# Patient Record
Sex: MALE | Race: WHITE | Employment: OTHER
[De-identification: names, ages, dates, MRNs, and addresses within clinical notes are randomized per-mention and may not be internally consistent; named-entity substitution may affect disease eponyms.]

---

## 2017-01-06 DIAGNOSIS — M54.50 LOW BACK PAIN WITHOUT SCIATICA, UNSPECIFIED BACK PAIN LATERALITY, UNSPECIFIED CHRONICITY: ICD-10-CM

## 2017-01-06 DIAGNOSIS — F41.9 ANXIETY: ICD-10-CM

## 2017-01-10 RX ORDER — CLONAZEPAM 0.5 MG/1
0.5 TABLET ORAL 2 TIMES DAILY PRN
Qty: 60 TABLET | Refills: 0 | Status: SHIPPED | OUTPATIENT
Start: 2017-01-10 | End: 2017-02-10 | Stop reason: SDUPTHER

## 2017-01-10 RX ORDER — HYDROCODONE BITARTRATE AND ACETAMINOPHEN 5; 325 MG/1; MG/1
1 TABLET ORAL DAILY PRN
Qty: 30 TABLET | Refills: 0 | Status: SHIPPED | OUTPATIENT
Start: 2017-01-10 | End: 2017-02-07 | Stop reason: SDUPTHER

## 2017-01-12 ENCOUNTER — OFFICE VISIT (OUTPATIENT)
Dept: INTERNAL MEDICINE | Facility: CLINIC | Age: 42
End: 2017-01-12

## 2017-01-12 VITALS
HEART RATE: 88 BPM | DIASTOLIC BLOOD PRESSURE: 80 MMHG | OXYGEN SATURATION: 96 % | SYSTOLIC BLOOD PRESSURE: 118 MMHG | BODY MASS INDEX: 43.12 KG/M2 | WEIGHT: 301.2 LBS | HEIGHT: 70 IN

## 2017-01-12 DIAGNOSIS — F39 MOOD DISORDER (HCC): ICD-10-CM

## 2017-01-12 DIAGNOSIS — E66.01 MORBID OBESITY DUE TO EXCESS CALORIES (HCC): ICD-10-CM

## 2017-01-12 DIAGNOSIS — I10 BENIGN ESSENTIAL HTN: Primary | ICD-10-CM

## 2017-01-12 PROCEDURE — 99214 OFFICE O/P EST MOD 30 MIN: CPT | Performed by: INTERNAL MEDICINE

## 2017-01-12 ASSESSMENT — PATIENT HEALTH QUESTIONNAIRE - PHQ9
SUM OF ALL RESPONSES TO PHQ9 QUESTIONS 1 & 2: 0
2. FEELING DOWN, DEPRESSED OR HOPELESS: 0
SUM OF ALL RESPONSES TO PHQ QUESTIONS 1-9: 0
1. LITTLE INTEREST OR PLEASURE IN DOING THINGS: 0

## 2017-02-01 RX ORDER — GUAIFENESIN 600 MG/1
600 TABLET, EXTENDED RELEASE ORAL 2 TIMES DAILY
Qty: 20 TABLET | Refills: 0 | Status: SHIPPED | OUTPATIENT
Start: 2017-02-01 | End: 2017-10-31 | Stop reason: ALTCHOICE

## 2017-02-07 DIAGNOSIS — F41.9 ANXIETY: ICD-10-CM

## 2017-02-07 DIAGNOSIS — M54.50 LOW BACK PAIN WITHOUT SCIATICA, UNSPECIFIED BACK PAIN LATERALITY, UNSPECIFIED CHRONICITY: ICD-10-CM

## 2017-02-07 RX ORDER — CLONAZEPAM 0.5 MG/1
0.5 TABLET ORAL 2 TIMES DAILY PRN
Qty: 60 TABLET | Refills: 0 | Status: CANCELLED | OUTPATIENT
Start: 2017-02-07

## 2017-02-10 DIAGNOSIS — F41.9 ANXIETY: ICD-10-CM

## 2017-02-10 DIAGNOSIS — L30.9 DERMATITIS: ICD-10-CM

## 2017-02-13 RX ORDER — CLONAZEPAM 0.5 MG/1
0.5 TABLET ORAL 2 TIMES DAILY PRN
Qty: 60 TABLET | Refills: 0 | Status: SHIPPED | OUTPATIENT
Start: 2017-02-13 | End: 2020-08-07 | Stop reason: SDUPTHER

## 2017-02-13 RX ORDER — HYDROCODONE BITARTRATE AND ACETAMINOPHEN 5; 325 MG/1; MG/1
1 TABLET ORAL DAILY PRN
Qty: 30 TABLET | Refills: 0 | Status: SHIPPED | OUTPATIENT
Start: 2017-02-13 | End: 2017-03-13 | Stop reason: SDUPTHER

## 2017-02-13 RX ORDER — KETOCONAZOLE 20 MG/G
CREAM TOPICAL
Qty: 30 G | Refills: 1 | Status: SHIPPED | OUTPATIENT
Start: 2017-02-13 | End: 2017-07-06 | Stop reason: SDUPTHER

## 2017-02-20 ENCOUNTER — OFFICE VISIT (OUTPATIENT)
Dept: INTERNAL MEDICINE | Facility: CLINIC | Age: 42
End: 2017-02-20

## 2017-02-20 VITALS
WEIGHT: 313 LBS | OXYGEN SATURATION: 96 % | DIASTOLIC BLOOD PRESSURE: 92 MMHG | SYSTOLIC BLOOD PRESSURE: 137 MMHG | HEART RATE: 87 BPM | BODY MASS INDEX: 44.91 KG/M2

## 2017-02-20 DIAGNOSIS — I10 BENIGN ESSENTIAL HTN: Primary | ICD-10-CM

## 2017-02-20 DIAGNOSIS — M54.50 LOW BACK PAIN WITHOUT SCIATICA, UNSPECIFIED BACK PAIN LATERALITY, UNSPECIFIED CHRONICITY: ICD-10-CM

## 2017-02-20 DIAGNOSIS — T14.8XXA SKIN EXCORIATION: ICD-10-CM

## 2017-02-20 DIAGNOSIS — Z20.2 STD EXPOSURE: ICD-10-CM

## 2017-02-20 PROCEDURE — 99214 OFFICE O/P EST MOD 30 MIN: CPT | Performed by: INTERNAL MEDICINE

## 2017-02-20 RX ORDER — ZOLPIDEM TARTRATE 10 MG/1
10 TABLET ORAL NIGHTLY PRN
Qty: 30 TABLET | Refills: 3 | Status: SHIPPED | OUTPATIENT
Start: 2017-02-20 | End: 2017-10-31 | Stop reason: ALTCHOICE

## 2017-02-20 RX ORDER — BUPROPION HYDROCHLORIDE 150 MG/1
TABLET ORAL
Refills: 0 | COMMUNITY
Start: 2017-02-01 | End: 2017-05-08 | Stop reason: ALTCHOICE

## 2017-02-20 RX ORDER — GABAPENTIN 600 MG/1
600 TABLET ORAL 3 TIMES DAILY
Qty: 90 TABLET | Refills: 3 | Status: SHIPPED | OUTPATIENT
Start: 2017-02-20 | End: 2017-06-19 | Stop reason: SDUPTHER

## 2017-02-20 RX ORDER — CLOBETASOL PROPIONATE 0.5 MG/G
CREAM TOPICAL
Qty: 15 G | Refills: 0 | Status: SHIPPED | OUTPATIENT
Start: 2017-02-20 | End: 2017-04-13

## 2017-02-20 RX ORDER — LITHIUM CARBONATE 300 MG/1
CAPSULE ORAL
Refills: 0 | COMMUNITY
Start: 2017-02-01 | End: 2017-05-08 | Stop reason: ALTCHOICE

## 2017-02-20 RX ORDER — GABAPENTIN 600 MG/1
600 TABLET ORAL 3 TIMES DAILY
Qty: 90 TABLET | Refills: 3 | Status: CANCELLED | OUTPATIENT
Start: 2017-02-20

## 2017-03-13 DIAGNOSIS — M54.50 LOW BACK PAIN WITHOUT SCIATICA, UNSPECIFIED BACK PAIN LATERALITY, UNSPECIFIED CHRONICITY: ICD-10-CM

## 2017-03-14 RX ORDER — HYDROCODONE BITARTRATE AND ACETAMINOPHEN 5; 325 MG/1; MG/1
1 TABLET ORAL DAILY PRN
Qty: 30 TABLET | Refills: 0 | Status: SHIPPED | OUTPATIENT
Start: 2017-03-14 | End: 2017-04-13 | Stop reason: SDUPTHER

## 2017-03-14 RX ORDER — MELOXICAM 15 MG/1
15 TABLET ORAL DAILY
Qty: 30 TABLET | Refills: 3 | Status: SHIPPED | OUTPATIENT
Start: 2017-03-14 | End: 2017-08-21 | Stop reason: SDUPTHER

## 2017-04-13 DIAGNOSIS — M54.50 LOW BACK PAIN WITHOUT SCIATICA, UNSPECIFIED BACK PAIN LATERALITY, UNSPECIFIED CHRONICITY: ICD-10-CM

## 2017-04-13 RX ORDER — CLOBETASOL PROPIONATE 0.5 MG/G
OINTMENT TOPICAL
Qty: 1 TUBE | Refills: 5 | Status: SHIPPED | OUTPATIENT
Start: 2017-04-13 | End: 2017-06-29 | Stop reason: SDUPTHER

## 2017-04-13 RX ORDER — HYDROCODONE BITARTRATE AND ACETAMINOPHEN 5; 325 MG/1; MG/1
1 TABLET ORAL DAILY PRN
Qty: 30 TABLET | Refills: 0 | Status: SHIPPED | OUTPATIENT
Start: 2017-04-13 | End: 2017-05-08 | Stop reason: SDUPTHER

## 2017-05-08 ENCOUNTER — HOSPITAL ENCOUNTER (OUTPATIENT)
Age: 42
Setting detail: SPECIMEN
Discharge: HOME | End: 2017-05-08
Payer: COMMERCIAL

## 2017-05-08 ENCOUNTER — OFFICE VISIT (OUTPATIENT)
Dept: INTERNAL MEDICINE | Age: 42
End: 2017-05-08
Payer: COMMERCIAL

## 2017-05-08 VITALS
BODY MASS INDEX: 45.2 KG/M2 | SYSTOLIC BLOOD PRESSURE: 133 MMHG | HEART RATE: 85 BPM | OXYGEN SATURATION: 94 % | DIASTOLIC BLOOD PRESSURE: 90 MMHG | WEIGHT: 315 LBS

## 2017-05-08 DIAGNOSIS — I10 BENIGN ESSENTIAL HTN: Primary | ICD-10-CM

## 2017-05-08 DIAGNOSIS — Z72.0 TOBACCO ABUSE: ICD-10-CM

## 2017-05-08 DIAGNOSIS — E66.01 MORBID OBESITY DUE TO EXCESS CALORIES (HCC): ICD-10-CM

## 2017-05-08 DIAGNOSIS — Z13.1 ENCOUNTER FOR SCREENING FOR DIABETES MELLITUS: ICD-10-CM

## 2017-05-08 DIAGNOSIS — Z23 NEED FOR PROPHYLACTIC VACCINATION AGAINST STREPTOCOCCUS PNEUMONIAE (PNEUMOCOCCUS): ICD-10-CM

## 2017-05-08 DIAGNOSIS — M54.50 LOW BACK PAIN WITHOUT SCIATICA, UNSPECIFIED BACK PAIN LATERALITY, UNSPECIFIED CHRONICITY: ICD-10-CM

## 2017-05-08 LAB — HBA1C MFR BLD: 5 %

## 2017-05-08 PROCEDURE — 90732 PPSV23 VACC 2 YRS+ SUBQ/IM: CPT | Performed by: INTERNAL MEDICINE

## 2017-05-08 PROCEDURE — 83036 HEMOGLOBIN GLYCOSYLATED A1C: CPT | Performed by: INTERNAL MEDICINE

## 2017-05-08 PROCEDURE — 90471 IMMUNIZATION ADMIN: CPT | Performed by: INTERNAL MEDICINE

## 2017-05-08 PROCEDURE — 99214 OFFICE O/P EST MOD 30 MIN: CPT | Performed by: INTERNAL MEDICINE

## 2017-05-08 RX ORDER — RISPERIDONE 1 MG/1
2 TABLET, FILM COATED ORAL 2 TIMES DAILY
Refills: 0 | COMMUNITY
Start: 2017-02-22 | End: 2017-06-19 | Stop reason: ALTCHOICE

## 2017-05-08 RX ORDER — HYDROCODONE BITARTRATE AND ACETAMINOPHEN 5; 325 MG/1; MG/1
1 TABLET ORAL DAILY PRN
Qty: 30 TABLET | Refills: 0 | Status: SHIPPED | OUTPATIENT
Start: 2017-05-08 | End: 2017-06-19 | Stop reason: SDUPTHER

## 2017-05-08 RX ORDER — HYDROCHLOROTHIAZIDE 25 MG/1
25 TABLET ORAL DAILY
Qty: 30 TABLET | Refills: 3 | Status: SHIPPED | OUTPATIENT
Start: 2017-05-08 | End: 2017-09-04 | Stop reason: SDUPTHER

## 2017-05-08 RX ORDER — VALSARTAN 160 MG/1
160 TABLET ORAL DAILY
Qty: 30 TABLET | Refills: 5 | Status: SHIPPED | OUTPATIENT
Start: 2017-05-08 | End: 2017-06-19 | Stop reason: SDUPTHER

## 2017-05-11 LAB
6-ACETYLMORPHINE, UR: NOT DETECTED
7-AMINOCLONAZEPAM, URINE: NOT DETECTED
ALPHA-OH-ALPRAZ, URINE: NOT DETECTED
ALPRAZOLAM, URINE: NOT DETECTED
AMPHETAMINES, URINE: NOT DETECTED
BARBITURATES, URINE: NOT DETECTED
BENZOYLECGONINE, UR: PRESENT
BUPRENORPHINE URINE: NOT DETECTED
CARISOPRODOL, UR: NOT DETECTED
CLONAZEPAM, URINE: NOT DETECTED
CODEINE, URINE: NOT DETECTED
CREATININE URINE: 169.8 MG/DL (ref 20–400)
DIAZEPAM, URINE: NOT DETECTED
DRUGS EXPECTED, UR: NORMAL
EER HI RES INTERP UR: NORMAL
ETHYL GLUCURONIDE UR: PRESENT
FENTANYL URINE: NOT DETECTED
HYDROCODONE, URINE: PRESENT
HYDROMORPHONE, URINE: NOT DETECTED
LORAZEPAM, URINE: NOT DETECTED
MARIJUANA METAB, UR: NOT DETECTED
MDA, UR: NOT DETECTED
MDEA, EVE, UR: NOT DETECTED
MDMA URINE: NOT DETECTED
MEPERIDINE METAB, UR: NOT DETECTED
METHADONE, URINE: NOT DETECTED
METHAMPHETAMINE, URINE: NOT DETECTED
METHYLPHENIDATE: NOT DETECTED
MIDAZOLAM, URINE: NOT DETECTED
MORPHINE URINE: NOT DETECTED
NORBUPRENORPHINE, URINE: NOT DETECTED
NORDIAZEPAM, URINE: NOT DETECTED
NORFENTANYL, URINE: NOT DETECTED
NORHYDROCODONE, URINE: PRESENT
NOROXYCODONE, URINE: NOT DETECTED
NOROXYMORPHONE, URINE: NOT DETECTED
OXAZEPAM, URINE: NOT DETECTED
OXYCODONE URINE: NOT DETECTED
OXYMORPHONE, URINE: NOT DETECTED
PAIN MANAGEMENT DRUG PANEL INTERP, URINE: NORMAL
PAIN MGT DRUG PANEL, HI RES, UR: NORMAL
PCP,URINE: NOT DETECTED
PHENTERMINE, UR: NOT DETECTED
PROPOXYPHENE, URINE: NOT DETECTED
TAPENTADOL, URINE: NOT DETECTED
TAPENTADOL-O-SULFATE, URINE: NOT DETECTED
TEMAZEPAM, URINE: NOT DETECTED
TRAMADOL, URINE: NOT DETECTED
ZOLPIDEM, URINE: NOT DETECTED

## 2017-06-19 ENCOUNTER — OFFICE VISIT (OUTPATIENT)
Dept: INTERNAL MEDICINE | Age: 42
End: 2017-06-19
Payer: COMMERCIAL

## 2017-06-19 VITALS
WEIGHT: 315 LBS | DIASTOLIC BLOOD PRESSURE: 94 MMHG | BODY MASS INDEX: 44.1 KG/M2 | OXYGEN SATURATION: 96 % | SYSTOLIC BLOOD PRESSURE: 136 MMHG | HEART RATE: 87 BPM | HEIGHT: 71 IN

## 2017-06-19 DIAGNOSIS — Z72.0 TOBACCO ABUSE: Primary | ICD-10-CM

## 2017-06-19 DIAGNOSIS — I10 BENIGN ESSENTIAL HTN: ICD-10-CM

## 2017-06-19 DIAGNOSIS — M54.50 LOW BACK PAIN WITHOUT SCIATICA, UNSPECIFIED BACK PAIN LATERALITY, UNSPECIFIED CHRONICITY: ICD-10-CM

## 2017-06-19 PROCEDURE — 99214 OFFICE O/P EST MOD 30 MIN: CPT | Performed by: INTERNAL MEDICINE

## 2017-06-19 RX ORDER — GABAPENTIN 600 MG/1
600 TABLET ORAL 3 TIMES DAILY
Qty: 90 TABLET | Refills: 3 | Status: SHIPPED | OUTPATIENT
Start: 2017-06-19 | End: 2017-10-31 | Stop reason: SDUPTHER

## 2017-06-19 RX ORDER — HYDROCODONE BITARTRATE AND ACETAMINOPHEN 5; 325 MG/1; MG/1
1 TABLET ORAL DAILY PRN
Qty: 30 TABLET | Refills: 0 | Status: CANCELLED | OUTPATIENT
Start: 2017-06-19

## 2017-06-19 RX ORDER — ARIPIPRAZOLE 10 MG/1
20 TABLET ORAL DAILY
COMMUNITY
End: 2018-06-07 | Stop reason: ALTCHOICE

## 2017-06-19 RX ORDER — VALSARTAN 320 MG/1
320 TABLET ORAL DAILY
Qty: 30 TABLET | Refills: 5 | Status: SHIPPED | OUTPATIENT
Start: 2017-06-19 | End: 2017-10-31 | Stop reason: ALTCHOICE

## 2017-06-19 RX ORDER — HYDROCODONE BITARTRATE AND ACETAMINOPHEN 5; 325 MG/1; MG/1
1 TABLET ORAL DAILY PRN
Qty: 30 TABLET | Refills: 0 | Status: SHIPPED | OUTPATIENT
Start: 2017-06-19 | End: 2017-07-19 | Stop reason: SDUPTHER

## 2017-06-19 ASSESSMENT — ENCOUNTER SYMPTOMS
PHOTOPHOBIA: 0
ABDOMINAL DISTENTION: 0
CHEST TIGHTNESS: 0
ABDOMINAL PAIN: 0
APNEA: 0
BACK PAIN: 0

## 2017-06-29 RX ORDER — CLOBETASOL PROPIONATE 0.5 MG/G
OINTMENT TOPICAL
Qty: 1 TUBE | Refills: 5 | Status: SHIPPED | OUTPATIENT
Start: 2017-06-29 | End: 2018-03-22 | Stop reason: ALTCHOICE

## 2017-07-06 DIAGNOSIS — L30.9 DERMATITIS: ICD-10-CM

## 2017-07-10 RX ORDER — KETOCONAZOLE 20 MG/G
CREAM TOPICAL
Qty: 30 G | Refills: 2 | Status: SHIPPED | OUTPATIENT
Start: 2017-07-10 | End: 2017-11-17 | Stop reason: SDUPTHER

## 2017-07-19 DIAGNOSIS — M54.50 LOW BACK PAIN WITHOUT SCIATICA, UNSPECIFIED BACK PAIN LATERALITY, UNSPECIFIED CHRONICITY: ICD-10-CM

## 2017-07-20 RX ORDER — HYDROCODONE BITARTRATE AND ACETAMINOPHEN 5; 325 MG/1; MG/1
1 TABLET ORAL DAILY PRN
Qty: 30 TABLET | Refills: 0 | Status: SHIPPED | OUTPATIENT
Start: 2017-07-20 | End: 2017-08-21 | Stop reason: SDUPTHER

## 2017-08-21 DIAGNOSIS — M54.50 LOW BACK PAIN WITHOUT SCIATICA, UNSPECIFIED BACK PAIN LATERALITY, UNSPECIFIED CHRONICITY: ICD-10-CM

## 2017-08-21 RX ORDER — MELOXICAM 15 MG/1
TABLET ORAL
Qty: 30 TABLET | Refills: 3 | Status: SHIPPED | OUTPATIENT
Start: 2017-08-21 | End: 2017-11-15 | Stop reason: ALTCHOICE

## 2017-08-21 RX ORDER — HYDROCODONE BITARTRATE AND ACETAMINOPHEN 5; 325 MG/1; MG/1
TABLET ORAL
Qty: 30 TABLET | Refills: 0 | Status: SHIPPED | OUTPATIENT
Start: 2017-08-21 | End: 2017-10-31 | Stop reason: SDUPTHER

## 2017-09-04 DIAGNOSIS — I10 BENIGN ESSENTIAL HTN: ICD-10-CM

## 2017-09-05 RX ORDER — HYDROCHLOROTHIAZIDE 25 MG/1
TABLET ORAL
Qty: 30 TABLET | Refills: 3 | Status: SHIPPED | OUTPATIENT
Start: 2017-09-05 | End: 2017-10-31 | Stop reason: ALTCHOICE

## 2017-09-22 ENCOUNTER — HOSPITAL ENCOUNTER (OUTPATIENT)
Age: 42
Setting detail: SPECIMEN
Discharge: HOME | End: 2017-09-22
Payer: COMMERCIAL

## 2017-09-22 LAB
-: NORMAL
ABSOLUTE EOS #: 0.1 K/UL (ref 0–0.4)
ABSOLUTE LYMPH #: 2.5 K/UL (ref 1–4.8)
ABSOLUTE MONO #: 1.1 K/UL (ref 0.2–0.8)
AMORPHOUS: NORMAL
BACTERIA: NORMAL
BASOPHILS # BLD: 1 %
BASOPHILS ABSOLUTE: 0.1 K/UL (ref 0–0.2)
BILIRUBIN URINE: NEGATIVE
C DIFFICILE TOXINS, PCR: NORMAL
CASTS UA: NORMAL /LPF (ref 0–8)
COLOR: YELLOW
COMMENT UA: ABNORMAL
CRYSTALS, UA: NORMAL /HPF
DIFFERENTIAL TYPE: ABNORMAL
EOSINOPHILS RELATIVE PERCENT: 1 %
EPITHELIAL CELLS UA: NORMAL /HPF (ref 0–5)
GLUCOSE URINE: NEGATIVE
HCT VFR BLD CALC: 42.4 % (ref 41–53)
HEMOGLOBIN: 13.8 G/DL (ref 13.5–17.5)
INR BLD: 1
KETONES, URINE: NEGATIVE
LEUKOCYTE ESTERASE, URINE: ABNORMAL
LYMPHOCYTES # BLD: 16 %
MAGNESIUM: 2.3 MG/DL (ref 1.6–2.6)
MCH RBC QN AUTO: 29.8 PG (ref 26–34)
MCHC RBC AUTO-ENTMCNC: 32.5 G/DL (ref 31–37)
MCV RBC AUTO: 91.7 FL (ref 80–100)
MONOCYTES # BLD: 7 %
MRSA, DNA, NASAL: NORMAL
MUCUS: NORMAL
NITRITE, URINE: NEGATIVE
OTHER OBSERVATIONS UA: NORMAL
PARTIAL THROMBOPLASTIN TIME: 27.3 SEC (ref 23–31)
PDW BLD-RTO: 14.6 % (ref 11.5–14.5)
PH UA: 5.5 (ref 5–8)
PLATELET # BLD: 203 K/UL (ref 130–400)
PLATELET ESTIMATE: ABNORMAL
PMV BLD AUTO: 9.8 FL (ref 6–12)
PROTEIN UA: NEGATIVE
PROTHROMBIN TIME: 10 SEC (ref 9.7–11.6)
RBC # BLD: 4.62 M/UL (ref 4.5–5.9)
RBC # BLD: ABNORMAL 10*6/UL
RBC UA: NORMAL /HPF (ref 0–4)
RENAL EPITHELIAL, UA: NORMAL /HPF
SEG NEUTROPHILS: 75 %
SEGMENTED NEUTROPHILS ABSOLUTE COUNT: 11.8 K/UL (ref 1.8–7.7)
SPECIFIC GRAVITY UA: 1.03 (ref 1–1.03)
SPECIMEN DESCRIPTION: NORMAL
SPECIMEN DESCRIPTION: NORMAL
TRICHOMONAS: NORMAL
TURBIDITY: CLEAR
URINE HGB: NEGATIVE
UROBILINOGEN, URINE: NORMAL
WBC # BLD: 15.6 K/UL (ref 3.5–11)
WBC # BLD: ABNORMAL 10*3/UL
WBC UA: NORMAL /HPF (ref 0–5)
YEAST: NORMAL

## 2017-09-22 PROCEDURE — 86403 PARTICLE AGGLUT ANTBDY SCRN: CPT

## 2017-09-22 PROCEDURE — 87641 MR-STAPH DNA AMP PROBE: CPT

## 2017-09-22 PROCEDURE — 81001 URINALYSIS AUTO W/SCOPE: CPT

## 2017-09-22 PROCEDURE — 83735 ASSAY OF MAGNESIUM: CPT

## 2017-09-22 PROCEDURE — 87186 SC STD MICRODIL/AGAR DIL: CPT

## 2017-09-22 PROCEDURE — 85025 COMPLETE CBC W/AUTO DIFF WBC: CPT

## 2017-09-22 PROCEDURE — 85610 PROTHROMBIN TIME: CPT

## 2017-09-22 PROCEDURE — 87081 CULTURE SCREEN ONLY: CPT

## 2017-09-22 PROCEDURE — 87086 URINE CULTURE/COLONY COUNT: CPT

## 2017-09-22 PROCEDURE — 87070 CULTURE OTHR SPECIMN AEROBIC: CPT

## 2017-09-22 PROCEDURE — 85730 THROMBOPLASTIN TIME PARTIAL: CPT

## 2017-09-22 PROCEDURE — 87493 C DIFF AMPLIFIED PROBE: CPT

## 2017-09-22 PROCEDURE — 87205 SMEAR GRAM STAIN: CPT

## 2017-09-22 PROCEDURE — 36415 COLL VENOUS BLD VENIPUNCTURE: CPT

## 2017-09-23 LAB
CULTURE: NO GROWTH
CULTURE: NORMAL
Lab: NORMAL
SPECIMEN DESCRIPTION: NORMAL
STATUS: NORMAL

## 2017-09-24 ENCOUNTER — HOSPITAL ENCOUNTER (OUTPATIENT)
Age: 42
Setting detail: SPECIMEN
Discharge: HOME | End: 2017-09-24
Payer: COMMERCIAL

## 2017-09-24 LAB
CULTURE: ABNORMAL
CULTURE: NORMAL
DIRECT EXAM: ABNORMAL
HCT VFR BLD CALC: 44.8 % (ref 41–53)
HEMOGLOBIN: 14.5 G/DL (ref 13.5–17.5)
Lab: ABNORMAL
Lab: ABNORMAL
Lab: NORMAL
MCH RBC QN AUTO: 29.9 PG (ref 26–34)
MCHC RBC AUTO-ENTMCNC: 32.3 G/DL (ref 31–37)
MCV RBC AUTO: 92.5 FL (ref 80–100)
ORGANISM: ABNORMAL
PDW BLD-RTO: 13.9 % (ref 11.5–14.5)
PLATELET # BLD: 208 K/UL (ref 130–400)
PMV BLD AUTO: 9.6 FL (ref 6–12)
RBC # BLD: 4.84 M/UL (ref 4.5–5.9)
SPECIMEN DESCRIPTION: ABNORMAL
SPECIMEN DESCRIPTION: ABNORMAL
SPECIMEN DESCRIPTION: NORMAL
STATUS: ABNORMAL
STATUS: NORMAL
STATUS: NORMAL
WBC # BLD: 12.1 K/UL (ref 3.5–11)

## 2017-09-24 PROCEDURE — 85027 COMPLETE CBC AUTOMATED: CPT

## 2017-09-24 PROCEDURE — 36415 COLL VENOUS BLD VENIPUNCTURE: CPT

## 2017-09-28 ENCOUNTER — HOSPITAL ENCOUNTER (OUTPATIENT)
Age: 42
Setting detail: SPECIMEN
Discharge: HOME | End: 2017-09-28
Payer: COMMERCIAL

## 2017-09-29 ENCOUNTER — HOSPITAL ENCOUNTER (OUTPATIENT)
Age: 42
Setting detail: SPECIMEN
Discharge: HOME | End: 2017-09-29
Payer: COMMERCIAL

## 2017-09-29 LAB
ABSOLUTE EOS #: 0.3 K/UL (ref 0–0.4)
ABSOLUTE LYMPH #: 1.3 K/UL (ref 1–4.8)
ABSOLUTE MONO #: 0.6 K/UL (ref 0.2–0.8)
ANION GAP SERPL CALCULATED.3IONS-SCNC: 15 MMOL/L (ref 9–17)
BASOPHILS # BLD: 0 %
BASOPHILS ABSOLUTE: 0 K/UL (ref 0–0.2)
BUN BLDV-MCNC: 31 MG/DL (ref 6–20)
BUN/CREAT BLD: 39 (ref 9–20)
CALCIUM SERPL-MCNC: 9.7 MG/DL (ref 8.6–10.4)
CHLORIDE BLD-SCNC: 108 MMOL/L (ref 98–107)
CO2: 24 MMOL/L (ref 20–31)
CREAT SERPL-MCNC: 0.79 MG/DL (ref 0.7–1.2)
DIFFERENTIAL TYPE: ABNORMAL
EOSINOPHILS RELATIVE PERCENT: 2 %
GFR AFRICAN AMERICAN: >60 ML/MIN
GFR NON-AFRICAN AMERICAN: >60 ML/MIN
GFR SERPL CREATININE-BSD FRML MDRD: ABNORMAL ML/MIN/{1.73_M2}
GFR SERPL CREATININE-BSD FRML MDRD: ABNORMAL ML/MIN/{1.73_M2}
GLUCOSE BLD-MCNC: 126 MG/DL (ref 70–99)
HCT VFR BLD CALC: 43.7 % (ref 41–53)
HEMOGLOBIN: 14.6 G/DL (ref 13.5–17.5)
INR BLD: 1
LYMPHOCYTES # BLD: 12 %
MCH RBC QN AUTO: 30.2 PG (ref 26–34)
MCHC RBC AUTO-ENTMCNC: 33.3 G/DL (ref 31–37)
MCV RBC AUTO: 90.8 FL (ref 80–100)
MONOCYTES # BLD: 6 %
PARTIAL THROMBOPLASTIN TIME: 28.2 SEC (ref 23–31)
PDW BLD-RTO: 13.4 % (ref 11.5–14.5)
PLATELET # BLD: 180 K/UL (ref 130–400)
PLATELET ESTIMATE: ABNORMAL
PMV BLD AUTO: ABNORMAL FL (ref 6–12)
POTASSIUM SERPL-SCNC: 3.7 MMOL/L (ref 3.7–5.3)
PROTHROMBIN TIME: 10.6 SEC (ref 9.7–11.6)
RBC # BLD: 4.82 M/UL (ref 4.5–5.9)
RBC # BLD: ABNORMAL 10*6/UL
SEG NEUTROPHILS: 80 %
SEGMENTED NEUTROPHILS ABSOLUTE COUNT: 8.5 K/UL (ref 1.8–7.7)
SODIUM BLD-SCNC: 147 MMOL/L (ref 135–144)
WBC # BLD: 10.7 K/UL (ref 3.5–11)
WBC # BLD: ABNORMAL 10*3/UL

## 2017-09-29 PROCEDURE — 85730 THROMBOPLASTIN TIME PARTIAL: CPT

## 2017-09-29 PROCEDURE — 87081 CULTURE SCREEN ONLY: CPT

## 2017-09-29 PROCEDURE — 36415 COLL VENOUS BLD VENIPUNCTURE: CPT

## 2017-09-29 PROCEDURE — 87077 CULTURE AEROBIC IDENTIFY: CPT

## 2017-09-29 PROCEDURE — 85610 PROTHROMBIN TIME: CPT

## 2017-09-29 PROCEDURE — 85025 COMPLETE CBC W/AUTO DIFF WBC: CPT

## 2017-09-29 PROCEDURE — 87641 MR-STAPH DNA AMP PROBE: CPT

## 2017-09-29 PROCEDURE — 80048 BASIC METABOLIC PNL TOTAL CA: CPT

## 2017-09-30 ENCOUNTER — HOSPITAL ENCOUNTER (OUTPATIENT)
Age: 42
Setting detail: SPECIMEN
Discharge: HOME | End: 2017-09-30
Payer: COMMERCIAL

## 2017-09-30 LAB
CULTURE: ABNORMAL
CULTURE: NORMAL
Lab: ABNORMAL
Lab: ABNORMAL
Lab: NORMAL
Lab: NORMAL
MRSA, DNA, NASAL: ABNORMAL
SPECIMEN DESCRIPTION: ABNORMAL
SPECIMEN DESCRIPTION: NORMAL
SPECIMEN DESCRIPTION: NORMAL
STATUS: ABNORMAL
STATUS: NORMAL

## 2017-09-30 PROCEDURE — 80053 COMPREHEN METABOLIC PANEL: CPT

## 2017-09-30 PROCEDURE — 85027 COMPLETE CBC AUTOMATED: CPT

## 2017-09-30 PROCEDURE — 36415 COLL VENOUS BLD VENIPUNCTURE: CPT

## 2017-10-01 ENCOUNTER — HOSPITAL ENCOUNTER (OUTPATIENT)
Age: 42
Setting detail: SPECIMEN
Discharge: HOME | End: 2017-10-01
Payer: COMMERCIAL

## 2017-10-01 LAB
ALBUMIN SERPL-MCNC: 3.3 G/DL (ref 3.5–5.2)
ALBUMIN/GLOBULIN RATIO: ABNORMAL (ref 1–2.5)
ALP BLD-CCNC: 63 U/L (ref 40–129)
ALT SERPL-CCNC: 39 U/L (ref 5–41)
ANION GAP SERPL CALCULATED.3IONS-SCNC: 15 MMOL/L (ref 9–17)
AST SERPL-CCNC: 18 U/L
BILIRUB SERPL-MCNC: 0.5 MG/DL (ref 0.3–1.2)
BUN BLDV-MCNC: 32 MG/DL (ref 6–20)
BUN/CREAT BLD: 40 (ref 9–20)
CALCIUM SERPL-MCNC: 10.1 MG/DL (ref 8.6–10.4)
CHLORIDE BLD-SCNC: 104 MMOL/L (ref 98–107)
CO2: 29 MMOL/L (ref 20–31)
CREAT SERPL-MCNC: 0.8 MG/DL (ref 0.7–1.2)
CULTURE: NORMAL
GFR AFRICAN AMERICAN: >60 ML/MIN
GFR NON-AFRICAN AMERICAN: >60 ML/MIN
GFR SERPL CREATININE-BSD FRML MDRD: ABNORMAL ML/MIN/{1.73_M2}
GFR SERPL CREATININE-BSD FRML MDRD: ABNORMAL ML/MIN/{1.73_M2}
GLUCOSE BLD-MCNC: 121 MG/DL (ref 70–99)
HCT VFR BLD CALC: 40.5 % (ref 41–53)
HCT VFR BLD CALC: 43.6 % (ref 41–53)
HEMOGLOBIN: 13.4 G/DL (ref 13.5–17.5)
HEMOGLOBIN: 14.3 G/DL (ref 13.5–17.5)
Lab: NORMAL
MCH RBC QN AUTO: 30 PG (ref 26–34)
MCH RBC QN AUTO: 30.5 PG (ref 26–34)
MCHC RBC AUTO-ENTMCNC: 32.8 G/DL (ref 31–37)
MCHC RBC AUTO-ENTMCNC: 33.1 G/DL (ref 31–37)
MCV RBC AUTO: 91.6 FL (ref 80–100)
MCV RBC AUTO: 91.9 FL (ref 80–100)
PDW BLD-RTO: 13.6 % (ref 11.5–14.5)
PDW BLD-RTO: 13.9 % (ref 11.5–14.5)
PLATELET # BLD: 141 K/UL (ref 130–400)
PLATELET # BLD: 89 K/UL (ref 130–400)
PMV BLD AUTO: ABNORMAL FL (ref 6–12)
PMV BLD AUTO: NORMAL FL (ref 6–12)
POTASSIUM SERPL-SCNC: 4.1 MMOL/L (ref 3.7–5.3)
RBC # BLD: 4.41 M/UL (ref 4.5–5.9)
RBC # BLD: 4.77 M/UL (ref 4.5–5.9)
SODIUM BLD-SCNC: 148 MMOL/L (ref 135–144)
SPECIMEN DESCRIPTION: NORMAL
STATUS: NORMAL
STATUS: NORMAL
TOTAL PROTEIN: 7.3 G/DL (ref 6.4–8.3)
WBC # BLD: 7.9 K/UL (ref 3.5–11)
WBC # BLD: 8.2 K/UL (ref 3.5–11)

## 2017-10-01 PROCEDURE — 85027 COMPLETE CBC AUTOMATED: CPT

## 2017-10-01 PROCEDURE — 36415 COLL VENOUS BLD VENIPUNCTURE: CPT

## 2017-10-02 ENCOUNTER — HOSPITAL ENCOUNTER (OUTPATIENT)
Age: 42
Setting detail: SPECIMEN
Discharge: HOME | End: 2017-10-02
Payer: COMMERCIAL

## 2017-10-02 LAB
ANION GAP SERPL CALCULATED.3IONS-SCNC: 9 MMOL/L (ref 9–17)
BUN BLDV-MCNC: 22 MG/DL (ref 6–20)
BUN/CREAT BLD: 32 (ref 9–20)
CALCIUM SERPL-MCNC: 9.3 MG/DL (ref 8.6–10.4)
CHLORIDE BLD-SCNC: 105 MMOL/L (ref 98–107)
CO2: 31 MMOL/L (ref 20–31)
CREAT SERPL-MCNC: 0.68 MG/DL (ref 0.7–1.2)
GFR AFRICAN AMERICAN: >60 ML/MIN
GFR NON-AFRICAN AMERICAN: >60 ML/MIN
GFR SERPL CREATININE-BSD FRML MDRD: ABNORMAL ML/MIN/{1.73_M2}
GFR SERPL CREATININE-BSD FRML MDRD: ABNORMAL ML/MIN/{1.73_M2}
GLUCOSE BLD-MCNC: 108 MG/DL (ref 70–99)
POTASSIUM SERPL-SCNC: 3.6 MMOL/L (ref 3.7–5.3)
SODIUM BLD-SCNC: 145 MMOL/L (ref 135–144)

## 2017-10-02 PROCEDURE — 80048 BASIC METABOLIC PNL TOTAL CA: CPT

## 2017-10-02 PROCEDURE — 36415 COLL VENOUS BLD VENIPUNCTURE: CPT

## 2017-10-03 ENCOUNTER — HOSPITAL ENCOUNTER (OUTPATIENT)
Age: 42
Setting detail: SPECIMEN
Discharge: HOME | End: 2017-10-03
Payer: COMMERCIAL

## 2017-10-03 LAB
ANION GAP SERPL CALCULATED.3IONS-SCNC: 14 MMOL/L (ref 9–17)
BUN BLDV-MCNC: 18 MG/DL (ref 6–20)
BUN/CREAT BLD: 29 (ref 9–20)
CALCIUM SERPL-MCNC: 9.2 MG/DL (ref 8.6–10.4)
CHLORIDE BLD-SCNC: 97 MMOL/L (ref 98–107)
CO2: 28 MMOL/L (ref 20–31)
CREAT SERPL-MCNC: 0.62 MG/DL (ref 0.7–1.2)
GFR AFRICAN AMERICAN: >60 ML/MIN
GFR NON-AFRICAN AMERICAN: >60 ML/MIN
GFR SERPL CREATININE-BSD FRML MDRD: ABNORMAL ML/MIN/{1.73_M2}
GFR SERPL CREATININE-BSD FRML MDRD: ABNORMAL ML/MIN/{1.73_M2}
GLUCOSE BLD-MCNC: 91 MG/DL (ref 70–99)
HCT VFR BLD CALC: 36.2 % (ref 41–53)
HEMOGLOBIN: 12.2 G/DL (ref 13.5–17.5)
MCH RBC QN AUTO: 30.1 PG (ref 26–34)
MCHC RBC AUTO-ENTMCNC: 33.6 G/DL (ref 31–37)
MCV RBC AUTO: 89.6 FL (ref 80–100)
PDW BLD-RTO: 13.6 % (ref 11.5–14.5)
PLATELET # BLD: 161 K/UL (ref 130–400)
PMV BLD AUTO: 10 FL (ref 6–12)
POTASSIUM SERPL-SCNC: 3.7 MMOL/L (ref 3.7–5.3)
RBC # BLD: 4.04 M/UL (ref 4.5–5.9)
SODIUM BLD-SCNC: 139 MMOL/L (ref 135–144)
WBC # BLD: 6.2 K/UL (ref 3.5–11)

## 2017-10-03 PROCEDURE — 36415 COLL VENOUS BLD VENIPUNCTURE: CPT

## 2017-10-03 PROCEDURE — 85027 COMPLETE CBC AUTOMATED: CPT

## 2017-10-03 PROCEDURE — 80048 BASIC METABOLIC PNL TOTAL CA: CPT

## 2017-10-04 ENCOUNTER — HOSPITAL ENCOUNTER (OUTPATIENT)
Age: 42
Setting detail: SPECIMEN
Discharge: HOME | End: 2017-10-04
Payer: COMMERCIAL

## 2017-10-04 LAB
AMYLASE: 136 U/L (ref 28–100)
ANION GAP SERPL CALCULATED.3IONS-SCNC: 10 MMOL/L (ref 9–17)
BUN BLDV-MCNC: 11 MG/DL (ref 6–20)
BUN/CREAT BLD: 18 (ref 9–20)
CALCIUM SERPL-MCNC: 8.8 MG/DL (ref 8.6–10.4)
CHLORIDE BLD-SCNC: 102 MMOL/L (ref 98–107)
CO2: 29 MMOL/L (ref 20–31)
CREAT SERPL-MCNC: 0.6 MG/DL (ref 0.7–1.2)
GFR AFRICAN AMERICAN: >60 ML/MIN
GFR NON-AFRICAN AMERICAN: >60 ML/MIN
GFR SERPL CREATININE-BSD FRML MDRD: ABNORMAL ML/MIN/{1.73_M2}
GFR SERPL CREATININE-BSD FRML MDRD: ABNORMAL ML/MIN/{1.73_M2}
GLUCOSE BLD-MCNC: 116 MG/DL (ref 70–99)
LIPASE: 243 U/L (ref 13–60)
MAGNESIUM: 1.7 MG/DL (ref 1.6–2.6)
POTASSIUM SERPL-SCNC: 3.2 MMOL/L (ref 3.7–5.3)
SODIUM BLD-SCNC: 141 MMOL/L (ref 135–144)

## 2017-10-04 PROCEDURE — 80048 BASIC METABOLIC PNL TOTAL CA: CPT

## 2017-10-04 PROCEDURE — 83690 ASSAY OF LIPASE: CPT

## 2017-10-04 PROCEDURE — 83735 ASSAY OF MAGNESIUM: CPT

## 2017-10-04 PROCEDURE — 82150 ASSAY OF AMYLASE: CPT

## 2017-10-05 ENCOUNTER — HOSPITAL ENCOUNTER (OUTPATIENT)
Age: 42
Setting detail: SPECIMEN
Discharge: HOME | End: 2017-10-05
Payer: COMMERCIAL

## 2017-10-05 LAB
AMYLASE: 130 U/L (ref 28–100)
ANION GAP SERPL CALCULATED.3IONS-SCNC: 11 MMOL/L (ref 9–17)
BUN BLDV-MCNC: 8 MG/DL (ref 6–20)
BUN/CREAT BLD: 12 (ref 9–20)
CALCIUM SERPL-MCNC: 8.8 MG/DL (ref 8.6–10.4)
CHLORIDE BLD-SCNC: 104 MMOL/L (ref 98–107)
CO2: 27 MMOL/L (ref 20–31)
CREAT SERPL-MCNC: 0.67 MG/DL (ref 0.7–1.2)
GFR AFRICAN AMERICAN: >60 ML/MIN
GFR NON-AFRICAN AMERICAN: >60 ML/MIN
GFR SERPL CREATININE-BSD FRML MDRD: ABNORMAL ML/MIN/{1.73_M2}
GFR SERPL CREATININE-BSD FRML MDRD: ABNORMAL ML/MIN/{1.73_M2}
GLUCOSE BLD-MCNC: 109 MG/DL (ref 70–99)
HCT VFR BLD CALC: 36 % (ref 41–53)
HEMOGLOBIN: 12.2 G/DL (ref 13.5–17.5)
LIPASE: 242 U/L (ref 13–60)
MAGNESIUM: 1.6 MG/DL (ref 1.6–2.6)
MCH RBC QN AUTO: 29.9 PG (ref 26–34)
MCHC RBC AUTO-ENTMCNC: 33.9 G/DL (ref 31–37)
MCV RBC AUTO: 88.1 FL (ref 80–100)
PDW BLD-RTO: 13.1 % (ref 11.5–14.5)
PLATELET # BLD: 192 K/UL (ref 130–400)
PMV BLD AUTO: ABNORMAL FL (ref 6–12)
POTASSIUM SERPL-SCNC: 3.6 MMOL/L (ref 3.7–5.3)
RBC # BLD: 4.08 M/UL (ref 4.5–5.9)
SODIUM BLD-SCNC: 142 MMOL/L (ref 135–144)
WBC # BLD: 6.4 K/UL (ref 3.5–11)

## 2017-10-05 PROCEDURE — 80048 BASIC METABOLIC PNL TOTAL CA: CPT

## 2017-10-05 PROCEDURE — 82150 ASSAY OF AMYLASE: CPT

## 2017-10-05 PROCEDURE — 83735 ASSAY OF MAGNESIUM: CPT

## 2017-10-05 PROCEDURE — 83690 ASSAY OF LIPASE: CPT

## 2017-10-05 PROCEDURE — 85027 COMPLETE CBC AUTOMATED: CPT

## 2017-10-06 ENCOUNTER — HOSPITAL ENCOUNTER (OUTPATIENT)
Age: 42
Setting detail: SPECIMEN
Discharge: HOME | End: 2017-10-06
Payer: COMMERCIAL

## 2017-10-06 LAB
AMYLASE: 102 U/L (ref 28–100)
ANION GAP SERPL CALCULATED.3IONS-SCNC: 10 MMOL/L (ref 9–17)
BUN BLDV-MCNC: 6 MG/DL (ref 6–20)
BUN/CREAT BLD: 10 (ref 9–20)
CALCIUM SERPL-MCNC: 9 MG/DL (ref 8.6–10.4)
CHLORIDE BLD-SCNC: 102 MMOL/L (ref 98–107)
CO2: 26 MMOL/L (ref 20–31)
CREAT SERPL-MCNC: 0.61 MG/DL (ref 0.7–1.2)
GFR AFRICAN AMERICAN: >60 ML/MIN
GFR NON-AFRICAN AMERICAN: >60 ML/MIN
GFR SERPL CREATININE-BSD FRML MDRD: ABNORMAL ML/MIN/{1.73_M2}
GFR SERPL CREATININE-BSD FRML MDRD: ABNORMAL ML/MIN/{1.73_M2}
GLUCOSE BLD-MCNC: 98 MG/DL (ref 70–99)
LIPASE: 171 U/L (ref 13–60)
MAGNESIUM: 1.5 MG/DL (ref 1.6–2.6)
POTASSIUM SERPL-SCNC: 3.5 MMOL/L (ref 3.7–5.3)
SODIUM BLD-SCNC: 138 MMOL/L (ref 135–144)

## 2017-10-06 PROCEDURE — 83735 ASSAY OF MAGNESIUM: CPT

## 2017-10-06 PROCEDURE — 82150 ASSAY OF AMYLASE: CPT

## 2017-10-06 PROCEDURE — 83690 ASSAY OF LIPASE: CPT

## 2017-10-06 PROCEDURE — 80048 BASIC METABOLIC PNL TOTAL CA: CPT

## 2017-10-07 ENCOUNTER — HOSPITAL ENCOUNTER (OUTPATIENT)
Age: 42
Setting detail: SPECIMEN
Discharge: HOME | End: 2017-10-07
Payer: COMMERCIAL

## 2017-10-07 LAB
ANION GAP SERPL CALCULATED.3IONS-SCNC: 11 MMOL/L (ref 9–17)
BUN BLDV-MCNC: 4 MG/DL (ref 6–20)
BUN/CREAT BLD: 6 (ref 9–20)
CALCIUM SERPL-MCNC: 8.6 MG/DL (ref 8.6–10.4)
CHLORIDE BLD-SCNC: 101 MMOL/L (ref 98–107)
CO2: 28 MMOL/L (ref 20–31)
CREAT SERPL-MCNC: 0.65 MG/DL (ref 0.7–1.2)
GFR AFRICAN AMERICAN: >60 ML/MIN
GFR NON-AFRICAN AMERICAN: >60 ML/MIN
GFR SERPL CREATININE-BSD FRML MDRD: ABNORMAL ML/MIN/{1.73_M2}
GFR SERPL CREATININE-BSD FRML MDRD: ABNORMAL ML/MIN/{1.73_M2}
GLUCOSE BLD-MCNC: 90 MG/DL (ref 70–99)
HCT VFR BLD CALC: 35.5 % (ref 41–53)
HEMOGLOBIN: 11.6 G/DL (ref 13.5–17.5)
MAGNESIUM: 1.6 MG/DL (ref 1.6–2.6)
MCH RBC QN AUTO: 29 PG (ref 26–34)
MCHC RBC AUTO-ENTMCNC: 32.8 G/DL (ref 31–37)
MCV RBC AUTO: 88.4 FL (ref 80–100)
PDW BLD-RTO: 13.2 % (ref 11.5–14.5)
PLATELET # BLD: 223 K/UL (ref 130–400)
PMV BLD AUTO: ABNORMAL FL (ref 6–12)
POTASSIUM SERPL-SCNC: 3.6 MMOL/L (ref 3.7–5.3)
RBC # BLD: 4.01 M/UL (ref 4.5–5.9)
SODIUM BLD-SCNC: 140 MMOL/L (ref 135–144)
WBC # BLD: 7 K/UL (ref 3.5–11)

## 2017-10-07 PROCEDURE — 85027 COMPLETE CBC AUTOMATED: CPT

## 2017-10-07 PROCEDURE — 36415 COLL VENOUS BLD VENIPUNCTURE: CPT

## 2017-10-07 PROCEDURE — 80048 BASIC METABOLIC PNL TOTAL CA: CPT

## 2017-10-07 PROCEDURE — 83735 ASSAY OF MAGNESIUM: CPT

## 2017-10-09 ENCOUNTER — HOSPITAL ENCOUNTER (OUTPATIENT)
Age: 42
Setting detail: SPECIMEN
Discharge: HOME | End: 2017-10-09
Payer: COMMERCIAL

## 2017-10-09 LAB
HCT VFR BLD CALC: 34.2 % (ref 41–53)
HEMOGLOBIN: 11.4 G/DL (ref 13.5–17.5)
MCH RBC QN AUTO: 29.8 PG (ref 26–34)
MCHC RBC AUTO-ENTMCNC: 33.4 G/DL (ref 31–37)
MCV RBC AUTO: 89.2 FL (ref 80–100)
PDW BLD-RTO: 14.1 % (ref 11.5–14.5)
PLATELET # BLD: 212 K/UL (ref 130–400)
PMV BLD AUTO: 9.7 FL (ref 6–12)
RBC # BLD: 3.84 M/UL (ref 4.5–5.9)
WBC # BLD: 9.8 K/UL (ref 3.5–11)

## 2017-10-09 PROCEDURE — 85027 COMPLETE CBC AUTOMATED: CPT

## 2017-10-09 PROCEDURE — 36415 COLL VENOUS BLD VENIPUNCTURE: CPT

## 2017-10-10 ENCOUNTER — HOSPITAL ENCOUNTER (OUTPATIENT)
Age: 42
Setting detail: SPECIMEN
Discharge: HOME | End: 2017-10-10
Payer: COMMERCIAL

## 2017-10-10 LAB
ANION GAP SERPL CALCULATED.3IONS-SCNC: 12 MMOL/L (ref 9–17)
BUN BLDV-MCNC: 11 MG/DL (ref 6–20)
BUN/CREAT BLD: 19 (ref 9–20)
CALCIUM SERPL-MCNC: 9.2 MG/DL (ref 8.6–10.4)
CHLORIDE BLD-SCNC: 97 MMOL/L (ref 98–107)
CO2: 28 MMOL/L (ref 20–31)
CREAT SERPL-MCNC: 0.58 MG/DL (ref 0.7–1.2)
GFR AFRICAN AMERICAN: >60 ML/MIN
GFR NON-AFRICAN AMERICAN: >60 ML/MIN
GFR SERPL CREATININE-BSD FRML MDRD: ABNORMAL ML/MIN/{1.73_M2}
GFR SERPL CREATININE-BSD FRML MDRD: ABNORMAL ML/MIN/{1.73_M2}
GLUCOSE BLD-MCNC: 99 MG/DL (ref 70–99)
POTASSIUM SERPL-SCNC: 3.9 MMOL/L (ref 3.7–5.3)
SODIUM BLD-SCNC: 137 MMOL/L (ref 135–144)

## 2017-10-10 PROCEDURE — 80048 BASIC METABOLIC PNL TOTAL CA: CPT

## 2017-10-11 ENCOUNTER — HOSPITAL ENCOUNTER (OUTPATIENT)
Age: 42
Setting detail: SPECIMEN
Discharge: HOME | End: 2017-10-11
Payer: COMMERCIAL

## 2017-10-11 LAB
ANION GAP SERPL CALCULATED.3IONS-SCNC: 13 MMOL/L (ref 9–17)
BUN BLDV-MCNC: 12 MG/DL (ref 6–20)
BUN/CREAT BLD: 24 (ref 9–20)
CALCIUM SERPL-MCNC: 9.1 MG/DL (ref 8.6–10.4)
CHLORIDE BLD-SCNC: 97 MMOL/L (ref 98–107)
CO2: 28 MMOL/L (ref 20–31)
CREAT SERPL-MCNC: 0.51 MG/DL (ref 0.7–1.2)
GFR AFRICAN AMERICAN: >60 ML/MIN
GFR NON-AFRICAN AMERICAN: >60 ML/MIN
GFR SERPL CREATININE-BSD FRML MDRD: ABNORMAL ML/MIN/{1.73_M2}
GFR SERPL CREATININE-BSD FRML MDRD: ABNORMAL ML/MIN/{1.73_M2}
GLUCOSE BLD-MCNC: 114 MG/DL (ref 70–99)
HCT VFR BLD CALC: 34.1 % (ref 41–53)
HEMOGLOBIN: 11.4 G/DL (ref 13.5–17.5)
MAGNESIUM: 1.7 MG/DL (ref 1.6–2.6)
MCH RBC QN AUTO: 29.8 PG (ref 26–34)
MCHC RBC AUTO-ENTMCNC: 33.5 G/DL (ref 31–37)
MCV RBC AUTO: 89.1 FL (ref 80–100)
PDW BLD-RTO: 14 % (ref 11.5–14.5)
PLATELET # BLD: 212 K/UL (ref 130–400)
PMV BLD AUTO: 9.7 FL (ref 6–12)
POTASSIUM SERPL-SCNC: 3.9 MMOL/L (ref 3.7–5.3)
RBC # BLD: 3.82 M/UL (ref 4.5–5.9)
SODIUM BLD-SCNC: 138 MMOL/L (ref 135–144)
WBC # BLD: 7.8 K/UL (ref 3.5–11)

## 2017-10-11 PROCEDURE — 83735 ASSAY OF MAGNESIUM: CPT

## 2017-10-11 PROCEDURE — 85027 COMPLETE CBC AUTOMATED: CPT

## 2017-10-11 PROCEDURE — 80048 BASIC METABOLIC PNL TOTAL CA: CPT

## 2017-10-13 ENCOUNTER — HOSPITAL ENCOUNTER (OUTPATIENT)
Age: 42
Setting detail: SPECIMEN
Discharge: HOME | End: 2017-10-13
Payer: COMMERCIAL

## 2017-10-13 LAB
HCT VFR BLD CALC: 40.2 % (ref 41–53)
HEMOGLOBIN: 13.3 G/DL (ref 13.5–17.5)
MCH RBC QN AUTO: 30 PG (ref 26–34)
MCHC RBC AUTO-ENTMCNC: 33.1 G/DL (ref 31–37)
MCV RBC AUTO: 90.7 FL (ref 80–100)
PDW BLD-RTO: 13.8 % (ref 11.5–14.5)
PLATELET # BLD: 194 K/UL (ref 130–400)
PMV BLD AUTO: 9.2 FL (ref 6–12)
RBC # BLD: 4.43 M/UL (ref 4.5–5.9)
WBC # BLD: 9.5 K/UL (ref 3.5–11)

## 2017-10-13 PROCEDURE — 85027 COMPLETE CBC AUTOMATED: CPT

## 2017-10-15 ENCOUNTER — HOSPITAL ENCOUNTER (OUTPATIENT)
Age: 42
Setting detail: SPECIMEN
Discharge: HOME | End: 2017-10-15
Payer: COMMERCIAL

## 2017-10-15 LAB
HCT VFR BLD CALC: 34.9 % (ref 41–53)
HEMOGLOBIN: 11.7 G/DL (ref 13.5–17.5)
MCH RBC QN AUTO: 30 PG (ref 26–34)
MCHC RBC AUTO-ENTMCNC: 33.5 G/DL (ref 31–37)
MCV RBC AUTO: 89.5 FL (ref 80–100)
PDW BLD-RTO: 14.1 % (ref 11.5–14.5)
PLATELET # BLD: 218 K/UL (ref 130–400)
PMV BLD AUTO: 9 FL (ref 6–12)
RBC # BLD: 3.9 M/UL (ref 4.5–5.9)
WBC # BLD: 9.4 K/UL (ref 3.5–11)

## 2017-10-15 PROCEDURE — 85027 COMPLETE CBC AUTOMATED: CPT

## 2017-10-16 ENCOUNTER — HOSPITAL ENCOUNTER (OUTPATIENT)
Age: 42
Setting detail: SPECIMEN
Discharge: HOME | End: 2017-10-16
Payer: COMMERCIAL

## 2017-10-16 LAB
ABSOLUTE EOS #: 0.1 K/UL (ref 0–0.4)
ABSOLUTE LYMPH #: 2.3 K/UL (ref 1–4.8)
ABSOLUTE MONO #: 0.7 K/UL (ref 0.2–0.8)
ALBUMIN SERPL-MCNC: 3.2 G/DL (ref 3.5–5.2)
ALBUMIN/GLOBULIN RATIO: ABNORMAL (ref 1–2.5)
ALP BLD-CCNC: 67 U/L (ref 40–129)
ALT SERPL-CCNC: 47 U/L (ref 5–41)
ANION GAP SERPL CALCULATED.3IONS-SCNC: 12 MMOL/L (ref 9–17)
AST SERPL-CCNC: 23 U/L
BASOPHILS # BLD: 1 %
BASOPHILS ABSOLUTE: 0.1 K/UL (ref 0–0.2)
BILIRUB SERPL-MCNC: 0.25 MG/DL (ref 0.3–1.2)
BUN BLDV-MCNC: 10 MG/DL (ref 6–20)
BUN/CREAT BLD: 15 (ref 9–20)
CALCIUM SERPL-MCNC: 9 MG/DL (ref 8.6–10.4)
CHLORIDE BLD-SCNC: 102 MMOL/L (ref 98–107)
CO2: 27 MMOL/L (ref 20–31)
CREAT SERPL-MCNC: 0.66 MG/DL (ref 0.7–1.2)
DIFFERENTIAL TYPE: ABNORMAL
EOSINOPHILS RELATIVE PERCENT: 1 %
GFR AFRICAN AMERICAN: >60 ML/MIN
GFR NON-AFRICAN AMERICAN: >60 ML/MIN
GFR SERPL CREATININE-BSD FRML MDRD: ABNORMAL ML/MIN/{1.73_M2}
GFR SERPL CREATININE-BSD FRML MDRD: ABNORMAL ML/MIN/{1.73_M2}
GLUCOSE BLD-MCNC: 89 MG/DL (ref 70–99)
HCT VFR BLD CALC: 35.6 % (ref 41–53)
HEMOGLOBIN: 11.8 G/DL (ref 13.5–17.5)
INR BLD: 1
LYMPHOCYTES # BLD: 22 %
MCH RBC QN AUTO: 29.7 PG (ref 26–34)
MCHC RBC AUTO-ENTMCNC: 33 G/DL (ref 31–37)
MCV RBC AUTO: 89.7 FL (ref 80–100)
MONOCYTES # BLD: 6 %
PDW BLD-RTO: 13.9 % (ref 11.5–14.5)
PLATELET # BLD: 225 K/UL (ref 130–400)
PLATELET ESTIMATE: ABNORMAL
PMV BLD AUTO: 9.6 FL (ref 6–12)
POTASSIUM SERPL-SCNC: 4.1 MMOL/L (ref 3.7–5.3)
PROTHROMBIN TIME: 10.4 SEC (ref 9.7–11.6)
RBC # BLD: 3.96 M/UL (ref 4.5–5.9)
RBC # BLD: ABNORMAL 10*6/UL
SEG NEUTROPHILS: 70 %
SEGMENTED NEUTROPHILS ABSOLUTE COUNT: 7.4 K/UL (ref 1.8–7.7)
SODIUM BLD-SCNC: 141 MMOL/L (ref 135–144)
TOTAL PROTEIN: 5.9 G/DL (ref 6.4–8.3)
WBC # BLD: 10.6 K/UL (ref 3.5–11)
WBC # BLD: ABNORMAL 10*3/UL

## 2017-10-16 PROCEDURE — 85610 PROTHROMBIN TIME: CPT

## 2017-10-16 PROCEDURE — 80053 COMPREHEN METABOLIC PANEL: CPT

## 2017-10-16 PROCEDURE — 85025 COMPLETE CBC W/AUTO DIFF WBC: CPT

## 2017-10-17 ENCOUNTER — HOSPITAL ENCOUNTER (OUTPATIENT)
Age: 42
Setting detail: SPECIMEN
Discharge: HOME | End: 2017-10-17
Payer: COMMERCIAL

## 2017-10-17 LAB
HCT VFR BLD CALC: 35.1 % (ref 41–53)
HEMOGLOBIN: 11.8 G/DL (ref 13.5–17.5)
MCH RBC QN AUTO: 30.2 PG (ref 26–34)
MCHC RBC AUTO-ENTMCNC: 33.7 G/DL (ref 31–37)
MCV RBC AUTO: 89.6 FL (ref 80–100)
PDW BLD-RTO: 14.1 % (ref 11.5–14.5)
PLATELET # BLD: 227 K/UL (ref 130–400)
PMV BLD AUTO: 8.9 FL (ref 6–12)
RBC # BLD: 3.92 M/UL (ref 4.5–5.9)
WBC # BLD: 9.9 K/UL (ref 3.5–11)

## 2017-10-17 PROCEDURE — 85027 COMPLETE CBC AUTOMATED: CPT

## 2017-10-31 ENCOUNTER — OFFICE VISIT (OUTPATIENT)
Dept: INTERNAL MEDICINE | Age: 42
End: 2017-10-31
Payer: COMMERCIAL

## 2017-10-31 VITALS
HEART RATE: 117 BPM | WEIGHT: 303 LBS | HEIGHT: 71 IN | SYSTOLIC BLOOD PRESSURE: 130 MMHG | OXYGEN SATURATION: 95 % | TEMPERATURE: 99 F | DIASTOLIC BLOOD PRESSURE: 96 MMHG | BODY MASS INDEX: 42.42 KG/M2

## 2017-10-31 DIAGNOSIS — J18.9 SEVERE PNEUMONIA: ICD-10-CM

## 2017-10-31 DIAGNOSIS — S99.912D INJURY OF LEFT ANKLE, SUBSEQUENT ENCOUNTER: ICD-10-CM

## 2017-10-31 DIAGNOSIS — M79.89 LEG SWELLING: ICD-10-CM

## 2017-10-31 DIAGNOSIS — Z09 HOSPITAL DISCHARGE FOLLOW-UP: Primary | ICD-10-CM

## 2017-10-31 DIAGNOSIS — I10 BENIGN ESSENTIAL HTN: ICD-10-CM

## 2017-10-31 DIAGNOSIS — Z87.09 H/O ACUTE RESPIRATORY FAILURE: ICD-10-CM

## 2017-10-31 DIAGNOSIS — M54.50 LOW BACK PAIN WITHOUT SCIATICA, UNSPECIFIED BACK PAIN LATERALITY, UNSPECIFIED CHRONICITY: ICD-10-CM

## 2017-10-31 PROCEDURE — G8482 FLU IMMUNIZE ORDER/ADMIN: HCPCS | Performed by: FAMILY MEDICINE

## 2017-10-31 PROCEDURE — 99214 OFFICE O/P EST MOD 30 MIN: CPT | Performed by: FAMILY MEDICINE

## 2017-10-31 PROCEDURE — 4004F PT TOBACCO SCREEN RCVD TLK: CPT | Performed by: FAMILY MEDICINE

## 2017-10-31 PROCEDURE — G8417 CALC BMI ABV UP PARAM F/U: HCPCS | Performed by: FAMILY MEDICINE

## 2017-10-31 PROCEDURE — G8427 DOCREV CUR MEDS BY ELIG CLIN: HCPCS | Performed by: FAMILY MEDICINE

## 2017-10-31 RX ORDER — HYDROCODONE BITARTRATE AND ACETAMINOPHEN 5; 325 MG/1; MG/1
1 TABLET ORAL DAILY PRN
Qty: 30 TABLET | Refills: 0 | Status: SHIPPED | OUTPATIENT
Start: 2017-10-31 | End: 2017-11-30 | Stop reason: SDUPTHER

## 2017-10-31 RX ORDER — LISINOPRIL 2.5 MG/1
2.5 TABLET ORAL DAILY
Qty: 30 TABLET | Refills: 3 | Status: SHIPPED | OUTPATIENT
Start: 2017-10-31 | End: 2018-02-28 | Stop reason: SDUPTHER

## 2017-10-31 RX ORDER — BUMETANIDE 1 MG/1
1 TABLET ORAL DAILY
COMMUNITY
End: 2017-10-31 | Stop reason: SDUPTHER

## 2017-10-31 RX ORDER — BUMETANIDE 1 MG/1
1 TABLET ORAL 2 TIMES DAILY
Qty: 60 TABLET | Refills: 2 | Status: SHIPPED | OUTPATIENT
Start: 2017-10-31 | End: 2018-01-29 | Stop reason: SDUPTHER

## 2017-10-31 RX ORDER — GABAPENTIN 600 MG/1
600 TABLET ORAL 3 TIMES DAILY
Qty: 90 TABLET | Refills: 3 | Status: SHIPPED | OUTPATIENT
Start: 2017-10-31 | End: 2018-03-05 | Stop reason: SDUPTHER

## 2017-10-31 RX ORDER — CARVEDILOL 12.5 MG/1
12.5 TABLET ORAL 2 TIMES DAILY WITH MEALS
COMMUNITY
End: 2017-11-17

## 2017-10-31 ASSESSMENT — ENCOUNTER SYMPTOMS
NAUSEA: 0
COUGH: 0
VOMITING: 0
CONSTIPATION: 0
WHEEZING: 0
DIARRHEA: 0
SHORTNESS OF BREATH: 0

## 2017-10-31 NOTE — PROGRESS NOTES
Subjective:      Patient ID: Rama Gonzalez is a 35 y.o. male. HPI patient was hospitalized in September. He was admitted for severe pna, and spesis at [facility]. Feels better except feels his lower legs feel swollen. Was on ventilator and then trach was placed. Xiong Finder was removed a week before discharge. He was discharged on oct 17. Has home health care and PTOT currently. Has a peg tube, which will be removed in 3 days by Dr. Orin Freedman at [facility]. He state on the day of his admission, he was disoriented and fallen down hurting his left ankle. His mom called EMS. His ankle was swollen and bruised when he went to hospital. xrays were negative. He state that it is hard to bear weight on left ankle and he has been limping. B/L leg swelling: Hes on bumex 1mg daily for lower leg swelling, started on 1/13 while he was hospitalized. Recent Echo with EF of 45% and mild global hypokinesia. denies any chest pain, shortness of breath, pnd, orthopnea, leg swelling, palpitations etc    Denies any sob and cough after discharge from hospital.     Review of Systems   Constitutional: Negative for chills and fever. HENT: Negative for congestion. Respiratory: Negative for cough, shortness of breath and wheezing. Cardiovascular: Positive for leg swelling. Negative for chest pain and palpitations. Gastrointestinal: Negative for constipation, diarrhea, nausea and vomiting. Skin: Negative for rash. Neurological: Negative for dizziness and headaches. BP (!) 130/96   Pulse 117   Temp 99 °F (37.2 °C)   Ht 5' 11\" (1.803 m)   Wt (!) 303 lb (137.4 kg)   SpO2 95%   BMI 42.26 kg/m²       Objective:   Physical Exam   Constitutional: No distress. obese   Neck: Neck supple. No thyromegaly present. Cardiovascular: Normal rate, regular rhythm, normal heart sounds and intact distal pulses. No murmur heard. Pulmonary/Chest: Effort normal and breath sounds normal. No respiratory distress.  He has no

## 2017-11-01 ENCOUNTER — TELEPHONE (OUTPATIENT)
Dept: INTERNAL MEDICINE | Age: 42
End: 2017-11-01

## 2017-11-13 ENCOUNTER — HOSPITAL ENCOUNTER (OUTPATIENT)
Dept: MRI IMAGING | Facility: CLINIC | Age: 42
Discharge: HOME | End: 2017-11-13
Payer: COMMERCIAL

## 2017-11-13 DIAGNOSIS — S82.892D CLOSED FRACTURE OF LEFT ANKLE WITH ROUTINE HEALING, SUBSEQUENT ENCOUNTER: Primary | ICD-10-CM

## 2017-11-13 DIAGNOSIS — S99.912D INJURY OF LEFT ANKLE, SUBSEQUENT ENCOUNTER: ICD-10-CM

## 2017-11-13 PROCEDURE — 73721 MRI JNT OF LWR EXTRE W/O DYE: CPT

## 2017-11-15 ENCOUNTER — OFFICE VISIT (OUTPATIENT)
Dept: PODIATRY | Age: 42
End: 2017-11-15
Payer: COMMERCIAL

## 2017-11-15 VITALS
HEIGHT: 70 IN | BODY MASS INDEX: 42.23 KG/M2 | TEMPERATURE: 98.1 F | WEIGHT: 295 LBS | HEART RATE: 95 BPM | SYSTOLIC BLOOD PRESSURE: 120 MMHG | DIASTOLIC BLOOD PRESSURE: 90 MMHG

## 2017-11-15 DIAGNOSIS — M25.572 CHRONIC PAIN OF LEFT ANKLE: ICD-10-CM

## 2017-11-15 DIAGNOSIS — B35.3 TINEA PEDIS OF BOTH FEET: ICD-10-CM

## 2017-11-15 DIAGNOSIS — S82.892A ANKLE FRACTURE, LEFT, CLOSED, INITIAL ENCOUNTER: Primary | ICD-10-CM

## 2017-11-15 DIAGNOSIS — G89.29 CHRONIC PAIN OF LEFT ANKLE: ICD-10-CM

## 2017-11-15 PROCEDURE — 4004F PT TOBACCO SCREEN RCVD TLK: CPT | Performed by: PODIATRIST

## 2017-11-15 PROCEDURE — G8427 DOCREV CUR MEDS BY ELIG CLIN: HCPCS | Performed by: PODIATRIST

## 2017-11-15 PROCEDURE — 99203 OFFICE O/P NEW LOW 30 MIN: CPT | Performed by: PODIATRIST

## 2017-11-15 PROCEDURE — G8417 CALC BMI ABV UP PARAM F/U: HCPCS | Performed by: PODIATRIST

## 2017-11-15 PROCEDURE — G8482 FLU IMMUNIZE ORDER/ADMIN: HCPCS | Performed by: PODIATRIST

## 2017-11-15 NOTE — PROGRESS NOTES
Patient instructed to remove shoes and socks, instructed to sit in exam chair. Current PCP name is Dr Carlos Reyes and date of last visit 10/31/2017. Do you have a follow up visit scheduled?   no

## 2017-11-15 NOTE — PROGRESS NOTES
Podiatry Clinic Note    Subjective:   Dante Richards is a 38 y.o. male who presents to the office today complaining of pain in his left ankle. Patient states that he does not remember a specific injury. Patient states that he remembers walking on the side of his ankle but it was at the time when he had pneumonia. Patient states an ambulance took him to the hospital and he was septic and they did a tracheotomy and he was ventilated. Patient states that he was there for almost two months. Patient states that he was discharged on 10/17/17. Patient states that since then he is still having pain in his ankle. Patient states that it has been really swollen. Patient states that he has been walking in a tennis shoe. Patient states that they got an xray and it was negative. Patient states that they did get an MRI and he doesn't know the results. Currently denies F/C/N/V. Past Medical History:   Diagnosis Date    Anxiety     Back pain     Hypertension        Prior to Admission medications    Medication Sig Start Date End Date Taking? Authorizing Provider   Ketoconazole 2 % GEL Apply to affected areas daily. 11/15/17  Yes Shweta Campos DPM   carvedilol (COREG) 12.5 MG tablet Take 12.5 mg by mouth 2 times daily (with meals)   Yes Historical Provider, MD   bumetanide (BUMEX) 1 MG tablet Take 1 tablet by mouth 2 times daily 10/31/17  Yes Franklin Cunningham MD   lisinopril (ZESTRIL) 2.5 MG tablet Take 1 tablet by mouth daily 10/31/17  Yes Franklin Cunningham MD   gabapentin (NEURONTIN) 600 MG tablet Take 1 tablet by mouth 3 times daily 10/31/17  Yes Franklin Cunningham MD   HYDROcodone-acetaminophen (NORCO) 5-325 MG per tablet Take 1 tablet by mouth daily as needed for Pain . 10/31/17  Yes Franklin Cunningham MD   ketoconazole (NIZORAL) 2 % cream Apply topically daily. 7/10/17  Yes Suni Ellis MD   clobetasol (TEMOVATE) 0.05 % ointment Apply topically 2 times daily.  6/29/17  Yes Suni Ellis MD   ARIPiprazole (ABILIFY) 10 MG tablet Take 10 mg by mouth daily   Yes Historical Provider, MD   Potassium (POTASSIMIN PO) Take 350 tablets by mouth daily    Historical Provider, MD   clonazePAM (KLONOPIN) 0.5 MG tablet Take 1 tablet by mouth 2 times daily as needed for Anxiety 2/13/17   Patricia Berumen MD       No past surgical history on file. No family history on file. Social History   Substance Use Topics    Smoking status: Current Every Day Smoker     Packs/day: 0.50     Types: Cigarettes    Smokeless tobacco: Never Used    Alcohol use Yes      Comment: social       No Known Allergies      Review of Systems: All 12 systems reviewed and pertinent positives noted above. Lower Extremity Physical Examination:   Vitals:   Vitals:    11/15/17 1325   BP: (!) 120/90   Pulse:    Temp:      General: AAO x 3 in NAD. Integument: Warm, dry, supple bilaterally. No HPKs, interdigital macerations, or ulcerations present, bilaterally. Nails are thickened, elongated, with subungual debris to digits 1-5, bilaterally. Erythema and xerotic scaling plaque in a moccasin like distribution, bilateral feet. Vascular: DP / PT pulses palpable 2/4 bilaterally. CFT < 3 seconds to digits 1-5 bilaterally. Hair growth present to the level of the digits bilaterally. Mil non-pitting edema noted to lower extremity, left. No varicosities noted to the medial ankle, bilaterally. Neurological:  Sensation intact to 10/10 sites via 5.07 [id number]. Vibratory and proprioceptive sensation intact to the 1st MPJ, bilaterally. Musculoskeletal: Muscle strength 5/5 in all lower extremity muscle groups, right. Muscle strength 4/5 in all lower extremity muscle groups, left. Pain with ROM of the left ankle. POP of the medial and lateral ankle ligaments, right. POP of the medial malleolus, right.        Labs:   Lab Results   Component Value Date    LABA1C 5.0 05/08/2017     No results found for: EAG    Radiographs:   MRI LEFT ANKLE 11/13/17  Impression   Ligamentous injury of the ankle with tears of the anterior tibiofibular   ligament, anterior talofibular ligament, and deltoid ligament.       Suspected small avulsion fracture of the medial malleolus.       Mild tendinosis of the peroneal tendons.       Reactive edema versus high-grade bone marrow contusion within the talus.  No   discrete fracture line is seen. Asessment:   Steffanie Canela is a 38 y.o. yo male with   1. Ankle fracture, left, closed, initial encounter  XR ANKLE LEFT (MIN 3 VIEWS)   2. Grade 3 ankle sprain, left, initial encounter  XR ANKLE LEFT (MIN 3 VIEWS)   3. Chronic pain of left ankle     4. Tinea pedis of both feet       Plan:   · Patient examined and evaluated. · Current condition and treatment options discussed in detail. · RX for tinea pedis. · Reviewed MRI of left ankle. · Dispensed CAM boot. · Discussed with patient that we cannot prescribe him pain meds for a chronic injury. Patient needs to see his PCP. Cannot do NSAIDs since he is on blood thinners. · Standing XR of left ankle ordered. · Will attempt immobilization. If no improvement may need an ankle stabilization. · Return to clinic in 3 weeks.     Discussed with Dr. Johanny Flores    Electronically signed by Zeb Rasmussen DPM on 11/15/2017 at 3:10 PM

## 2017-11-16 ENCOUNTER — OFFICE VISIT (OUTPATIENT)
Dept: PULMONOLOGY | Age: 42
End: 2017-11-16
Payer: COMMERCIAL

## 2017-11-16 VITALS — BODY MASS INDEX: 42.37 KG/M2 | HEIGHT: 70 IN | OXYGEN SATURATION: 99 % | WEIGHT: 296 LBS | HEART RATE: 81 BPM

## 2017-11-16 VITALS
OXYGEN SATURATION: 99 % | RESPIRATION RATE: 18 BRPM | HEIGHT: 72 IN | SYSTOLIC BLOOD PRESSURE: 124 MMHG | DIASTOLIC BLOOD PRESSURE: 88 MMHG | BODY MASS INDEX: 40.09 KG/M2 | WEIGHT: 296 LBS | HEART RATE: 82 BPM

## 2017-11-16 DIAGNOSIS — G47.33 OSA (OBSTRUCTIVE SLEEP APNEA): ICD-10-CM

## 2017-11-16 DIAGNOSIS — J15.6 PNEUMONIA DUE TO OTHER AEROBIC GRAM-NEGATIVE BACTERIA, UNSPECIFIED LATERALITY, UNSPECIFIED PART OF LUNG (HCC): Primary | ICD-10-CM

## 2017-11-16 DIAGNOSIS — J96.00 ACUTE RESPIRATORY FAILURE, UNSPECIFIED WHETHER WITH HYPOXIA OR HYPERCAPNIA (HCC): ICD-10-CM

## 2017-11-16 DIAGNOSIS — E66.01 MORBID OBESITY DUE TO EXCESS CALORIES (HCC): ICD-10-CM

## 2017-11-16 DIAGNOSIS — R06.00 DYSPNEA, UNSPECIFIED TYPE: Primary | ICD-10-CM

## 2017-11-16 PROCEDURE — 94729 DIFFUSING CAPACITY: CPT | Performed by: INTERNAL MEDICINE

## 2017-11-16 PROCEDURE — 94726 PLETHYSMOGRAPHY LUNG VOLUMES: CPT | Performed by: INTERNAL MEDICINE

## 2017-11-16 PROCEDURE — G8427 DOCREV CUR MEDS BY ELIG CLIN: HCPCS | Performed by: INTERNAL MEDICINE

## 2017-11-16 PROCEDURE — G8482 FLU IMMUNIZE ORDER/ADMIN: HCPCS | Performed by: INTERNAL MEDICINE

## 2017-11-16 PROCEDURE — 99215 OFFICE O/P EST HI 40 MIN: CPT | Performed by: INTERNAL MEDICINE

## 2017-11-16 PROCEDURE — 4004F PT TOBACCO SCREEN RCVD TLK: CPT | Performed by: INTERNAL MEDICINE

## 2017-11-16 PROCEDURE — G8417 CALC BMI ABV UP PARAM F/U: HCPCS | Performed by: INTERNAL MEDICINE

## 2017-11-16 PROCEDURE — 94375 RESPIRATORY FLOW VOLUME LOOP: CPT | Performed by: INTERNAL MEDICINE

## 2017-11-16 RX ORDER — CALCIUM CARBONATE/VITAMIN D3 500-10/5ML
400 LIQUID (ML) ORAL
COMMUNITY
Start: 2017-11-15 | End: 2018-07-16 | Stop reason: ALTCHOICE

## 2017-11-16 NOTE — TELEPHONE ENCOUNTER
Next Visit Date:  Future Appointments  Date Time Provider Asmita Erwin   11/16/2017 2:30 PM SCHEDULE, [facility] SPEC Resp Spec [id number]   11/16/2017 3:00 PM Edith Null [facility]   12/6/2017 2:15 PM Nandini Prasad,  [address] Maintenance   Topic Date Due    Lipid screen  06/20/2021    DTaP/Tdap/Td vaccine (2 - Td) 06/06/2026    Flu vaccine  Completed    Pneumococcal med risk  Completed    HIV screen  Completed       Hemoglobin A1C (%)   Date Value   05/08/2017 5.0             ( goal A1C is < 7)   No results found for: LABMICR  LDL Cholesterol (mg/dL)   Date Value   06/20/2016 111       (goal LDL is <100)   AST (U/L)   Date Value   10/16/2017 23     ALT (U/L)   Date Value   10/16/2017 47 (H)     BUN (mg/dL)   Date Value   10/16/2017 10     BP Readings from Last 3 Encounters:   11/15/17 (!) 120/90   10/31/17 (!) 130/96   06/19/17 (!) 136/94          (goal 120/80)    All Future Testing planned in [facility]  Lab Frequency Next Occurrence   XR ANKLE LEFT (MIN 3 VIEWS) Once 11/15/2017               Patient Active Problem List:     Low back pain without sciatica     Morbid obesity due to excess calories (HCC)     Benign essential HTN     Tobacco abuse

## 2017-11-17 DIAGNOSIS — L30.9 DERMATITIS: ICD-10-CM

## 2017-11-17 RX ORDER — CARVEDILOL 3.12 MG/1
TABLET ORAL
Qty: 60 TABLET | Refills: 5 | Status: SHIPPED | OUTPATIENT
Start: 2017-11-17 | End: 2018-05-21 | Stop reason: SDUPTHER

## 2017-11-18 NOTE — PROGRESS NOTES
PULMONARY OP  PROGRESS NOTE      Patient:  Leatha Meyer  YOB: 1975    MRN: [id number]     Acct:        Pt seen and Chart reviewed. Mrs. Leatha Meyer is here in followup for   1. Pneumonia due to other aerobic gram-negative bacteria, unspecified laterality, unspecified part of lung ([facility] [address]    2. CHRIS (obstructive sleep apnea)    3. Morbid obesity due to excess calories ([facility] [address]    4. Acute respiratory failure, unspecified whether with hypoxia or hypercapnia [facility])        Patient has been doing well since last visit. Pt has not been hospitalized or been to er since last visit. Has been using meds as recommended. Denied any fevers or chills. No orthopnea or PND. No cough or sputum production. No hemoptysis. No loss of appetite or weight. Has snoring with apnea with fatigue and tiredness in the daytime. Patient likely has sleep apnea. Patient does not want sleep study as he does not want any treatment for sleep apnea.   He understands the consequences of not treating sleep apnea       Subjective:     Review of Systems -   General ROS: Completed and except as mentioned above were negative   Psychological ROS:  Completed and except as mentioned above were negative  Ophthalmic ROS:  Completed and except as mentioned above were negative  ENT ROS:  Completed and except as mentioned above were negative  Allergy and Immunology ROS:  Completed and except as mentioned above were negative  Hematological and Lymphatic ROS:  Completed and except as mentioned above were negative  Endocrine ROS: Completed and except as mentioned above were negative  Respiratory ROS:  Completed and except as mentioned above were negative  Cardiovascular ROS:  Completed and except as mentioned above were negative  Gastrointestinal ROS: Completed and except as mentioned above were negative  Genito-Urinary ROS:  Completed and except as mentioned above were is 40.14 kg/m². Physical Examination:   PHYSICAL EXAMINATION:  Vitals:    11/16/17 1452   BP: 124/88   Site: Right Arm   Position: Sitting   Cuff Size: Large Adult   Pulse: 82   Resp: 18   SpO2: 99%   Weight: 296 lb (134.3 kg)   Height: 6' (1.829 m)     Constitutional: This is a well developed, well nourished, Greater than 40 - Morbid Obesity / Extreme Obesity / Grade III 38y.o. year old male who is alert, oriented, cooperative and in no apparent distress. Head:normocephalic and atraumatic. EENT:   ER. No conjunctival injections. Septum was midline, mucosa was without erythema, exudates or cobblestoning. No thrush was noted. Mallampati III (soft palate, base of uvula visible)  Neck: Supple without thyromegaly. No elevated JVP. Trachea was midline. Respiratory: Chest was symmetrical without dullness to percussion. Breath sounds bilaterally were clear to auscultation. There were no wheezes, rhonchi or rales. There is no intercostal retraction or use of accessory muscles. No egophony noted. Cardiovascular: Regular without murmur, clicks, gallops or rubs. Abdomen: Slightly rounded and soft without organomegaly. No rebound tenderness, rigidity or guarding was appreciated. Lymphatic: No lymphadenopathy. Musculoskeletal: Normal curvature of the spine. No gross muscle weakness. Extremities:  No lower extremity edema, ulcerations, tenderness, varicosities or erythema. Muscle size, tone and strength are normal.  No involuntary movements are noted. Skin:  Warm and dry. Good color, turgor and pigmentation. No lesions or scars. Neurological/Psychiatric: The patient's general behavior, level of consciousness, thought content and emotional status is normal.       Labs:  Pulmonary function tests were normal        Assessment:    1. Pneumonia due to other aerobic gram-negative bacteria, unspecified laterality, unspecified part of lung ([address]    2. CHRIS (obstructive sleep apnea)    3.  Morbid obesity due to excess calories ([facility] [address]    4. Acute respiratory failure, unspecified whether with hypoxia or hypercapnia (HCC)          PLAN:    Refills were provided -none  Educated and clarified the medication use. Recommend flu vaccination in the fall annually. Patient had flu vaccination for the season  Recommendations given regarding pneumococcal vaccinations. Patient is up-to-date with vaccinations from pulmonary perspective. Maintain an active lifestyle. Questions  Patient had were answered to his satisfaction. Home O2 evaluation to be done. Supplemental oxygen was not needed. Smoking cessation was recommended  Pulmonary function tests were reviewed. Chest x-ray was reviewed/ordered a follow-up chest x-ray to ensure resolution of the pneumonia. Polysomnogram with CPAP/BiPAP titration if needed. Was offered but the patient refused. Weight loss was recommended and discussed. Recommended following good sleep hygiene instructions. Pt is not to drive if sleepy  We'll see the patient back as needed. If the chest x-ray is abnormal or if the patient would want workup for sleep apnea. Thank you for having us involved in the care of your patient. Please call us if you have any questions or concerns.       Juliet Hart MD             11/18/2017, 8:36 AM

## 2017-11-20 ENCOUNTER — HOSPITAL ENCOUNTER (OUTPATIENT)
Dept: GENERAL RADIOLOGY | Facility: CLINIC | Age: 42
Discharge: HOME | End: 2017-11-20
Payer: COMMERCIAL

## 2017-11-20 DIAGNOSIS — J15.6 PNEUMONIA DUE TO OTHER AEROBIC GRAM-NEGATIVE BACTERIA, UNSPECIFIED LATERALITY, UNSPECIFIED PART OF LUNG (HCC): ICD-10-CM

## 2017-11-20 DIAGNOSIS — S82.892A ANKLE FRACTURE, LEFT, CLOSED, INITIAL ENCOUNTER: ICD-10-CM

## 2017-11-20 PROCEDURE — 73610 X-RAY EXAM OF ANKLE: CPT

## 2017-11-20 PROCEDURE — 71020 XR CHEST STANDARD TWO VW: CPT

## 2017-11-20 RX ORDER — KETOCONAZOLE 20 MG/G
CREAM TOPICAL
Qty: 15 G | Refills: 2 | Status: SHIPPED | OUTPATIENT
Start: 2017-11-20 | End: 2018-04-05 | Stop reason: SDUPTHER

## 2017-11-30 DIAGNOSIS — M54.50 LOW BACK PAIN WITHOUT SCIATICA, UNSPECIFIED BACK PAIN LATERALITY, UNSPECIFIED CHRONICITY: ICD-10-CM

## 2017-11-30 NOTE — TELEPHONE ENCOUNTER
Next Visit Date:  Future Appointments  Date Time Provider Asmita Ordazi   12/5/2017 2:20 PM Kenny Rutherford MD Chris IM [facility]   12/6/2017 2:15 PM Geannie Osgood,  [address] Maintenance   Topic Date Due    Lipid screen  06/20/2021    DTaP/Tdap/Td vaccine (2 - Td) 06/06/2026    Flu vaccine  Completed    Pneumococcal med risk  Completed    HIV screen  Completed       Hemoglobin A1C (%)   Date Value   05/08/2017 5.0             ( goal A1C is < 7)   No results found for: LABMICR  LDL Cholesterol (mg/dL)   Date Value   06/20/2016 111       (goal LDL is <100)   AST (U/L)   Date Value   10/16/2017 23     ALT (U/L)   Date Value   10/16/2017 47 (H)     BUN (mg/dL)   Date Value   10/16/2017 10     BP Readings from Last 3 Encounters:   11/16/17 124/88   11/15/17 (!) 120/90   10/31/17 (!) 130/96          (goal 120/80)    All Future Testing planned in [facility]  Lab Frequency Next Occurrence               Patient Active Problem List:     Low back pain without sciatica     Morbid obesity due to excess calories (HCC)     Benign essential HTN     Tobacco abuse

## 2017-12-01 DIAGNOSIS — M54.50 LOW BACK PAIN WITHOUT SCIATICA, UNSPECIFIED BACK PAIN LATERALITY, UNSPECIFIED CHRONICITY: ICD-10-CM

## 2017-12-04 RX ORDER — HYDROCODONE BITARTRATE AND ACETAMINOPHEN 5; 325 MG/1; MG/1
TABLET ORAL
Qty: 30 TABLET | Refills: 0 | Status: SHIPPED | OUTPATIENT
Start: 2017-12-04 | End: 2018-01-04 | Stop reason: SDUPTHER

## 2017-12-05 ENCOUNTER — OFFICE VISIT (OUTPATIENT)
Dept: INTERNAL MEDICINE | Age: 42
End: 2017-12-05
Payer: COMMERCIAL

## 2017-12-05 VITALS
OXYGEN SATURATION: 97 % | WEIGHT: 297 LBS | DIASTOLIC BLOOD PRESSURE: 88 MMHG | BODY MASS INDEX: 42.52 KG/M2 | HEIGHT: 70 IN | SYSTOLIC BLOOD PRESSURE: 137 MMHG | HEART RATE: 80 BPM

## 2017-12-05 DIAGNOSIS — M79.89 LEG SWELLING: Primary | ICD-10-CM

## 2017-12-05 DIAGNOSIS — M54.50 LOW BACK PAIN WITHOUT SCIATICA, UNSPECIFIED BACK PAIN LATERALITY, UNSPECIFIED CHRONICITY: ICD-10-CM

## 2017-12-05 PROCEDURE — 99214 OFFICE O/P EST MOD 30 MIN: CPT | Performed by: FAMILY MEDICINE

## 2017-12-05 PROCEDURE — G8417 CALC BMI ABV UP PARAM F/U: HCPCS | Performed by: FAMILY MEDICINE

## 2017-12-05 PROCEDURE — G8427 DOCREV CUR MEDS BY ELIG CLIN: HCPCS | Performed by: FAMILY MEDICINE

## 2017-12-05 PROCEDURE — G8482 FLU IMMUNIZE ORDER/ADMIN: HCPCS | Performed by: FAMILY MEDICINE

## 2017-12-05 PROCEDURE — 4004F PT TOBACCO SCREEN RCVD TLK: CPT | Performed by: FAMILY MEDICINE

## 2017-12-05 RX ORDER — HYDROCODONE BITARTRATE AND ACETAMINOPHEN 5; 325 MG/1; MG/1
1 TABLET ORAL DAILY PRN
Qty: 30 TABLET | Refills: 0 | OUTPATIENT
Start: 2017-12-05

## 2017-12-05 RX ORDER — HYDROCODONE BITARTRATE AND ACETAMINOPHEN 5; 325 MG/1; MG/1
TABLET ORAL
Qty: 30 TABLET | Refills: 0 | Status: CANCELLED | OUTPATIENT
Start: 2017-12-05

## 2017-12-05 ASSESSMENT — ENCOUNTER SYMPTOMS
BACK PAIN: 1
COUGH: 0
NAUSEA: 0
VOMITING: 0
SHORTNESS OF BREATH: 0
WHEEZING: 0

## 2017-12-05 NOTE — PROGRESS NOTES
Subjective:      Patient ID: Laurel Agosto is a 38 y.o. male. HPIpatient is here for follow up. Recent severe pna/CHRIS: s/p trach removal. seen pulm recently. Refuses cpap    UE left dvt: restarted on xaralto 20mg daily by cardiology    Chronic back pain: associated with radicular sx including numness andf tingling in right leg. On gabapentin. Lower leg swelling; has been taking bumex 2 mg daily. Review of Systems   Constitutional: Negative for chills and fever. HENT: Negative for congestion. Respiratory: Negative for cough, shortness of breath and wheezing. Cardiovascular: Negative for chest pain, palpitations and leg swelling. Gastrointestinal: Negative for nausea and vomiting. Musculoskeletal: Positive for back pain. Neurological: Negative for dizziness and headaches. /88   Pulse 80   Ht 5' 10\" (1.778 m)   Wt 297 lb (134.7 kg)   SpO2 97%   BMI 42.62 kg/m²       Objective:   Physical Exam   Constitutional:   obese   Neck: Neck supple. Cardiovascular: Normal rate, regular rhythm, normal heart sounds and intact distal pulses. No murmur heard. Pulmonary/Chest: Effort normal and breath sounds normal. No respiratory distress. He has no wheezes. Musculoskeletal: He exhibits no edema. Lymphadenopathy:     He has no cervical adenopathy. Neurological: He has normal reflexes. Skin: No rash noted. Assessment:      1. Leg swelling    2. Low back pain without sciatica, unspecified back pain laterality, unspecified chronicity            Plan:       Check BMP  refrral placed for PT  norco refilled      Nirmal received counseling on the following healthy behaviors: nutrition, exercise and medication adherence  Reviewed prior labs and health maintenance  Continue current medications, diet and exercise. Discussed use, benefit, and side effects of prescribed medications. Barriers to medication compliance addressed.    Patient given educational materials - see patient instructions  Was a self-tracking handout given in paper form or via [employer]? No:     Requested Prescriptions      No prescriptions requested or ordered in this encounter       All patient questions answered. Patient voiced understanding. Quality Measures    Body mass index is 42.62 kg/m². Elevated. Weight control planned discussed Healthy diet and regular exercise. BP: 137/88 Blood pressure is normal. Treatment plan consists of No treatment change needed.     Lab Results   Component Value Date    LDLCHOLESTEROL 111 06/20/2016    (goal LDL reduction with dx if diabetes is 50% LDL reduction)      PHQ Scores 1/12/2017 12/12/2016 11/10/2016   PHQ2 Score 0 6 0   PHQ9 Score 0 15 0     Interpretation of Total Score Depression Severity: 1-4 = Minimal depression, 5-9 = Mild depression, 10-14 = Moderate depression, 15-19 = Moderately severe depression, 20-27 = Severe depression        Orders Placed This Encounter   Procedures    Basic Metabolic Panel     Standing Status:   Future     Standing Expiration Date:   12/5/2018    External Referral To Physical Therapy     Referral Priority:   Routine     Referral Type:   Eval and Treat     Referral Reason:   Specialty Services Required     Requested Specialty:   Physical Therapy     Number of Visits Requested:   1

## 2017-12-06 ENCOUNTER — OFFICE VISIT (OUTPATIENT)
Dept: PODIATRY | Age: 42
End: 2017-12-06
Payer: COMMERCIAL

## 2017-12-06 VITALS
DIASTOLIC BLOOD PRESSURE: 89 MMHG | WEIGHT: 305 LBS | BODY MASS INDEX: 42.7 KG/M2 | HEART RATE: 90 BPM | HEIGHT: 71 IN | SYSTOLIC BLOOD PRESSURE: 124 MMHG

## 2017-12-06 PROCEDURE — 99213 OFFICE O/P EST LOW 20 MIN: CPT | Performed by: PODIATRIST

## 2017-12-11 ENCOUNTER — HOSPITAL ENCOUNTER (OUTPATIENT)
Age: 42
Discharge: HOME | End: 2017-12-11
Payer: COMMERCIAL

## 2017-12-11 DIAGNOSIS — M79.89 LEG SWELLING: ICD-10-CM

## 2017-12-11 LAB
ANION GAP SERPL CALCULATED.3IONS-SCNC: 11 MMOL/L (ref 9–17)
BUN BLDV-MCNC: 18 MG/DL (ref 6–20)
BUN/CREAT BLD: 19 (ref 9–20)
CALCIUM SERPL-MCNC: 10 MG/DL (ref 8.6–10.4)
CHLORIDE BLD-SCNC: 98 MMOL/L (ref 98–107)
CO2: 30 MMOL/L (ref 20–31)
CREAT SERPL-MCNC: 0.94 MG/DL (ref 0.7–1.2)
GFR AFRICAN AMERICAN: >60 ML/MIN
GFR NON-AFRICAN AMERICAN: >60 ML/MIN
GFR SERPL CREATININE-BSD FRML MDRD: NORMAL ML/MIN/{1.73_M2}
GFR SERPL CREATININE-BSD FRML MDRD: NORMAL ML/MIN/{1.73_M2}
GLUCOSE BLD-MCNC: 92 MG/DL (ref 70–99)
POTASSIUM SERPL-SCNC: 4.3 MMOL/L (ref 3.7–5.3)
SODIUM BLD-SCNC: 139 MMOL/L (ref 135–144)

## 2017-12-11 PROCEDURE — 36415 COLL VENOUS BLD VENIPUNCTURE: CPT

## 2017-12-11 PROCEDURE — 80048 BASIC METABOLIC PNL TOTAL CA: CPT

## 2017-12-20 ENCOUNTER — OFFICE VISIT (OUTPATIENT)
Dept: PODIATRY | Age: 42
End: 2017-12-20
Payer: COMMERCIAL

## 2017-12-20 VITALS
WEIGHT: 304.9 LBS | HEART RATE: 106 BPM | SYSTOLIC BLOOD PRESSURE: 134 MMHG | DIASTOLIC BLOOD PRESSURE: 86 MMHG | HEIGHT: 71 IN | BODY MASS INDEX: 42.69 KG/M2

## 2017-12-20 DIAGNOSIS — M21.869 ACQUIRED POSTERIOR EQUINUS, UNSPECIFIED LATERALITY: ICD-10-CM

## 2017-12-20 DIAGNOSIS — M54.16 LUMBAR RADICULOPATHY: ICD-10-CM

## 2017-12-20 DIAGNOSIS — M25.572 CHRONIC PAIN OF LEFT ANKLE: ICD-10-CM

## 2017-12-20 DIAGNOSIS — G89.29 CHRONIC PAIN OF LEFT ANKLE: ICD-10-CM

## 2017-12-20 PROBLEM — I42.9 SECONDARY CARDIOMYOPATHY (HCC): Status: ACTIVE | Noted: 2017-11-03

## 2017-12-20 PROBLEM — N17.9 ACUTE KIDNEY INJURY (HCC): Status: ACTIVE | Noted: 2017-09-11

## 2017-12-20 PROBLEM — R00.1 BRADYCARDIA: Status: ACTIVE | Noted: 2017-09-13

## 2017-12-20 PROBLEM — R65.21 SEPTIC SHOCK (HCC): Status: ACTIVE | Noted: 2017-09-11

## 2017-12-20 PROBLEM — I82.409 DVT (DEEP VENOUS THROMBOSIS) (HCC): Status: ACTIVE | Noted: 2017-12-01

## 2017-12-20 PROBLEM — R73.9 HYPERGLYCEMIA: Status: ACTIVE | Noted: 2017-09-11

## 2017-12-20 PROBLEM — J96.90 RESPIRATORY FAILURE (HCC): Status: ACTIVE | Noted: 2017-09-09

## 2017-12-20 PROBLEM — R06.02 SHORTNESS OF BREATH: Status: ACTIVE | Noted: 2017-11-03

## 2017-12-20 PROBLEM — J96.01 ACUTE HYPOXEMIC RESPIRATORY FAILURE (HCC): Status: ACTIVE | Noted: 2017-11-02

## 2017-12-20 PROBLEM — M62.82 RHABDOMYOLYSIS: Status: ACTIVE | Noted: 2017-09-11

## 2017-12-20 PROBLEM — J18.9 BILATERAL PNEUMONIA: Status: ACTIVE | Noted: 2017-11-02

## 2017-12-20 PROBLEM — A41.9 SEPTIC SHOCK (HCC): Status: ACTIVE | Noted: 2017-09-11

## 2017-12-20 PROBLEM — I50.21 ACUTE SYSTOLIC CONGESTIVE HEART FAILURE (HCC): Status: ACTIVE | Noted: 2017-09-13

## 2017-12-20 PROCEDURE — 99213 OFFICE O/P EST LOW 20 MIN: CPT | Performed by: PODIATRIST

## 2017-12-20 NOTE — PROGRESS NOTES
[facility]    External Referral To Physical Therapy   3. Lumbar radiculopathy  [facility]    External Referral To Physical Therapy   4. Acquired posterior equinus, unspecified laterality       Plan:   · Patient examined and evaluated. · Current condition and treatment options discussed in detail. · Reviewed imaging. · Continue ambulating in regular shoe gear. · External referral given for PT.  · RTC  2 months if not improving. Electronically signed by Pawan Werner DPM on 12/20/2017 at 3:02 PM    I performed a history and physical examination of the patient and discussed management with the resident. I reviewed the residents note and agree with the documented findings and plan of care. Any areas of disagreement are noted on the chart. I was personally present for the key portions of any procedures. I have documented in the chart those procedures where I was not present during the key portions. I have reviewed the Podiatry Resident progress note. I agree with the chief complaint, past medical history, past surgical history, allergies, medications, social and family history as documented unless otherwise noted below. Documentation of the HPI, Physical Exam and Medical Decision Making performed by medical students or scribes is based on my personal performance of the HPI, PE and MDM. I have personally evaluated this patient and have completed at least one if not all key elements of the E/M (history, physical exam, and MDM). Additional findings are as noted. Eliezer Fox D.P.M.

## 2018-01-04 DIAGNOSIS — M54.50 LOW BACK PAIN WITHOUT SCIATICA, UNSPECIFIED BACK PAIN LATERALITY, UNSPECIFIED CHRONICITY: ICD-10-CM

## 2018-01-04 RX ORDER — HYDROCODONE BITARTRATE AND ACETAMINOPHEN 5; 325 MG/1; MG/1
TABLET ORAL
Qty: 30 TABLET | Refills: 0 | Status: SHIPPED | OUTPATIENT
Start: 2018-01-04 | End: 2018-02-02 | Stop reason: SDUPTHER

## 2018-01-04 NOTE — TELEPHONE ENCOUNTER
Health Maintenance   Topic Date Due    Potassium monitoring  1975    Creatinine monitoring  1975    Lipid screen  06/20/2021    DTaP/Tdap/Td vaccine (2 - Td) 06/06/2026    Flu vaccine  Completed    Pneumococcal med risk  Completed    HIV screen  Completed             (applicable per patient's age: Cancer Screenings, Depression Screening, Fall Risk Screening, Immunizations)    Hemoglobin A1C (%)   Date Value   05/08/2017 5.0     LDL Cholesterol (mg/dL)   Date Value   06/20/2016 111     AST (U/L)   Date Value   10/16/2017 23     ALT (U/L)   Date Value   10/16/2017 47 (H)     BUN (mg/dL)   Date Value   12/11/2017 18      (goal A1C is < 7)   (goal LDL is <100) need 30-50% reduction from baseline     BP Readings from Last 3 Encounters:   12/20/17 134/86   12/06/17 124/89   12/05/17 137/88    (goal /80)      All Future Testing planned in [facility]:  Lab Frequency Next Occurrence       Next Visit Date:  Future Appointments  Date Time Provider Asmita Erwin   2/21/2018 1:15 PM Joaquin Lazar DPM [address] [place] [address] Podiatry [facility]            Patient Active Problem List:     Low back pain without sciatica     Morbid obesity due to excess calories (HCC)     Benign essential HTN     Tobacco abuse     Acute hypoxemic respiratory failure (HCC)     Acute kidney injury ([address]     Acute systolic congestive heart failure (HCC)     Bilateral pneumonia     Bradycardia     Secondary cardiomyopathy ([address]     DVT (deep venous thrombosis) (HCC)     Hyperglycemia     Respiratory failure (HCC)     Rhabdomyolysis     Septic shock (HCC)     Shortness of breath

## 2018-01-29 RX ORDER — BUMETANIDE 1 MG/1
1 TABLET ORAL 2 TIMES DAILY
Qty: 60 TABLET | Refills: 3 | Status: SHIPPED | OUTPATIENT
Start: 2018-01-29 | End: 2019-01-10 | Stop reason: ALTCHOICE

## 2018-01-29 NOTE — TELEPHONE ENCOUNTER
Next Visit Date:  Future Appointments  Date Time Provider Asmita Ordazi   2/15/2018 2:20 PM Sourav Pittman MD Chris IM [facility]   2/21/2018 1:15 PM Art Ridley DPOCTAVIANO [address] Maintenance   Topic Date Due    Potassium monitoring  12/11/2018    Creatinine monitoring  12/11/2018    Lipid screen  06/20/2021    DTaP/Tdap/Td vaccine (2 - Td) 06/06/2026    Flu vaccine  Completed    Pneumococcal med risk  Completed    HIV screen  Completed       Hemoglobin A1C (%)   Date Value   05/08/2017 5.0             ( goal A1C is < 7)   No results found for: LABMICR  LDL Cholesterol (mg/dL)   Date Value   06/20/2016 111       (goal LDL is <100)   AST (U/L)   Date Value   10/16/2017 23     ALT (U/L)   Date Value   10/16/2017 47 (H)     BUN (mg/dL)   Date Value   12/11/2017 18     BP Readings from Last 3 Encounters:   12/20/17 134/86   12/06/17 124/89   12/05/17 137/88          (goal 120/80)    All Future Testing planned in [facility]  Lab Frequency Next Occurrence               Patient Active Problem List:     Low back pain without sciatica     Morbid obesity due to excess calories (HCC)     Benign essential HTN     Tobacco abuse     Acute hypoxemic respiratory failure (HCC)     Acute kidney injury ([address]     Acute systolic congestive heart failure (HCC)     Bilateral pneumonia     Bradycardia     Secondary cardiomyopathy (HCC)     DVT (deep venous thrombosis) (HCC)     Hyperglycemia     Respiratory failure (HCC)     Rhabdomyolysis     Septic shock (HCC)     Shortness of breath

## 2018-02-05 ENCOUNTER — TELEPHONE (OUTPATIENT)
Dept: INTERNAL MEDICINE | Age: 43
End: 2018-02-05

## 2018-03-01 RX ORDER — LISINOPRIL 2.5 MG/1
TABLET ORAL
Qty: 30 TABLET | Refills: 3 | Status: SHIPPED | OUTPATIENT
Start: 2018-03-01 | End: 2018-06-07 | Stop reason: ALTCHOICE

## 2018-03-02 ENCOUNTER — TELEPHONE (OUTPATIENT)
Dept: PULMONOLOGY | Age: 43
End: 2018-03-02

## 2018-03-02 NOTE — TELEPHONE ENCOUNTER
Patient called and stated that Dr. Nidia Conti gave him an order for a CXR/  DIAG:  Tracheal mass and to have done at the [facility]. He does not want to go there. Shirley Meeks He is wondering if Dr. Stephy Horne can put an order in the [address] [place] [address] [employer] system so he can have done here. I advised that he call Dr. Oj Adams and see if they may help since he is ordering physician. He said he wants Dr. Stephy Horne to?? Please Advise.

## 2018-03-02 NOTE — TELEPHONE ENCOUNTER
Seems to me that Dr. Harsh Saavedra had done a bronchoscopy on him on January 24 no results or report available for that and he had a CT chest done on January 24, I don't think that he would send him to [facility] clinic just for a chest x-ray, I don't have any problem ordering a chest x-ray we need to have the bronchoscopy results obtained from whichever hospital bronchoscopy was done and is scheduling an appointment with Dr. Tamera Webb, getting the chest x-ray is not a problem but we need to know the bronchoscopy report and also follow-up in the office with Dr. Tamera Webb in few weeks. ,

## 2018-03-05 DIAGNOSIS — M54.50 LOW BACK PAIN WITHOUT SCIATICA, UNSPECIFIED BACK PAIN LATERALITY, UNSPECIFIED CHRONICITY: ICD-10-CM

## 2018-03-06 RX ORDER — HYDROCODONE BITARTRATE AND ACETAMINOPHEN 5; 325 MG/1; MG/1
1 TABLET ORAL EVERY 8 HOURS PRN
Qty: 30 TABLET | Refills: 0 | Status: SHIPPED | OUTPATIENT
Start: 2018-03-06 | End: 2018-04-05 | Stop reason: SDUPTHER

## 2018-03-06 RX ORDER — GABAPENTIN 600 MG/1
600 TABLET ORAL 3 TIMES DAILY
Qty: 90 TABLET | Refills: 3 | Status: SHIPPED | OUTPATIENT
Start: 2018-03-06 | End: 2018-06-25 | Stop reason: SDUPTHER

## 2018-03-08 ENCOUNTER — OFFICE VISIT (OUTPATIENT)
Dept: PULMONOLOGY | Age: 43
End: 2018-03-08
Payer: COMMERCIAL

## 2018-03-08 VITALS
RESPIRATION RATE: 16 BRPM | BODY MASS INDEX: 44.1 KG/M2 | SYSTOLIC BLOOD PRESSURE: 119 MMHG | OXYGEN SATURATION: 95 % | HEART RATE: 86 BPM | DIASTOLIC BLOOD PRESSURE: 68 MMHG | HEIGHT: 71 IN | WEIGHT: 315 LBS | TEMPERATURE: 97.6 F

## 2018-03-08 DIAGNOSIS — E66.01 MORBID OBESITY DUE TO EXCESS CALORIES (HCC): ICD-10-CM

## 2018-03-08 DIAGNOSIS — F17.200 SMOKING: ICD-10-CM

## 2018-03-08 DIAGNOSIS — G47.33 OSA (OBSTRUCTIVE SLEEP APNEA): ICD-10-CM

## 2018-03-08 DIAGNOSIS — J15.6 PNEUMONIA DUE TO OTHER AEROBIC GRAM-NEGATIVE BACTERIA, UNSPECIFIED LATERALITY, UNSPECIFIED PART OF LUNG (HCC): Primary | ICD-10-CM

## 2018-03-08 PROCEDURE — 99215 OFFICE O/P EST HI 40 MIN: CPT | Performed by: INTERNAL MEDICINE

## 2018-03-08 PROCEDURE — G8482 FLU IMMUNIZE ORDER/ADMIN: HCPCS | Performed by: INTERNAL MEDICINE

## 2018-03-08 PROCEDURE — G8427 DOCREV CUR MEDS BY ELIG CLIN: HCPCS | Performed by: INTERNAL MEDICINE

## 2018-03-08 PROCEDURE — 4004F PT TOBACCO SCREEN RCVD TLK: CPT | Performed by: INTERNAL MEDICINE

## 2018-03-08 PROCEDURE — G8417 CALC BMI ABV UP PARAM F/U: HCPCS | Performed by: INTERNAL MEDICINE

## 2018-03-11 NOTE — PROGRESS NOTES
PULMONARY OP  PROGRESS NOTE      Patient:  Karli Ashton  YOB: 1975    MRN: [id number]     Acct:        Pt seen and Chart reviewed. Mr. Karli Ashton is here in followup for   1. Pneumonia due to other aerobic gram-negative bacteria, unspecified laterality, unspecified part of lung ([facility]    2. CHRIS (obstructive sleep apnea)    3. Morbid obesity due to excess calories ([facility]    4. Smoking        Patient has been doing well since last visit. Pt has not been hospitalized or been to er since last visit. Has been using meds as recommended. Patient had a bronchoscopy done and was informed that he has a polyp in the trachea. It is possible this could be granulation tissue from his tracheostomy. He was told it was a benign lesion  Denied any fevers or chills. No orthopnea or PND. No cough or sputum production. No hemoptysis. No loss of appetite or weight. Has snoring with apnea with fatigue and tiredness in the daytime. Takes naps in the daytime  Patient likely has sleep apnea.   Patient now  Willing to have sleep study  He understands the consequences of not treating sleep apnea       Subjective:     Review of Systems -   General ROS: Completed and except as mentioned above were negative   Psychological ROS:  Completed and except as mentioned above were negative  Ophthalmic ROS:  Completed and except as mentioned above were negative  ENT ROS:  Completed and except as mentioned above were negative  Allergy and Immunology ROS:  Completed and except as mentioned above were negative  Hematological and Lymphatic ROS:  Completed and except as mentioned above were negative  Endocrine ROS: Completed and except as mentioned above were negative  Respiratory ROS:  Completed and except as mentioned above were negative  Cardiovascular ROS:  Completed and except as mentioned above were negative  Gastrointestinal ROS: Completed and except as emotional status is normal.       Labs:  Pulmonary function tests were normal  Chest x-ray in November 2017 was normal      Assessment:    1. Pneumonia due to other aerobic gram-negative bacteria, unspecified laterality, unspecified part of lung ([facility]    2. CHRIS (obstructive sleep apnea)    3. Morbid obesity due to excess calories ([facility] [facility]    4. Smoking          PLAN:    Refills were provided -none  Educated and clarified the medication use. Recommend flu vaccination in the fall annually. Patient had flu vaccination for the season  Recommendations given regarding pneumococcal vaccinations. Patient is up-to-date with vaccinations from pulmonary perspective. Maintain an active lifestyle. Questions  Patient had were answered to his satisfaction. Home O2 evaluation to be done. Supplemental oxygen was not needed. Smoking cessation was recommended  Pulmonary function tests were reviewed. Chest x-ray was reviewed  Polysomnogram with CPAP/BiPAP titration if needed  Weight loss was recommended and discussed. Recommended following good sleep hygiene instructions. Pt is not to drive if sleepy. We'll see the patient back in 3 months or earlier based on the sleep study could be done  Thank you for having us involved in the care of your patient. Please call us if you have any questions or concerns.       Leona Cruz MD             3/11/2018, 3:30 PM

## 2018-03-22 ENCOUNTER — OFFICE VISIT (OUTPATIENT)
Dept: INTERNAL MEDICINE | Age: 43
End: 2018-03-22
Payer: COMMERCIAL

## 2018-03-22 VITALS
BODY MASS INDEX: 43.65 KG/M2 | HEART RATE: 79 BPM | DIASTOLIC BLOOD PRESSURE: 87 MMHG | OXYGEN SATURATION: 96 % | WEIGHT: 313 LBS | SYSTOLIC BLOOD PRESSURE: 121 MMHG

## 2018-03-22 DIAGNOSIS — E66.01 MORBID OBESITY DUE TO EXCESS CALORIES (HCC): Primary | ICD-10-CM

## 2018-03-22 DIAGNOSIS — I10 BENIGN ESSENTIAL HTN: ICD-10-CM

## 2018-03-22 DIAGNOSIS — I82.629 DEEP VEIN THROMBOSIS (DVT) OF UPPER EXTREMITY, UNSPECIFIED CHRONICITY, UNSPECIFIED LATERALITY, UNSPECIFIED VEIN (HCC): ICD-10-CM

## 2018-03-22 PROBLEM — R00.1 BRADYCARDIA: Status: RESOLVED | Noted: 2017-09-13 | Resolved: 2018-03-22

## 2018-03-22 PROBLEM — J96.01 ACUTE HYPOXEMIC RESPIRATORY FAILURE (HCC): Status: RESOLVED | Noted: 2017-11-02 | Resolved: 2018-03-22

## 2018-03-22 PROBLEM — R65.21 SEPTIC SHOCK (HCC): Status: RESOLVED | Noted: 2017-09-11 | Resolved: 2018-03-22

## 2018-03-22 PROBLEM — J96.90 RESPIRATORY FAILURE (HCC): Status: RESOLVED | Noted: 2017-09-09 | Resolved: 2018-03-22

## 2018-03-22 PROBLEM — A41.9 SEPTIC SHOCK (HCC): Status: RESOLVED | Noted: 2017-09-11 | Resolved: 2018-03-22

## 2018-03-22 PROBLEM — R06.02 SHORTNESS OF BREATH: Status: RESOLVED | Noted: 2017-11-03 | Resolved: 2018-03-22

## 2018-03-22 PROBLEM — J18.9 BILATERAL PNEUMONIA: Status: RESOLVED | Noted: 2017-11-02 | Resolved: 2018-03-22

## 2018-03-22 PROBLEM — N17.9 ACUTE KIDNEY INJURY (HCC): Status: RESOLVED | Noted: 2017-09-11 | Resolved: 2018-03-22

## 2018-03-22 PROCEDURE — 1036F TOBACCO NON-USER: CPT | Performed by: INTERNAL MEDICINE

## 2018-03-22 PROCEDURE — 99214 OFFICE O/P EST MOD 30 MIN: CPT | Performed by: INTERNAL MEDICINE

## 2018-03-22 PROCEDURE — G8427 DOCREV CUR MEDS BY ELIG CLIN: HCPCS | Performed by: INTERNAL MEDICINE

## 2018-03-22 PROCEDURE — G8482 FLU IMMUNIZE ORDER/ADMIN: HCPCS | Performed by: INTERNAL MEDICINE

## 2018-03-22 PROCEDURE — G8417 CALC BMI ABV UP PARAM F/U: HCPCS | Performed by: INTERNAL MEDICINE

## 2018-03-22 ASSESSMENT — ENCOUNTER SYMPTOMS
RESPIRATORY NEGATIVE: 1
ALLERGIC/IMMUNOLOGIC NEGATIVE: 1
EYES NEGATIVE: 1
GASTROINTESTINAL NEGATIVE: 1

## 2018-03-22 ASSESSMENT — PATIENT HEALTH QUESTIONNAIRE - PHQ9
2. FEELING DOWN, DEPRESSED OR HOPELESS: 0
SUM OF ALL RESPONSES TO PHQ QUESTIONS 1-9: 0
SUM OF ALL RESPONSES TO PHQ9 QUESTIONS 1 & 2: 0
1. LITTLE INTEREST OR PLEASURE IN DOING THINGS: 0

## 2018-03-22 NOTE — PROGRESS NOTES
[facility]   Progress Note      Visit Information    Have you changed or started any medications since your last visit including any over-the-counter medicines, vitamins, or herbal medicines? no   Are you having any side effects from any of your medications? -  no  Have you stopped taking any of your medications? Is so, why? -  no    Have you seen any other physician or provider since your last visit? Yes - Records Obtained  Have you had any other diagnostic tests since your last visit? No  Have you been seen in the emergency room and/or had an admission to a hospital since we last saw you? Yes - Records Obtained  Have you had your routine dental cleaning in the past 6 months? no    Have you activated your [employer] account? If not, what are your barriers? Yes     Patient Care Team:  Robert Rico MD as PCP - General (Internal Medicine)  Robert Rico MD as Referring Physician (Internal Medicine)  Tomeka Hoang MD as Consulting Physician (Pulmonology)  Johnathon Cuba MD as Consulting Physician (Internal Medicine)    Medical History Review  Past Medical, Family, and Social History reviewed and does contribute to the patient presenting condition    Health Maintenance   Topic Date Due    Potassium monitoring  12/11/2018    Creatinine monitoring  12/11/2018    Lipid screen  06/20/2021    DTaP/Tdap/Td vaccine (2 - Td) 06/06/2026    Flu vaccine  Completed    Pneumococcal med risk  Completed    HIV screen  Completed       Date of patient's visit: 3/22/2018  Patient's Name:  Alexandra Garcia                   YOB: 1975        PCP:  Robert Rico MD    Alexandra Garcia is a 38 y.o. male who presents for   Chief Complaint   Patient presents with    Numbness     to both legs    Medication Check    Other     patient would like some lab work done    and follow up of chronic medical problems.   Patient Active Problem List   Diagnosis    Low back intending to generate a document that actually reflects the content of the visit, the document can still have some mistakes which may not have been identified and corrected by editing.     Dr Ross Vargas MD, [address]  Associate , Department of Internal Medicine  [address] [place] [address]  Attending [address] [facility], [facility]                   3/22/2018, 9:36 AM

## 2018-04-04 ENCOUNTER — HOSPITAL ENCOUNTER (OUTPATIENT)
Age: 43
Discharge: HOME | End: 2018-04-04
Payer: COMMERCIAL

## 2018-04-04 DIAGNOSIS — E66.01 MORBID OBESITY DUE TO EXCESS CALORIES (HCC): ICD-10-CM

## 2018-04-04 LAB
CHOLESTEROL/HDL RATIO: 4.6
CHOLESTEROL: 237 MG/DL
HDLC SERPL-MCNC: 52 MG/DL
LDL CHOLESTEROL: 130 MG/DL (ref 0–130)
TRIGL SERPL-MCNC: 274 MG/DL
VLDLC SERPL CALC-MCNC: ABNORMAL MG/DL (ref 1–30)

## 2018-04-04 PROCEDURE — 80061 LIPID PANEL: CPT

## 2018-04-04 PROCEDURE — 36415 COLL VENOUS BLD VENIPUNCTURE: CPT

## 2018-04-05 DIAGNOSIS — M54.50 LOW BACK PAIN WITHOUT SCIATICA, UNSPECIFIED BACK PAIN LATERALITY, UNSPECIFIED CHRONICITY: ICD-10-CM

## 2018-04-05 DIAGNOSIS — L30.9 DERMATITIS: ICD-10-CM

## 2018-04-05 DIAGNOSIS — E78.1 HYPERTRIGLYCERIDEMIA: Primary | ICD-10-CM

## 2018-04-05 RX ORDER — KETOCONAZOLE 20 MG/G
CREAM TOPICAL
Qty: 15 G | Refills: 3 | Status: SHIPPED | OUTPATIENT
Start: 2018-04-05 | End: 2019-01-10 | Stop reason: ALTCHOICE

## 2018-04-05 RX ORDER — FENOFIBRATE 48 MG/1
48 TABLET, COATED ORAL DAILY
Qty: 30 TABLET | Refills: 3 | Status: SHIPPED | OUTPATIENT
Start: 2018-04-05 | End: 2018-04-25

## 2018-04-05 RX ORDER — HYDROCODONE BITARTRATE AND ACETAMINOPHEN 5; 325 MG/1; MG/1
1 TABLET ORAL EVERY 8 HOURS PRN
Qty: 30 TABLET | Refills: 0 | Status: SHIPPED | OUTPATIENT
Start: 2018-04-05 | End: 2018-05-04 | Stop reason: SDUPTHER

## 2018-04-25 ENCOUNTER — TELEPHONE (OUTPATIENT)
Dept: INTERNAL MEDICINE | Age: 43
End: 2018-04-25

## 2018-04-25 RX ORDER — GEMFIBROZIL 600 MG/1
600 TABLET, FILM COATED ORAL 2 TIMES DAILY
Qty: 60 TABLET | Refills: 3 | Status: SHIPPED | OUTPATIENT
Start: 2018-04-25 | End: 2018-12-03 | Stop reason: SDUPTHER

## 2018-05-21 RX ORDER — CARVEDILOL 3.12 MG/1
3.12 TABLET ORAL 2 TIMES DAILY
Qty: 60 TABLET | Refills: 5 | Status: SHIPPED | OUTPATIENT
Start: 2018-05-21 | End: 2018-07-16 | Stop reason: SDUPTHER

## 2018-06-07 ENCOUNTER — OFFICE VISIT (OUTPATIENT)
Dept: INTERNAL MEDICINE | Age: 43
End: 2018-06-07
Payer: MEDICARE

## 2018-06-07 VITALS
DIASTOLIC BLOOD PRESSURE: 76 MMHG | HEART RATE: 70 BPM | HEIGHT: 70 IN | OXYGEN SATURATION: 95 % | RESPIRATION RATE: 12 BRPM | BODY MASS INDEX: 45.1 KG/M2 | WEIGHT: 315 LBS | SYSTOLIC BLOOD PRESSURE: 134 MMHG

## 2018-06-07 DIAGNOSIS — I42.9 SECONDARY CARDIOMYOPATHY (HCC): ICD-10-CM

## 2018-06-07 DIAGNOSIS — M54.50 LOW BACK PAIN WITHOUT SCIATICA, UNSPECIFIED BACK PAIN LATERALITY, UNSPECIFIED CHRONICITY: ICD-10-CM

## 2018-06-07 DIAGNOSIS — Z23 NEED FOR PROPHYLACTIC VACCINATION AGAINST STREPTOCOCCUS PNEUMONIAE (PNEUMOCOCCUS): Primary | ICD-10-CM

## 2018-06-07 PROCEDURE — 99212 OFFICE O/P EST SF 10 MIN: CPT | Performed by: INTERNAL MEDICINE

## 2018-06-07 PROCEDURE — G0009 ADMIN PNEUMOCOCCAL VACCINE: HCPCS | Performed by: INTERNAL MEDICINE

## 2018-06-07 PROCEDURE — 1036F TOBACCO NON-USER: CPT | Performed by: INTERNAL MEDICINE

## 2018-06-07 PROCEDURE — G8427 DOCREV CUR MEDS BY ELIG CLIN: HCPCS | Performed by: INTERNAL MEDICINE

## 2018-06-07 PROCEDURE — G8417 CALC BMI ABV UP PARAM F/U: HCPCS | Performed by: INTERNAL MEDICINE

## 2018-06-07 PROCEDURE — 90670 PCV13 VACCINE IM: CPT | Performed by: INTERNAL MEDICINE

## 2018-06-07 RX ORDER — HYDROCODONE BITARTRATE AND ACETAMINOPHEN 5; 325 MG/1; MG/1
TABLET ORAL
Qty: 30 TABLET | Refills: 0 | Status: SHIPPED | OUTPATIENT
Start: 2018-06-07 | End: 2018-07-07

## 2018-06-25 DIAGNOSIS — M54.50 LOW BACK PAIN WITHOUT SCIATICA, UNSPECIFIED BACK PAIN LATERALITY, UNSPECIFIED CHRONICITY: ICD-10-CM

## 2018-06-25 RX ORDER — GABAPENTIN 600 MG/1
600 TABLET ORAL 3 TIMES DAILY
Qty: 90 TABLET | Refills: 3 | Status: SHIPPED | OUTPATIENT
Start: 2018-06-25 | End: 2018-10-17 | Stop reason: SDUPTHER

## 2018-07-13 DIAGNOSIS — M54.50 LOW BACK PAIN WITHOUT SCIATICA, UNSPECIFIED BACK PAIN LATERALITY, UNSPECIFIED CHRONICITY: ICD-10-CM

## 2018-07-16 ENCOUNTER — OFFICE VISIT (OUTPATIENT)
Dept: INTERNAL MEDICINE | Age: 43
End: 2018-07-16
Payer: MEDICARE

## 2018-07-16 VITALS
DIASTOLIC BLOOD PRESSURE: 101 MMHG | WEIGHT: 315 LBS | HEIGHT: 71 IN | HEART RATE: 88 BPM | SYSTOLIC BLOOD PRESSURE: 147 MMHG | BODY MASS INDEX: 44.1 KG/M2 | OXYGEN SATURATION: 95 %

## 2018-07-16 DIAGNOSIS — R21 SKIN RASH: ICD-10-CM

## 2018-07-16 DIAGNOSIS — Z93.0 TRACHEOSTOMY STATUS (HCC): ICD-10-CM

## 2018-07-16 DIAGNOSIS — M54.50 LOW BACK PAIN WITHOUT SCIATICA, UNSPECIFIED BACK PAIN LATERALITY, UNSPECIFIED CHRONICITY: ICD-10-CM

## 2018-07-16 DIAGNOSIS — E66.01 MORBID OBESITY DUE TO EXCESS CALORIES (HCC): ICD-10-CM

## 2018-07-16 DIAGNOSIS — I10 BENIGN ESSENTIAL HTN: Primary | ICD-10-CM

## 2018-07-16 PROCEDURE — 1036F TOBACCO NON-USER: CPT | Performed by: INTERNAL MEDICINE

## 2018-07-16 PROCEDURE — G8427 DOCREV CUR MEDS BY ELIG CLIN: HCPCS | Performed by: INTERNAL MEDICINE

## 2018-07-16 PROCEDURE — G8417 CALC BMI ABV UP PARAM F/U: HCPCS | Performed by: INTERNAL MEDICINE

## 2018-07-16 PROCEDURE — 99214 OFFICE O/P EST MOD 30 MIN: CPT | Performed by: INTERNAL MEDICINE

## 2018-07-16 RX ORDER — HYDROCODONE BITARTRATE AND ACETAMINOPHEN 5; 325 MG/1; MG/1
1 TABLET ORAL EVERY 8 HOURS PRN
COMMUNITY
End: 2018-08-27 | Stop reason: ALTCHOICE

## 2018-07-16 RX ORDER — HYDROCODONE BITARTRATE AND ACETAMINOPHEN 5; 325 MG/1; MG/1
1 TABLET ORAL EVERY 8 HOURS PRN
Status: CANCELLED | OUTPATIENT
Start: 2018-07-16

## 2018-07-16 RX ORDER — OXYCODONE AND ACETAMINOPHEN 7.5; 325 MG/1; MG/1
1 TABLET ORAL DAILY PRN
Qty: 30 TABLET | Refills: 0 | Status: SHIPPED | OUTPATIENT
Start: 2018-07-16 | End: 2018-08-27 | Stop reason: SDUPTHER

## 2018-07-16 RX ORDER — CARVEDILOL 6.25 MG/1
6.25 TABLET ORAL 2 TIMES DAILY
Qty: 60 TABLET | Refills: 3 | Status: SHIPPED | OUTPATIENT
Start: 2018-07-16 | End: 2019-04-01 | Stop reason: SDUPTHER

## 2018-07-16 RX ORDER — HYDROCODONE BITARTRATE AND ACETAMINOPHEN 5; 325 MG/1; MG/1
TABLET ORAL
Qty: 30 TABLET | Refills: 0 | OUTPATIENT
Start: 2018-07-16 | End: 2018-08-12

## 2018-07-16 ASSESSMENT — ENCOUNTER SYMPTOMS
GASTROINTESTINAL NEGATIVE: 1
BACK PAIN: 1
RESPIRATORY NEGATIVE: 1
EYES NEGATIVE: 1
ALLERGIC/IMMUNOLOGIC NEGATIVE: 1

## 2018-07-16 NOTE — PROGRESS NOTES
[facility]   Progress Note      Visit Information    Have you changed or started any medications since your last visit including any over-the-counter medicines, vitamins, or herbal medicines? no   Are you having any side effects from any of your medications? -  no  Have you stopped taking any of your medications? Is so, why? -  no    Have you seen any other physician or provider since your last visit? No  Have you had any other diagnostic tests since your last visit? No  Have you been seen in the emergency room and/or had an admission to a hospital since we last saw you? No  Have you had your routine dental cleaning in the past 6 months? no    Have you activated your [employer] account? If not, what are your barriers? Yes     Patient Care Team:  Jim Steel MD as PCP - General (Internal Medicine)  Jim Steel MD as PCP - S Attributed Provider  Jim Steel MD as Referring Physician (Internal Medicine)  Gretta Zazueta MD as Consulting Physician (Pulmonology)  Bill Feliz MD as Consulting Physician (Internal Medicine)    Medical History Review  Past Medical, Family, and Social History reviewed and does contribute to the patient presenting condition    Health Maintenance   Topic Date Due    Flu vaccine (1) 09/01/2018    Potassium monitoring  12/11/2018    Creatinine monitoring  12/11/2018    Lipid screen  04/04/2023    DTaP/Tdap/Td vaccine (2 - Td) 06/06/2026    Pneumococcal med risk  Completed    HIV screen  Completed       Date of patient's visit: 7/16/2018  Patient's Name:  Any Willingham                   YOB: 1975        PCP:  Jim Steel MD    Any Willingham is a 38 y.o. male who presents for   Chief Complaint   Patient presents with    Follow-up     4 month    Hypertension    and follow up of chronic medical problems.   Patient Active Problem List   Diagnosis    Low back pain without sciatica    Morbid obesity due to Allergic/Immunologic: Negative. Neurological: Negative. Hematological: Negative. Psychiatric/Behavioral: Negative.         PHYSICAL EXAM:      Vitals:    07/16/18 1355   BP: (!) 147/101   Pulse: 88   SpO2:      BP Readings from Last 3 Encounters:   07/16/18 (!) 147/101   06/07/18 134/76   03/22/18 121/87       Physical Examination: General appearance - alert, well appearing, and in no distress  Mental status - normal mood, behavior, speech, dress, motor activity, and thought processes  Eyes - pupils equal and reactive, extraocular eye movements intact  Chest - clear to auscultation, no wheezes, rales or rhonchi, symmetric air entry  Heart - normal rate and regular rhythm  Back exam - full range of motion, no tenderness, palpable spasm or pain on motion  Neurological - normal muscle tone, no tremors, strength 5/5  Musculoskeletal - no joint tenderness, deformity or swelling  Extremities - no pedal edema noted  Skin - LESIONS NOTED: small red bloisters on palms    LABORATORY FINDINGS:    CBC:   Lab Results   Component Value Date    WBC 9.9 10/17/2017    HGB 11.8 10/17/2017     10/17/2017     BMP:    Lab Results   Component Value Date     12/11/2017    K 4.3 12/11/2017    CL 98 12/11/2017    CO2 30 12/11/2017    BUN 18 12/11/2017    CREATININE 0.94 12/11/2017    GLUCOSE 92 12/11/2017     Hemoglobin A1C:   Lab Results   Component Value Date    LABA1C 5.0 05/08/2017     Microalbumin Urine: No results found for: MICROALBUR  Lipid profile:   Lab Results   Component Value Date    CHOL 237 04/04/2018    TRIG 274 04/04/2018    HDL 52 04/04/2018     Thyroid functions: No results found for: TSH   Hepatic functions:   Lab Results   Component Value Date    ALT 47 10/16/2017    AST 23 10/16/2017    PROT 5.9 10/16/2017    BILITOT 0.25 10/16/2017    LABALBU 3.2 10/16/2017     Urine Analysis: No results found for: [address] [address]her:    There are no preventive care reminders to display for this patient. ASSESSMENT AND PLAN:       Diagnosis Orders   1. Benign essential HTN  carvedilol (COREG) 6.25 MG tablet BID increased from 3.125mg BID   2. Morbid obesity due to excess calories [facility])  The patient is asked to make an attempt to improve diet and exercise patterns to aid in medical management of this problem. 3. Low back pain without sciatica, unspecified back pain laterality, unspecified chronicity  oxyCODONE-acetaminophen (PERCOCET) 7.5-325 MG per tablet  Increased from 5-325mg dose  Controlled substances monitoring: OARRS report reviewed today- activity consistent with treatment plan. 4. Skin rash -contact dermatitis gentle cleanser (CETAPHIL) lotion    hydrocortisone (SCALACORT) 2 % LOTN lotion   5. Trache[facility] status ([facility]  resolved     rtc in 4 weeks    FOLLOW UP:   1. Lydia Blank received counseling on the following healthy behaviors: nutrition and exercise    2. Reviewed prior labs and health maintenance. 3.  Discussed use, benefit, and side effects of prescribed medications. Barriers to medication compliance addressed. All patient questions answered. Pt voiced understanding. 4.  Continue current medications, diet and exercise. Orders Placed This Encounter   Medications    carvedilol (COREG) 6.25 MG tablet     Sig: Take 1 tablet by mouth 2 times daily     Dispense:  60 tablet     Refill:  3    oxyCODONE-acetaminophen (PERCOCET) 7.5-325 MG per tablet     Sig: Take 1 tablet by mouth daily as needed for Pain for up to 30 days. Intended supply: 28 days. Dispense:  30 tablet     Refill:  0    gentle cleanser (CETAPHIL) lotion     Sig: Apply topically as needed.      Dispense:  1 Bottle     Refill:  5    hydrocortisone (SCALACORT) 2 % LOTN lotion     Sig: Apply on affected area     Dispense:  1 Bottle     Refill:  5          Completed Refills               Requested Prescriptions     Signed Prescriptions Disp Refills    carvedilol (COREG) 6.25 MG tablet 60 tablet 3

## 2018-07-16 NOTE — PATIENT INSTRUCTIONS
lemon juice, onion, vinegar, herbs, and spices instead of salt. Do not use soy sauce, steak sauce, onion salt, garlic salt, mustard, or ketchup on your food. ¨ Use less salt (or none) when recipes call for it. You can often use half the salt a recipe calls for without losing flavor. · Be physically active. Get at least 30 minutes of exercise on most days of the week. Walking is a good choice. You also may want to do other activities, such as running, swimming, cycling, or playing tennis or team sports. · Limit alcohol to 2 drinks a day for men and 1 drink a day for women. · Eat plenty of fruits, vegetables, and low-fat dairy products. Eat less saturated and total fats. How is high blood pressure treated? · Your doctor will suggest making lifestyle changes. For example, your doctor may ask you to eat healthy foods, quit smoking, lose extra weight, and be more active. · If lifestyle changes don't help enough or your blood pressure is very high, you will have to take medicine every day. Follow-up care is a key part of your treatment and safety. Be sure to make and go to all appointments, and call your doctor if you are having problems. It's also a good idea to know your test results and keep a list of the medicines you take. Where can you learn more? Go to [email or web address] org and sign in to your [employer] account. Enter P501 in the [facility] box to learn more about \"Learning About High Blood Pressure. \"     If you do not have an account, please click on the \"Sign Up Now\" link. Current as of: May 10, 2017  Content Version: 11.6  © 1608-2667 [employer], [employer]. Care instructions adapted under license by [facility] If you have questions about a medical condition or this instruction, always ask your healthcare professional. [address] any warranty or liability for your use of this information.

## 2018-08-27 ENCOUNTER — OFFICE VISIT (OUTPATIENT)
Dept: INTERNAL MEDICINE | Age: 43
End: 2018-08-27
Payer: MEDICARE

## 2018-08-27 VITALS
HEART RATE: 81 BPM | OXYGEN SATURATION: 100 % | SYSTOLIC BLOOD PRESSURE: 123 MMHG | RESPIRATION RATE: 12 BRPM | BODY MASS INDEX: 44.1 KG/M2 | HEIGHT: 71 IN | WEIGHT: 315 LBS | DIASTOLIC BLOOD PRESSURE: 86 MMHG

## 2018-08-27 DIAGNOSIS — I10 BENIGN ESSENTIAL HTN: ICD-10-CM

## 2018-08-27 DIAGNOSIS — M54.50 LOW BACK PAIN WITHOUT SCIATICA, UNSPECIFIED BACK PAIN LATERALITY, UNSPECIFIED CHRONICITY: ICD-10-CM

## 2018-08-27 DIAGNOSIS — L30.9 DERMATITIS: ICD-10-CM

## 2018-08-27 DIAGNOSIS — E66.01 MORBID OBESITY DUE TO EXCESS CALORIES (HCC): Primary | ICD-10-CM

## 2018-08-27 PROCEDURE — G8417 CALC BMI ABV UP PARAM F/U: HCPCS | Performed by: INTERNAL MEDICINE

## 2018-08-27 PROCEDURE — G8427 DOCREV CUR MEDS BY ELIG CLIN: HCPCS | Performed by: INTERNAL MEDICINE

## 2018-08-27 PROCEDURE — 99213 OFFICE O/P EST LOW 20 MIN: CPT | Performed by: INTERNAL MEDICINE

## 2018-08-27 PROCEDURE — 1036F TOBACCO NON-USER: CPT | Performed by: INTERNAL MEDICINE

## 2018-08-27 RX ORDER — OXYCODONE AND ACETAMINOPHEN 7.5; 325 MG/1; MG/1
1 TABLET ORAL 2 TIMES DAILY PRN
Qty: 60 TABLET | Refills: 0 | Status: SHIPPED | OUTPATIENT
Start: 2018-08-27 | End: 2018-09-25 | Stop reason: SDUPTHER

## 2018-08-27 ASSESSMENT — ENCOUNTER SYMPTOMS
EYES NEGATIVE: 1
GASTROINTESTINAL NEGATIVE: 1
RESPIRATORY NEGATIVE: 1
BACK PAIN: 1
ALLERGIC/IMMUNOLOGIC NEGATIVE: 1

## 2018-08-27 ASSESSMENT — PATIENT HEALTH QUESTIONNAIRE - PHQ9
SUM OF ALL RESPONSES TO PHQ QUESTIONS 1-9: 0
2. FEELING DOWN, DEPRESSED OR HOPELESS: 0
1. LITTLE INTEREST OR PLEASURE IN DOING THINGS: 0
SUM OF ALL RESPONSES TO PHQ9 QUESTIONS 1 & 2: 0
SUM OF ALL RESPONSES TO PHQ QUESTIONS 1-9: 0

## 2018-08-27 NOTE — PROGRESS NOTES
[facility]   Progress Note      Visit Information    Have you changed or started any medications since your last visit including any over-the-counter medicines, vitamins, or herbal medicines? no   Are you having any side effects from any of your medications? -  no  Have you stopped taking any of your medications? Is so, why? -  no    Have you seen any other physician or provider since your last visit? No  Have you had any other diagnostic tests since your last visit? No  Have you been seen in the emergency room and/or had an admission to a hospital since we last saw you? No  Have you had your routine dental cleaning in the past 6 months? yes -     Have you activated your [employer] account? If not, what are your barriers? Yes     Patient Care Team:  Madan Jaimes MD as PCP - General (Internal Medicine)  Madan Jaimes MD as PCP - S Attributed Provider  Madan Jaimes MD as Referring Physician (Internal Medicine)  Zamzam Zamora MD as Consulting Physician (Pulmonology)  Aime Espinal MD as Consulting Physician (Internal Medicine)    Medical History Review  Past Medical, Family, and Social History reviewed and does contribute to the patient presenting condition    Health Maintenance   Topic Date Due    Flu vaccine (1) 09/01/2018    Potassium monitoring  12/11/2018    Creatinine monitoring  12/11/2018    Lipid screen  04/04/2023    DTaP/Tdap/Td vaccine (2 - Td) 06/06/2026    Pneumococcal med risk  Completed    HIV screen  Completed       Date of patient's visit: 8/27/2018  Patient's Name:  Swapnil Evangelista                   YOB: 1975        PCP:  Madan Jaimes MD    Swapnil Evangelista is a 38 y.o. male who presents for No chief complaint on file. and follow up of chronic medical problems.   Patient Active Problem List   Diagnosis    Low back pain without sciatica    Morbid obesity due to excess calories (HCC)    Benign essential HTN    Acute systolic congestive heart failure (HCC)    Secondary cardiomyopathy ([facility]    DVT (deep venous thrombosis) (HCC)    Hyperglycemia    Rhabdomyolysis       HISTORY OF PRESENT ILLNESS:    History was obtained from the patient. Hypertension  Patient is here for follow-up of elevated blood pressure. He is not exercising and is adherent to a low-salt diet. Blood pressure is well controlled at home. Cardiac symptoms: none. Patient denies dyspnea and exertional chest pressure/discomfort. Cardiovascular risk factors: none. Use of agents associated with hypertension: none. History of target organ damage: none. Needs refill on percocet. Patient's allergies, medications, past medical, surgical, social and family histories were reviewed and updated as appropriate. ALLERGIES    No Known Allergies      MEDICATIONS:      Current Outpatient Prescriptions   Medication Sig Dispense Refill    HYDROcodone-acetaminophen (NORCO) 5-325 MG per tablet Take 1 tablet by mouth every 8 hours as needed for Pain. Nikki Pascual carvedilol (COREG) 6.25 MG tablet Take 1 tablet by mouth 2 times daily 60 tablet 3    gentle cleanser (CETAPHIL) lotion Apply topically as needed. 1 Bottle 5    hydrocortisone (SCALACORT) 2 % LOTN lotion Apply on affected area 1 Bottle 5    gabapentin (NEURONTIN) 600 MG tablet Take 1 tablet by mouth 3 times daily for 30 days. . 90 tablet 3    brexpiprazole (REXULTI) 3 MG TABS tablet Take 3 mg by mouth daily      gemfibrozil (LOPID) 600 MG tablet Take 1 tablet by mouth 2 times daily 60 tablet 3    ketoconazole (NIZORAL) 2 % cream Apply topically daily.  15 g 3    VENTOLIN  (90 Base) MCG/ACT inhaler Inhale 2 puffs into the lungs every 4-6 hours as needed  0    bumetanide (BUMEX) 1 MG tablet Take 1 tablet by mouth 2 times daily 60 tablet 3    clonazePAM (KLONOPIN) 0.5 MG tablet Take 1 tablet by mouth 2 times daily as needed for Anxiety (Patient taking differently: Take 0.5 mg by mouth once .) 60 SpO2: 100%     BP Readings from Last 3 Encounters:   08/27/18 123/86   07/16/18 (!) 147/101   06/07/18 134/76       Physical Examination: General appearance - alert, well appearing, and in no distress  Mental status - alert, oriented to person, place, and time  Eyes - pupils equal and reactive, extraocular eye movements intact  Chest - clear to auscultation, no wheezes, rales or rhonchi, symmetric air entry  Heart - normal rate and regular rhythm  Abdomen - soft, nontender, nondistended, no masses or organomegaly  bowel sounds normal  Back exam - full range of motion, no tenderness, palpable spasm or pain on motion  Neurological - alert, oriented, normal speech, no focal findings or movement disorder noted  Musculoskeletal - no joint tenderness, deformity or swelling  Extremities - no pedal edema noted    LABORATORY FINDINGS:    CBC:   Lab Results   Component Value Date    WBC 9.9 10/17/2017    HGB 11.8 10/17/2017     10/17/2017     BMP:    Lab Results   Component Value Date     12/11/2017    K 4.3 12/11/2017    CL 98 12/11/2017    CO2 30 12/11/2017    BUN 18 12/11/2017    CREATININE 0.94 12/11/2017    GLUCOSE 92 12/11/2017     Hemoglobin A1C:   Lab Results   Component Value Date    LABA1C 5.0 05/08/2017     Microalbumin Urine: No results found for: Jose Lucas  Lipid profile:   Lab Results   Component Value Date    CHOL 237 04/04/2018    TRIG 274 04/04/2018    HDL 52 04/04/2018     Thyroid functions: No results found for: TSH   Hepatic functions:   Lab Results   Component Value Date    ALT 47 10/16/2017    AST 23 10/16/2017    PROT 5.9 10/16/2017    BILITOT 0.25 10/16/2017    LABALBU 3.2 10/16/2017     Urine Analysis: No results found for: [id number] [address]:    There are no preventive care reminders to display for this patient. ASSESSMENT AND PLAN:       Diagnosis Orders   1.  Morbid obesity due to excess calories [facility])  The patient is asked to make an attempt to improve diet and Hannah Erwin MD, [address]  Associate , Department of Internal Medicine  [facility]  Attending [facility], [facility]  [facility]                   8/27/2018, 12:07 PM

## 2018-09-25 DIAGNOSIS — M54.50 LOW BACK PAIN WITHOUT SCIATICA, UNSPECIFIED BACK PAIN LATERALITY, UNSPECIFIED CHRONICITY: ICD-10-CM

## 2018-09-25 RX ORDER — OXYCODONE AND ACETAMINOPHEN 7.5; 325 MG/1; MG/1
TABLET ORAL
Qty: 60 TABLET | Refills: 0 | Status: SHIPPED | OUTPATIENT
Start: 2018-09-25 | End: 2018-11-01 | Stop reason: SDUPTHER

## 2018-09-25 NOTE — TELEPHONE ENCOUNTER
Last visit:8/27/18  Last Med refill:8/27/18    Next Visit Date:  No future appointments.     Health Maintenance   Topic Date Due    Flu vaccine (1) 09/01/2018    Potassium monitoring  12/11/2018    Creatinine monitoring  12/11/2018    Lipid screen  04/04/2023    DTaP/Tdap/Td vaccine (2 - Td) 06/06/2026    Pneumococcal med risk  Completed    HIV screen  Completed       Hemoglobin A1C (%)   Date Value   05/08/2017 5.0             ( goal A1C is < 7)   No results found for: LABMICR  LDL Cholesterol (mg/dL)   Date Value   04/04/2018 130   06/20/2016 111       (goal LDL is <100)   AST (U/L)   Date Value   10/16/2017 23     ALT (U/L)   Date Value   10/16/2017 47 (H)     BUN (mg/dL)   Date Value   12/11/2017 18     BP Readings from Last 3 Encounters:   08/27/18 123/86   07/16/18 (!) 147/101   06/07/18 134/76          (goal 120/80)    All Future Testing planned in [facility]  Lab Frequency Next Occurrence               Patient Active Problem List:     Low back pain without sciatica     Morbid obesity due to excess calories (HCC)     Benign essential HTN     Acute systolic congestive heart failure (HCC)     Secondary cardiomyopathy ([address]     DVT (deep venous thrombosis) (HCC)     Hyperglycemia     Rhabdomyolysis

## 2018-10-17 DIAGNOSIS — M54.50 LOW BACK PAIN WITHOUT SCIATICA, UNSPECIFIED BACK PAIN LATERALITY, UNSPECIFIED CHRONICITY: ICD-10-CM

## 2018-10-17 RX ORDER — GABAPENTIN 600 MG/1
TABLET ORAL
Qty: 90 TABLET | Refills: 3 | Status: SHIPPED | OUTPATIENT
Start: 2018-10-17 | End: 2019-01-10 | Stop reason: SDUPTHER

## 2018-10-23 ENCOUNTER — TELEPHONE (OUTPATIENT)
Dept: INTERNAL MEDICINE | Age: 43
End: 2018-10-23

## 2018-11-01 ENCOUNTER — NURSE ONLY (OUTPATIENT)
Dept: INTERNAL MEDICINE | Age: 43
End: 2018-11-01
Payer: MEDICARE

## 2018-11-01 VITALS
RESPIRATION RATE: 16 BRPM | SYSTOLIC BLOOD PRESSURE: 128 MMHG | HEART RATE: 84 BPM | OXYGEN SATURATION: 96 % | DIASTOLIC BLOOD PRESSURE: 86 MMHG

## 2018-11-01 DIAGNOSIS — M54.50 LOW BACK PAIN WITHOUT SCIATICA, UNSPECIFIED BACK PAIN LATERALITY, UNSPECIFIED CHRONICITY: ICD-10-CM

## 2018-11-01 DIAGNOSIS — Z23 NEEDS FLU SHOT: Primary | ICD-10-CM

## 2018-11-01 PROCEDURE — 99212 OFFICE O/P EST SF 10 MIN: CPT | Performed by: INTERNAL MEDICINE

## 2018-11-01 PROCEDURE — 90686 IIV4 VACC NO PRSV 0.5 ML IM: CPT | Performed by: INTERNAL MEDICINE

## 2018-11-01 PROCEDURE — 99211 OFF/OP EST MAY X REQ PHY/QHP: CPT | Performed by: INTERNAL MEDICINE

## 2018-11-01 RX ORDER — OXYCODONE AND ACETAMINOPHEN 7.5; 325 MG/1; MG/1
1 TABLET ORAL 2 TIMES DAILY
Qty: 60 TABLET | Refills: 0 | Status: SHIPPED | OUTPATIENT
Start: 2018-11-01 | End: 2018-12-03 | Stop reason: SDUPTHER

## 2018-12-03 ENCOUNTER — OFFICE VISIT (OUTPATIENT)
Dept: INTERNAL MEDICINE | Age: 43
End: 2018-12-03
Payer: MEDICARE

## 2018-12-03 VITALS
DIASTOLIC BLOOD PRESSURE: 84 MMHG | HEART RATE: 85 BPM | SYSTOLIC BLOOD PRESSURE: 130 MMHG | HEIGHT: 70 IN | BODY MASS INDEX: 45.1 KG/M2 | WEIGHT: 315 LBS

## 2018-12-03 DIAGNOSIS — M54.50 LOW BACK PAIN WITHOUT SCIATICA, UNSPECIFIED BACK PAIN LATERALITY, UNSPECIFIED CHRONICITY: ICD-10-CM

## 2018-12-03 DIAGNOSIS — I10 BENIGN ESSENTIAL HTN: ICD-10-CM

## 2018-12-03 DIAGNOSIS — E66.01 MORBID OBESITY DUE TO EXCESS CALORIES (HCC): Primary | ICD-10-CM

## 2018-12-03 DIAGNOSIS — E78.1 HYPERTRIGLYCERIDEMIA: ICD-10-CM

## 2018-12-03 PROCEDURE — 1036F TOBACCO NON-USER: CPT | Performed by: INTERNAL MEDICINE

## 2018-12-03 PROCEDURE — G8417 CALC BMI ABV UP PARAM F/U: HCPCS | Performed by: INTERNAL MEDICINE

## 2018-12-03 PROCEDURE — 99211 OFF/OP EST MAY X REQ PHY/QHP: CPT | Performed by: INTERNAL MEDICINE

## 2018-12-03 PROCEDURE — G8482 FLU IMMUNIZE ORDER/ADMIN: HCPCS | Performed by: INTERNAL MEDICINE

## 2018-12-03 PROCEDURE — 99214 OFFICE O/P EST MOD 30 MIN: CPT | Performed by: INTERNAL MEDICINE

## 2018-12-03 PROCEDURE — G8427 DOCREV CUR MEDS BY ELIG CLIN: HCPCS | Performed by: INTERNAL MEDICINE

## 2018-12-03 RX ORDER — OXYCODONE AND ACETAMINOPHEN 7.5; 325 MG/1; MG/1
1 TABLET ORAL 2 TIMES DAILY
Qty: 60 TABLET | Refills: 0 | Status: SHIPPED | OUTPATIENT
Start: 2018-12-03 | End: 2019-01-10 | Stop reason: SDUPTHER

## 2018-12-03 RX ORDER — GEMFIBROZIL 600 MG/1
600 TABLET, FILM COATED ORAL 2 TIMES DAILY
Qty: 60 TABLET | Refills: 3 | Status: SHIPPED | OUTPATIENT
Start: 2018-12-03 | End: 2019-06-12 | Stop reason: SDUPTHER

## 2018-12-03 ASSESSMENT — ENCOUNTER SYMPTOMS
RESPIRATORY NEGATIVE: 1
EYES NEGATIVE: 1
GASTROINTESTINAL NEGATIVE: 1
ALLERGIC/IMMUNOLOGIC NEGATIVE: 1

## 2018-12-03 NOTE — PATIENT INSTRUCTIONS
Return To Clinic 1/10/19. After Visit Summary given and reviewed. bb    It is very important for your care that you keep your appointment. If for some reason you are unable to keep your appointment it is equally important that you call our office at [phone] to cancel your appointment and reschedule. Failure to do so may result in your termination from our practice. LABORATORY INSTRUCTIONS    Your doctor has ordered blood or urine testing. You can get this testing done at the Lab located on the first floor of the [facility], or at any other [facility]. Please stop at Main Registration, before going to the lab, as you must be registered first.     Please get this lab done before your next visit. You may NOT eat, drink, smoke, or chew anything before this test for 8 hours. You may still have water.     Script for percocet given to pt

## 2018-12-03 NOTE — PROGRESS NOTES
obesity due to excess calories ([facility] [address]     2. Low back pain without sciatica, unspecified back pain laterality, unspecified chronicity  oxyCODONE-acetaminophen (PERCOCET) 7.5-325 MG per tablet  Controlled substances monitoring: OARRS report reviewed today- activity consistent with treatment plan. 3. Benign essential HTN  Basic Metabolic Panel   4. Hypertriglyceridemia  Lipid Panel         FOLLOW UP:   1. Luz Elena Cardoso received counseling on the following healthy behaviors: nutrition and exercise    2. Reviewed prior labs and health maintenance. 3.  Discussed use, benefit, and side effects of prescribed medications. Barriers to medication compliance addressed. All patient questions answered. Pt voiced understanding. 4.  Continue current medications, diet and exercise. Orders Placed This Encounter   Medications    oxyCODONE-acetaminophen (PERCOCET) 7.5-325 MG per tablet     Sig: Take 1 tablet by mouth 2 times daily for 30 days. Francesca Malone Date: 12/3/18     Dispense:  60 tablet     Refill:  0    gemfibrozil (LOPID) 600 MG tablet     Sig: Take 1 tablet by mouth 2 times daily     Dispense:  60 tablet     Refill:  3          Completed Refills               Requested Prescriptions     Signed Prescriptions Disp Refills    oxyCODONE-acetaminophen (PERCOCET) 7.5-325 MG per tablet 60 tablet 0     Sig: Take 1 tablet by mouth 2 times daily for 30 days. Francesca Malone Date: 12/3/18    gemfibrozil (LOPID) 600 MG tablet 60 tablet 3     Sig: Take 1 tablet by mouth 2 times daily       5. Patient given educational materials - see patient instructions    6. Was a self-tracking handout given in paper form or via zlient? Yes  If yes, see orders or list here. Orders Placed This Encounter   Procedures    Basic Metabolic Panel     Please get this labwork done before your next visit.      Standing Status:   Future     Standing Expiration Date:   12/2/2019    Lipid Panel     Standing Status:   Future

## 2018-12-31 ENCOUNTER — HOSPITAL ENCOUNTER (OUTPATIENT)
Age: 43
Discharge: HOME | End: 2018-12-31
Payer: MEDICARE

## 2018-12-31 DIAGNOSIS — I10 BENIGN ESSENTIAL HTN: ICD-10-CM

## 2018-12-31 DIAGNOSIS — E78.1 HYPERTRIGLYCERIDEMIA: ICD-10-CM

## 2018-12-31 LAB
ANION GAP SERPL CALCULATED.3IONS-SCNC: 13 MMOL/L (ref 9–17)
BUN BLDV-MCNC: 12 MG/DL (ref 6–20)
BUN/CREAT BLD: ABNORMAL (ref 9–20)
CALCIUM SERPL-MCNC: 9.7 MG/DL (ref 8.6–10.4)
CHLORIDE BLD-SCNC: 104 MMOL/L (ref 98–107)
CHOLESTEROL/HDL RATIO: 3.6
CHOLESTEROL: 207 MG/DL
CO2: 26 MMOL/L (ref 20–31)
CREAT SERPL-MCNC: 1.14 MG/DL (ref 0.7–1.2)
GFR AFRICAN AMERICAN: >60 ML/MIN
GFR NON-AFRICAN AMERICAN: >60 ML/MIN
GFR SERPL CREATININE-BSD FRML MDRD: ABNORMAL ML/MIN/{1.73_M2}
GFR SERPL CREATININE-BSD FRML MDRD: ABNORMAL ML/MIN/{1.73_M2}
GLUCOSE BLD-MCNC: 102 MG/DL (ref 70–99)
HDLC SERPL-MCNC: 57 MG/DL
LDL CHOLESTEROL: 126 MG/DL (ref 0–130)
POTASSIUM SERPL-SCNC: 4.4 MMOL/L (ref 3.7–5.3)
SODIUM BLD-SCNC: 143 MMOL/L (ref 135–144)
TRIGL SERPL-MCNC: 120 MG/DL
VLDLC SERPL CALC-MCNC: ABNORMAL MG/DL (ref 1–30)

## 2018-12-31 PROCEDURE — 80061 LIPID PANEL: CPT

## 2018-12-31 PROCEDURE — 36415 COLL VENOUS BLD VENIPUNCTURE: CPT

## 2018-12-31 PROCEDURE — 80048 BASIC METABOLIC PNL TOTAL CA: CPT

## 2019-01-10 ENCOUNTER — TELEPHONE (OUTPATIENT)
Dept: INTERNAL MEDICINE | Age: 44
End: 2019-01-10

## 2019-01-10 ENCOUNTER — OFFICE VISIT (OUTPATIENT)
Dept: INTERNAL MEDICINE | Age: 44
End: 2019-01-10
Payer: MEDICARE

## 2019-01-10 VITALS
DIASTOLIC BLOOD PRESSURE: 91 MMHG | SYSTOLIC BLOOD PRESSURE: 127 MMHG | BODY MASS INDEX: 45.1 KG/M2 | WEIGHT: 315 LBS | HEART RATE: 74 BPM | HEIGHT: 70 IN

## 2019-01-10 DIAGNOSIS — E66.01 MORBID OBESITY WITH BMI OF 45.0-49.9, ADULT (HCC): ICD-10-CM

## 2019-01-10 DIAGNOSIS — I42.9 SECONDARY CARDIOMYOPATHY (HCC): ICD-10-CM

## 2019-01-10 DIAGNOSIS — R21 RASH: ICD-10-CM

## 2019-01-10 DIAGNOSIS — M54.50 LOW BACK PAIN WITHOUT SCIATICA, UNSPECIFIED BACK PAIN LATERALITY, UNSPECIFIED CHRONICITY: ICD-10-CM

## 2019-01-10 DIAGNOSIS — Z93.0 TRACHEOSTOMY STATUS (HCC): ICD-10-CM

## 2019-01-10 DIAGNOSIS — E66.01 MORBID OBESITY DUE TO EXCESS CALORIES (HCC): Primary | ICD-10-CM

## 2019-01-10 PROCEDURE — 99211 OFF/OP EST MAY X REQ PHY/QHP: CPT | Performed by: INTERNAL MEDICINE

## 2019-01-10 PROCEDURE — 99214 OFFICE O/P EST MOD 30 MIN: CPT | Performed by: INTERNAL MEDICINE

## 2019-01-10 RX ORDER — OXYCODONE AND ACETAMINOPHEN 7.5; 325 MG/1; MG/1
1 TABLET ORAL 2 TIMES DAILY
Qty: 60 TABLET | Refills: 0 | Status: SHIPPED | OUTPATIENT
Start: 2019-01-10 | End: 2019-02-07 | Stop reason: SDUPTHER

## 2019-01-10 RX ORDER — PHENTERMINE HYDROCHLORIDE 37.5 MG/1
37.5 CAPSULE ORAL EVERY MORNING
Qty: 30 CAPSULE | Refills: 0 | Status: SHIPPED | OUTPATIENT
Start: 2019-01-10 | End: 2019-02-07 | Stop reason: SDUPTHER

## 2019-01-10 RX ORDER — GABAPENTIN 600 MG/1
TABLET ORAL
Qty: 90 TABLET | Refills: 3 | Status: SHIPPED | OUTPATIENT
Start: 2019-01-10 | End: 2019-05-23 | Stop reason: SDUPTHER

## 2019-01-10 ASSESSMENT — ENCOUNTER SYMPTOMS
BACK PAIN: 1
EYES NEGATIVE: 1
ALLERGIC/IMMUNOLOGIC NEGATIVE: 1
GASTROINTESTINAL NEGATIVE: 1
STRIDOR: 1

## 2019-02-07 ENCOUNTER — OFFICE VISIT (OUTPATIENT)
Dept: INTERNAL MEDICINE | Age: 44
End: 2019-02-07
Payer: MEDICARE

## 2019-02-07 VITALS
HEART RATE: 78 BPM | DIASTOLIC BLOOD PRESSURE: 89 MMHG | BODY MASS INDEX: 44.61 KG/M2 | SYSTOLIC BLOOD PRESSURE: 131 MMHG | HEIGHT: 70 IN | WEIGHT: 311.6 LBS

## 2019-02-07 DIAGNOSIS — M54.50 LOW BACK PAIN WITHOUT SCIATICA, UNSPECIFIED BACK PAIN LATERALITY, UNSPECIFIED CHRONICITY: ICD-10-CM

## 2019-02-07 DIAGNOSIS — E66.01 MORBID OBESITY DUE TO EXCESS CALORIES (HCC): ICD-10-CM

## 2019-02-07 PROCEDURE — 99211 OFF/OP EST MAY X REQ PHY/QHP: CPT | Performed by: INTERNAL MEDICINE

## 2019-02-07 PROCEDURE — 99213 OFFICE O/P EST LOW 20 MIN: CPT | Performed by: INTERNAL MEDICINE

## 2019-02-07 RX ORDER — PHENTERMINE HYDROCHLORIDE 37.5 MG/1
37.5 CAPSULE ORAL EVERY MORNING
Qty: 30 CAPSULE | Refills: 0 | Status: SHIPPED | OUTPATIENT
Start: 2019-02-07 | End: 2019-03-07 | Stop reason: SDUPTHER

## 2019-02-07 RX ORDER — OXYCODONE AND ACETAMINOPHEN 7.5; 325 MG/1; MG/1
1 TABLET ORAL 2 TIMES DAILY
Qty: 60 TABLET | Refills: 0 | Status: SHIPPED | OUTPATIENT
Start: 2019-02-07 | End: 2019-03-07 | Stop reason: SDUPTHER

## 2019-02-07 ASSESSMENT — PATIENT HEALTH QUESTIONNAIRE - PHQ9
SUM OF ALL RESPONSES TO PHQ QUESTIONS 1-9: 1
2. FEELING DOWN, DEPRESSED OR HOPELESS: 0
1. LITTLE INTEREST OR PLEASURE IN DOING THINGS: 1
SUM OF ALL RESPONSES TO PHQ QUESTIONS 1-9: 1
SUM OF ALL RESPONSES TO PHQ9 QUESTIONS 1 & 2: 1

## 2019-02-07 ASSESSMENT — ENCOUNTER SYMPTOMS
RESPIRATORY NEGATIVE: 1
BACK PAIN: 1
GASTROINTESTINAL NEGATIVE: 1
EYES NEGATIVE: 1

## 2019-03-07 ENCOUNTER — OFFICE VISIT (OUTPATIENT)
Dept: INTERNAL MEDICINE | Age: 44
End: 2019-03-07
Payer: MEDICARE

## 2019-03-07 VITALS
HEIGHT: 70 IN | WEIGHT: 302 LBS | SYSTOLIC BLOOD PRESSURE: 119 MMHG | DIASTOLIC BLOOD PRESSURE: 73 MMHG | BODY MASS INDEX: 43.23 KG/M2 | HEART RATE: 90 BPM

## 2019-03-07 DIAGNOSIS — E66.01 MORBID OBESITY DUE TO EXCESS CALORIES (HCC): ICD-10-CM

## 2019-03-07 DIAGNOSIS — M54.50 LOW BACK PAIN WITHOUT SCIATICA, UNSPECIFIED BACK PAIN LATERALITY, UNSPECIFIED CHRONICITY: ICD-10-CM

## 2019-03-07 PROCEDURE — 99213 OFFICE O/P EST LOW 20 MIN: CPT | Performed by: INTERNAL MEDICINE

## 2019-03-07 PROCEDURE — 99211 OFF/OP EST MAY X REQ PHY/QHP: CPT | Performed by: INTERNAL MEDICINE

## 2019-03-07 RX ORDER — OXYCODONE AND ACETAMINOPHEN 7.5; 325 MG/1; MG/1
1 TABLET ORAL 2 TIMES DAILY
Qty: 60 TABLET | Refills: 0 | Status: SHIPPED | OUTPATIENT
Start: 2019-03-07 | End: 2019-04-11 | Stop reason: SDUPTHER

## 2019-03-07 RX ORDER — PHENTERMINE HYDROCHLORIDE 37.5 MG/1
37.5 CAPSULE ORAL EVERY MORNING
Qty: 30 CAPSULE | Refills: 0 | Status: SHIPPED | OUTPATIENT
Start: 2019-03-07 | End: 2019-04-06

## 2019-03-07 ASSESSMENT — ENCOUNTER SYMPTOMS
BACK PAIN: 1
ALLERGIC/IMMUNOLOGIC NEGATIVE: 1
EYES NEGATIVE: 1
RESPIRATORY NEGATIVE: 1
GASTROINTESTINAL NEGATIVE: 1

## 2019-04-01 DIAGNOSIS — I10 BENIGN ESSENTIAL HTN: ICD-10-CM

## 2019-04-01 RX ORDER — CARVEDILOL 6.25 MG/1
6.25 TABLET ORAL 2 TIMES DAILY
Qty: 60 TABLET | Refills: 3 | Status: SHIPPED | OUTPATIENT
Start: 2019-04-01 | End: 2019-08-18 | Stop reason: SDUPTHER

## 2019-04-11 ENCOUNTER — OFFICE VISIT (OUTPATIENT)
Dept: INTERNAL MEDICINE | Age: 44
End: 2019-04-11
Payer: MEDICARE

## 2019-04-11 VITALS
SYSTOLIC BLOOD PRESSURE: 142 MMHG | HEIGHT: 70 IN | DIASTOLIC BLOOD PRESSURE: 96 MMHG | WEIGHT: 292 LBS | HEART RATE: 84 BPM | BODY MASS INDEX: 41.8 KG/M2

## 2019-04-11 DIAGNOSIS — E66.01 MORBID OBESITY DUE TO EXCESS CALORIES (HCC): Primary | ICD-10-CM

## 2019-04-11 DIAGNOSIS — M54.50 LOW BACK PAIN WITHOUT SCIATICA, UNSPECIFIED BACK PAIN LATERALITY, UNSPECIFIED CHRONICITY: ICD-10-CM

## 2019-04-11 DIAGNOSIS — I10 BENIGN ESSENTIAL HTN: ICD-10-CM

## 2019-04-11 PROCEDURE — 99211 OFF/OP EST MAY X REQ PHY/QHP: CPT | Performed by: INTERNAL MEDICINE

## 2019-04-11 PROCEDURE — 99213 OFFICE O/P EST LOW 20 MIN: CPT | Performed by: INTERNAL MEDICINE

## 2019-04-11 RX ORDER — OXYCODONE AND ACETAMINOPHEN 7.5; 325 MG/1; MG/1
1 TABLET ORAL 2 TIMES DAILY
Qty: 60 TABLET | Refills: 0 | Status: SHIPPED | OUTPATIENT
Start: 2019-04-11 | End: 2019-05-11

## 2019-04-11 ASSESSMENT — ENCOUNTER SYMPTOMS
RESPIRATORY NEGATIVE: 1
GASTROINTESTINAL NEGATIVE: 1
ALLERGIC/IMMUNOLOGIC NEGATIVE: 1
EYE REDNESS: 1

## 2019-04-11 NOTE — PATIENT INSTRUCTIONS
Follow-up appointment scheduled for 7/25/19, AVS given to patient.     Printed script for [employer] signed and given to pt    LJ

## 2019-04-11 NOTE — PROGRESS NOTES
[facility]     Progress Note      Visit Information    Have you changed or started any medications since your last visit including any over-the-counter medicines, vitamins, or herbal medicines? no   Are you having any side effects from any of your medications? -  no  Have you stopped taking any of your medications? Is so, why? -  no    Have you seen any other physician or provider since your last visit? No  Have you had any other diagnostic tests since your last visit? No  Have you been seen in the emergency room and/or had an admission to a hospital since we last saw you? No  Have you had your routine dental cleaning in the past 6 months? no    Have you activated your [employer] account? If not, what are your barriers? Yes     Patient Care Team:  Trev Willams MD as PCP - General (Internal Medicine)  Trev Willams MD as PCP - [facility] Attributed Provider  Trev Willams MD as Referring Physician (Internal Medicine)  Miracle Mcfadden MD as Consulting Physician (Pulmonology)  Gadiel Lerner MD as Consulting Physician (Internal Medicine)    Medical History Review  Past Medical, Family, and Social History reviewed and does not contribute to the patient presenting condition    Health Maintenance   Topic Date Due    Potassium monitoring  12/31/2019    Creatinine monitoring  12/31/2019    Diabetes screen  05/08/2020    Lipid screen  12/31/2023    DTaP/Tdap/Td vaccine (2 - Td) 06/06/2026    Flu vaccine  Completed    Pneumococcal 0-64 years Vaccine  Completed    HIV screen  Completed       Date of patient's visit: 4/11/2019  Patient's Name:  Haylee Nagel                   YOB: 1975        PCP:  Trev Willams MD    Haylee Nagel is a 37 y.o. male who presents for   Chief Complaint   Patient presents with    Medication Refill     Adipex refill, 1 month follow up    and follow up of chronic medical problems.   Patient Active Problem List   Diagnosis    Low back pain without sciatica    Morbid obesity due to excess calories (HCC)    Benign essential HTN    Acute systolic congestive heart failure (HCC)    Secondary cardiomyopathy ([address]    DVT (deep venous thrombosis) (HCC)    Hyperglycemia    Rhabdomyolysis       HISTORY OF PRESENT ILLNESS:    History was obtained from the patient. Patient Active Problem List   Diagnosis    Low back pain without sciatica- unchanged. Although reports he feels more ability to exercise since losing more than 30 lbs    Morbid obesity due to excess calories ([address]- completed 3 months of adipex and lost 38 lbs    Benign essential HTN- controlled. Patient's allergies, medications, past medical, surgical, social and family histories were reviewed and updated as appropriate. ALLERGIES    No Known Allergies      MEDICATIONS:      Current Outpatient Medications   Medication Sig Dispense Refill    carvedilol (COREG) 6.25 MG tablet Take 1 tablet by mouth 2 times daily 60 tablet 3    gabapentin (NEURONTIN) 600 MG tablet take 1 tablet by mouth three times a day. 90 tablet 3    gemfibrozil (LOPID) 600 MG tablet Take 1 tablet by mouth 2 times daily 60 tablet 3    TraZODone HCl  MG TB24 Take 300 mg by mouth nightly      brexpiprazole (REXULTI) 3 MG TABS tablet Take 3 mg by mouth daily      VENTOLIN  (90 Base) MCG/ACT inhaler Inhale 2 puffs into the lungs every 4-6 hours as needed  0    clonazePAM (KLONOPIN) 0.5 MG tablet Take 1 tablet by mouth 2 times daily as needed for Anxiety (Patient taking differently: Take 0.5 mg by mouth once .) 60 tablet 0     No current facility-administered medications for this visit.         Patient Care Team:  Edith Hernandez MD as PCP - General (Internal Medicine)  Edith Hernandez MD as PCP - [facility] Attributed Provider  Edith Hernandez MD as Referring Physician (Internal Medicine)  Nabor Peterson MD as Consulting Physician (Pulmonology)  Anjelica Holden MD as Consulting Physician (Internal Medicine)    [facility] AND SURGICAL HISTORY:      Past Medical History:   Diagnosis Date    Anxiety     Back pain     Dvt femoral (deep venous thrombosis) (HCC)     Hypertension     Morbid obesity due to excess calories (HCC)     CHRIS (obstructive sleep apnea)     Pneumonia     DUE TO OTHER AEROBIC GRAM-NEGATIVE BACTERIA, UNSPECIFIED LATERALITY, UNSPECIFIED PART OF LUNG     Respiratory failure (HCC)     Smoking      Past Surgical History:   Procedure Laterality Date    TRACHEOSTOMY         SOCIAL HISTORY      Social History     Tobacco Use    Smoking status: Former Smoker     Packs/day: 0.50     Types: Cigarettes     Last attempt to quit: 9/3/2017     Years since quittin.6    Smokeless tobacco: Never Used   Substance Use Topics    Alcohol use: Yes     Comment: social Francy Weiss  reports that he quit smoking about 19 months ago. His smoking use included cigarettes. He smoked 0.50 packs per day. He has never used smokeless tobacco.    FAMILY HISTORY:    History reviewed. No pertinent family history. REVIEW OF SYSTEMS:    Review of Systems   Constitutional: Positive for appetite change. HENT: Negative. Eyes: Positive for redness. Respiratory: Negative. Cardiovascular: Negative. Gastrointestinal: Negative. Endocrine: Negative. Genitourinary: Negative. Musculoskeletal: Negative. Skin: Negative. Allergic/Immunologic: Negative. Neurological: Negative. Hematological: Negative. Psychiatric/Behavioral: Negative.         PHYSICAL EXAM:      Vitals:    19 1412   BP: (!) 142/96   Pulse:      BP Readings from Last 3 Encounters:   19 (!) 142/96   19 119/73   19 131/89       Physical Examination: General appearance - alert, well appearing, and in no distress  Mental status - alert, oriented to person, place, and time  Chest - clear to auscultation, no wheezes, rales or rhonchi, symmetric air entry  Heart - normal rate, regular rhythm, normal S1, S2, no murmurs, rubs, clicks or

## 2019-05-23 DIAGNOSIS — M54.50 LOW BACK PAIN WITHOUT SCIATICA, UNSPECIFIED BACK PAIN LATERALITY, UNSPECIFIED CHRONICITY: ICD-10-CM

## 2019-05-24 RX ORDER — GABAPENTIN 600 MG/1
TABLET ORAL
Qty: 90 TABLET | Refills: 3 | Status: SHIPPED | OUTPATIENT
Start: 2019-05-24 | End: 2019-10-09 | Stop reason: SDUPTHER

## 2019-07-15 DIAGNOSIS — M54.50 LOW BACK PAIN WITHOUT SCIATICA, UNSPECIFIED BACK PAIN LATERALITY, UNSPECIFIED CHRONICITY: ICD-10-CM

## 2019-07-15 RX ORDER — OXYCODONE AND ACETAMINOPHEN 7.5; 325 MG/1; MG/1
TABLET ORAL
Qty: 60 TABLET | Refills: 0 | Status: SHIPPED | OUTPATIENT
Start: 2019-07-15 | End: 2019-08-16 | Stop reason: SDUPTHER

## 2019-07-15 NOTE — TELEPHONE ENCOUNTER
E-scribe request from [employer] for Percocet 7.5-325 mg. Pt has a follow up appt on 9/19/19.       Health Maintenance   Topic Date Due    Flu vaccine (1) 09/01/2019    Potassium monitoring  12/31/2019    Creatinine monitoring  12/31/2019    Diabetes screen  05/08/2020    Lipid screen  12/31/2023    DTaP/Tdap/Td vaccine (2 - Td) 06/06/2026    Pneumococcal 0-64 years Vaccine  Completed    HIV screen  Completed             (applicable per patient's age: Cancer Screenings, Depression Screening, Fall Risk Screening, Immunizations)    Hemoglobin A1C (%)   Date Value   05/08/2017 5.0     LDL Cholesterol (mg/dL)   Date Value   12/31/2018 126     AST (U/L)   Date Value   10/16/2017 23     ALT (U/L)   Date Value   10/16/2017 47 (H)     BUN (mg/dL)   Date Value   12/31/2018 12      (goal A1C is < 7)   (goal LDL is <100) need 30-50% reduction from baseline     BP Readings from Last 3 Encounters:   04/11/19 (!) 142/96   03/07/19 119/73   02/07/19 131/89    (goal /80)      All Future Testing planned in [facility]:  Lab Frequency Next Occurrence       Next Visit Date:  Future Appointments   Date Time Provider Asmita Erwin   9/19/2019  9:30 AM Lorin Kam MD [facility]            Patient Active Problem List:     Low back pain without sciatica     Morbid obesity due to excess calories ([address]     Benign essential HTN     Acute systolic congestive heart failure (HCC)     Secondary cardiomyopathy ([address]     DVT (deep venous thrombosis) (HCC)     Hyperglycemia     Rhabdomyolysis

## 2019-08-18 DIAGNOSIS — I10 BENIGN ESSENTIAL HTN: ICD-10-CM

## 2019-08-19 ENCOUNTER — NURSE ONLY (OUTPATIENT)
Dept: INTERNAL MEDICINE | Age: 44
End: 2019-08-19
Payer: MEDICARE

## 2019-08-19 VITALS — DIASTOLIC BLOOD PRESSURE: 88 MMHG | SYSTOLIC BLOOD PRESSURE: 121 MMHG | HEART RATE: 95 BPM

## 2019-08-19 DIAGNOSIS — I10 BENIGN ESSENTIAL HTN: Primary | ICD-10-CM

## 2019-08-19 PROCEDURE — 99211 OFF/OP EST MAY X REQ PHY/QHP: CPT | Performed by: INTERNAL MEDICINE

## 2019-08-19 RX ORDER — CARVEDILOL 6.25 MG/1
TABLET ORAL
Qty: 60 TABLET | Refills: 5 | Status: SHIPPED | OUTPATIENT
Start: 2019-08-19 | End: 2020-07-06 | Stop reason: SDUPTHER

## 2019-09-19 DIAGNOSIS — M54.50 LOW BACK PAIN WITHOUT SCIATICA, UNSPECIFIED BACK PAIN LATERALITY, UNSPECIFIED CHRONICITY: ICD-10-CM

## 2019-09-23 RX ORDER — OXYCODONE AND ACETAMINOPHEN 7.5; 325 MG/1; MG/1
TABLET ORAL
Qty: 60 TABLET | Refills: 0 | Status: SHIPPED | OUTPATIENT
Start: 2019-09-23 | End: 2019-10-23

## 2019-09-23 RX ORDER — GABAPENTIN 600 MG/1
TABLET ORAL
Qty: 90 TABLET | Refills: 3 | OUTPATIENT
Start: 2019-09-23

## 2019-10-09 ENCOUNTER — OFFICE VISIT (OUTPATIENT)
Dept: INTERNAL MEDICINE | Age: 44
End: 2019-10-09
Payer: MEDICARE

## 2019-10-09 VITALS
DIASTOLIC BLOOD PRESSURE: 87 MMHG | WEIGHT: 303.6 LBS | BODY MASS INDEX: 42.5 KG/M2 | HEART RATE: 80 BPM | SYSTOLIC BLOOD PRESSURE: 127 MMHG | HEIGHT: 71 IN

## 2019-10-09 DIAGNOSIS — M54.50 LOW BACK PAIN WITHOUT SCIATICA, UNSPECIFIED BACK PAIN LATERALITY, UNSPECIFIED CHRONICITY: ICD-10-CM

## 2019-10-09 DIAGNOSIS — F19.20: ICD-10-CM

## 2019-10-09 DIAGNOSIS — M54.16 LUMBAR RADICULOPATHY: ICD-10-CM

## 2019-10-09 DIAGNOSIS — G47.33 OSA (OBSTRUCTIVE SLEEP APNEA): ICD-10-CM

## 2019-10-09 DIAGNOSIS — E88.81 METABOLIC SYNDROME: Primary | ICD-10-CM

## 2019-10-09 DIAGNOSIS — I10 BENIGN ESSENTIAL HTN: ICD-10-CM

## 2019-10-09 DIAGNOSIS — Z79.899 POLYPHARMACY: ICD-10-CM

## 2019-10-09 PROCEDURE — 99214 OFFICE O/P EST MOD 30 MIN: CPT | Performed by: INTERNAL MEDICINE

## 2019-10-09 PROCEDURE — 99211 OFF/OP EST MAY X REQ PHY/QHP: CPT | Performed by: INTERNAL MEDICINE

## 2019-10-09 RX ORDER — OXYCODONE AND ACETAMINOPHEN 7.5; 325 MG/1; MG/1
TABLET ORAL
Qty: 60 TABLET | Refills: 0 | Status: CANCELLED | OUTPATIENT
Start: 2019-10-09 | End: 2019-11-08

## 2019-10-09 RX ORDER — ARIPIPRAZOLE 30 MG/1
30 TABLET ORAL
COMMUNITY
Start: 2019-10-08 | End: 2020-08-07 | Stop reason: SDUPTHER

## 2019-10-09 RX ORDER — BUPROPION HYDROCHLORIDE 300 MG/1
TABLET ORAL
COMMUNITY
Start: 2019-10-08 | End: 2020-08-07

## 2019-10-09 RX ORDER — GABAPENTIN 600 MG/1
600 TABLET ORAL 3 TIMES DAILY
Qty: 90 TABLET | Refills: 11 | Status: SHIPPED | OUTPATIENT
Start: 2019-10-09 | End: 2020-08-07 | Stop reason: SDUPTHER

## 2019-10-21 ENCOUNTER — HOSPITAL ENCOUNTER (OUTPATIENT)
Age: 44
Discharge: HOME | End: 2019-10-21
Payer: MEDICARE

## 2019-10-21 DIAGNOSIS — E88.81 METABOLIC SYNDROME: ICD-10-CM

## 2019-10-21 LAB
ALBUMIN SERPL-MCNC: 4.4 G/DL (ref 3.5–5.2)
ALBUMIN/GLOBULIN RATIO: 1.5 (ref 1–2.5)
ALP BLD-CCNC: 86 U/L (ref 40–129)
ALT SERPL-CCNC: 14 U/L (ref 5–41)
ANION GAP SERPL CALCULATED.3IONS-SCNC: 12 MMOL/L (ref 9–17)
AST SERPL-CCNC: 13 U/L
BILIRUB SERPL-MCNC: 0.58 MG/DL (ref 0.3–1.2)
BUN BLDV-MCNC: 12 MG/DL (ref 6–20)
BUN/CREAT BLD: NORMAL (ref 9–20)
CALCIUM SERPL-MCNC: 9.6 MG/DL (ref 8.6–10.4)
CHLORIDE BLD-SCNC: 101 MMOL/L (ref 98–107)
CHOLESTEROL/HDL RATIO: 3.7
CHOLESTEROL: 229 MG/DL
CO2: 27 MMOL/L (ref 20–31)
CREAT SERPL-MCNC: 0.92 MG/DL (ref 0.7–1.2)
ESTIMATED AVERAGE GLUCOSE: 103 MG/DL
GFR AFRICAN AMERICAN: >60 ML/MIN
GFR NON-AFRICAN AMERICAN: >60 ML/MIN
GFR SERPL CREATININE-BSD FRML MDRD: NORMAL ML/MIN/{1.73_M2}
GFR SERPL CREATININE-BSD FRML MDRD: NORMAL ML/MIN/{1.73_M2}
GLUCOSE BLD-MCNC: 83 MG/DL (ref 70–99)
HBA1C MFR BLD: 5.2 % (ref 4–6)
HCT VFR BLD CALC: 56.2 % (ref 40.7–50.3)
HDLC SERPL-MCNC: 62 MG/DL
HEMOGLOBIN: 17.9 G/DL (ref 13–17)
LDL CHOLESTEROL: 134 MG/DL (ref 0–130)
MCH RBC QN AUTO: 30.1 PG (ref 25.2–33.5)
MCHC RBC AUTO-ENTMCNC: 31.9 G/DL (ref 28.4–34.8)
MCV RBC AUTO: 94.5 FL (ref 82.6–102.9)
NRBC AUTOMATED: 0 PER 100 WBC
PDW BLD-RTO: 13.7 % (ref 11.8–14.4)
PLATELET # BLD: 210 K/UL (ref 138–453)
PMV BLD AUTO: 11.3 FL (ref 8.1–13.5)
POTASSIUM SERPL-SCNC: 4.7 MMOL/L (ref 3.7–5.3)
RBC # BLD: 5.95 M/UL (ref 4.21–5.77)
SODIUM BLD-SCNC: 140 MMOL/L (ref 135–144)
TOTAL PROTEIN: 7.4 G/DL (ref 6.4–8.3)
TRIGL SERPL-MCNC: 167 MG/DL
TSH SERPL DL<=0.05 MIU/L-ACNC: 1.19 MIU/L (ref 0.3–5)
VLDLC SERPL CALC-MCNC: ABNORMAL MG/DL (ref 1–30)
WBC # BLD: 10.2 K/UL (ref 3.5–11.3)

## 2019-10-21 PROCEDURE — 83036 HEMOGLOBIN GLYCOSYLATED A1C: CPT

## 2019-10-21 PROCEDURE — 36415 COLL VENOUS BLD VENIPUNCTURE: CPT

## 2019-10-21 PROCEDURE — 80061 LIPID PANEL: CPT

## 2019-10-21 PROCEDURE — 80053 COMPREHEN METABOLIC PANEL: CPT

## 2019-10-21 PROCEDURE — 85027 COMPLETE CBC AUTOMATED: CPT

## 2019-10-21 PROCEDURE — 84443 ASSAY THYROID STIM HORMONE: CPT

## 2019-10-23 DIAGNOSIS — M54.50 LOW BACK PAIN WITHOUT SCIATICA, UNSPECIFIED BACK PAIN LATERALITY, UNSPECIFIED CHRONICITY: ICD-10-CM

## 2019-10-24 RX ORDER — OXYCODONE AND ACETAMINOPHEN 7.5; 325 MG/1; MG/1
TABLET ORAL
Qty: 60 TABLET | Refills: 0 | OUTPATIENT
Start: 2019-10-24 | End: 2019-11-23

## 2019-11-06 ENCOUNTER — TELEPHONE (OUTPATIENT)
Dept: INTERNAL MEDICINE | Age: 44
End: 2019-11-06

## 2019-11-06 DIAGNOSIS — K62.5 BLOOD PER RECTUM: Primary | ICD-10-CM

## 2019-11-11 ENCOUNTER — TELEPHONE (OUTPATIENT)
Dept: GASTROENTEROLOGY | Age: 44
End: 2019-11-11

## 2019-11-26 ENCOUNTER — OFFICE VISIT (OUTPATIENT)
Dept: GASTROENTEROLOGY | Age: 44
End: 2019-11-26
Payer: MEDICARE

## 2019-11-26 VITALS
HEART RATE: 93 BPM | BODY MASS INDEX: 43.46 KG/M2 | SYSTOLIC BLOOD PRESSURE: 124 MMHG | WEIGHT: 311.6 LBS | DIASTOLIC BLOOD PRESSURE: 93 MMHG

## 2019-11-26 DIAGNOSIS — R19.7 DIARRHEA, UNSPECIFIED TYPE: ICD-10-CM

## 2019-11-26 DIAGNOSIS — K62.5 RECTAL BLEEDING: Primary | ICD-10-CM

## 2019-11-26 PROCEDURE — 99204 OFFICE O/P NEW MOD 45 MIN: CPT | Performed by: INTERNAL MEDICINE

## 2019-11-26 RX ORDER — SODIUM, POTASSIUM,MAG SULFATES 17.5-3.13G
1 SOLUTION, RECONSTITUTED, ORAL ORAL ONCE
Qty: 1 BOTTLE | Refills: 0 | Status: SHIPPED | OUTPATIENT
Start: 2019-11-26 | End: 2019-11-26

## 2019-11-26 ASSESSMENT — ENCOUNTER SYMPTOMS
NAUSEA: 1
BACK PAIN: 1
DIARRHEA: 1
BLOOD IN STOOL: 1
EYES NEGATIVE: 1
RESPIRATORY NEGATIVE: 1
ALLERGIC/IMMUNOLOGIC NEGATIVE: 1

## 2019-12-06 ENCOUNTER — TELEPHONE (OUTPATIENT)
Dept: GASTROENTEROLOGY | Age: 44
End: 2019-12-06

## 2019-12-11 ENCOUNTER — ANESTHESIA EVENT (OUTPATIENT)
Dept: OPERATING ROOM | Age: 44
End: 2019-12-11
Payer: MEDICARE

## 2019-12-12 ENCOUNTER — HOSPITAL ENCOUNTER (OUTPATIENT)
Age: 44
Setting detail: OUTPATIENT SURGERY
Discharge: HOME | End: 2019-12-12
Attending: INTERNAL MEDICINE | Admitting: INTERNAL MEDICINE
Payer: MEDICARE

## 2019-12-12 ENCOUNTER — ANESTHESIA (OUTPATIENT)
Dept: OPERATING ROOM | Age: 44
End: 2019-12-12
Payer: MEDICARE

## 2019-12-12 VITALS
RESPIRATION RATE: 23 BRPM | OXYGEN SATURATION: 99 % | HEIGHT: 70 IN | WEIGHT: 315 LBS | SYSTOLIC BLOOD PRESSURE: 115 MMHG | BODY MASS INDEX: 45.1 KG/M2 | DIASTOLIC BLOOD PRESSURE: 71 MMHG | TEMPERATURE: 97.2 F | HEART RATE: 64 BPM

## 2019-12-12 VITALS — DIASTOLIC BLOOD PRESSURE: 66 MMHG | OXYGEN SATURATION: 99 % | SYSTOLIC BLOOD PRESSURE: 117 MMHG

## 2019-12-12 PROCEDURE — 3700000001 HC ADD 15 MINUTES (ANESTHESIA): Performed by: INTERNAL MEDICINE

## 2019-12-12 PROCEDURE — 6360000002 HC RX W HCPCS: Performed by: NURSE ANESTHETIST, CERTIFIED REGISTERED

## 2019-12-12 PROCEDURE — 2500000003 HC RX 250 WO HCPCS: Performed by: NURSE ANESTHETIST, CERTIFIED REGISTERED

## 2019-12-12 PROCEDURE — 88305 TISSUE EXAM BY PATHOLOGIST: CPT

## 2019-12-12 PROCEDURE — 2709999900 HC NON-CHARGEABLE SUPPLY: Performed by: INTERNAL MEDICINE

## 2019-12-12 PROCEDURE — 2580000003 HC RX 258: Performed by: NURSE ANESTHETIST, CERTIFIED REGISTERED

## 2019-12-12 PROCEDURE — 45385 COLONOSCOPY W/LESION REMOVAL: CPT | Performed by: INTERNAL MEDICINE

## 2019-12-12 PROCEDURE — 7100000011 HC PHASE II RECOVERY - ADDTL 15 MIN: Performed by: INTERNAL MEDICINE

## 2019-12-12 PROCEDURE — 3700000000 HC ANESTHESIA ATTENDED CARE: Performed by: INTERNAL MEDICINE

## 2019-12-12 PROCEDURE — 3609010400 HC COLONOSCOPY POLYPECTOMY HOT BIOPSY: Performed by: INTERNAL MEDICINE

## 2019-12-12 PROCEDURE — 2580000003 HC RX 258: Performed by: ANESTHESIOLOGY

## 2019-12-12 PROCEDURE — 7100000010 HC PHASE II RECOVERY - FIRST 15 MIN: Performed by: INTERNAL MEDICINE

## 2019-12-12 RX ORDER — LIDOCAINE HYDROCHLORIDE 10 MG/ML
1 INJECTION, SOLUTION EPIDURAL; INFILTRATION; INTRACAUDAL; PERINEURAL
Status: DISCONTINUED | OUTPATIENT
Start: 2019-12-12 | End: 2019-12-12 | Stop reason: HOSPADM

## 2019-12-12 RX ORDER — SODIUM CHLORIDE, SODIUM LACTATE, POTASSIUM CHLORIDE, CALCIUM CHLORIDE 600; 310; 30; 20 MG/100ML; MG/100ML; MG/100ML; MG/100ML
INJECTION, SOLUTION INTRAVENOUS CONTINUOUS PRN
Status: DISCONTINUED | OUTPATIENT
Start: 2019-12-12 | End: 2019-12-12 | Stop reason: SDUPTHER

## 2019-12-12 RX ORDER — SODIUM CHLORIDE 0.9 % (FLUSH) 0.9 %
10 SYRINGE (ML) INJECTION PRN
Status: DISCONTINUED | OUTPATIENT
Start: 2019-12-12 | End: 2019-12-12 | Stop reason: HOSPADM

## 2019-12-12 RX ORDER — SODIUM CHLORIDE 9 MG/ML
INJECTION, SOLUTION INTRAVENOUS CONTINUOUS
Status: DISCONTINUED | OUTPATIENT
Start: 2019-12-12 | End: 2019-12-12

## 2019-12-12 RX ORDER — SODIUM CHLORIDE 0.9 % (FLUSH) 0.9 %
10 SYRINGE (ML) INJECTION EVERY 12 HOURS SCHEDULED
Status: DISCONTINUED | OUTPATIENT
Start: 2019-12-12 | End: 2019-12-12 | Stop reason: HOSPADM

## 2019-12-12 RX ORDER — SODIUM CHLORIDE, SODIUM LACTATE, POTASSIUM CHLORIDE, CALCIUM CHLORIDE 600; 310; 30; 20 MG/100ML; MG/100ML; MG/100ML; MG/100ML
INJECTION, SOLUTION INTRAVENOUS CONTINUOUS
Status: DISCONTINUED | OUTPATIENT
Start: 2019-12-12 | End: 2019-12-12 | Stop reason: HOSPADM

## 2019-12-12 RX ORDER — LIDOCAINE HYDROCHLORIDE 20 MG/ML
INJECTION, SOLUTION EPIDURAL; INFILTRATION; INTRACAUDAL; PERINEURAL PRN
Status: DISCONTINUED | OUTPATIENT
Start: 2019-12-12 | End: 2019-12-12 | Stop reason: SDUPTHER

## 2019-12-12 RX ORDER — ONDANSETRON 2 MG/ML
4 INJECTION INTRAMUSCULAR; INTRAVENOUS
Status: DISCONTINUED | OUTPATIENT
Start: 2019-12-12 | End: 2019-12-12 | Stop reason: HOSPADM

## 2019-12-12 RX ORDER — PROPOFOL 10 MG/ML
INJECTION, EMULSION INTRAVENOUS PRN
Status: DISCONTINUED | OUTPATIENT
Start: 2019-12-12 | End: 2019-12-12 | Stop reason: SDUPTHER

## 2019-12-12 RX ADMIN — LIDOCAINE HYDROCHLORIDE 100 MG: 20 INJECTION, SOLUTION EPIDURAL; INFILTRATION; INTRACAUDAL; PERINEURAL at 11:08

## 2019-12-12 RX ADMIN — PROPOFOL 20 MG: 10 INJECTION, EMULSION INTRAVENOUS at 11:11

## 2019-12-12 RX ADMIN — PROPOFOL 20 MG: 10 INJECTION, EMULSION INTRAVENOUS at 11:25

## 2019-12-12 RX ADMIN — PROPOFOL 10 MG: 10 INJECTION, EMULSION INTRAVENOUS at 11:27

## 2019-12-12 RX ADMIN — PROPOFOL 20 MG: 10 INJECTION, EMULSION INTRAVENOUS at 11:23

## 2019-12-12 RX ADMIN — SODIUM CHLORIDE, POTASSIUM CHLORIDE, SODIUM LACTATE AND CALCIUM CHLORIDE: 600; 310; 30; 20 INJECTION, SOLUTION INTRAVENOUS at 10:59

## 2019-12-12 RX ADMIN — PROPOFOL 20 MG: 10 INJECTION, EMULSION INTRAVENOUS at 11:31

## 2019-12-12 RX ADMIN — PROPOFOL 30 MG: 10 INJECTION, EMULSION INTRAVENOUS at 11:19

## 2019-12-12 RX ADMIN — PROPOFOL 20 MG: 10 INJECTION, EMULSION INTRAVENOUS at 11:29

## 2019-12-12 RX ADMIN — PROPOFOL 20 MG: 10 INJECTION, EMULSION INTRAVENOUS at 11:21

## 2019-12-12 RX ADMIN — PROPOFOL 20 MG: 10 INJECTION, EMULSION INTRAVENOUS at 11:09

## 2019-12-12 RX ADMIN — SODIUM CHLORIDE, POTASSIUM CHLORIDE, SODIUM LACTATE AND CALCIUM CHLORIDE: 600; 310; 30; 20 INJECTION, SOLUTION INTRAVENOUS at 10:23

## 2019-12-12 RX ADMIN — PROPOFOL 20 MG: 10 INJECTION, EMULSION INTRAVENOUS at 11:17

## 2019-12-12 RX ADMIN — PROPOFOL 20 MG: 10 INJECTION, EMULSION INTRAVENOUS at 11:13

## 2019-12-12 RX ADMIN — PROPOFOL 100 MG: 10 INJECTION, EMULSION INTRAVENOUS at 11:08

## 2019-12-12 RX ADMIN — PROPOFOL 20 MG: 10 INJECTION, EMULSION INTRAVENOUS at 11:15

## 2019-12-12 ASSESSMENT — PULMONARY FUNCTION TESTS
PIF_VALUE: 1

## 2019-12-12 ASSESSMENT — PAIN DESCRIPTION - DESCRIPTORS: DESCRIPTORS: THROBBING;SHOOTING

## 2019-12-12 ASSESSMENT — PAIN - FUNCTIONAL ASSESSMENT: PAIN_FUNCTIONAL_ASSESSMENT: 0-10

## 2019-12-12 ASSESSMENT — PAIN SCALES - GENERAL
PAINLEVEL_OUTOF10: 0

## 2019-12-13 LAB — SURGICAL PATHOLOGY REPORT: NORMAL

## 2019-12-19 DIAGNOSIS — K62.5 RECTAL BLEEDING: ICD-10-CM

## 2019-12-19 PROBLEM — K63.5 HYPERPLASTIC COLON POLYP: Status: ACTIVE | Noted: 2019-12-19

## 2019-12-19 PROBLEM — D12.8 TUBULAR ADENOMA OF RECTUM: Status: ACTIVE | Noted: 2019-12-19

## 2019-12-19 PROBLEM — D12.6 SERRATED ADENOMA OF COLON: Status: ACTIVE | Noted: 2019-12-19

## 2019-12-19 PROBLEM — D12.6 TUBULAR ADENOMA OF COLON: Status: ACTIVE | Noted: 2019-12-19

## 2020-01-06 RX ORDER — GEMFIBROZIL 600 MG/1
600 TABLET, FILM COATED ORAL 2 TIMES DAILY
Qty: 60 TABLET | Refills: 3 | Status: SHIPPED | OUTPATIENT
Start: 2020-01-06 | End: 2020-08-07 | Stop reason: SDUPTHER

## 2020-01-06 NOTE — TELEPHONE ENCOUNTER
Request for gemfibrozil - medication pended. Please fill if appropriate.       Next Visit Date:  Future Appointments   Date Time Provider Asmita Erwin   1/29/2020  1:30 PM Meme Elmore MD [facility]   2/13/2020  1:00 PM Anneliese Kelsey MD [address] Maintenance   Topic Date Due    Annual Wellness Visit (AWV)  05/29/2019    Potassium monitoring  10/21/2020    Creatinine monitoring  10/21/2020    Colon cancer screen colonoscopy  12/12/2020    Lipid screen  10/21/2024    DTaP/Tdap/Td vaccine (2 - Td) 06/06/2026    Flu vaccine  Completed    Pneumococcal 0-64 years Vaccine  Completed    HIV screen  Completed       Hemoglobin A1C (%)   Date Value   10/21/2019 5.2   05/08/2017 5.0             ( goal A1C is < 7)   No results found for: LABMICR  LDL Cholesterol (mg/dL)   Date Value   10/21/2019 134 (H)       (goal LDL is <100)   AST (U/L)   Date Value   10/21/2019 13     ALT (U/L)   Date Value   10/21/2019 14     BUN (mg/dL)   Date Value   10/21/2019 12     BP Readings from Last 3 Encounters:   12/12/19 117/66   12/12/19 115/71   11/26/19 (!) 124/93          (goal 120/80)    All Future Testing planned in [facility]  Lab Frequency Next Occurrence   MRI Lumbar Spine WO Contrast Once 01/25/2020         Patient Active Problem List:     Low back pain without sciatica     Morbid obesity due to excess calories (HCC)     Benign essential HTN     Acute systolic congestive heart failure (HCC)     Secondary cardiomyopathy ([address]     DVT (deep venous thrombosis) ([address]     Hyperglycemia     Rhabdomyolysis     Encounter for screening colonoscopy     Tubular adenoma of colon     Tubular adenoma of rectum     Hyperplastic colon polyp     Serrated adenoma of colon

## 2020-01-29 ENCOUNTER — OFFICE VISIT (OUTPATIENT)
Dept: GASTROENTEROLOGY | Age: 45
End: 2020-01-29
Payer: MEDICARE

## 2020-01-29 VITALS
HEART RATE: 74 BPM | BODY MASS INDEX: 44.1 KG/M2 | DIASTOLIC BLOOD PRESSURE: 72 MMHG | HEIGHT: 71 IN | WEIGHT: 315 LBS | TEMPERATURE: 97.2 F | SYSTOLIC BLOOD PRESSURE: 112 MMHG

## 2020-01-29 PROCEDURE — 99214 OFFICE O/P EST MOD 30 MIN: CPT | Performed by: INTERNAL MEDICINE

## 2020-01-29 ASSESSMENT — ENCOUNTER SYMPTOMS
SHORTNESS OF BREATH: 0
DIARRHEA: 0
CONSTIPATION: 1
VOMITING: 0
NAUSEA: 0
CHEST TIGHTNESS: 0
ABDOMINAL PAIN: 0

## 2020-01-29 NOTE — PROGRESS NOTES
GI CLINIC FOLLOW UP    INTERVAL HISTORY:   No referring provider defined for this encounter. Chief Complaint   Patient presents with    Follow-up     12/12/19 Colonoscopy follow up           HISTORY OF PRESENT ILLNESS: Tia Coles is a 53 y.o. male with a pasthistory remarkable for anxiety, Bipolar, on wellbutrin, Trazadone, Klonopin, and abilify, morbid obesity, DVT, off AC, HTN, back pain, CHRIS, prior history of complicated PNA, prior trach, 2017, referred for evaluation of intermittent rectal bleeding.     No prior Colonoscopy/EGD  No weight loss, + weight gain  No smoking  Social drinking  No illicit drugs  PEG in past in 2017, removed that same yr. FH- GF had colostomy, no GI cancers in the family    Past Medical,Family, and Social History reviewed and does contribute to the patient presenting condition. Patient's PMH/PSH,SH,PSYCH Hx, MEDs, ALLERGIES, and ROS were all reviewed and updated in the appropriate sections.     PAST MEDICAL HISTORY:  Past Medical History:   Diagnosis Date    Anxiety     Back pain     B[facility] 1 disorder ([facility] [address]     Dvt femoral (deep venous thrombosis) (HCC)     Hypertension     Morbid obesity due to excess calories (HCC)     CHRIS (obstructive sleep apnea)     Pneumonia     DUE TO OTHER AEROBIC GRAM-NEGATIVE BACTERIA, UNSPECIFIED LATERALITY, UNSPECIFIED PART OF LUNG     Respiratory failure ([facility] [address]     Smoking        Past Surgical History:   Procedure Laterality Date    COLONOSCOPY N/A 12/12/2019    COLONOSCOPY POLYPECTOMY HOT BIOPSY performed by Jai Hutson MD at [address] [facility],[address]:    Current Outpatient Medications:     gemfibrozil (LOPID) 600 MG tablet, Take 1 tablet by mouth 2 times daily, Disp: 60 tablet, Rfl: 3    ARIPiprazole (ABILIFY) 30 MG tablet, 30 mg , Disp: , Rfl:     buPROPion (WELLBUTRIN XL) 300 MG extended on file    Intimate partner violence:     Fear of current or ex partner: Not on file     Emotionally abused: Not on file     Physically abused: Not on file     Forced sexual activity: Not on file   Other Topics Concern    Not on file   Social History Narrative    Not on file       REVIEW OF SYSTEMS: A 12-point review of systems was obtained and pertinent positives and negatives were listed below. REVIEW OF SYSTEMS:     Constitutional: No fever, no chills, no lethargy, no weakness. HEENT:  No headache, otalgia, itchy eyes, nasal discharge or sore throat. Cardiac:  No chest pain, dyspnea, orthopnea or PND. Chest:   No cough, phlegm or wheezing. Abdomen:      Detailed by MA   Neuro:  No focal weakness, abnormal movements or seizure like activity. Skin:   No rashes, no itching. :   No hematuria, no pyuria, no dysuria, no flank pain. Extremities:  No swelling or joint pains. ROS was otherwise negative    Review of Systems   Constitutional: Negative for chills and fever. Respiratory: Negative for chest tightness and shortness of breath. Cardiovascular: Negative for chest pain and palpitations. Gastrointestinal: Positive for constipation. Negative for abdominal pain, diarrhea, nausea and vomiting. Genitourinary: Negative for difficulty urinating. Neurological: Negative for dizziness, light-headedness and headaches. Hematological: Bruises/bleeds easily. PHYSICAL EXAMINATION: Vital signs reviewed per the nursing documentation. /72   Pulse 74   Temp 97.2 °F (36.2 °C)   Ht 5' 11\" (1.803 m)   Wt (!) 319 lb 3.2 oz (144.8 kg)   BMI 44.52 kg/m²   Body mass index is 44.52 kg/m². Physical Exam    Physical Exam   Constitutional: Patient is oriented to person, place, and time. Patient appears well-developed and well-nourished. HENT:   Head: Normocephalic and atraumatic. Eyes: Pupils are equal, round, and reactive to light. EOM are normal.   Neck: Normal range of motion.  Neck (ongoing x 6 months). S/p colonoscopy to identified TA and rectal hemorrhoids, internal and external, likely source of recurrent intermittent bleeding    Anusol for rectal topical application for hemorrhoids  Repeat Colonoscopy 5 months  Will refer to CR surgery if ongoing bleeding, none currently  Smoking cessation advised. Advised weight loss. RTC in 4 months. Thank you for allowing me to participate in the care of Mr. Ronny Story. For any further questions please do not hesitate to contact me. I have reviewed and agree with the ROS entered by the MA/LPN.          Rickey Taylor MD, MPH   [facility]  Office #: [phone]

## 2020-02-13 ENCOUNTER — OFFICE VISIT (OUTPATIENT)
Dept: INTERNAL MEDICINE | Age: 45
End: 2020-02-13
Payer: MEDICARE

## 2020-02-13 VITALS
HEART RATE: 84 BPM | HEIGHT: 71 IN | BODY MASS INDEX: 44.1 KG/M2 | DIASTOLIC BLOOD PRESSURE: 98 MMHG | WEIGHT: 315 LBS | SYSTOLIC BLOOD PRESSURE: 143 MMHG

## 2020-02-13 PROCEDURE — G0444 DEPRESSION SCREEN ANNUAL: HCPCS | Performed by: INTERNAL MEDICINE

## 2020-02-13 PROCEDURE — G8431 POS CLIN DEPRES SCRN F/U DOC: HCPCS | Performed by: INTERNAL MEDICINE

## 2020-02-13 PROCEDURE — 99211 OFF/OP EST MAY X REQ PHY/QHP: CPT | Performed by: INTERNAL MEDICINE

## 2020-02-13 PROCEDURE — 99214 OFFICE O/P EST MOD 30 MIN: CPT | Performed by: INTERNAL MEDICINE

## 2020-02-13 ASSESSMENT — PATIENT HEALTH QUESTIONNAIRE - PHQ9
5. POOR APPETITE OR OVEREATING: 3
8. MOVING OR SPEAKING SO SLOWLY THAT OTHER PEOPLE COULD HAVE NOTICED. OR THE OPPOSITE, BEING SO FIGETY OR RESTLESS THAT YOU HAVE BEEN MOVING AROUND A LOT MORE THAN USUAL: 3
4. FEELING TIRED OR HAVING LITTLE ENERGY: 3
7. TROUBLE CONCENTRATING ON THINGS, SUCH AS READING THE NEWSPAPER OR WATCHING TELEVISION: 0
10. IF YOU CHECKED OFF ANY PROBLEMS, HOW DIFFICULT HAVE THESE PROBLEMS MADE IT FOR YOU TO DO YOUR WORK, TAKE CARE OF THINGS AT HOME, OR GET ALONG WITH OTHER PEOPLE: 0
SUM OF ALL RESPONSES TO PHQ QUESTIONS 1-9: 21
9. THOUGHTS THAT YOU WOULD BE BETTER OFF DEAD, OR OF HURTING YOURSELF: 0
3. TROUBLE FALLING OR STAYING ASLEEP: 3
6. FEELING BAD ABOUT YOURSELF - OR THAT YOU ARE A FAILURE OR HAVE LET YOURSELF OR YOUR FAMILY DOWN: 3
1. LITTLE INTEREST OR PLEASURE IN DOING THINGS: 3
SUM OF ALL RESPONSES TO PHQ9 QUESTIONS 1 & 2: 6
SUM OF ALL RESPONSES TO PHQ QUESTIONS 1-9: 21
2. FEELING DOWN, DEPRESSED OR HOPELESS: 3

## 2020-02-13 ASSESSMENT — ENCOUNTER SYMPTOMS
COUGH: 1
ALLERGIC/IMMUNOLOGIC NEGATIVE: 1
BACK PAIN: 1
GASTROINTESTINAL NEGATIVE: 1
EYES NEGATIVE: 1

## 2020-02-13 NOTE — PROGRESS NOTES
On the basis of positive PHQ-9 screening (PHQ-9 Total Score: 21), the following plan was implemented: patient declines further evaluation/treatment for depression. Patient will follow-up in 6 month(s) with PCP.

## 2020-02-13 NOTE — PROGRESS NOTES
of Birth: 1975        PCP:  Bret Akins MD    Anthony Mcneil is a 53 y.o. male who presents for   Chief Complaint   Patient presents with    Hypertension    Health Maintenance     Needs AWV    and follow up of chronic medical problems. Patient Active Problem List   Diagnosis    Low back pain without sciatica    Morbid obesity due to excess calories (HCC)    Benign essential HTN    Acute systolic congestive heart failure (HCC)    Secondary cardiomyopathy ([address]    DVT (deep venous thrombosis) (HCC)    Hyperglycemia    Rhabdomyolysis    Tubular adenoma of colon    Tubular adenoma of rectum    Hyperplastic colon polyp    Serrated adenoma of colon       HISTORY OF PRESENT ILLNESS:    History was obtained from the patient. Patient here today reporting that he would like to return back to using Adipex for weight loss. Patient did do a trial of Adipex about 1 year ago at that time he did lose about 20 pounds however at this time he has gained all the weight back and reports he has not continued with diet and exercise. Explained to patient that a certain amount of weight loss has to be maintained for a repeated trial and that given some of his comorbid conditions that it is best for him to try alternative options. Patient also reports ongoing depression associated with his bipolar disorder he is following with psychiatry and sees his physician every 4 months he is not in therapy of any kind at this time and reports that there are ongoing stressors in his life  Patient was recently seen by gastroenterology for a colonoscopy which revealed he had tubular adenoma of the colon he is asked to return back to the GI physician in a few months  Patient's blood pressure is controlled on Coreg  Patient also continues to take gemfibrozil for high triglycerides  Patient's allergies, medications, past medical, surgical, social and family histories were reviewed and updated as appropriate.     ALLERGIES    No Known Allergies      MEDICATIONS:      Current Outpatient Medications   Medication Sig Dispense Refill    hydrocortisone (ANUSOL-HC) 2.5 % rectal cream Place rectally 2 times daily. 1 Tube 3    gemfibrozil (LOPID) 600 MG tablet Take 1 tablet by mouth 2 times daily 60 tablet 3    ARIPiprazole (ABILIFY) 30 MG tablet 30 mg       buPROPion (WELLBUTRIN XL) 300 MG extended release tablet       gabapentin (NEURONTIN) 600 MG tablet Take 1 tablet by mouth 3 times daily for 30 days. 90 tablet 11    carvedilol (COREG) 6.25 MG tablet take 1 tablet by mouth twice a day 60 tablet 5    naloxone 4 MG/0.1ML LIQD nasal spray 1 spray by Nasal route as needed for Opioid Reversal 1 each 5    TraZODone HCl  MG TB24 Take 300 mg by mouth nightly      clonazePAM (KLONOPIN) 0.5 MG tablet Take 1 tablet by mouth 2 times daily as needed for Anxiety (Patient taking differently: Take 0.5 mg by mouth once .) 60 tablet 0     No current facility-administered medications for this visit.         Patient Care Team:  Jose J Esparza MD as PCP - General (Internal Medicine)  Jose J Esparza MD [facility]  Jose J Esparza MD as Referring Physician (Internal Medicine)  Izaiah Hartman MD as Consulting Physician (Pulmonology)  Blayne Boggs MD as Consulting Physician (Internal Medicine)  Shanta Serna MD as Consulting Physician (Gastroenterology)    PAST MEDICAL AND SURGICAL HISTORY:      Past Medical History:   Diagnosis Date    Anxiety     Back pain     Bipolar 1 disorder ([address]     Dvt femoral (deep venous thrombosis) ([address]     Hypertension     Morbid obesity due to excess calories ([address]     CHRIS (obstructive sleep apnea)     Pneumonia     DUE TO OTHER AEROBIC GRAM-NEGATIVE BACTERIA, UNSPECIFIED LATERALITY, UNSPECIFIED PART OF LUNG     Respiratory failure ([address]     Smoking      Past Surgical History:   Procedure Laterality Date    COLONOSCOPY N/A 12/12/2019    COLONOSCOPY POLYPECTOMY HOT BIOPSY performed by Component Value Date     10/21/2019    K 4.7 10/21/2019     10/21/2019    CO2 27 10/21/2019    BUN 12 10/21/2019    CREATININE 0.92 10/21/2019    GLUCOSE 83 10/21/2019     Hemoglobin A1C:   Lab Results   Component Value Date    LABA1C 5.2 10/21/2019     Microalbumin Urine: No results found for: Rosario Ramirez  Lipid profile:   Lab Results   Component Value Date    CHOL 229 10/21/2019    TRIG 167 10/21/2019    HDL 62 10/21/2019     Thyroid functions:   Lab Results   Component Value Date    TSH 1.19 10/21/2019      Hepatic functions:   Lab Results   Component Value Date    ALT 14 10/21/2019    AST 13 10/21/2019    PROT 7.4 10/21/2019    BILITOT 0.58 10/21/2019    LABALBU 4.4 10/21/2019     Urine Analysis: No results found for: [id number] [address]:      Health Maintenance Due   Topic Date Due    Annual Wellness Visit (AWV)  05/29/2019       ASSESSMENT AND PLAN:       Diagnosis Orders   1. Positive depression screening  Positive Screen for Clinical Depression with a Documented Follow-up Plan    2. Morbid obesity due to excess calories [facility])  Fahad Vargas MD, Weight Management and [address] [place] [address]   3. Benign essential HTN     4. Polypharmacy   patient reports he will follow-up with pain clinic as well as bariatrics   5. Hyperplastic colonic polyp, unspecified part of colon   return to GI     On the basis of positive PHQ-9 screening (PHQ-9 Total Score: 21), the following plan was implemented: Continue to follow-up with psychiatry. Patient will follow-up in 6 month(s) with PCP. FOLLOW UP:   1. Colleen Simpson received counseling on the following healthy behaviors: nutrition and exercise    2. Reviewed prior labs and health maintenance. 3.  Discussed use, benefit, and side effects of prescribed medications. Barriers to medication compliance addressed. All patient questions answered. Pt voiced understanding. 4.  Continue current medications, diet and exercise.             No orders of the defined types were placed in this encounter. Completed Refills               Requested Prescriptions      No prescriptions requested or ordered in this encounter       5. Patient given educational materials - see patient instructions    6. Was a self-tracking handout given in paper form or via OrderWithMehart? Yes  If yes, see orders or list here. Orders Placed This Encounter   Procedures   Giuseppe Conde MD, Weight Management and [address] [facility]     Referral Priority:   Routine     Referral Type:   Eval and Treat     Referral Reason:   Specialty Services Required     Requested Specialty:   Bariatric Surgery     Number of Visits Requested:   1    Positive Screen for Clinical Depression with a Documented Follow-up Plan        Return in about 6 months (around 8/13/2020). Patient voiced understanding and agreed to treatment plan. This note is created with the assistance of a speech-recognition program. While intending to generate a document that actually reflects the content of the visit, the document can still have some mistakes which may not have been identified and corrected by editing.     Dr Maci Leyva MD, [address]  Associate , Department of Internal Medicine  [address] [place] [address]  Attending [address] [place] [address], [address] [place] [address]                   2/13/2020, 2:02 PM

## 2020-05-14 ENCOUNTER — TELEPHONE (OUTPATIENT)
Dept: GASTROENTEROLOGY | Age: 45
End: 2020-05-14

## 2020-05-22 ENCOUNTER — VIRTUAL VISIT (OUTPATIENT)
Dept: GASTROENTEROLOGY | Age: 45
End: 2020-05-22
Payer: MEDICARE

## 2020-05-22 PROCEDURE — 99211 OFF/OP EST MAY X REQ PHY/QHP: CPT | Performed by: INTERNAL MEDICINE

## 2020-05-22 RX ORDER — POLYETHYLENE GLYCOL 3350 17 G/17G
17 POWDER, FOR SOLUTION ORAL DAILY
Qty: 5 BOTTLE | Refills: 0 | COMMUNITY
Start: 2020-05-22 | End: 2020-05-24

## 2020-05-22 ASSESSMENT — ENCOUNTER SYMPTOMS
CONSTIPATION: 1
VOMITING: 0
ALLERGIC/IMMUNOLOGIC NEGATIVE: 1
BACK PAIN: 1
EYES NEGATIVE: 1
DIARRHEA: 0
RESPIRATORY NEGATIVE: 1
ANAL BLEEDING: 1
SHORTNESS OF BREATH: 0
ABDOMINAL PAIN: 0
CHEST TIGHTNESS: 0
NAUSEA: 0

## 2020-05-22 NOTE — PROGRESS NOTES
VIRTUAL VISIT:  Kaylynn Munroe is a 53 y.o. male evaluated via telephone on 2020. Physical ID was verified on screen, along with verbal confirmation of  and full name. Consent:  He and/or health care decision maker is aware that that he may receive a bill for this telephone service, depending on his insurance coverage, and has provided verbal consent to proceed: Yes    Agree with ROS as documented and detailed by MA/LPN in separate note from today's encounter     Documentation:  I communicated with the patient and/or health care decision maker about rectal bleeding and prior colonoscopy. Details of this discussion including any medical advice provided:     Aria Brooks a 53 y. o. male with a pasthistory remarkable for anxiety, Bipolar, on wellbutrin, Trazadone, Klonopin, and abilify, morbid obesity, DVT, off AC, HTN, back pain, CHRIS, prior history of complicated PNA, prior trach, , referred for evaluation of intermittent rectal bleeding (ongoing x 6 months). S/p colonoscopy to identified TA and rectal hemorrhoids, internal and external, likely source of recurrent intermittent bleeding. Prior colonoscopy with tubular adenomas- patient is due for colonoscopy in 1 month, had a poor bowel prep in 2020. Findings were discussed with the patient again. Rectal bleeding- frequency of rectal bleeding has reduced significantly with the use of Anusol. Patient continues to have mild rectal bleeding while wiping. Impression: 61-year-old male with a history of bipolar disorder, on psychiatric medication, morbid obesity, hypertension, CHRIS, rectal bleeding secondary to internal/external hemorrhoids, prior colonoscopy which identified tubular adenomas and sessile serrated adenomas (see operative report).     Prior history of tubular adenoma polyps- we will plan for repeat colonoscopy with extended bowel prep.  2 days of clear liquid diet and daily MiraLAX along with standard bowel prep. Tentative plan in 1 month. Rectal bleeding secondary to hemorrhoids- patient is to continue with Anusol topical treatment. Remains ongoing bleeding then patient is to be referred to colorectal surgery for evaluation of a hemorrhoidectomy. Follow-up in 3 months    The patient was counseled at length about the risks of jone Covid-19 during their perioperative period and any recovery window from their procedure. The patient was made aware that jone Covid-19  may worsen their prognosis for recovering from their procedure  and lend to a higher morbidity and/or mortality risk. All material risks, benefits, and reasonable alternatives including postponing the procedure were discussed. The patient does wish to proceed with the procedure at this time. I affirm this is a Patient Initiated Episode with an Established Patient who has not had a related appointment within my department in the past 7 days or scheduled within the next 24 hours. Total Time: minutes: 5-10 minutes    Sarika Sorenson is a 53 y.o. male being evaluated by a Virtual Visit (video visit) encounter to address concerns as mentioned above. A caregiver was present when appropriate. Due to this being a TeleHealth encounter (During JQOZR-03 public health emergency), evaluation of the following organ systems was limited: Vitals/Constitutional/EENT/Resp/CV/GI//MS/Neuro/Skin/Heme-Lymph-Imm. Pursuant to the emergency declaration under the [address] [place] [address], [address] [place] [address] authority and the [employer] and [employer] Act, this Virtual Visit was conducted with patient's (and/or legal guardian's) consent, to reduce the patient's risk of exposure to COVID-19 and provide necessary medical care.   The patient (and/or legal guardian) has also been advised to contact this office for worsening conditions or problems, and seek emergency medical treatment and/or call 911 if deemed necessary. Services were provided through a video synchronous discussion virtually to substitute for in-person clinic visit. Patient and provider were located at their individual homes. --Brenita Kayser, MD on 5/22/2020 at 10:16 AM    An electronic signature was used to authenticate this note.        [address] [facility]

## 2020-05-26 RX ORDER — POLYETHYLENE GLYCOL 3350, SODIUM SULFATE ANHYDROUS, SODIUM BICARBONATE, SODIUM CHLORIDE, POTASSIUM CHLORIDE 236; 22.74; 6.74; 5.86; 2.97 G/4L; G/4L; G/4L; G/4L; G/4L
4 POWDER, FOR SOLUTION ORAL ONCE
Qty: 4000 ML | Refills: 0 | Status: SHIPPED | OUTPATIENT
Start: 2020-05-26 | End: 2020-05-26

## 2020-05-29 ENCOUNTER — HOSPITAL ENCOUNTER (OUTPATIENT)
Dept: PREADMISSION TESTING | Age: 45
Setting detail: SPECIMEN
Discharge: HOME | End: 2020-06-02
Payer: MEDICARE

## 2020-05-29 PROCEDURE — U0004 COV-19 TEST NON-CDC HGH THRU: HCPCS

## 2020-05-30 LAB
SARS-COV-2, PCR: NOT DETECTED
SARS-COV-2, RAPID: NORMAL
SARS-COV-2: NORMAL
SOURCE: NORMAL

## 2020-05-31 ENCOUNTER — TELEPHONE (OUTPATIENT)
Dept: PRIMARY CARE CLINIC | Age: 45
End: 2020-05-31

## 2020-06-02 ENCOUNTER — HOSPITAL ENCOUNTER (OUTPATIENT)
Age: 45
Setting detail: OUTPATIENT SURGERY
Discharge: HOME | End: 2020-06-02
Attending: INTERNAL MEDICINE | Admitting: INTERNAL MEDICINE
Payer: MEDICARE

## 2020-06-02 ENCOUNTER — ANESTHESIA (OUTPATIENT)
Dept: OPERATING ROOM | Age: 45
End: 2020-06-02
Payer: MEDICARE

## 2020-06-02 ENCOUNTER — ANESTHESIA EVENT (OUTPATIENT)
Dept: OPERATING ROOM | Age: 45
End: 2020-06-02
Payer: MEDICARE

## 2020-06-02 VITALS
HEIGHT: 71 IN | SYSTOLIC BLOOD PRESSURE: 111 MMHG | TEMPERATURE: 97.2 F | HEART RATE: 65 BPM | RESPIRATION RATE: 14 BRPM | DIASTOLIC BLOOD PRESSURE: 84 MMHG | WEIGHT: 308 LBS | OXYGEN SATURATION: 96 % | BODY MASS INDEX: 43.12 KG/M2

## 2020-06-02 VITALS
SYSTOLIC BLOOD PRESSURE: 117 MMHG | DIASTOLIC BLOOD PRESSURE: 63 MMHG | OXYGEN SATURATION: 97 % | RESPIRATION RATE: 23 BRPM

## 2020-06-02 PROCEDURE — 7100000011 HC PHASE II RECOVERY - ADDTL 15 MIN: Performed by: INTERNAL MEDICINE

## 2020-06-02 PROCEDURE — 6360000002 HC RX W HCPCS: Performed by: ANESTHESIOLOGY

## 2020-06-02 PROCEDURE — 3609010600 HC COLONOSCOPY POLYPECTOMY SNARE/COLD BIOPSY: Performed by: INTERNAL MEDICINE

## 2020-06-02 PROCEDURE — 2580000003 HC RX 258: Performed by: ANESTHESIOLOGY

## 2020-06-02 PROCEDURE — 7100000010 HC PHASE II RECOVERY - FIRST 15 MIN: Performed by: INTERNAL MEDICINE

## 2020-06-02 PROCEDURE — 88305 TISSUE EXAM BY PATHOLOGIST: CPT

## 2020-06-02 PROCEDURE — 6360000002 HC RX W HCPCS: Performed by: NURSE ANESTHETIST, CERTIFIED REGISTERED

## 2020-06-02 PROCEDURE — 2709999900 HC NON-CHARGEABLE SUPPLY: Performed by: INTERNAL MEDICINE

## 2020-06-02 PROCEDURE — 3700000001 HC ADD 15 MINUTES (ANESTHESIA): Performed by: INTERNAL MEDICINE

## 2020-06-02 PROCEDURE — 45380 COLONOSCOPY AND BIOPSY: CPT | Performed by: INTERNAL MEDICINE

## 2020-06-02 PROCEDURE — 3700000000 HC ANESTHESIA ATTENDED CARE: Performed by: INTERNAL MEDICINE

## 2020-06-02 RX ORDER — SODIUM CHLORIDE 9 MG/ML
INJECTION, SOLUTION INTRAVENOUS CONTINUOUS
Status: DISCONTINUED | OUTPATIENT
Start: 2020-06-02 | End: 2020-06-02

## 2020-06-02 RX ORDER — LIDOCAINE HYDROCHLORIDE 10 MG/ML
1 INJECTION, SOLUTION EPIDURAL; INFILTRATION; INTRACAUDAL; PERINEURAL
Status: DISCONTINUED | OUTPATIENT
Start: 2020-06-02 | End: 2020-06-02 | Stop reason: HOSPADM

## 2020-06-02 RX ORDER — FENTANYL CITRATE 50 UG/ML
50 INJECTION, SOLUTION INTRAMUSCULAR; INTRAVENOUS ONCE
Status: COMPLETED | OUTPATIENT
Start: 2020-06-02 | End: 2020-06-02

## 2020-06-02 RX ORDER — SODIUM CHLORIDE 0.9 % (FLUSH) 0.9 %
10 SYRINGE (ML) INJECTION PRN
Status: DISCONTINUED | OUTPATIENT
Start: 2020-06-02 | End: 2020-06-02 | Stop reason: HOSPADM

## 2020-06-02 RX ORDER — SODIUM CHLORIDE, SODIUM LACTATE, POTASSIUM CHLORIDE, CALCIUM CHLORIDE 600; 310; 30; 20 MG/100ML; MG/100ML; MG/100ML; MG/100ML
INJECTION, SOLUTION INTRAVENOUS CONTINUOUS
Status: DISCONTINUED | OUTPATIENT
Start: 2020-06-02 | End: 2020-06-02 | Stop reason: HOSPADM

## 2020-06-02 RX ORDER — PROPOFOL 10 MG/ML
INJECTION, EMULSION INTRAVENOUS PRN
Status: DISCONTINUED | OUTPATIENT
Start: 2020-06-02 | End: 2020-06-02 | Stop reason: SDUPTHER

## 2020-06-02 RX ORDER — SODIUM CHLORIDE 0.9 % (FLUSH) 0.9 %
10 SYRINGE (ML) INJECTION EVERY 12 HOURS SCHEDULED
Status: DISCONTINUED | OUTPATIENT
Start: 2020-06-02 | End: 2020-06-02 | Stop reason: HOSPADM

## 2020-06-02 RX ADMIN — PROPOFOL 20 MG: 10 INJECTION, EMULSION INTRAVENOUS at 11:09

## 2020-06-02 RX ADMIN — PROPOFOL 20 MG: 10 INJECTION, EMULSION INTRAVENOUS at 10:51

## 2020-06-02 RX ADMIN — PROPOFOL 50 MG: 10 INJECTION, EMULSION INTRAVENOUS at 10:48

## 2020-06-02 RX ADMIN — PROPOFOL 20 MG: 10 INJECTION, EMULSION INTRAVENOUS at 11:06

## 2020-06-02 RX ADMIN — PROPOFOL 20 MG: 10 INJECTION, EMULSION INTRAVENOUS at 11:14

## 2020-06-02 RX ADMIN — SODIUM CHLORIDE, POTASSIUM CHLORIDE, SODIUM LACTATE AND CALCIUM CHLORIDE: 600; 310; 30; 20 INJECTION, SOLUTION INTRAVENOUS at 10:40

## 2020-06-02 RX ADMIN — PROPOFOL 20 MG: 10 INJECTION, EMULSION INTRAVENOUS at 10:46

## 2020-06-02 RX ADMIN — PROPOFOL 20 MG: 10 INJECTION, EMULSION INTRAVENOUS at 11:00

## 2020-06-02 RX ADMIN — PROPOFOL 20 MG: 10 INJECTION, EMULSION INTRAVENOUS at 10:54

## 2020-06-02 RX ADMIN — PROPOFOL 20 MG: 10 INJECTION, EMULSION INTRAVENOUS at 11:12

## 2020-06-02 RX ADMIN — PROPOFOL 20 MG: 10 INJECTION, EMULSION INTRAVENOUS at 11:03

## 2020-06-02 RX ADMIN — PROPOFOL 20 MG: 10 INJECTION, EMULSION INTRAVENOUS at 10:57

## 2020-06-02 RX ADMIN — PROPOFOL 20 MG: 10 INJECTION, EMULSION INTRAVENOUS at 10:56

## 2020-06-02 RX ADMIN — FENTANYL CITRATE 50 MCG: 50 INJECTION, SOLUTION INTRAMUSCULAR; INTRAVENOUS at 10:33

## 2020-06-02 RX ADMIN — SODIUM CHLORIDE, POTASSIUM CHLORIDE, SODIUM LACTATE AND CALCIUM CHLORIDE: 600; 310; 30; 20 INJECTION, SOLUTION INTRAVENOUS at 10:03

## 2020-06-02 RX ADMIN — PROPOFOL 50 MG: 10 INJECTION, EMULSION INTRAVENOUS at 10:45

## 2020-06-02 ASSESSMENT — PULMONARY FUNCTION TESTS
PIF_VALUE: 1

## 2020-06-02 ASSESSMENT — PAIN SCALES - GENERAL
PAINLEVEL_OUTOF10: 0
PAINLEVEL_OUTOF10: 8
PAINLEVEL_OUTOF10: 0
PAINLEVEL_OUTOF10: 0

## 2020-06-02 ASSESSMENT — PAIN - FUNCTIONAL ASSESSMENT
PAIN_FUNCTIONAL_ASSESSMENT: PREVENTS OR INTERFERES SOME ACTIVE ACTIVITIES AND ADLS
PAIN_FUNCTIONAL_ASSESSMENT: 0-10

## 2020-06-02 ASSESSMENT — PAIN DESCRIPTION - DESCRIPTORS: DESCRIPTORS: THROBBING;SHOOTING

## 2020-06-02 NOTE — H&P
History and Physical Update    Pt Name: Nikita Macias  MRN: [id number]  YOB: 1975  Date of evaluation: 6/2/2020      [x] I have reviewed the Gastroenterology Progress Note by Dr Genevieve Duarte dated 5/22/20 in Epic which meets the criteria for an Interval History and Physical note and is attached below. [x] I have examined  Nikita Macias, a 52 yo morbidly obese male. There are no changes to the patient who is scheduled for a diagnostic colonoscopy by Dr Genevieve Duarte for rectal bleeding   S/p colonoscopy 12/12/19 for his rectal bleeding Polyps excised but \"prep inadequate especially on right side of colon\" (refer to Epic OP Note)  The patient was now rescheduled for his procedure. He followed the bowel prep as directed until watery clear No FH  He denies bloody tarry stools, diarrhea alternating with constipation, abdominal pain, fever, chills, wheezing, cough, increased SOB, chest pain, or unexplained weight loss. Vital signs: /78   Pulse 74   Temp 97.1 °F (36.2 °C) (Temporal)   Resp 20   Ht 5' 11\" (1.803 m)   Wt (!) 308 lb (139.7 kg)   SpO2 95%   BMI 42.96 kg/m²     Allergies:  Patient has no known allergies. Medications:    Prior to Admission medications    Medication Sig Start Date End Date Taking? Authorizing Provider   bisacodyl (DULCOLAX) 5 MG EC tablet TAKE 4 TABS AS DIRECTED BY PHYSICIAN OFFICE 5/26/20  Yes Genevieve Duarte MD   gemfibrozil (LOPID) 600 MG tablet Take 1 tablet by mouth 2 times daily 1/6/20  Yes Rohit Dumont MD   ARIPiprazole (ABILIFY) 30 MG tablet 30 mg  10/8/19  Yes Historical Provider, MD   buPROPion (WELLBUTRIN XL) 300 MG extended release tablet  10/8/19  Yes Historical Provider, MD   gabapentin (NEURONTIN) 600 MG tablet Take 1 tablet by mouth 3 times daily for 30 days.  10/9/19 6/2/20 Yes Rohit Dumont MD   carvedilol (COREG) 6.25 MG tablet take 1 tablet by mouth twice a day 8/19/19  Yes Katherin Najjar, MD   TraZODone HCl  MG TB24 Take 300 mg by mouth identified tubular adenomas and sessile serrated adenomas (see operative report). Prior history of tubular adenoma polyps- we will plan for repeat colonoscopy with extended bowel prep.  2 days of clear liquid diet and daily MiraLAX along with standard bowel prep. Tentative plan in 1 month. Rectal bleeding secondary to hemorrhoids- patient is to continue with Anusol topical treatment. Remains ongoing bleeding then patient is to be referred to colorectal surgery for evaluation of a hemorrhoidectomy. Follow-up in 3 months     The patient was counseled at length about the risks of jone Covid-19 during their perioperative period and any recovery window from their procedure. The patient was made aware that jone Covid-19  may worsen their prognosis for recovering from their procedure  and lend to a higher morbidity and/or mortality risk. All material risks, benefits, and reasonable alternatives including postponing the procedure were discussed. The patient does wish to proceed with the procedure at this time. I affirm this is a Patient Initiated Episode with an Established Patient who has not had a related appointment within my department in the past 7 days or scheduled within the next 24 hours. Total Time: minutes: 5-10 minutes     Pam Martinez is a 53 y.o. male being evaluated by a Virtual Visit (video visit) encounter to address concerns as mentioned above. A caregiver was present when appropriate. Due to this being a TeleHealth encounter (During LXYIV-77 public health emergency), evaluation of the following organ systems was limited: Vitals/Constitutional/EENT/Resp/CV/GI//MS/Neuro/Skin/Heme-Lymph-Imm.   Pursuant to the emergency declaration under the [address] [place] [address], [address] [place] [address] authority and the [employer] and [employer] Act, this Virtual Visit was conducted with patient's (and/or legal guardian's) consent, to reduce the patient's risk of exposure to COVID-19 and provide necessary medical care. The patient (and/or legal guardian) has also been advised to contact this office for worsening conditions or problems, and seek emergency medical treatment and/or call 911 if deemed necessary. Services were provided through a video synchronous discussion virtually to substitute for in-person clinic visit. Patient and provider were located at their individual homes. --Nahid Newman MD on 5/22/2020 at 10:16 AM     An electronic signature was used to authenticate this note. [facility] [facility]             ·   Virtual Visit on 5/22/2020   ·   ·   Detailed Report   ·   ·   Note shared with patient   Progress Notes Info     Author Note Status Last Update User   Nahid Newman MD Signed Nahid Newman MD   Last Update Date/Time: 5/22/2020 10:42 AM   Chart Review Routing History     No routing history on file. Sahil Lazo LPN   Medical Assistant      Progress Notes      Signed   Encounter Date:  5/22/2020               Signed            Subjective:      Patient ID: Carroll Turpin is a 53 y.o. male. HPI     Review of Systems   Constitutional: Negative. Negative for chills and fever. HENT: Negative. Eyes: Negative. Respiratory: Negative. Negative for chest tightness and shortness of breath. Cardiovascular: Negative. Negative for chest pain and palpitations. Gastrointestinal: Positive for anal bleeding and constipation. Negative for abdominal pain, diarrhea, nausea and vomiting. Endocrine: Negative. Genitourinary: Negative. Negative for difficulty urinating. Musculoskeletal: Positive for back pain. Skin: Negative. Allergic/Immunologic: Negative. Neurological: Negative. Negative for dizziness, light-headedness and headaches. Hematological: Bruises/bleeds easily. Psychiatric/Behavioral: The patient is nervous/anxious.     All other systems

## 2020-06-02 NOTE — ANESTHESIA POSTPROCEDURE EVALUATION
Department of Anesthesiology  Postprocedure Note    Patient: Marlys Babb  MRN: [id number]  YOB: 1975  Date of evaluation: 6/2/2020  Time:  11:32 AM     Procedure Summary     Date:  06/02/20 Room / Location:  [place] [address] / [facility] - INPATIENT    Anesthesia Start:  4810 Anesthesia Stop:  1125    Procedure:  COLONOSCOPY POLYPECTOMY SNARE/COLD BIOPSY (N/A ) Diagnosis:  (DX RECTAL BLEEDING)    Surgeon:  Manuel Lombardo MD Responsible Provider:  Anderson Caballero MD    Anesthesia Type:  general, MAC ASA Status:  3          Anesthesia Type: general, MAC    Che Phase I: Che Score: 10    Che Phase II: Che Score: 8    Last vitals: Reviewed and per EMR flowsheets.        Anesthesia Post Evaluation    Patient location during evaluation: PACU  Patient participation: complete - patient participated  Level of consciousness: awake and alert  Pain score: 1  Airway patency: patent  Nausea & Vomiting: no nausea and no vomiting  Complications: no  Cardiovascular status: blood pressure returned to baseline  Respiratory status: acceptable  Hydration status: euvolemic

## 2020-06-02 NOTE — OP NOTE
[facility]     [facility] ENDOSCOPY     COLONOSCOPY    PROCEDURE DATE: 06/02/20    REFERRING PHYSICIAN: No ref. provider found     PRIMARY CARE PROVIDER: Emelda Paget, MD    ATTENDING PHYSICIAN: Felicity Johnson MD     HISTORY: Mr. Sarika Sorenson is a 53 y.o. male who presents to the [facility] endoscopy unit for colonoscopy. The patient's clinical history is remarkable for HTN, DVT, prior history of serrated adenoma, referred for repeat colonoscopy due to prior poor prep. He is currently medically stable and appropriate for the planned procedure. PREOPERATIVE DIAGNOSIS: previous adenomatous polyp. PROCEDURES:   Transanal Colonoscopy with polypectomy (cold biopsy). POSTPROCEDURE DIAGNOSIS:    FAIR PREP    1) Hepatic flexure polyp 6mm s/p cold biopsy and removal  2) Distal transverse colon polyp 5mm s/p cold biopsy removal  3) Descending colon polyp 5mm s/p cold biopsy removal  4) Small internal hemorrhoids    MEDICATIONS:     MAC per anesthesia     EBL <10cc     INSTRUMENT: Olympus CF-H180 AL Pediatric flexible Colonoscope. PREPARATION: The nature and character of the procedure as well as risks, benefits, and alternatives were discussed with the patient and informed consent was obtained. Complications were said to include, but were not limited to: medication allergy, medication reaction, cardiovascular and respiratory problems, bleeding, perforation, infection, and/or missed diagnosis. Following arrival in the endoscopy room, the patient was placed in the left lateral decubitus position and final time-out accomplished in the presence of the nursing staff. Baseline vital signs were obtained and reviewed, and IV sedation was subsequently initiated. FINDINGS: Rectal examination demonstrated no significant visible external abnormality and digital palpation was unremarkable.  Following adequate conscious sedation the colonoscope was introduced and advanced under direct visualization to the

## 2020-06-02 NOTE — ANESTHESIA PRE PROCEDURE
Department of Anesthesiology  Preprocedure Note       Name:  Monica Sanabria   Age:  53 y.o.  :  1975                                          MRN:  [id number]         Date:  2020      Surgeon: Anita Leon):  Thomas Gonzalez MD    Procedure: Procedure(s):  COLONOSCOPY DIAGNOSTIC    Medications prior to admission:   Prior to Admission medications    Medication Sig Start Date End Date Taking? Authorizing Provider   bisacodyl (DULCOLAX) 5 MG EC tablet TAKE 4 TABS AS DIRECTED BY PHYSICIAN OFFICE 20  Yes Thomas Gonzalez MD   gemfibrozil (LOPID) 600 MG tablet Take 1 tablet by mouth 2 times daily 20  Yes Alli Maurer MD   ARIPiprazole (ABILIFY) 30 MG tablet 30 mg  10/8/19  Yes Historical Provider, MD   buPROPion (WELLBUTRIN XL) 300 MG extended release tablet  10/8/19  Yes Historical Provider, MD   gabapentin (NEURONTIN) 600 MG tablet Take 1 tablet by mouth 3 times daily for 30 days. 10/9/19 6/2/20 Yes Alli Maurer MD   carvedilol (COREG) 6.25 MG tablet take 1 tablet by mouth twice a day 19  Yes Russell Barthel, MD   TraZODone HCl  MG TB24 Take 300 mg by mouth nightly   Yes Historical Provider, MD   hydrocortisone (ANUSOL-HC) 2.5 % rectal cream Place rectally 2 times daily. 20   Thomas Gonzalez MD   naloxone 4 MG/0.1ML LIQD nasal spray 1 spray by Nasal route as needed for Opioid Reversal 19   Russell Barthel, MD   clonazePAM (KLONOPIN) 0.5 MG tablet Take 1 tablet by mouth 2 times daily as needed for Anxiety  Patient taking differently: Take 0.5 mg by mouth once .  17   Russell Barthel, MD       Current medications:    Current Facility-Administered Medications   Medication Dose Route Frequency Provider Last Rate Last Dose    lactated ringers infusion   Intravenous Continuous Shauna Rivas  mL/hr at 20 1003      sodium chloride flush 0.9 % injection 10 mL  10 mL Intravenous 2 times per day Shauna Rivas MD        sodium chloride flush 0.9 % injection 10 mL  10 mL Intravenous PRN Last 3 Encounters:   06/02/20 131/78   02/13/20 (!) 143/98   01/29/20 112/72       NPO Status: Time of last liquid consumption: 1900                        Time of last solid consumption: 1600                        Date of last liquid consumption: 06/01/20                        Date of last solid food consumption: 05/31/20    BMI:   Wt Readings from Last 3 Encounters:   06/02/20 (!) 308 lb (139.7 kg)   02/13/20 (!) 316 lb 6.4 oz (143.5 kg)   01/29/20 (!) 319 lb 3.2 oz (144.8 kg)     Body mass index is 42.96 kg/m². CBC:   Lab Results   Component Value Date    WBC 10.2 10/21/2019    RBC 5.95 10/21/2019    HGB 17.9 10/21/2019    HCT 56.2 10/21/2019    MCV 94.5 10/21/2019    RDW 13.7 10/21/2019     10/21/2019       CMP:   Lab Results   Component Value Date     10/21/2019    K 4.7 10/21/2019     10/21/2019    CO2 27 10/21/2019    BUN 12 10/21/2019    CREATININE 0.92 10/21/2019    GFRAA >60 10/21/2019    LABGLOM >60 10/21/2019    GLUCOSE 83 10/21/2019    PROT 7.4 10/21/2019    CALCIUM 9.6 10/21/2019    BILITOT 0.58 10/21/2019    ALKPHOS 86 10/21/2019    AST 13 10/21/2019    ALT 14 10/21/2019       POC Tests: No results for input(s): POCGLU, POCNA, POCK, POCCL, POCBUN, POCHEMO, POCHCT in the last 72 hours.     Coags:   Lab Results   Component Value Date    PROTIME 10.4 10/16/2017    INR 1.0 10/16/2017    APTT 28.2 09/29/2017       HCG (If Applicable): No results found for: PREGTESTUR, PREGSERUM, HCG, HCGQUANT     ABGs: No results found for: PHART, PO2ART, UCQ1NXS, GQH6HTM, BEART, P0RGUXMW     Type & Screen (If Applicable):  No results found for: LABABO, LABRH    Drug/Infectious Status (If Applicable):  Lab Results   Component Value Date    HEPCAB NONREACTIVE 03/01/2017       COVID-19 Screening (If Applicable):   Lab Results   Component Value Date    COVID19 Not Detected 05/29/2020         Anesthesia Evaluation  Patient summary reviewed and Nursing notes reviewed  Airway: Mallampati: II  TM distance: >3 FB   Neck ROM: full  Mouth opening: > = 3 FB Dental: normal exam         Pulmonary:normal exam  breath sounds clear to auscultation                             Cardiovascular:  Exercise tolerance: good (>4 METS),           Rhythm: regular  Rate: normal                    Neuro/Psych:               GI/Hepatic/Renal:             Endo/Other:                     Abdominal:       Abdomen: soft. Vascular:                                        Anesthesia Plan      general and MAC     ASA 3       Induction: intravenous. MIPS: Postoperative opioids intended and Prophylactic antiemetics administered. Anesthetic plan and risks discussed with patient. Use of blood products discussed with patient whom consented to blood products. Plan discussed with attending and CRNA.     Attending anesthesiologist reviewed and agrees with Todd Dinero MD   6/2/2020

## 2020-06-04 LAB — SURGICAL PATHOLOGY REPORT: NORMAL

## 2020-07-06 NOTE — TELEPHONE ENCOUNTER
Request for carvedilol - med pended. Please fill if appropriate.       Next Visit Date:  Future Appointments   Date Time Provider Asmita Erwin   8/25/2020  3:45 PM MD robbie Mireles [address] Maintenance   Topic Date Due    Annual Wellness Visit (AWV)  05/29/2019    Flu vaccine (1) 09/01/2020    Potassium monitoring  10/21/2020    Creatinine monitoring  10/21/2020    Colon cancer screen colonoscopy  06/02/2022    Lipid screen  10/21/2024    DTaP/Tdap/Td vaccine (2 - Td) 06/06/2026    Pneumococcal 0-64 years Vaccine  Completed    HIV screen  Completed    Hepatitis A vaccine  Aged Out    Hepatitis B vaccine  Aged Out    Hib vaccine  Aged Out    Meningococcal (ACWY) vaccine  Aged Out       Hemoglobin A1C (%)   Date Value   10/21/2019 5.2   05/08/2017 5.0             ( goal A1C is < 7)   No results found for: LABMICR  LDL Cholesterol (mg/dL)   Date Value   10/21/2019 134 (H)       (goal LDL is <100)   AST (U/L)   Date Value   10/21/2019 13     ALT (U/L)   Date Value   10/21/2019 14     BUN (mg/dL)   Date Value   10/21/2019 12     BP Readings from Last 3 Encounters:   06/02/20 111/84   06/02/20 117/63   02/13/20 (!) 143/98          (goal 120/80)    All Future Testing planned in [facility]  Lab Frequency Next Occurrence   MRI Lumbar Spine WO Contrast Once 10/09/2020         Patient Active Problem List:     Low back pain without sciatica     Morbid obesity due to excess calories (HCC)     Benign essential HTN     Acute systolic congestive heart failure (HCC)     Secondary cardiomyopathy ([address]     DVT (deep venous thrombosis) (HCC)     Hyperglycemia     Rhabdomyolysis     Tubular adenoma of colon     Tubular adenoma of rectum     Hyperplastic colon polyp     Serrated adenoma of colon     Polyp of colon

## 2020-07-08 RX ORDER — CARVEDILOL 6.25 MG/1
6.25 TABLET ORAL 2 TIMES DAILY
Qty: 60 TABLET | Refills: 5 | Status: SHIPPED | OUTPATIENT
Start: 2020-07-08 | End: 2020-08-07 | Stop reason: SDUPTHER

## 2020-07-10 ENCOUNTER — TELEPHONE (OUTPATIENT)
Dept: INTERNAL MEDICINE | Age: 45
End: 2020-07-10

## 2020-07-17 ENCOUNTER — HOSPITAL ENCOUNTER (OUTPATIENT)
Dept: GENERAL RADIOLOGY | Facility: CLINIC | Age: 45
Discharge: HOME | End: 2020-07-19
Payer: MEDICARE

## 2020-07-17 ENCOUNTER — OFFICE VISIT (OUTPATIENT)
Dept: PAIN MANAGEMENT | Age: 45
End: 2020-07-17
Payer: MEDICARE

## 2020-07-17 VITALS
BODY MASS INDEX: 44.1 KG/M2 | HEART RATE: 86 BPM | WEIGHT: 315 LBS | SYSTOLIC BLOOD PRESSURE: 127 MMHG | DIASTOLIC BLOOD PRESSURE: 78 MMHG | HEIGHT: 71 IN | OXYGEN SATURATION: 96 % | TEMPERATURE: 97.8 F

## 2020-07-17 PROCEDURE — 72114 X-RAY EXAM L-S SPINE BENDING: CPT

## 2020-07-17 PROCEDURE — 99204 OFFICE O/P NEW MOD 45 MIN: CPT | Performed by: ANESTHESIOLOGY

## 2020-07-17 ASSESSMENT — ENCOUNTER SYMPTOMS
EYE REDNESS: 1
ANAL BLEEDING: 0
WHEEZING: 0
BACK PAIN: 1

## 2020-07-17 NOTE — PROGRESS NOTES
(Imaging/EMG)   CT, MRI, or Xray:  none  What part of the body:none  What facility did they have it at:none  What year or specific date: N/A  EMG:  no    2. Previous non interventional treatments tried:  chiropractor or physical therapy: PT  What part of the body part: legs  What facility was it done at: unsure  How long ago was it last tried: 2007  Did it work: No  Did they complete it:No    3. Previous Medications tried  NSAID's: yes  Neurontin: yes  Lyrica: no  Trycyclic antidepressant (Ellavil / Pamelor ): no  Cymbalta: no  Opioids (Ultram / Vicodin / Percocet / Morphine / Dilaudid / Oramorph/ Fentanyl etc.): Percocet, Tylenol #3 in the past  Last Pain medication taken (name of med and date): gabapentin and tylenol 7/17/2020    4. Previous Interventional pain procedures tried:  What kind of injection: none  Who did the injection: none  did the injection help: n/a  Last time injection was done:N/A    5.  Previous surgeries for pain  What part of the body did they have the surgery:none  What physician did the surgery:none  What Facility did they have the surgery done:none  Date of Surgery:N/A    Social History:  Marital status:Single  Employment History:   Working  No  Full time Or Part time:   Disability  Yes   Legal Issues related to pain complaint: No     Pain Disability Index score : 36            Informant: patient    Past Medical History:   Diagnosis Date    Anxiety     Back pain     Bipolar 1 disorder ([facility]     Dvt femoral (deep venous thrombosis) ([facility]     Hypertension     Morbid obesity due to excess calories ([facility]     CHRIS (obstructive sleep apnea)     Pneumonia 2017    DUE TO OTHER AEROBIC GRAM-NEGATIVE BACTERIA, UNSPECIFIED LATERALITY, UNSPECIFIED PART OF LUNG     Respiratory failure ([facility] 2017    Smoking       Past Surgical History:   Procedure Laterality Date    COLONOSCOPY N/A 12/12/2019    COLONOSCOPY POLYPECTOMY HOT BIOPSY performed by Niru Campos MD at [facility] 2020    COLONOSCOPY POLYPECTOMY SNARE/COLD BIOPSY performed by Nikolas Solis MD at [address] [place] [address]     Social History     Socioeconomic History    Marital status: Single     Spouse name: None    Number of children: None    Years of education: None    Highest education level: None   Occupational History    None   Social Needs    Financial resource strain: None    Food insecurity     Worry: None     Inability: None    Transportation needs     Medical: None     Non-medical: None   Tobacco Use    Smoking status: Former Smoker     Packs/day: 0.50     Types: Cigarettes     Last attempt to quit: 9/3/2017     Years since quittin.8    Smokeless tobacco: Never Used   Substance and Sexual Activity    Alcohol use: Not Currently     Comment: social    Drug use: No    Sexual activity: None   Lifestyle    Physical activity     Days per week: None     Minutes per session: None    Stress: None   Relationships    Social connections     Talks on phone: None     Gets together: None     Attends Oriental orthodox service: None     Active member of club or organization: None     Attends meetings of clubs or organizations: None     Relationship status: None    Intimate partner violence     Fear of current or ex partner: None     Emotionally abused: None     Physically abused: None     Forced sexual activity: None   Other Topics Concern    None   Social History Narrative    None     No family history on file. No Known Allergies  Patient has no known allergies.    Vitals:    20 1018   BP: 127/78   Pulse: 86   Temp: 97.8 °F (36.6 °C)   SpO2: 96%     Current Outpatient Medications   Medication Sig Dispense Refill    carvedilol (COREG) 6.25 MG tablet Take 1 tablet by mouth 2 times daily 60 tablet 5    gemfibrozil (LOPID) 600 MG tablet Take 1 tablet by mouth 2 times daily 60 tablet 3    ARIPiprazole (ABILIFY) 30 MG tablet 30 mg       buPROPion (WELLBUTRIN XL) 300 MG extended release tablet       gabapentin (NEURONTIN) 600 MG tablet Take 1 tablet by mouth 3 times daily for 30 days. 90 tablet 11    naloxone 4 MG/0.1ML LIQD nasal spray 1 spray by Nasal route as needed for Opioid Reversal 1 each 5    TraZODone HCl  MG TB24 Take 300 mg by mouth nightly      clonazePAM (KLONOPIN) 0.5 MG tablet Take 1 tablet by mouth 2 times daily as needed for Anxiety (Patient taking differently: Take 0.5 mg by mouth once .) 60 tablet 0    bisacodyl (DULCOLAX) 5 MG EC tablet TAKE 4 TABS AS DIRECTED BY PHYSICIAN OFFICE (Patient not taking: Reported on 7/17/2020) 4 tablet 0    hydrocortisone (ANUSOL-HC) 2.5 % rectal cream Place rectally 2 times daily. (Patient not taking: Reported on 7/17/2020) 1 Tube 3     No current facility-administered medications for this visit. Review of Systems   Constitutional: Negative for chills and fever. HENT: Negative for postnasal drip. Eyes: Positive for redness. Respiratory: Negative for wheezing. Cardiovascular: Negative for palpitations. Gastrointestinal: Negative for anal bleeding. Endocrine: Positive for heat intolerance. Genitourinary: Negative for difficulty urinating. Musculoskeletal: Positive for back pain. Skin: Negative for wound. Allergic/Immunologic: Negative for food allergies. Neurological: Positive for numbness. Negative for weakness. Hematological: Does not bruise/bleed easily. Psychiatric/Behavioral: Negative for agitation. All other systems reviewed and are negative. Objective:  General Appearance:  Well-appearing, in no acute distress, uncomfortable and in pain. Vital signs: (most recent): Blood pressure 127/78, pulse 86, temperature 97.8 °F (36.6 °C), temperature source Oral, height 5' 11\" (1.803 m), weight (!) 318 lb (144.2 kg), SpO2 96 %. Vital signs are normal.  No fever. Output: Producing urine and producing stool. HEENT: Normal HEENT exam.    Lungs:  Normal effort and normal respiratory rate.   Breath sounds clear previous lumbar spine surgical history  Recalls physical therapy few years ago but unable to provide further details  No previous diagnostic work-up for chronic back pain issues  Pain score today  5  1. Chronic bilateral low back pain with bilateral sciatica    2. Hx of drug abuse ([facility]    3. Morbid obesity with BMI of 40.0-44.9, adult ([facility]    4. History of psychiatric disorder    5. Chronic prescription benzodiazepine use        Chronic low back pain with bilateral sciatica  Clinical presentation suggest lumbar disc disease and lumbar spinal stenosis  We will refer patient for physical therapy  There is no focal deficit to recommend any urgent surgical intervention or evaluation    We will obtain plain x-ray imaging  We will obtain EMG nerve testing    Consider for MRI lumbar spine and appropriate intervention after therapy    Will order topical compounding cream,  including local anesthetic and NSAIDs    He is not a candidate for opioid for lack of utilization of non-opioid modalities and cocaine abuse history  Orders Placed This Encounter   Procedures    XR Lumbar Spine Ap Lateral Flexion and Extension and Oblique    Ambulatory referral to Physical Therapy    EMG      No orders of the defined types were placed in this encounter. Controlled Substance Monitoring:    Acute and Chronic Pain Monitoring:   RX Monitoring 7/17/2020   Attestation -   Periodic Controlled Substance Monitoring Potential drug abuse or diversion identified, see note documentation.      Consultation note sent to the referring physician    Electronically signed by Nusrat Barajas MD on 7/17/2020 at 11:02 AM     EMG bilateral lower extremity August 4, 2020  Chronic left L5-S1 lumbar radiculopathy

## 2020-07-24 ENCOUNTER — TELEPHONE (OUTPATIENT)
Dept: PEDIATRICS | Age: 45
End: 2020-07-24

## 2020-07-24 NOTE — TELEPHONE ENCOUNTER
Back in January 2019 a referral was made for pt to see Dr Beverly Del Rio at dermatology. That referral has been closed and the pt would like a new referral if possible.

## 2020-08-07 ENCOUNTER — VIRTUAL VISIT (OUTPATIENT)
Dept: INTERNAL MEDICINE | Age: 45
End: 2020-08-07
Payer: MEDICARE

## 2020-08-07 PROBLEM — Z93.0 TRACHEOSTOMY STATUS (HCC): Status: ACTIVE | Noted: 2020-08-07

## 2020-08-07 PROBLEM — F19.20: Status: ACTIVE | Noted: 2020-08-07

## 2020-08-07 PROCEDURE — 99441 PR PHYS/QHP TELEPHONE EVALUATION 5-10 MIN: CPT | Performed by: INTERNAL MEDICINE

## 2020-08-07 RX ORDER — BUPROPION HYDROCHLORIDE 300 MG/1
TABLET ORAL
Qty: 30 TABLET | Status: CANCELLED | OUTPATIENT
Start: 2020-08-07

## 2020-08-07 RX ORDER — TRAZODONE HYDROCHLORIDE 300 MG/1
300 TABLET ORAL NIGHTLY
Status: CANCELLED | OUTPATIENT
Start: 2020-08-07

## 2020-08-07 RX ORDER — ARIPIPRAZOLE 30 MG/1
30 TABLET ORAL DAILY
Qty: 30 TABLET | Refills: 0 | Status: SHIPPED | OUTPATIENT
Start: 2020-08-07 | End: 2021-07-06

## 2020-08-07 RX ORDER — CARVEDILOL 6.25 MG/1
6.25 TABLET ORAL 2 TIMES DAILY
Qty: 60 TABLET | Refills: 5 | Status: SHIPPED | OUTPATIENT
Start: 2020-08-07 | End: 2021-04-30 | Stop reason: SDUPTHER

## 2020-08-07 RX ORDER — GEMFIBROZIL 600 MG/1
600 TABLET, FILM COATED ORAL 2 TIMES DAILY
Qty: 60 TABLET | Refills: 3 | Status: SHIPPED | OUTPATIENT
Start: 2020-08-07 | End: 2021-02-04

## 2020-08-07 RX ORDER — GABAPENTIN 600 MG/1
600 TABLET ORAL 3 TIMES DAILY
Qty: 90 TABLET | Refills: 0 | Status: SHIPPED | OUTPATIENT
Start: 2020-08-07 | End: 2020-09-23 | Stop reason: SDUPTHER

## 2020-08-07 RX ORDER — CLONAZEPAM 0.5 MG/1
0.5 TABLET ORAL DAILY PRN
Qty: 30 TABLET | Refills: 0 | Status: SHIPPED | OUTPATIENT
Start: 2020-08-07 | End: 2021-07-06

## 2020-08-17 ENCOUNTER — TELEPHONE (OUTPATIENT)
Dept: INTERNAL MEDICINE | Age: 45
End: 2020-08-17

## 2020-08-17 NOTE — TELEPHONE ENCOUNTER
Patient calling in regards to AVS - he is wondering why it says opioid dependence & abuse? Patient states he's never had an opioid issue. About 2-3 years ago he tested dirty with cocaine in system - he states that doesn't mean it's opioid dependence or abuse. This being on his AVS is making him look bad & ruining his chances with pain management. He states he is going to get shots in his back but he knows the doctor will be hesitant after seeing that. Please advise. Was this an error? Can we get it removed?

## 2020-08-17 NOTE — TELEPHONE ENCOUNTER
Patient called to schedule an appointment with Dermatology for possible ringworm on both legs. He is also experiencing his hands turning white/purple, blistering and peeling. He is asking for the referral to state these symptoms along with the rash. Currently dermatology is scheduling out to November for new patients and he is hopeful with an updated referral he may be able to be seen at an earlier date. Please advise at earliest convenience. Thank you.

## 2020-08-20 NOTE — TELEPHONE ENCOUNTER
Referral was sent to same place. Different referral pended. Please contact patient with contact information.

## 2020-08-25 ENCOUNTER — OFFICE VISIT (OUTPATIENT)
Dept: GASTROENTEROLOGY | Age: 45
End: 2020-08-25
Payer: MEDICARE

## 2020-08-25 ENCOUNTER — OFFICE VISIT (OUTPATIENT)
Dept: PAIN MANAGEMENT | Age: 45
End: 2020-08-25
Payer: MEDICARE

## 2020-08-25 VITALS
BODY MASS INDEX: 44.1 KG/M2 | DIASTOLIC BLOOD PRESSURE: 67 MMHG | HEART RATE: 86 BPM | OXYGEN SATURATION: 96 % | HEIGHT: 71 IN | WEIGHT: 315 LBS | SYSTOLIC BLOOD PRESSURE: 147 MMHG | TEMPERATURE: 96.2 F

## 2020-08-25 VITALS — TEMPERATURE: 97.2 F | WEIGHT: 310 LBS | BODY MASS INDEX: 43.24 KG/M2 | RESPIRATION RATE: 18 BRPM

## 2020-08-25 PROBLEM — F19.11 HX OF DRUG ABUSE (HCC): Status: ACTIVE | Noted: 2020-08-25

## 2020-08-25 PROBLEM — E66.01 MORBID OBESITY WITH BMI OF 40.0-44.9, ADULT (HCC): Status: ACTIVE | Noted: 2020-08-25

## 2020-08-25 PROBLEM — Z86.59 HISTORY OF PSYCHIATRIC DISORDER: Status: ACTIVE | Noted: 2020-08-25

## 2020-08-25 PROCEDURE — 99213 OFFICE O/P EST LOW 20 MIN: CPT | Performed by: INTERNAL MEDICINE

## 2020-08-25 PROCEDURE — 99213 OFFICE O/P EST LOW 20 MIN: CPT | Performed by: ANESTHESIOLOGY

## 2020-08-25 RX ORDER — AMOXICILLIN 250 MG
1 CAPSULE ORAL DAILY PRN
Qty: 30 TABLET | Refills: 3 | Status: SHIPPED | OUTPATIENT
Start: 2020-08-25 | End: 2020-12-01 | Stop reason: ALTCHOICE

## 2020-08-25 RX ORDER — FAMOTIDINE 20 MG/1
20 TABLET, FILM COATED ORAL DAILY PRN
Qty: 30 TABLET | Refills: 3 | Status: SHIPPED | OUTPATIENT
Start: 2020-08-25 | End: 2020-12-01 | Stop reason: ALTCHOICE

## 2020-08-25 ASSESSMENT — ENCOUNTER SYMPTOMS
ABDOMINAL PAIN: 0
SHORTNESS OF BREATH: 0
EYES NEGATIVE: 1
RESPIRATORY NEGATIVE: 1
ANAL BLEEDING: 1
BACK PAIN: 1
DIARRHEA: 0
CONSTIPATION: 1
RESPIRATORY NEGATIVE: 1
CHEST TIGHTNESS: 0
ALLERGIC/IMMUNOLOGIC NEGATIVE: 1
BACK PAIN: 1
VOMITING: 0
NAUSEA: 0

## 2020-08-25 NOTE — PROGRESS NOTES
Respiratory failure ([facility] 2017    Smoking        Past Surgical History:   Procedure Laterality Date    COLONOSCOPY N/A 12/12/2019    COLONOSCOPY POLYPECTOMY HOT BIOPSY performed by Clarita Kimbrough MD at [facility] COLONOSCOPY N/A 6/2/2020    COLONOSCOPY POLYPECTOMY SNARE/COLD BIOPSY performed by Clarita Kimbrough MD at [address] [place] [address]  2017       CURRENT MEDICATIONS:    Current Outpatient Medications:     buPROPion HCl (WELLBUTRIN PO), Take by mouth, Disp: , Rfl:     Elastic Bandages & Supports (LUMBAR BACK BRACE/SUPPORT PAD) MISC, 1 Units by Does not apply route daily, Disp: 1 each, Rfl: 0    carvedilol (COREG) 6.25 MG tablet, Take 1 tablet by mouth 2 times daily, Disp: 60 tablet, Rfl: 5    clonazePAM (KLONOPIN) 0.5 MG tablet, Take 1 tablet by mouth daily as needed for Anxiety for up to 30 days. , Disp: 30 tablet, Rfl: 0    gabapentin (NEURONTIN) 600 MG tablet, Take 1 tablet by mouth 3 times daily for 30 days. , Disp: 90 tablet, Rfl: 0    gemfibrozil (LOPID) 600 MG tablet, Take 1 tablet by mouth 2 times daily, Disp: 60 tablet, Rfl: 3    ARIPiprazole (ABILIFY) 30 MG tablet, Take 1 tablet by mouth daily, Disp: 30 tablet, Rfl: 0    bisacodyl (DULCOLAX) 5 MG EC tablet, TAKE 4 TABS AS DIRECTED BY PHYSICIAN OFFICE, Disp: 4 tablet, Rfl: 0    hydrocortisone (ANUSOL-HC) 2.5 % rectal cream, Place rectally 2 times daily. , Disp: 1 Tube, Rfl: 3    naloxone 4 MG/0.1ML LIQD nasal spray, 1 spray by Nasal route as needed for Opioid Reversal, Disp: 1 each, Rfl: 5    TraZODone HCl  MG TB24, Take 300 mg by mouth nightly, Disp: , Rfl:     ALLERGIES:   No Known Allergies    FAMILY HISTORY: No family history on file.       SOCIAL HISTORY:   Social History     Socioeconomic History    Marital status: Single     Spouse name: Not on file    Number of children: Not on file    Years of education: Not on file    Highest education level: Not on file   Occupational History    Not on file   Social Needs    Financial resource strain: Not on file    Food insecurity     Worry: Not on file     Inability: Not on file    Transportation needs     Medical: Not on file     Non-medical: Not on file   Tobacco Use    Smoking status: Former Smoker     Packs/day: 0.50     Types: Cigarettes     Last attempt to quit: 9/3/2017     Years since quittin.9    Smokeless tobacco: Never Used   Substance and Sexual Activity    Alcohol use: Not Currently     Comment: social    Drug use: No    Sexual activity: Not on file   Lifestyle    Physical activity     Days per week: Not on file     Minutes per session: Not on file    Stress: Not on file   Relationships    Social connections     Talks on phone: Not on file     Gets together: Not on file     Attends Restoration service: Not on file     Active member of club or organization: Not on file     Attends meetings of clubs or organizations: Not on file     Relationship status: Not on file    Intimate partner violence     Fear of current or ex partner: Not on file     Emotionally abused: Not on file     Physically abused: Not on file     Forced sexual activity: Not on file   Other Topics Concern    Not on file   Social History Narrative    Not on file       REVIEW OF SYSTEMS: A 12-point review of systems was obtained and pertinent positives and negatives were listed below. REVIEW OF SYSTEMS:     Constitutional: No fever, no chills, no lethargy, no weakness. HEENT:  No headache, otalgia, itchy eyes, nasal discharge or sore throat. Cardiac:  No chest pain, dyspnea, orthopnea or PND. Chest:   No cough, phlegm or wheezing. Abdomen:      Detailed by MA   Neuro:  No focal weakness, abnormal movements or seizure like activity. Skin:   No rashes, no itching. :   No hematuria, no pyuria, no dysuria, no flank pain. Extremities:  No swelling or joint pains. ROS was otherwise negative    Review of Systems    PHYSICAL EXAMINATION: Vital signs reviewed per the nursing documentation.      Temp 97.2 °F (36.2 °C)   Resp 18   Wt (!) 310 lb (140.6 kg)   BMI 43.24 kg/m²   Body mass index is 43.24 kg/m². Physical Exam    Physical Exam   Constitutional: Patient is oriented to person, place, and time. Patient appears well-developed and well-nourished. HENT:   Head: Normocephalic and atraumatic. Eyes: Pupils are equal, round, and reactive to light. EOM are normal.   Neck: Normal range of motion. Neck supple. No JVD present. No tracheal deviation present. No thyromegaly present. Cardiovascular: Normal rate, regular rhythm, normal heart sounds and intact distal pulses. Pulmonary/Chest: Effort normal and breath sounds normal. No stridor. No respiratory distress. He has no wheezes. He has no rales. He exhibits no tenderness. Abdominal: Soft. Bowel sounds are normal. He exhibits no distension and no mass. There is no tenderness. There is no rebound and no guarding. No hernia. Musculoskeletal: Normal range of motion. Lymphadenopathy:    Patient has no cervical adenopathy. Neurological: Patient is alert and oriented to person, place, and time. Psychiatric: Patient has a normal mood and affect.  Patient behavior is normal.       LABORATORY DATA: Reviewed  Lab Results   Component Value Date    WBC 10.2 10/21/2019    HGB 17.9 (H) 10/21/2019    HCT 56.2 (H) 10/21/2019    MCV 94.5 10/21/2019     10/21/2019     10/21/2019    K 4.7 10/21/2019     10/21/2019    CO2 27 10/21/2019    BUN 12 10/21/2019    CREATININE 0.92 10/21/2019    LABALBU 4.4 10/21/2019    BILITOT 0.58 10/21/2019    ALKPHOS 86 10/21/2019    AST 13 10/21/2019    ALT 14 10/21/2019    INR 1.0 10/16/2017         Lab Results   Component Value Date    RBC 5.95 (H) 10/21/2019    HGB 17.9 (H) 10/21/2019    MCV 94.5 10/21/2019    MCH 30.1 10/21/2019    MCHC 31.9 10/21/2019    RDW 13.7 10/21/2019    MPV 11.3 10/21/2019    BASOPCT 1 10/16/2017    LYMPHSABS 2.30 10/16/2017    MONOSABS 0.70 10/16/2017    NEUTROABS 7.40 10/16/2017

## 2020-08-25 NOTE — PROGRESS NOTES
Subjective:      Patient ID: Luis Enrique Rodriguez is a 53 y.o. male. HPI    Review of Systems   Constitutional: Negative. Negative for chills and fever. HENT: Negative. Eyes: Negative. Respiratory: Negative. Negative for chest tightness and shortness of breath. Cardiovascular: Negative. Negative for chest pain and palpitations. Gastrointestinal: Positive for anal bleeding and constipation. Negative for abdominal pain, diarrhea, nausea and vomiting. Endocrine: Negative. Genitourinary: Negative. Negative for difficulty urinating. Musculoskeletal: Positive for back pain. Skin: Negative. Allergic/Immunologic: Negative. Neurological: Negative. Negative for dizziness, light-headedness and headaches. Hematological: Bruises/bleeds easily. Psychiatric/Behavioral: The patient is nervous/anxious. All other systems reviewed and are negative.       Objective:   Physical Exam    Assessment:            Plan:

## 2020-08-25 NOTE — PROGRESS NOTES
History    None   Social Needs    Financial resource strain: None    Food insecurity     Worry: None     Inability: None    Transportation needs     Medical: None     Non-medical: None   Tobacco Use    Smoking status: Former Smoker     Packs/day: 0.50     Types: Cigarettes     Last attempt to quit: 9/3/2017     Years since quittin.9    Smokeless tobacco: Never Used   Substance and Sexual Activity    Alcohol use: Not Currently     Comment: social    Drug use: No    Sexual activity: None   Lifestyle    Physical activity     Days per week: None     Minutes per session: None    Stress: None   Relationships    Social connections     Talks on phone: None     Gets together: None     Attends Voodoo service: None     Active member of club or organization: None     Attends meetings of clubs or organizations: None     Relationship status: None    Intimate partner violence     Fear of current or ex partner: None     Emotionally abused: None     Physically abused: None     Forced sexual activity: None   Other Topics Concern    None   Social History Narrative    None     No family history on file. No Known Allergies  Patient has no known allergies. Vitals:    20 1128   BP: (!) 147/67   Pulse: 86   Temp: 96.2 °F (35.7 °C)   SpO2: 96%     Current Outpatient Medications   Medication Sig Dispense Refill    buPROPion HCl (WELLBUTRIN PO) Take by mouth      Elastic Bandages & Supports (LUMBAR BACK BRACE/SUPPORT PAD) MISC 1 Units by Does not apply route daily 1 each 0    carvedilol (COREG) 6.25 MG tablet Take 1 tablet by mouth 2 times daily 60 tablet 5    clonazePAM (KLONOPIN) 0.5 MG tablet Take 1 tablet by mouth daily as needed for Anxiety for up to 30 days. 30 tablet 0    gabapentin (NEURONTIN) 600 MG tablet Take 1 tablet by mouth 3 times daily for 30 days.  90 tablet 0    gemfibrozil (LOPID) 600 MG tablet Take 1 tablet by mouth 2 times daily 60 tablet 3    ARIPiprazole (ABILIFY) 30 MG tablet Take 1 EMG  Here for review of the results  Did not start physical therapy  States that it is too expensive    Insurance have declined MRI because of lack of physical therapy  No progressive leg weakness    History of psychiatric disorder, on Abilify and chronic benzodiazepine therapy  Currently taking gabapentin for pain  History of cocaine drug abuse  1. Chronic midline low back pain without sciatica    2. Morbid obesity with BMI of 40.0-44.9, adult ([facility]    3. History of psychiatric disorder    4. Hx of drug abuse ([facility]        EXAMINATION: 7 XRAY VIEWS OF THE LUMBAR SPINE  2020 3:52 pm   FINDINGS: Lumbar vertebral body height and alignment is maintained. Disc spaces are preserved. Mild endplate degenerative changes at multiple levels. Small anterior osteophytes within the upper lumbar spine. EMG bilateral lower extremity 2020  Chronic left L5-S1 lumbar radiculopathy    Diagnostic work-up reviewed  Discussed with patient  Advised patient to start physical therapy  Offered TENS unit, patient declined  Offered back brace, patient agreeable to try that    No orders of the defined types were placed in this encounter.      Orders Placed This Encounter   Medications    Elastic Bandages & Supports (LUMBAR BACK BRACE/SUPPORT PAD) MISC     Si Units by Does not apply route daily     Dispense:  1 each     Refill:  0          Electronically signed by Gabe Lennox, MD on 2020 at 11:57 AM

## 2020-09-23 NOTE — TELEPHONE ENCOUNTER
Refill request for Gabapentin. If appropriate please send medication(s) to patients pharmacy. Next appt: Patient is currently on wait list for provider.     Last appt: 8/7/2020    Health Maintenance   Topic Date Due    Annual Wellness Visit (AWV)  05/29/2019    Flu vaccine (1) 09/01/2020    Potassium monitoring  10/21/2020    Creatinine monitoring  10/21/2020    Colon cancer screen colonoscopy  06/02/2022    Lipid screen  10/21/2024    DTaP/Tdap/Td vaccine (2 - Td) 06/06/2026    Pneumococcal 0-64 years Vaccine  Completed    HIV screen  Completed    Hepatitis A vaccine  Aged Out    Hepatitis B vaccine  Aged Out    Hib vaccine  Aged Out    Meningococcal (ACWY) vaccine  Aged Out       Hemoglobin A1C (%)   Date Value   10/21/2019 5.2   05/08/2017 5.0             ( goal A1C is < 7)   No results found for: LABMICR  LDL Cholesterol (mg/dL)   Date Value   10/21/2019 134 (H)       (goal LDL is <100)   AST (U/L)   Date Value   10/21/2019 13     ALT (U/L)   Date Value   10/21/2019 14     BUN (mg/dL)   Date Value   10/21/2019 12     BP Readings from Last 3 Encounters:   08/25/20 (!) 147/67   07/17/20 127/78   06/02/20 111/84          (goal 120/80)          Patient Active Problem List:     Bilateral low back pain with bilateral sciatica     Morbid obesity due to excess calories (HCC)     Benign essential HTN     Acute systolic congestive heart failure (HCC)     Secondary cardiomyopathy ([address]     DVT (deep venous thrombosis) (HCC)     Hyperglycemia     Rhabdomyolysis     Tubular adenoma of colon     Tubular adenoma of rectum     Hyperplastic colon polyp     Serrated adenoma of colon     Polyp of colon     Tracheostomy status ([address]     Combined opioid with non-opioid drug dependence, with abuse ([address]     Morbid obesity with BMI of 40.0-44.9, adult ([address]     History of psychiatric disorder     Hx of drug abuse ([address]

## 2020-09-24 RX ORDER — GABAPENTIN 600 MG/1
600 TABLET ORAL 3 TIMES DAILY
Qty: 90 TABLET | Refills: 0 | Status: SHIPPED | OUTPATIENT
Start: 2020-09-24 | End: 2020-10-01 | Stop reason: SDUPTHER

## 2020-10-01 ENCOUNTER — VIRTUAL VISIT (OUTPATIENT)
Dept: INTERNAL MEDICINE | Age: 45
End: 2020-10-01
Payer: MEDICARE

## 2020-10-01 PROCEDURE — G8431 POS CLIN DEPRES SCRN F/U DOC: HCPCS | Performed by: INTERNAL MEDICINE

## 2020-10-01 PROCEDURE — G0444 DEPRESSION SCREEN ANNUAL: HCPCS | Performed by: INTERNAL MEDICINE

## 2020-10-01 PROCEDURE — 99442 PR PHYS/QHP TELEPHONE EVALUATION 11-20 MIN: CPT | Performed by: INTERNAL MEDICINE

## 2020-10-01 RX ORDER — TRAZODONE HYDROCHLORIDE 300 MG/1
300 TABLET ORAL NIGHTLY
COMMUNITY
Start: 2020-09-25 | End: 2021-07-06 | Stop reason: SDUPTHER

## 2020-10-01 RX ORDER — BUPROPION HYDROCHLORIDE 300 MG/1
300 TABLET ORAL DAILY
COMMUNITY
Start: 2020-09-24 | End: 2021-07-06

## 2020-10-01 RX ORDER — GABAPENTIN 800 MG/1
800 TABLET ORAL 3 TIMES DAILY
Qty: 90 TABLET | Refills: 0 | Status: SHIPPED | OUTPATIENT
Start: 2020-10-01 | End: 2020-11-09 | Stop reason: SDUPTHER

## 2020-10-01 RX ORDER — TERBINAFINE HYDROCHLORIDE 250 MG/1
250 TABLET ORAL DAILY
COMMUNITY
Start: 2020-09-04 | End: 2022-04-26

## 2020-10-01 ASSESSMENT — PATIENT HEALTH QUESTIONNAIRE - PHQ9
SUM OF ALL RESPONSES TO PHQ9 QUESTIONS 1 & 2: 6
7. TROUBLE CONCENTRATING ON THINGS, SUCH AS READING THE NEWSPAPER OR WATCHING TELEVISION: 3
9. THOUGHTS THAT YOU WOULD BE BETTER OFF DEAD, OR OF HURTING YOURSELF: 0
6. FEELING BAD ABOUT YOURSELF - OR THAT YOU ARE A FAILURE OR HAVE LET YOURSELF OR YOUR FAMILY DOWN: 3
6. FEELING BAD ABOUT YOURSELF - OR THAT YOU ARE A FAILURE OR HAVE LET YOURSELF OR YOUR FAMILY DOWN: 3
3. TROUBLE FALLING OR STAYING ASLEEP: 3
SUM OF ALL RESPONSES TO PHQ QUESTIONS 1-9: 24
5. POOR APPETITE OR OVEREATING: 3
9. THOUGHTS THAT YOU WOULD BE BETTER OFF DEAD, OR OF HURTING YOURSELF: 0
2. FEELING DOWN, DEPRESSED OR HOPELESS: 3
SUM OF ALL RESPONSES TO PHQ QUESTIONS 1-9: 24
SUM OF ALL RESPONSES TO PHQ QUESTIONS 1-9: 24
2. FEELING DOWN, DEPRESSED OR HOPELESS: 3
1. LITTLE INTEREST OR PLEASURE IN DOING THINGS: 3
8. MOVING OR SPEAKING SO SLOWLY THAT OTHER PEOPLE COULD HAVE NOTICED. OR THE OPPOSITE, BEING SO FIGETY OR RESTLESS THAT YOU HAVE BEEN MOVING AROUND A LOT MORE THAN USUAL: 3
4. FEELING TIRED OR HAVING LITTLE ENERGY: 3
4. FEELING TIRED OR HAVING LITTLE ENERGY: 3
7. TROUBLE CONCENTRATING ON THINGS, SUCH AS READING THE NEWSPAPER OR WATCHING TELEVISION: 3
8. MOVING OR SPEAKING SO SLOWLY THAT OTHER PEOPLE COULD HAVE NOTICED. OR THE OPPOSITE, BEING SO FIGETY OR RESTLESS THAT YOU HAVE BEEN MOVING AROUND A LOT MORE THAN USUAL: 3
3. TROUBLE FALLING OR STAYING ASLEEP: 3
SUM OF ALL RESPONSES TO PHQ9 QUESTIONS 1 & 2: 6
SUM OF ALL RESPONSES TO PHQ QUESTIONS 1-9: 24
10. IF YOU CHECKED OFF ANY PROBLEMS, HOW DIFFICULT HAVE THESE PROBLEMS MADE IT FOR YOU TO DO YOUR WORK, TAKE CARE OF THINGS AT HOME, OR GET ALONG WITH OTHER PEOPLE: 1
5. POOR APPETITE OR OVEREATING: 3
10. IF YOU CHECKED OFF ANY PROBLEMS, HOW DIFFICULT HAVE THESE PROBLEMS MADE IT FOR YOU TO DO YOUR WORK, TAKE CARE OF THINGS AT HOME, OR GET ALONG WITH OTHER PEOPLE: 1
1. LITTLE INTEREST OR PLEASURE IN DOING THINGS: 3

## 2020-10-01 ASSESSMENT — LIFESTYLE VARIABLES: HOW OFTEN DO YOU HAVE A DRINK CONTAINING ALCOHOL: 0

## 2020-10-01 NOTE — PROGRESS NOTES
Jesenia Mitchell is a 53 y.o. male evaluated via telephone on 10/1/2020. Consent:  He and/or health care decision maker is aware that that he may receive a bill for this telephone service, depending on his insurance coverage, and has provided verbal consent to proceed: Yes      Documentation:  Depression  Patient complains of depression. He complains of depressed mood, difficulty concentrating and fatigue. Onset was approximately several years ago. Symptoms have been unchanged since that time. Current symptoms include: depressed mood, difficulty concentrating and fatigue. Patient denies recurrent thoughts of death. Family history significant for depression. Possible organic causes contributing are: none. Risk factors: none. Previous treatment includes medication. He complains of the following side effects from the treatment: none. Back Pain:  Pain is aching in nature, with radiation to thigh. Pain is intermittent, typically mild in intensity, and is exacerbated by nothing in particular. Associated symptoms: none. Patient reports the following CPR Red Flags: none. Precipitating factors: no known injury. Patient's history includes years of back pain. Has been on meds for a long time. Imaging shows mild DJD- may have some lumbar stenosis and disc disease. Needs to do PT- reports it is 80$ and can't afford. Diagnosis Orders   1. Positive depression screening     2. Low back pain without sciatica, unspecified back pain laterality, unspecified chronicity  gabapentin (NEURONTIN) 800 MG tablet     Follows with PSych- has over years been on various regiments of meds. This is the best his mood symptoms have been controlled  Inc gabapentin and offered financial assistance info       I affirm this is a Patient Initiated Episode with a Patient who has not had a related appointment within my department in the past 7 days or scheduled within the next 24 hours.     Patient identification was verified at the start of the visit: Yes    Total Time: minutes: 11-20 minutes    Note: not billable if this call serves to triage the patient into an appointment for the relevant concern      Sherry Farm

## 2020-10-01 NOTE — PATIENT INSTRUCTIONS
Medications e-scribe to pharmacy of pt's choice. Patient to return to clinic 3 months (office will call and schedule appointment). AVS reviewed and mailed to pt. It is very important for your care that you keep your appointment. If for some reason you are unable to keep your appointment it is equally important that you call our office at [phone] to cancel your appointment and reschedule. Failure to do so may result in your termination from our practice.   MB

## 2020-10-09 ENCOUNTER — TELEPHONE (OUTPATIENT)
Dept: INTERNAL MEDICINE | Age: 45
End: 2020-10-09

## 2020-10-09 NOTE — TELEPHONE ENCOUNTER
([facility]     Morbid obesity with BMI of 40.0-44.9, adult ([facility]     History of psychiatric disorder     Hx of drug abuse ([facility]

## 2020-10-12 ENCOUNTER — TELEPHONE (OUTPATIENT)
Dept: INTERNAL MEDICINE | Age: 45
End: 2020-10-12

## 2020-10-12 NOTE — TELEPHONE ENCOUNTER
Patient called the office and wanted to know why Dr. Skyla Steve put: Combined opioid with non-opioid drug dependence, with abuse [facility]) for a diagnosis on his visit on 8/7/2020. Writer can't see why this was put in so she told the patient she will send a message to Dr. Skyla Steve and then we will call the patient back once she replies.

## 2020-11-09 ENCOUNTER — OFFICE VISIT (OUTPATIENT)
Dept: FAMILY MEDICINE CLINIC | Age: 45
End: 2020-11-09
Payer: MEDICARE

## 2020-11-09 VITALS
OXYGEN SATURATION: 95 % | TEMPERATURE: 97.3 F | BODY MASS INDEX: 44.1 KG/M2 | HEIGHT: 71 IN | WEIGHT: 315 LBS | DIASTOLIC BLOOD PRESSURE: 92 MMHG | HEART RATE: 94 BPM | SYSTOLIC BLOOD PRESSURE: 153 MMHG

## 2020-11-09 PROCEDURE — 99204 OFFICE O/P NEW MOD 45 MIN: CPT | Performed by: FAMILY MEDICINE

## 2020-11-09 RX ORDER — GABAPENTIN 800 MG/1
800 TABLET ORAL 3 TIMES DAILY
Qty: 90 TABLET | Refills: 2 | Status: SHIPPED | OUTPATIENT
Start: 2020-11-09 | End: 2021-02-04

## 2020-11-09 ASSESSMENT — PATIENT HEALTH QUESTIONNAIRE - PHQ9
SUM OF ALL RESPONSES TO PHQ9 QUESTIONS 1 & 2: 0
1. LITTLE INTEREST OR PLEASURE IN DOING THINGS: 0
2. FEELING DOWN, DEPRESSED OR HOPELESS: 0
SUM OF ALL RESPONSES TO PHQ QUESTIONS 1-9: 0

## 2020-11-09 NOTE — PROGRESS NOTES
[facility] 53 FAM[address] [address]  Dept: [phone]      Melany Salas is a 50 y.o. male who presents today for follow up on his  medical conditions as noted below. Chief Complaint   Patient presents with    New Patient       Patient Active Problem List:     Bilateral low back pain with bilateral sciatica     Morbid obesity due to excess calories ([address]     Benign essential HTN     Acute systolic congestive heart failure ([address]     Secondary cardiomyopathy ([address]     DVT (deep venous thrombosis) (HCC)     Hyperglycemia     Rhabdomyolysis     Tubular adenoma of colon     Tubular adenoma of rectum     Hyperplastic colon polyp     Serrated adenoma of colon     Polyp of colon     Tracheostomy status ([address]     Combined opioid with non-opioid drug dependence, with abuse ([address]     Morbid obesity with BMI of 40.0-44.9, adult ([address]     History of psychiatric disorder     Hx of drug abuse ([address]     Past Medical History:   Diagnosis Date    Anxiety     Back pain     Bipolar 1 disorder ([address]     Dvt femoral (deep venous thrombosis) ([address]     Hypertension     Morbid obesity due to excess calories ([address]     CHRIS (obstructive sleep apnea)     Pneumonia 2017    DUE TO OTHER AEROBIC GRAM-NEGATIVE BACTERIA, UNSPECIFIED LATERALITY, UNSPECIFIED PART OF LUNG     Respiratory failure ([address] 2017    Smoking       Past Surgical History:   Procedure Laterality Date    COLONOSCOPY N/A 12/12/2019    COLONOSCOPY POLYPECTOMY HOT BIOPSY performed by Carmela House MD at [address] [place] [address] N/A 6/2/2020    COLONOSCOPY POLYPECTOMY SNARE/COLD BIOPSY performed by Carmela House MD at [address] [place] [address], S.  2017     No family history on file. Current Outpatient Medications   Medication Sig Dispense Refill    gabapentin (NEURONTIN) 800 MG tablet Take 1 tablet by mouth 3 times daily for 30 days.  90 tablet 2    buPROPion (WELLBUTRIN XL) 300 MG extended release tablet Take 300 mg by mouth daily      terbinafine (LAMISIL) 250 MG tablet Take 250 mg by mouth daily      traZODone (DESYREL) 300 MG tablet Take 300 mg by mouth nightly      senna-docusate (PERICOLACE) 8.6-50 MG per tablet Take 1 tablet by mouth daily as needed for Constipation 30 tablet 3    famotidine (PEPCID) 20 MG tablet Take 1 tablet by mouth daily as needed (GERD) 30 tablet 3    carvedilol (COREG) 6.25 MG tablet Take 1 tablet by mouth 2 times daily 60 tablet 5    clonazePAM (KLONOPIN) 0.5 MG tablet Take 1 tablet by mouth daily as needed for Anxiety for up to 30 days. 30 tablet 0    gemfibrozil (LOPID) 600 MG tablet Take 1 tablet by mouth 2 times daily 60 tablet 3    ARIPiprazole (ABILIFY) 30 MG tablet Take 1 tablet by mouth daily 30 tablet 0    ciclopirox (LOPROX) 0.77 % cream apply to affected area twice a day      buPROPion HCl (WELLBUTRIN PO) Take by mouth       No current facility-administered medications for this visit.       ALLERGIES:  No Known Allergies    Social History     Tobacco Use    Smoking status: Former Smoker     Packs/day: 0.50     Types: Cigarettes     Last attempt to quit: 9/3/2017     Years since quitting: 3.1    Smokeless tobacco: Never Used   Substance Use Topics    Alcohol use: Not Currently     Comment: social        LDL Cholesterol (mg/dL)   Date Value   10/21/2019 134 (H)     HDL (mg/dL)   Date Value   10/21/2019 62     BUN (mg/dL)   Date Value   10/21/2019 12     CREATININE (mg/dL)   Date Value   10/21/2019 0.92     Glucose (mg/dL)   Date Value   10/21/2019 83     Hemoglobin A1C (%)   Date Value   10/21/2019 5.2              Subjective:      HPI  He is here today to establish care as a new patient he has a history of several medical disorders documented in chart and I reviewed with patient  He has ongoing issues with his back is taking gabapentin and needs a refill for that he has a history of opioid abuse  I did question about use of alcohol and he states he only had 1 beer today    Review of Systems:     Constitutional: Negative for fever, appetite change and fatigue. Family social and medical history reviewed and unchanged     HENT: Negative. Negative for nosebleeds, trouble swallowing and neck pain. Eyes: Negative for photophobia and visual disturbance. Respiratory: Negative. Negative for chest tightness and shortness of breath. Cardiovascular: Negative. Negative for chest pain and leg swelling. Gastrointestinal: Negative. Negative for abdominal pain and blood in stool. Endocrine: Negative for cold intolerance and polyuria. Genitourinary: Negative for dysuria and hematuria. Musculoskeletal: Negative. Skin: Negative for rash. Allergic/Immunologic: Negative. Neurological: Negative. Negative for dizziness, weakness and numbness. Hematological: Negative. Negative for adenopathy. Does not bruise/bleed easily. Psychiatric/Behavioral: Negative for sleep disturbance, dysphoric mood and  decreased concentration. The patient is not nervous/anxious. Objective:     Physical Exam:     Nursing note and vitals reviewed. BP (!) 153/92 Comment: No BP medication taken  Pulse 94   Temp 97.3 °F (36.3 °C)   Ht 5' 11\" (1.803 m)   Wt (!) 323 lb 12.8 oz (146.9 kg)   SpO2 95%   BMI 45.16 kg/m²   Constitutional: He is oriented to person, place, and time. He   appears well-developed and well-nourished. His speech is a bit slurred I can smell alcohol  HENT:   Head: Normocephalic and atraumatic. Right Ear: External ear normal. Tympanic membrane is not erythematous. No middle ear effusion. Left Ear: External ear normal. Tympanic membrane is not erythematous. No middle ear effusion. Nose: No mucosal edema. Mouth/Throat: Oropharynx is clear and moist. No posterior oropharyngeal erythema. Eyes: Conjunctivae extremely red bilaterally and EOM are normal. Pupils are equal, round, and reactive to light.     Neck: Normal range of motion. Neck supple. No thyromegaly present. Cardiovascular: Normal rate, regular rhythm and normal heart sounds. No murmur heard. Pulmonary/Chest: Effort normal and breath sounds normal. He has no wheezes. Hehas no rales. Abdominal: Soft. Bowel sounds are normal. He exhibits no distension and no mass. There is no tenderness. There is no rebound and no guarding. Genitourinary/Anorectal:deferred  Musculoskeletal: Normal range of motion. He exhibits no edema or tenderness. Lymphadenopathy: He has no cervical adenopathy. Neurological: He is alert and oriented to person, place, and time. He has normal reflexes. Skin: Skin is warm and dry. No rash noted. Psychiatric: He has a normal mood and affect. His   behavior is normal.       Assessment:      1. Establishing care with new doctor, encounter for    2. Low back pain without sciatica, unspecified back pain laterality, unspecified chronicity    3. Benign essential HTN    4. Secondary cardiomyopathy ([facility]    5. Hyperglycemia    6. Morbid obesity due to excess calories [facility])          Plan:      Call or return to clinic prn if these symptoms worsen or fail to improve as anticipated. I have reviewed the instructions with the patient, answering all questions to his satisfaction. No follow-ups on file.   Orders Placed This Encounter   Procedures    CBC Auto Differential     Standing Status:   Future     Standing Expiration Date:   5/9/2021    Comprehensive Metabolic Panel     Standing Status:   Future     Standing Expiration Date:   5/9/2021    Lipid Panel     Standing Status:   Future     Standing Expiration Date:   5/9/2021     Order Specific Question:   Is Patient Fasting?/# of Hours     Answer:   yes    T4, Free     Standing Status:   Future     Standing Expiration Date:   5/9/2021    TSH without Reflex     Standing Status:   Future     Standing Expiration Date:   5/9/2021    Gamma GT     Standing Status:   Future     Standing Expiration Date:   5/9/2021    Vitamin B12 & Folate     Standing Status:   Future     Standing Expiration Date:   12/9/2020     Orders Placed This Encounter   Medications    gabapentin (NEURONTIN) 800 MG tablet     Sig: Take 1 tablet by mouth 3 times daily for 30 days.      Dispense:  90 tablet     Refill:  2     Is to get the above testing done  And follow-up in the office in 6 months or as needed I also did question him on alcohol use and he states he only drinks 1 he only had 1 drink today  Electronically signed by Eloise Goodman DO on 11/9/2020 at 3:21 PM

## 2020-11-13 ENCOUNTER — HOSPITAL ENCOUNTER (OUTPATIENT)
Age: 45
Discharge: HOME | End: 2020-11-13
Payer: MEDICARE

## 2020-11-13 LAB
ABSOLUTE EOS #: 0.4 K/UL (ref 0–0.44)
ABSOLUTE IMMATURE GRANULOCYTE: 0.05 K/UL (ref 0–0.3)
ABSOLUTE LYMPH #: 1.69 K/UL (ref 1.1–3.7)
ABSOLUTE MONO #: 0.54 K/UL (ref 0.1–1.2)
ALBUMIN SERPL-MCNC: 4.1 G/DL (ref 3.5–5.2)
ALBUMIN/GLOBULIN RATIO: 1.5 (ref 1–2.5)
ALP BLD-CCNC: 86 U/L (ref 40–129)
ALT SERPL-CCNC: 12 U/L (ref 5–41)
ANION GAP SERPL CALCULATED.3IONS-SCNC: 8 MMOL/L (ref 9–17)
AST SERPL-CCNC: 14 U/L
BASOPHILS # BLD: 1 % (ref 0–2)
BASOPHILS ABSOLUTE: 0.12 K/UL (ref 0–0.2)
BILIRUB SERPL-MCNC: 0.37 MG/DL (ref 0.3–1.2)
BUN BLDV-MCNC: 12 MG/DL (ref 6–20)
BUN/CREAT BLD: ABNORMAL (ref 9–20)
CALCIUM SERPL-MCNC: 9.1 MG/DL (ref 8.6–10.4)
CHLORIDE BLD-SCNC: 102 MMOL/L (ref 98–107)
CHOLESTEROL/HDL RATIO: 2.9
CHOLESTEROL: 191 MG/DL
CO2: 28 MMOL/L (ref 20–31)
CREAT SERPL-MCNC: 0.89 MG/DL (ref 0.7–1.2)
DIFFERENTIAL TYPE: ABNORMAL
EOSINOPHILS RELATIVE PERCENT: 4 % (ref 1–4)
FOLATE: 8.9 NG/ML
GFR AFRICAN AMERICAN: >60 ML/MIN
GFR NON-AFRICAN AMERICAN: >60 ML/MIN
GFR SERPL CREATININE-BSD FRML MDRD: ABNORMAL ML/MIN/{1.73_M2}
GFR SERPL CREATININE-BSD FRML MDRD: ABNORMAL ML/MIN/{1.73_M2}
GGT: 17 U/L (ref 8–61)
GLUCOSE BLD-MCNC: 97 MG/DL (ref 70–99)
HCT VFR BLD CALC: 57.2 % (ref 40.7–50.3)
HDLC SERPL-MCNC: 65 MG/DL
HEMOGLOBIN: 17.8 G/DL (ref 13–17)
IMMATURE GRANULOCYTES: 1 %
LDL CHOLESTEROL: 100 MG/DL (ref 0–130)
LYMPHOCYTES # BLD: 19 % (ref 24–43)
MCH RBC QN AUTO: 29.9 PG (ref 25.2–33.5)
MCHC RBC AUTO-ENTMCNC: 31.1 G/DL (ref 28.4–34.8)
MCV RBC AUTO: 96 FL (ref 82.6–102.9)
MONOCYTES # BLD: 6 % (ref 3–12)
NRBC AUTOMATED: 0 PER 100 WBC
PDW BLD-RTO: 12.6 % (ref 11.8–14.4)
PLATELET # BLD: 174 K/UL (ref 138–453)
PLATELET ESTIMATE: ABNORMAL
PMV BLD AUTO: 11.9 FL (ref 8.1–13.5)
POTASSIUM SERPL-SCNC: 4.4 MMOL/L (ref 3.7–5.3)
RBC # BLD: 5.96 M/UL (ref 4.21–5.77)
RBC # BLD: ABNORMAL 10*6/UL
SEG NEUTROPHILS: 69 % (ref 36–65)
SEGMENTED NEUTROPHILS ABSOLUTE COUNT: 6.27 K/UL (ref 1.5–8.1)
SEX HORMONE BINDING GLOBULIN: 36 NMOL/L (ref 11–80)
SODIUM BLD-SCNC: 138 MMOL/L (ref 135–144)
TESTOSTERONE FREE-NONMALE: 51.1 PG/ML (ref 47–244)
TESTOSTERONE TOTAL: 276 NG/DL (ref 220–1000)
THYROXINE, FREE: 1.23 NG/DL (ref 0.93–1.7)
TOTAL PROTEIN: 6.8 G/DL (ref 6.4–8.3)
TRIGL SERPL-MCNC: 131 MG/DL
TSH SERPL DL<=0.05 MIU/L-ACNC: 1.71 MIU/L (ref 0.3–5)
VITAMIN B-12: 374 PG/ML (ref 232–1245)
VLDLC SERPL CALC-MCNC: NORMAL MG/DL (ref 1–30)
WBC # BLD: 9.1 K/UL (ref 3.5–11.3)
WBC # BLD: ABNORMAL 10*3/UL

## 2020-11-13 PROCEDURE — 84439 ASSAY OF FREE THYROXINE: CPT

## 2020-11-13 PROCEDURE — 82607 VITAMIN B-12: CPT

## 2020-11-13 PROCEDURE — 84270 ASSAY OF SEX HORMONE GLOBUL: CPT

## 2020-11-13 PROCEDURE — 82746 ASSAY OF FOLIC ACID SERUM: CPT

## 2020-11-13 PROCEDURE — 84443 ASSAY THYROID STIM HORMONE: CPT

## 2020-11-13 PROCEDURE — 82977 ASSAY OF GGT: CPT

## 2020-11-13 PROCEDURE — 85025 COMPLETE CBC W/AUTO DIFF WBC: CPT

## 2020-11-13 PROCEDURE — 80053 COMPREHEN METABOLIC PANEL: CPT

## 2020-11-13 PROCEDURE — 80061 LIPID PANEL: CPT

## 2020-11-13 PROCEDURE — 36415 COLL VENOUS BLD VENIPUNCTURE: CPT

## 2020-11-13 PROCEDURE — 84403 ASSAY OF TOTAL TESTOSTERONE: CPT

## 2020-11-16 ENCOUNTER — PATIENT MESSAGE (OUTPATIENT)
Dept: FAMILY MEDICINE CLINIC | Age: 45
End: 2020-11-16

## 2020-11-16 NOTE — TELEPHONE ENCOUNTER
From: Agata Webster  To: Jia Valderrama DO  Sent: 11/16/2020 12:14 PM EST  Subject: Test Results Question    I have a question about HB resulted on 11/13/20 at 11:31 AM. Is this normal? Can we boost this?

## 2020-12-01 ENCOUNTER — OFFICE VISIT (OUTPATIENT)
Dept: GASTROENTEROLOGY | Age: 45
End: 2020-12-01
Payer: MEDICARE

## 2020-12-01 VITALS
SYSTOLIC BLOOD PRESSURE: 144 MMHG | DIASTOLIC BLOOD PRESSURE: 103 MMHG | WEIGHT: 315 LBS | BODY MASS INDEX: 45.33 KG/M2 | TEMPERATURE: 98 F

## 2020-12-01 PROCEDURE — 99213 OFFICE O/P EST LOW 20 MIN: CPT | Performed by: INTERNAL MEDICINE

## 2020-12-01 RX ORDER — OMEPRAZOLE 20 MG/1
20 CAPSULE, DELAYED RELEASE ORAL DAILY
Qty: 30 CAPSULE | Refills: 3 | Status: SHIPPED | OUTPATIENT
Start: 2020-12-01 | End: 2021-07-06

## 2020-12-01 RX ORDER — AMOXICILLIN 250 MG
2 CAPSULE ORAL DAILY PRN
Qty: 60 TABLET | Refills: 2 | Status: SHIPPED | OUTPATIENT
Start: 2020-12-01 | End: 2021-07-06

## 2020-12-01 ASSESSMENT — ENCOUNTER SYMPTOMS
VOMITING: 0
CONSTIPATION: 1
CHEST TIGHTNESS: 0
ALLERGIC/IMMUNOLOGIC NEGATIVE: 1
DIARRHEA: 0
ABDOMINAL DISTENTION: 1
NAUSEA: 0
ANAL BLEEDING: 1
RESPIRATORY NEGATIVE: 1
ABDOMINAL PAIN: 0
SHORTNESS OF BREATH: 0
BACK PAIN: 1
EYES NEGATIVE: 1

## 2020-12-01 NOTE — PROGRESS NOTES
GI FOLLOW UP    INTERVAL HISTORY:     GERD   Rectal bleeding    Chief Complaint   Patient presents with    Follow-up     3 month follow up.  Constipation     Patient states taking medication daily and still having bowel movements every other day. Patient states wanting something stronger. Feels a little bloated.  Rectal Bleeding     patient states having blood in his stool 2 times in the past 3 months. States rectal pain is associated with it. Denies black stools.  Gastroesophageal Reflux     Patient states taking pepcid PRN and still having reflux. Patient was advised to take medication consistently. HISTORY OF PRESENT ILLNESS: MG.CVQD Petersen is a 53 y. o. male with a pasthistory remarkable for anxiety, Bipolar, on wellbutrin, Trazadone, Klonopin, and abilify, morbid obesity, DVT, off AC, HTN, back pain, CHRIS, prior history of complicated PNA, prior trach, 2017, referred for evaluation of intermittent rectal bleeding (ongoing x 6 months). S/p colonoscopy to identified TA and rectal hemorrhoids, internal and external, likely source of recurrent intermittent bleeding.     Prior colonoscopy with tubular adenomas- patient is due for colonoscopy in 1 month, had a poor bowel prep in January 2020.  Findings were discussed with the patient again.     Rectal bleeding- frequency of rectal bleeding has reduced significantly with the use of Yolis Kwaku continues to have mild rectal bleeding while wiping.       Past Medical,Family, and Social History reviewed and does contribute to the patient presenting condition. Patient's PMH/PSH,SH,PSYCH Hx, MEDs, ALLERGIES, and ROS were all reviewed and updated in the appropriate sections.     PAST MEDICAL HISTORY:  Past Medical History:   Diagnosis Date    Anxiety     Back pain     Bipolar 1 disorder ([address]     Dvt femoral (deep venous thrombosis) file.      SOCIAL HISTORY:   Social History     Socioeconomic History    Marital status: Single     Spouse name: Not on file    Number of children: Not on file    Years of education: Not on file    Highest education level: Not on file   Occupational History    Not on file   Social Needs    Financial resource strain: Not on file    Food insecurity     Worry: Not on file     Inability: Not on file    Transportation needs     Medical: Not on file     Non-medical: Not on file   Tobacco Use    Smoking status: Former Smoker     Packs/day: 0.50     Types: Cigarettes     Last attempt to quit: 9/3/2017     Years since quitting: 3.2    Smokeless tobacco: Never Used   Substance and Sexual Activity    Alcohol use: Not Currently     Comment: social    Drug use: No    Sexual activity: Not on file   Lifestyle    Physical activity     Days per week: Not on file     Minutes per session: Not on file    Stress: Not on file   Relationships    Social connections     Talks on phone: Not on file     Gets together: Not on file     Attends [employer] service: Not on file     Active member of club or organization: Not on file     Attends meetings of clubs or organizations: Not on file     Relationship status: Not on file    Intimate partner violence     Fear of current or ex partner: Not on file     Emotionally abused: Not on file     Physically abused: Not on file     Forced sexual activity: Not on file   Other Topics Concern    Not on file   Social History Narrative    Not on file       REVIEW OF SYSTEMS: A 12-point review of systems was obtained and pertinent positives and negatives were listed below. REVIEW OF SYSTEMS:     Constitutional: No fever, no chills, no lethargy, no weakness. HEENT:  No headache, otalgia, itchy eyes, nasal discharge or sore throat. Cardiac:  No chest pain, dyspnea, orthopnea or PND. Chest:   No cough, phlegm or wheezing.   Abdomen:      Detailed by MA   Neuro:  No focal weakness, abnormal movements or seizure like activity. Skin:   No rashes, no itching. :   No hematuria, no pyuria, no dysuria, no flank pain. Extremities:  No swelling or joint pains. ROS was otherwise negative    Review of Systems   Constitutional: Negative. Negative for chills and fever. HENT: Negative. Eyes: Negative. Respiratory: Negative. Negative for chest tightness and shortness of breath. Cardiovascular: Negative. Negative for chest pain and palpitations. Gastrointestinal: Positive for abdominal distention, anal bleeding and constipation. Negative for abdominal pain, diarrhea, nausea and vomiting. Endocrine: Negative. Genitourinary: Negative. Negative for difficulty urinating. Musculoskeletal: Positive for back pain. Skin: Negative. Allergic/Immunologic: Negative. Neurological: Negative. Negative for dizziness, light-headedness and headaches. Hematological: Bruises/bleeds easily. Psychiatric/Behavioral: The patient is nervous/anxious. All other systems reviewed and are negative. PHYSICAL EXAMINATION: Vital signs reviewed per the nursing documentation. BP (!) 140/98   Temp 98 °F (36.7 °C)   Wt (!) 325 lb (147.4 kg)   BMI 45.33 kg/m²   Body mass index is 45.33 kg/m². Physical Exam    Physical Exam   Constitutional: Patient is oriented to person, place, and time. Patient appears well-developed and well-nourished. HENT:   Head: Normocephalic and atraumatic. Eyes: Pupils are equal, round, and reactive to light. EOM are normal.   Neck: Normal range of motion. Neck supple. No JVD present. No tracheal deviation present. No thyromegaly present. Cardiovascular: Normal rate, regular rhythm, normal heart sounds and intact distal pulses. Pulmonary/Chest: Effort normal and breath sounds normal. No stridor. No respiratory distress. He has no wheezes. He has no rales. He exhibits no tenderness. Abdominal: Soft. Bowel sounds are normal. He exhibits no distension and no mass. any episodes of rectal bleeding for several weeks but did have to episodes after last visit in August 2020    3) RTC in 6 weeks to evaluate response to treatment    Thank you for allowing me to participate in the care of Mr. Marcos Hoffman. For any further questions please do not hesitate to contact me. I have reviewed and agree with the ROS entered by the MA/LPN from today's encounter documented in a separate note. Sera Macdonald MD, MPH   [facility]  Office #: [phone]    this note is created with the assistance of a speech recognition program.  While intending to generate a document that actually reflects the content of the visit, the document can still have some errors including those of syntax and sound a like substitutions which may escape proof reading. It such instances, actual meaning can be extrapolated by contextual diversion.

## 2020-12-28 ENCOUNTER — NURSE TRIAGE (OUTPATIENT)
Dept: OTHER | Facility: CLINIC | Age: 45
End: 2020-12-28

## 2020-12-28 NOTE — TELEPHONE ENCOUNTER
Reason for Disposition   [1] HIGH RISK patient (e.g., age > 59 years, diabetes, heart or lung disease, weak immune system) AND [2] new or worsening symptoms    Answer Assessment - Initial Assessment Questions  1. COVID-19 DIAGNOSIS: \"Who made your Coronavirus (COVID-19) diagnosis? \" \"Was it confirmed by a positive lab test?\" If not diagnosed by a HCP, ask \"Are there lots of cases (community spread) where you live? \" (See public health department website, if unsure)      Not tested    2. COVID-19 EXPOSURE: \"Was there any known exposure to COVID before the symptoms began? \" [facility] Definition of close contact: within 6 feet (2 meters) for a total of 15 minutes or more over a 24-hour period. Not sure    3. ONSET: \"When did the COVID-19 symptoms start? \"       Today     4. WORST SYMPTOM: \"What is your worst symptom? \" (e.g., cough, fever, shortness of breath, muscle aches)      Diarrhea    5. COUGH: \"Do you have a cough? \" If so, ask: \"How bad is the cough? \"        No    6. FEVER: \"Do you have a fever? \" If so, ask: \"What is your temperature, how was it measured, and when did it start? \"      No    7. RESPIRATORY STATUS: \"Describe your breathing? \" (e.g., shortness of breath, wheezing, unable to speak)       Normal    8. BETTER-SAME-WORSE: Catheline Aus you getting better, staying the same or getting worse compared to yesterday? \"  If getting worse, ask, \"In what way? \"      Worse    9. HIGH RISK DISEASE: \"Do you have any chronic medical problems? \" (e.g., asthma, heart or lung disease, weak immune system, obesity, etc.)      COPD    10. PREGNANCY: \"Is there any chance you are pregnant? \" \"When was your last menstrual period? \"        N/a    11. OTHER SYMPTOMS: \"Do you have any other symptoms? \"  (e.g., chills, fatigue, headache, loss of smell or taste, muscle pain, sore throat; new loss of smell or taste especially support the diagnosis of COVID-19)        Headache, see travel screeen    Protocols used: CORONAVIRUS (COVID-19) DIAGNOSED OR SUSPECTED-ADULT-AH

## 2021-02-03 DIAGNOSIS — M54.50 LOW BACK PAIN WITHOUT SCIATICA, UNSPECIFIED BACK PAIN LATERALITY, UNSPECIFIED CHRONICITY: ICD-10-CM

## 2021-02-03 NOTE — TELEPHONE ENCOUNTER
Ross Sibley is calling to request a refill on the following medication(s):    Last Visit Date (If Applicable):  00/0/7765    Next Visit Date:    Visit date not found    Medication Request:  Requested Prescriptions     Pending Prescriptions Disp Refills    gabapentin (NEURONTIN) 800 MG tablet [Pharmacy Med Name: GABAPENTIN 800 MG TABLET] 90 tablet 2     Sig: take 1 tablet by mouth three times a day    gemfibrozil (LOPID) 600 MG tablet [Pharmacy Med Name: GEMFIBROZIL 600 MG TABLET] 60 tablet 3     Sig: take 1 tablet by mouth twice a day

## 2021-02-04 RX ORDER — GABAPENTIN 800 MG/1
TABLET ORAL
Qty: 90 TABLET | Refills: 2 | Status: SHIPPED | OUTPATIENT
Start: 2021-02-04 | End: 2021-04-30 | Stop reason: SDUPTHER

## 2021-02-04 RX ORDER — GEMFIBROZIL 600 MG/1
TABLET, FILM COATED ORAL
Qty: 60 TABLET | Refills: 3 | Status: SHIPPED | OUTPATIENT
Start: 2021-02-04 | End: 2021-07-06 | Stop reason: SDUPTHER

## 2021-04-30 DIAGNOSIS — M54.50 LOW BACK PAIN WITHOUT SCIATICA, UNSPECIFIED BACK PAIN LATERALITY, UNSPECIFIED CHRONICITY: ICD-10-CM

## 2021-04-30 DIAGNOSIS — I10 BENIGN ESSENTIAL HTN: ICD-10-CM

## 2021-04-30 RX ORDER — GABAPENTIN 800 MG/1
TABLET ORAL
Qty: 90 TABLET | Refills: 2 | Status: SHIPPED | OUTPATIENT
Start: 2021-04-30 | End: 2021-07-06 | Stop reason: SDUPTHER

## 2021-04-30 RX ORDER — CARVEDILOL 6.25 MG/1
6.25 TABLET ORAL 2 TIMES DAILY
Qty: 60 TABLET | Refills: 5 | Status: SHIPPED | OUTPATIENT
Start: 2021-04-30 | End: 2021-07-06 | Stop reason: SDUPTHER

## 2021-07-06 ENCOUNTER — OFFICE VISIT (OUTPATIENT)
Dept: FAMILY MEDICINE CLINIC | Age: 46
End: 2021-07-06
Payer: COMMERCIAL

## 2021-07-06 VITALS
WEIGHT: 315 LBS | BODY MASS INDEX: 45.1 KG/M2 | HEART RATE: 85 BPM | HEIGHT: 70 IN | DIASTOLIC BLOOD PRESSURE: 91 MMHG | OXYGEN SATURATION: 95 % | SYSTOLIC BLOOD PRESSURE: 126 MMHG

## 2021-07-06 DIAGNOSIS — Z00.00 ROUTINE GENERAL MEDICAL EXAMINATION AT A HEALTH CARE FACILITY: ICD-10-CM

## 2021-07-06 DIAGNOSIS — F19.20: ICD-10-CM

## 2021-07-06 DIAGNOSIS — F33.1 MODERATE EPISODE OF RECURRENT MAJOR DEPRESSIVE DISORDER (HCC): ICD-10-CM

## 2021-07-06 DIAGNOSIS — F33.0 MAJOR DEPRESSIVE DISORDER, RECURRENT, MILD (HCC): ICD-10-CM

## 2021-07-06 DIAGNOSIS — Z00.00 MEDICARE ANNUAL WELLNESS VISIT, SUBSEQUENT: Primary | ICD-10-CM

## 2021-07-06 DIAGNOSIS — E66.01 CLASS 3 SEVERE OBESITY DUE TO EXCESS CALORIES WITH SERIOUS COMORBIDITY AND BODY MASS INDEX (BMI) OF 50.0 TO 59.9 IN ADULT (HCC): ICD-10-CM

## 2021-07-06 DIAGNOSIS — I10 BENIGN ESSENTIAL HTN: ICD-10-CM

## 2021-07-06 DIAGNOSIS — I82.629 DEEP VEIN THROMBOSIS (DVT) OF UPPER EXTREMITY, UNSPECIFIED CHRONICITY, UNSPECIFIED LATERALITY, UNSPECIFIED VEIN (HCC): ICD-10-CM

## 2021-07-06 DIAGNOSIS — F33.1 MAJOR DEPRESSIVE DISORDER, RECURRENT, MODERATE (HCC): ICD-10-CM

## 2021-07-06 DIAGNOSIS — R73.9 HYPERGLYCEMIA: ICD-10-CM

## 2021-07-06 DIAGNOSIS — Z93.0 TRACHEOSTOMY STATUS (HCC): ICD-10-CM

## 2021-07-06 DIAGNOSIS — I42.9 SECONDARY CARDIOMYOPATHY (HCC): ICD-10-CM

## 2021-07-06 DIAGNOSIS — M54.50 LOW BACK PAIN WITHOUT SCIATICA, UNSPECIFIED BACK PAIN LATERALITY, UNSPECIFIED CHRONICITY: ICD-10-CM

## 2021-07-06 PROBLEM — F33.9 MAJOR DEPRESSIVE DISORDER, RECURRENT, UNSPECIFIED (HCC): Status: ACTIVE | Noted: 2021-07-06

## 2021-07-06 PROCEDURE — G0438 PPPS, INITIAL VISIT: HCPCS | Performed by: FAMILY MEDICINE

## 2021-07-06 RX ORDER — TRAZODONE HYDROCHLORIDE 300 MG/1
300 TABLET ORAL NIGHTLY
Qty: 90 TABLET | Refills: 3 | Status: SHIPPED | OUTPATIENT
Start: 2021-07-06 | End: 2022-04-26

## 2021-07-06 RX ORDER — PHENTERMINE HYDROCHLORIDE 37.5 MG/1
37.5 TABLET ORAL
Qty: 30 TABLET | Refills: 0 | Status: SHIPPED | OUTPATIENT
Start: 2021-07-06 | End: 2021-08-04 | Stop reason: SDUPTHER

## 2021-07-06 RX ORDER — CARIPRAZINE 4.5 MG/1
CAPSULE, GELATIN COATED ORAL
COMMUNITY
Start: 2021-06-29 | End: 2021-07-06 | Stop reason: SDUPTHER

## 2021-07-06 RX ORDER — GABAPENTIN 800 MG/1
TABLET ORAL
Qty: 90 TABLET | Refills: 2 | Status: SHIPPED | OUTPATIENT
Start: 2021-07-06 | End: 2021-10-01 | Stop reason: SDUPTHER

## 2021-07-06 RX ORDER — CARIPRAZINE 4.5 MG/1
CAPSULE, GELATIN COATED ORAL
Qty: 90 CAPSULE | Refills: 3 | Status: SHIPPED | OUTPATIENT
Start: 2021-07-06 | End: 2022-04-26

## 2021-07-06 RX ORDER — CARVEDILOL 6.25 MG/1
6.25 TABLET ORAL 2 TIMES DAILY
Qty: 180 TABLET | Refills: 3 | Status: SHIPPED | OUTPATIENT
Start: 2021-07-06 | End: 2022-09-30 | Stop reason: SDUPTHER

## 2021-07-06 RX ORDER — SEMAGLUTIDE 1.34 MG/ML
1 INJECTION, SOLUTION SUBCUTANEOUS WEEKLY
Qty: 3 ML | Refills: 5 | Status: SHIPPED | OUTPATIENT
Start: 2021-07-06 | End: 2022-04-26

## 2021-07-06 RX ORDER — GEMFIBROZIL 600 MG/1
TABLET, FILM COATED ORAL
Qty: 180 TABLET | Refills: 3 | Status: SHIPPED | OUTPATIENT
Start: 2021-07-06 | End: 2022-08-08

## 2021-07-06 SDOH — ECONOMIC STABILITY: FOOD INSECURITY: WITHIN THE PAST 12 MONTHS, THE FOOD YOU BOUGHT JUST DIDN'T LAST AND YOU DIDN'T HAVE MONEY TO GET MORE.: NEVER TRUE

## 2021-07-06 SDOH — ECONOMIC STABILITY: FOOD INSECURITY: WITHIN THE PAST 12 MONTHS, YOU WORRIED THAT YOUR FOOD WOULD RUN OUT BEFORE YOU GOT MONEY TO BUY MORE.: NEVER TRUE

## 2021-07-06 ASSESSMENT — LIFESTYLE VARIABLES
HOW OFTEN DURING THE LAST YEAR HAVE YOU HAD A FEELING OF GUILT OR REMORSE AFTER DRINKING: 0
HOW OFTEN DO YOU HAVE A DRINK CONTAINING ALCOHOL: 3
AUDIT-C TOTAL SCORE: 3
HOW OFTEN DURING THE LAST YEAR HAVE YOU FOUND THAT YOU WERE NOT ABLE TO STOP DRINKING ONCE YOU HAD STARTED: 0
HOW OFTEN DURING THE LAST YEAR HAVE YOU FAILED TO DO WHAT WAS NORMALLY EXPECTED FROM YOU BECAUSE OF DRINKING: 0
AUDIT TOTAL SCORE: 3
HOW OFTEN DO YOU HAVE SIX OR MORE DRINKS ON ONE OCCASION: 0
HAVE YOU OR SOMEONE ELSE BEEN INJURED AS A RESULT OF YOUR DRINKING: 0
HOW OFTEN DURING THE LAST YEAR HAVE YOU BEEN UNABLE TO REMEMBER WHAT HAPPENED THE NIGHT BEFORE BECAUSE YOU HAD BEEN DRINKING: 0
HOW OFTEN DURING THE LAST YEAR HAVE YOU NEEDED AN ALCOHOLIC DRINK FIRST THING IN THE MORNING TO GET YOURSELF GOING AFTER A NIGHT OF HEAVY DRINKING: 0
HOW MANY STANDARD DRINKS CONTAINING ALCOHOL DO YOU HAVE ON A TYPICAL DAY: 0
HAS A RELATIVE, FRIEND, DOCTOR, OR ANOTHER HEALTH PROFESSIONAL EXPRESSED CONCERN ABOUT YOUR DRINKING OR SUGGESTED YOU CUT DOWN: 0

## 2021-07-06 ASSESSMENT — PATIENT HEALTH QUESTIONNAIRE - PHQ9
1. LITTLE INTEREST OR PLEASURE IN DOING THINGS: 0
SUM OF ALL RESPONSES TO PHQ QUESTIONS 1-9: 0
SUM OF ALL RESPONSES TO PHQ9 QUESTIONS 1 & 2: 0
SUM OF ALL RESPONSES TO PHQ QUESTIONS 1-9: 0
SUM OF ALL RESPONSES TO PHQ QUESTIONS 1-9: 0
2. FEELING DOWN, DEPRESSED OR HOPELESS: 0

## 2021-07-06 ASSESSMENT — SOCIAL DETERMINANTS OF HEALTH (SDOH): HOW HARD IS IT FOR YOU TO PAY FOR THE VERY BASICS LIKE FOOD, HOUSING, MEDICAL CARE, AND HEATING?: HARD

## 2021-07-15 NOTE — PROGRESS NOTES
Medicare Annual Wellness Visit  Name: Yonatan Castro Date: 7/15/2021   MRN: [id number] Sex: Male   Age: 50 y.o. Ethnicity: Non-/Non    : 1975 Race: Kiera Gagnon is here for Medicare AWV and Medication Refill  Also having some issues with back pain  Screenings for behavioral, psychosocial and functional/safety risks, and cognitive dysfunction are all negative except as indicated below. These results, as well as other patient data from the [address] [facility] [address] form, are documented in Flowsheets linked to this Encounter. No Known Allergies    Prior to Visit Medications    Medication Sig Taking? Authorizing Provider   Semaglutide, 1 MG/DOSE, (OZEMPIC, 1 MG/DOSE,) 4 MG/3ML SOPN Inject 1 mg into the skin once a week Yes Jesenia Miller DO   phentermine (ADIPEX-P) 37.5 MG tablet Take 1 tablet by mouth every morning (before breakfast) for 30 days.  Yes Jesenia Miller DO   VRAYLAR 4.5 MG CAPS capsule take 1 capsule by mouth once daily Yes Jesenia Miller DO   gabapentin (NEURONTIN) 800 MG tablet take 1 tablet by mouth three times a day Yes Jesenia Miller DO   carvedilol (COREG) 6.25 MG tablet Take 1 tablet by mouth 2 times daily Yes Jesenia Miller DO   gemfibrozil (LOPID) 600 MG tablet take 1 tablet by mouth twice a day Yes Jesenia Miller DO   traZODone (DESYREL) 300 MG tablet Take 1 tablet by mouth nightly Yes Jesenia Miller DO   terbinafine (LAMISIL) 250 MG tablet Take 250 mg by mouth daily Yes Historical Provider, MD       Past Medical History:   Diagnosis Date    Anxiety     Back pain     Bipolar 1 disorder ([address]     Dvt femoral (deep venous thrombosis) ([address]     Hypertension     Morbid obesity due to excess calories ([address]     CHRIS (obstructive sleep apnea)     Pneumonia 2017    DUE TO OTHER AEROBIC GRAM-NEGATIVE BACTERIA, UNSPECIFIED LATERALITY, UNSPECIFIED PART OF LUNG     Respiratory failure ([address] 2017    Smoking        Past Surgical History:   Procedure Laterality Date  COLONOSCOPY N/A 12/12/2019    COLONOSCOPY POLYPECTOMY HOT BIOPSY performed by Cheko Kumar MD at [facility] COLONOSCOPY N/A 6/2/2020    COLONOSCOPY POLYPECTOMY SNARE/COLD BIOPSY performed by Cheko Kumar MD at [address] [place] [address]  2017       History reviewed. No pertinent family history. CareTeam (Including outside providers/suppliers regularly involved in providing care):   Patient Care Team:  Evonne Castro DO as PCP - General (Family Medicine)  Evonne Castro DO as PCP - [facility] Provider  Hany Venegas MD as Referring Physician (Internal Medicine)  Carlton Hernández MD as Consulting Physician (Pulmonology)  Vasile Tsai MD as Consulting Physician (Internal Medicine)  Cheko Kumar MD as Consulting Physician (Gastroenterology)    Wt Readings from Last 3 Encounters:   07/06/21 (!) 363 lb 6.4 oz (164.8 kg)   12/01/20 (!) 325 lb (147.4 kg)   11/09/20 (!) 323 lb 12.8 oz (146.9 kg)     Vitals:    07/06/21 0957   BP: (!) 126/91   Pulse: 85   SpO2: 95%   Weight: (!) 363 lb 6.4 oz (164.8 kg)   Height: 5' 10\" (1.778 m)     Body mass index is 52.14 kg/m². Based upon direct observation of the patient, evaluation of cognition reveals recent and remote memory intact.     General Appearance: alert and oriented to person, place and time, well developed and well- nourished, in no acute distress  Skin: warm and dry, no rash or erythema  Head: normocephalic and atraumatic  Eyes: pupils equal, round, and reactive to light, extraocular eye movements intact, conjunctivae normal  ENT: tympanic membrane, external ear and ear canal normal bilaterally, nose without deformity, nasal mucosa and turbinates normal without polyps  Neck: supple and non-tender without mass, no thyromegaly or thyroid nodules, no cervical lymphadenopathy  Pulmonary/Chest: clear to auscultation bilaterally- no wheezes, rales or rhonchi, normal air movement, no respiratory distress  Cardiovascular: normal rate, regular rhythm, normal S1 and S2, no murmurs, rubs, clicks, or gallops, distal pulses intact, no carotid bruits  Abdomen: soft, non-tender, non-distended, normal bowel sounds, no masses or organomegaly  Extremities: no cyanosis, clubbing or edema  Musculoskeletal: normal range of motion, no joint swelling, deformity or tenderness  Neurologic: reflexes normal and symmetric, no cranial nerve deficit, gait, coordination and speech normal    Patient's complete Health Risk Assessment and screening values have been reviewed and are found in Flowsheets. The following problems were reviewed today and where indicated follow up appointments were made and/or referrals ordered. Positive Risk Factor Screenings with Interventions:          General Health and ACP:  General  In general, how would you say your health is?: Good  In the past 7 days, have you experienced any of the following?  New or Increased Pain, New or Increased Fatigue, Loneliness, Social Isolation, Stress or Anger?: (!) New or Increased Pain  Do you get the social and emotional support that you need?: Yes  Do you have a Living Will?: (!) No  Advance Directives     Power of  Living Will ACP-Advance Directive ACP-Power of     Not on File Not on File Not on File Not on File      General Health Risk Interventions:  · no isues     Health Habits/Nutrition:  Health Habits/Nutrition  Do you exercise for at least 20 minutes 2-3 times per week?: (!) No  Have you lost any weight without trying in the past 3 months?: No  Do you eat only one meal per day?: No  Have you seen the dentist within the past year?: Yes  Body mass index: (!) 52.14  Health Habits/Nutrition Interventions:  · no issues     Safety:  Safety  Do you have working smoke detectors?: Yes  Have all throw rugs been removed or fastened?: (!) No  Do you have non-slip mats or surfaces in all bathtubs/showers?: (!) No  Do all of your stairways have a railing or banister?: (!) No  Are your doorways, halls and stairs free of clutter?: Yes  Do you always fasten your seatbelt when you are in a car?: Yes  Safety Interventions:  · Home safety tips provided     Personalized Preventive Plan   Current Health Maintenance Status  Immunization History   Administered Date(s) Administered    Influenza Vaccine, unspecified formulation 11/01/2018    Influenza Virus Vaccine 10/17/2017, 10/10/2019    Influenza, Quadv, IM, (6 mo and older Fluzone, Flulaval, Fluarix and 3 yrs and older Afluria) 12/12/2016    Influenza, Quadv, IM, PF (6 mo and older Fluzone, Flulaval, Fluarix, and 3 yrs and older Afluria) 11/01/2018, 10/09/2019    Pneumococcal Conjugate 13-valent ([id number]) 06/07/2018    Pneumococcal Polysaccharide ([id number]) 05/08/2017    Tdap (Boostrix, Adacel) 06/06/2016        Health Maintenance   Topic Date Due    Flu vaccine (1) 09/01/2021    Potassium monitoring  11/13/2021    Creatinine monitoring  11/13/2021    Colon cancer screen colonoscopy  06/02/2022    Lipid screen  11/13/2025    DTaP/Tdap/Td vaccine (2 - Td or Tdap) 06/06/2026    Pneumococcal 0-64 years Vaccine (2 of 2 - PPSV23) 11/08/2038    COVID-19 Vaccine  Completed    Hepatitis C screen  Completed    HIV screen  Completed    Hepatitis A vaccine  Aged Out    Hepatitis B vaccine  Aged Out    Hib vaccine  Aged Out    Meningococcal (ACWY) vaccine  Aged Out     Recommendations for [place] Due: see orders and patient instructions/AVS.  . Recommended screening schedule for the next 5-10 years is provided to the patient in written form: see Patient Instructions/AVS.    Yanique Felix was seen today for medicare awv and medication refill.     Diagnoses and all orders for this visit:    Medicare annual wellness visit, subsequent    Tracheostomy status ([address]    Secondary cardiomyopathy ([address]    Combined opioid with non-opioid drug dependence, with abuse ([facility]    Deep vein thrombosis (DVT) of upper extremity, unspecified chronicity, unspecified laterality, unspecified vein (HCC)    Hyperglycemia  -     Semaglutide, 1 MG/DOSE, (OZEMPIC, 1 MG/DOSE,) 4 MG/3ML SOPN; Inject 1 mg into the skin once a week    Low back pain without sciatica, unspecified back pain laterality, unspecified chronicity  -     gabapentin (NEURONTIN) 800 MG tablet; take 1 tablet by mouth three times a day    Benign essential HTN  -     carvedilol (COREG) 6.25 MG tablet; Take 1 tablet by mouth 2 times daily    Class 3 severe obesity due to excess calories with serious comorbidity and body mass index (BMI) of 50.0 to 59.9 in adult ([facility])  -     phentermine (ADIPEX-P) 37.5 MG tablet; Take 1 tablet by mouth every morning (before breakfast) for 30 days. Major depressive disorder, recurrent, mild    Major depressive disorder, recurrent, moderate    Moderate episode of recurrent major depressive disorder (HCC)    Other orders  -     VRAYLAR 4.5 MG CAPS capsule; take 1 capsule by mouth once daily  -     gemfibrozil (LOPID) 600 MG tablet; take 1 tablet by mouth twice a day  -     traZODone (DESYREL) 300 MG tablet; Take 1 tablet by mouth nightly             1. Medicare annual wellness visit, subsequent    2. Tracheostomy status ([facility]    3. Secondary cardiomyopathy ([facility]    4. Combined opioid with non-opioid drug dependence, with abuse ([facility]    5. Deep vein thrombosis (DVT) of upper extremity, unspecified chronicity, unspecified laterality, unspecified vein (HCC)    6. Hyperglycemia    7. Low back pain without sciatica, unspecified back pain laterality, unspecified chronicity    8. Benign essential HTN    9. Class 3 severe obesity due to excess calories with serious comorbidity and body mass index (BMI) of 50.0 to 59.9 in adult ([facility]    10. Major depressive disorder, recurrent, mild    11. Major depressive disorder, recurrent, moderate    12. Moderate episode of recurrent major depressive disorder (HCC)      No orders of the defined types were placed in this encounter.     Requested Prescriptions     Signed Prescriptions

## 2021-07-15 NOTE — PATIENT INSTRUCTIONS
Personalized Preventive Plan for Starla Denney - 7/6/2021  Medicare offers a range of preventive health benefits. Some of the tests and screenings are paid in full while other may be subject to a deductible, co-insurance, and/or copay. Some of these benefits include a comprehensive review of your medical history including lifestyle, illnesses that may run in your family, and various assessments and screenings as appropriate. After reviewing your medical record and screening and assessments performed today your provider may have ordered immunizations, labs, imaging, and/or referrals for you. A list of these orders (if applicable) as well as your Preventive Care list are included within your After Visit Summary for your review. Other Preventive Recommendations:    · A preventive eye exam performed by an eye specialist is recommended every 1-2 years to screen for glaucoma; cataracts, macular degeneration, and other eye disorders. · A preventive dental visit is recommended every 6 months. · Try to get at least 150 minutes of exercise per week or 10,000 steps per day on a pedometer . · Order or download the FREE \"Exercise & Physical Activity: Your Everyday Guide\" from [employer]. Call 1-760.126.3927 or search [employer] online. · You need 2054-3572 mg of calcium and 6963-2971 IU of vitamin D per day. It is possible to meet your calcium requirement with diet alone, but a vitamin D supplement is usually necessary to meet this goal.  · When exposed to the sun, use a sunscreen that protects against both UVA and UVB radiation with an SPF of 30 or greater. Reapply every 2 to 3 hours or after sweating, drying off with a towel, or swimming. · Always wear a seat belt when traveling in a car. Always wear a helmet when riding a bicycle or motorcycle.

## 2021-08-04 ENCOUNTER — OFFICE VISIT (OUTPATIENT)
Dept: FAMILY MEDICINE CLINIC | Age: 46
End: 2021-08-04
Payer: COMMERCIAL

## 2021-08-04 VITALS
DIASTOLIC BLOOD PRESSURE: 88 MMHG | WEIGHT: 315 LBS | BODY MASS INDEX: 45.1 KG/M2 | SYSTOLIC BLOOD PRESSURE: 129 MMHG | HEIGHT: 70 IN | OXYGEN SATURATION: 95 % | HEART RATE: 77 BPM

## 2021-08-04 DIAGNOSIS — I10 BENIGN ESSENTIAL HTN: ICD-10-CM

## 2021-08-04 DIAGNOSIS — L30.1 DYSHIDROSIS: Primary | ICD-10-CM

## 2021-08-04 DIAGNOSIS — E66.01 CLASS 3 SEVERE OBESITY DUE TO EXCESS CALORIES WITH SERIOUS COMORBIDITY AND BODY MASS INDEX (BMI) OF 50.0 TO 59.9 IN ADULT (HCC): ICD-10-CM

## 2021-08-04 DIAGNOSIS — L30.9 DERMATITIS: ICD-10-CM

## 2021-08-04 PROCEDURE — 1036F TOBACCO NON-USER: CPT | Performed by: FAMILY MEDICINE

## 2021-08-04 PROCEDURE — 99213 OFFICE O/P EST LOW 20 MIN: CPT | Performed by: FAMILY MEDICINE

## 2021-08-04 PROCEDURE — G8417 CALC BMI ABV UP PARAM F/U: HCPCS | Performed by: FAMILY MEDICINE

## 2021-08-04 PROCEDURE — G8427 DOCREV CUR MEDS BY ELIG CLIN: HCPCS | Performed by: FAMILY MEDICINE

## 2021-08-04 RX ORDER — PHENTERMINE HYDROCHLORIDE 37.5 MG/1
37.5 TABLET ORAL
Qty: 30 TABLET | Refills: 0 | Status: SHIPPED | OUTPATIENT
Start: 2021-08-04 | End: 2021-09-01 | Stop reason: SDUPTHER

## 2021-08-04 RX ORDER — TRIAMCINOLONE ACETONIDE 1 MG/G
CREAM TOPICAL
Qty: 80 G | Refills: 1 | Status: SHIPPED | OUTPATIENT
Start: 2021-08-04 | End: 2022-04-26

## 2021-08-04 NOTE — PROGRESS NOTES
[facility]  [address] [place] [address] [address] [facility] [place] [address]  Dept: [phone]      Lori Agarwal is a 50 y.o. male who presents today for follow up on his  medical conditions as noted below. Chief Complaint   Patient presents with    Weight Loss       Patient Active Problem List:     Bilateral low back pain with bilateral sciatica     Morbid obesity due to excess calories (HCC)     Benign essential HTN     Acute systolic congestive heart failure ([address]     Secondary cardiomyopathy ([address]     DVT (deep venous thrombosis) ([facility])     Hyperglycemia     Rhabdomyolysis     Tubular adenoma of colon     Tubular adenoma of rectum     Hyperplastic colon polyp     Serrated adenoma of colon     Polyp of colon     Tracheostomy status ([facility]     Combined opioid with non-opioid drug dependence, with abuse ([address]     Morbid obesity with BMI of 40.0-44.9, adult ([address]     History of psychiatric disorder     Hx of drug abuse ([facility]     Major depressive disorder, recurrent, mild     Major depressive disorder, recurrent, moderate     Major depressive disorder, recurrent, unspecified     Past Medical History:   Diagnosis Date    Anxiety     Back pain     Bipolar 1 disorder ([address]     Dvt femoral (deep venous thrombosis) ([address]     Hypertension     Morbid obesity due to excess calories ([address]     CHRIS (obstructive sleep apnea)     Pneumonia 2017    DUE TO OTHER AEROBIC GRAM-NEGATIVE BACTERIA, UNSPECIFIED LATERALITY, UNSPECIFIED PART OF LUNG     Respiratory failure ([address] 2017    Smoking       Past Surgical History:   Procedure Laterality Date    COLONOSCOPY N/A 12/12/2019    COLONOSCOPY POLYPECTOMY HOT BIOPSY performed by Namita Burton MD at [place] [address] N/A 6/2/2020    COLONOSCOPY POLYPECTOMY SNARE/COLD BIOPSY performed by Namita Burton MD at [address] [place] [address].  2017     No family history on file.     Current Outpatient Medications   Medication Sig Dispense months  He is also here for weight check and is having bad problems with constipation    Review of Systems:     Constitutional: Negative for fever, appetite change and fatigue. Family social and medical history reviewed and unchanged     HENT: Negative. Negative for nosebleeds, trouble swallowing and neck pain. Eyes: Negative for photophobia and visual disturbance. Respiratory: Negative. Negative for chest tightness and shortness of breath. Cardiovascular: Negative. Negative for chest pain and leg swelling. Gastrointestinal: Negative. Negative for abdominal pain and blood in stool. Endocrine: Negative for cold intolerance and polyuria. Genitourinary: Negative for dysuria and hematuria. Musculoskeletal: Negative. Skin: Negative for rash. Allergic/Immunologic: Negative. Neurological: Negative. Negative for dizziness, weakness and numbness. Hematological: Negative. Negative for adenopathy. Does not bruise/bleed easily. Psychiatric/Behavioral: Negative for sleep disturbance, dysphoric mood and  decreased concentration. The patient is not nervous/anxious. Objective:     Physical Exam:     Nursing note and vitals reviewed. /88   Pulse 77   Ht 5' 10\" (1.778 m)   Wt (!) 355 lb 3.2 oz (161.1 kg)   SpO2 95%   BMI 50.97 kg/m²   Constitutional: He is oriented to person, place, and time. He   appears well-developed and well-nourished. HENT:   Head: Normocephalic and atraumatic. Right Ear: External ear normal. Tympanic membrane is not erythematous. No middle ear effusion. Left Ear: External ear normal. Tympanic membrane is not erythematous. No middle ear effusion. Nose: No mucosal edema. Mouth/Throat: Oropharynx is clear and moist. No posterior oropharyngeal erythema. Eyes: Conjunctivae and EOM are normal. Pupils are equal, round, and reactive to light. Neck: Normal range of motion. Neck supple. No thyromegaly present.    Cardiovascular: Normal rate, regular rhythm and normal heart sounds. No murmur heard. Pulmonary/Chest: Effort normal and breath sounds normal. He has no wheezes. Hehas no rales. Abdominal: Soft. Bowel sounds are normal. He exhibits no distension and no mass. There is no tenderness. There is no rebound and no guarding. Genitourinary/Anorectal:deferred  Musculoskeletal: Normal range of motion. He exhibits no edema or tenderness. Lymphadenopathy: He has no cervical adenopathy. Neurological: He is alert and oriented to person, place, and time. He has normal reflexes. Skin: Skin is warm and dry. rash noted. Bilateral palms reveal peeling of skin with somewhat of an erythematous base with some tiny vesicles  Psychiatric: He has a normal mood and affect. His   behavior is normal.       Assessment:      1. Dyshidrosis    2. Dermatitis    3. Benign essential HTN    4. Class 3 severe obesity due to excess calories with serious comorbidity and body mass index (BMI) of 50.0 to 59.9 in adult [facility])          Plan:      Call or return to clinic prn if these symptoms worsen or fail to improve as anticipated. I have reviewed the instructions with the patient, answering all questions to his satisfaction. Return in about 4 weeks (around 9/1/2021). Orders Placed This Encounter   Procedures   Stephan Gonzales MD, Dermatology, [facility]     Referral Priority:   Routine     Referral Type:   Eval and Treat     Referral Reason:   Specialty Services Required     Referred to Provider:   Lisandro Caceres MD     Requested Specialty:   Dermatology     Number of Visits Requested:   1     Orders Placed This Encounter   Medications    triamcinolone (KENALOG) 0.1 % cream     Sig: Apply bid to affect are of hands     Dispense:  80 g     Refill:  1    phentermine (ADIPEX-P) 37.5 MG tablet     Sig: Take 1 tablet by mouth every morning (before breakfast) for 30 days.      Dispense:  30 tablet     Refill:  0     Get magnesium citrate and Colace MiraLAX  Electronically signed by Renaldo Rios DO on 8/4/2021 at 1:28 PM

## 2021-09-01 ENCOUNTER — OFFICE VISIT (OUTPATIENT)
Dept: FAMILY MEDICINE CLINIC | Age: 46
End: 2021-09-01
Payer: COMMERCIAL

## 2021-09-01 VITALS
HEIGHT: 71 IN | SYSTOLIC BLOOD PRESSURE: 130 MMHG | DIASTOLIC BLOOD PRESSURE: 85 MMHG | HEART RATE: 82 BPM | WEIGHT: 315 LBS | BODY MASS INDEX: 44.1 KG/M2 | OXYGEN SATURATION: 93 %

## 2021-09-01 DIAGNOSIS — E66.01 CLASS 3 SEVERE OBESITY DUE TO EXCESS CALORIES WITH SERIOUS COMORBIDITY AND BODY MASS INDEX (BMI) OF 50.0 TO 59.9 IN ADULT (HCC): ICD-10-CM

## 2021-09-01 DIAGNOSIS — K59.03 DRUG-INDUCED CONSTIPATION: Primary | ICD-10-CM

## 2021-09-01 DIAGNOSIS — F51.01 PRIMARY INSOMNIA: ICD-10-CM

## 2021-09-01 PROCEDURE — G8427 DOCREV CUR MEDS BY ELIG CLIN: HCPCS | Performed by: FAMILY MEDICINE

## 2021-09-01 PROCEDURE — 99213 OFFICE O/P EST LOW 20 MIN: CPT | Performed by: FAMILY MEDICINE

## 2021-09-01 PROCEDURE — G8417 CALC BMI ABV UP PARAM F/U: HCPCS | Performed by: FAMILY MEDICINE

## 2021-09-01 PROCEDURE — 1036F TOBACCO NON-USER: CPT | Performed by: FAMILY MEDICINE

## 2021-09-01 RX ORDER — PHENTERMINE HYDROCHLORIDE 37.5 MG/1
37.5 TABLET ORAL
Qty: 30 TABLET | Refills: 0 | Status: SHIPPED | OUTPATIENT
Start: 2021-09-01 | End: 2022-04-26 | Stop reason: SDUPTHER

## 2021-09-01 RX ORDER — ESZOPICLONE 3 MG/1
3 TABLET, FILM COATED ORAL NIGHTLY PRN
Qty: 30 TABLET | Refills: 0 | Status: SHIPPED | OUTPATIENT
Start: 2021-09-01 | End: 2021-09-30 | Stop reason: SDUPTHER

## 2021-09-01 ASSESSMENT — PATIENT HEALTH QUESTIONNAIRE - PHQ9
2. FEELING DOWN, DEPRESSED OR HOPELESS: 0
1. LITTLE INTEREST OR PLEASURE IN DOING THINGS: 0
SUM OF ALL RESPONSES TO PHQ QUESTIONS 1-9: 0
SUM OF ALL RESPONSES TO PHQ QUESTIONS 1-9: 0
SUM OF ALL RESPONSES TO PHQ9 QUESTIONS 1 & 2: 0
SUM OF ALL RESPONSES TO PHQ QUESTIONS 1-9: 0

## 2021-09-01 NOTE — PROGRESS NOTES
[facility]  [address] [place] [address] [address] [facility] [id number]  Dept: [phone]      Mindy Ya is a 50 y.o. male who presents today for follow up on his  medical conditions as noted below. Chief Complaint   Patient presents with    Weight Loss       Patient Active Problem List:     Bilateral low back pain with bilateral sciatica     Morbid obesity due to excess calories (HCC)     Benign essential HTN     Acute systolic congestive heart failure ([address]     Secondary cardiomyopathy ([address]     DVT (deep venous thrombosis) (HCC)     Hyperglycemia     Rhabdomyolysis     Tubular adenoma of colon     Tubular adenoma of rectum     Hyperplastic colon polyp     Serrated adenoma of colon     Polyp of colon     Tracheostomy status ([address]     Combined opioid with non-opioid drug dependence, with abuse ([address]     Morbid obesity with BMI of 40.0-44.9, adult ([address]     History of psychiatric disorder     Hx of drug abuse ([address]     Major depressive disorder, recurrent, mild     Major depressive disorder, recurrent, moderate     Major depressive disorder, recurrent, unspecified     Past Medical History:   Diagnosis Date    Anxiety     Back pain     Bipolar 1 disorder ([address]     Dvt femoral (deep venous thrombosis) ([address]     Hypertension     Morbid obesity due to excess calories ([address]     CHRIS (obstructive sleep apnea)     Pneumonia 2017    DUE TO OTHER AEROBIC GRAM-NEGATIVE BACTERIA, UNSPECIFIED LATERALITY, UNSPECIFIED PART OF LUNG     Respiratory failure ([address] 2017    Smoking       Past Surgical History:   Procedure Laterality Date    COLONOSCOPY N/A 12/12/2019    COLONOSCOPY POLYPECTOMY HOT BIOPSY performed by Kelly Gonzales MD at [address] [place] [address] N/A 6/2/2020    COLONOSCOPY POLYPECTOMY SNARE/COLD BIOPSY performed by Kelly Gonzales MD at [address] [place] [address]  2017     History reviewed. No pertinent family history.     Current Outpatient Medications Medication Sig Dispense Refill    eszopiclone (ESZOPICLONE) 3 MG TABS Take 1 tablet by mouth nightly as needed (insomnia) for up to 30 days. 30 tablet 0    phentermine (ADIPEX-P) 37.5 MG tablet Take 1 tablet by mouth every morning (before breakfast) for 30 days. 30 tablet 0    triamcinolone (KENALOG) 0.1 % cream Apply bid to affect are of hands 80 g 1    VRAYLAR 4.5 MG CAPS capsule take 1 capsule by mouth once daily 90 capsule 3    gabapentin (NEURONTIN) 800 MG tablet take 1 tablet by mouth three times a day 90 tablet 2    carvedilol (COREG) 6.25 MG tablet Take 1 tablet by mouth 2 times daily 180 tablet 3    gemfibrozil (LOPID) 600 MG tablet take 1 tablet by mouth twice a day 180 tablet 3    traZODone (DESYREL) 300 MG tablet Take 1 tablet by mouth nightly 90 tablet 3    Semaglutide, 1 MG/DOSE, (OZEMPIC, 1 MG/DOSE,) 4 MG/3ML SOPN Inject 1 mg into the skin once a week (Patient not taking: Reported on 8/4/2021) 3 mL 5    terbinafine (LAMISIL) 250 MG tablet Take 250 mg by mouth daily (Patient not taking: Reported on 8/4/2021)       No current facility-administered medications for this visit.      ALLERGIES:  No Known Allergies    Social History     Tobacco Use    Smoking status: Former Smoker     Packs/day: 0.50     Types: Cigarettes     Quit date: 9/3/2017     Years since quitting: 3.9    Smokeless tobacco: Never Used   Substance Use Topics    Alcohol use: Not Currently     Comment: social        LDL Cholesterol (mg/dL)   Date Value   11/13/2020 100     HDL (mg/dL)   Date Value   11/13/2020 65     BUN (mg/dL)   Date Value   11/13/2020 12     CREATININE (mg/dL)   Date Value   11/13/2020 0.89     Glucose (mg/dL)   Date Value   11/13/2020 97     Hemoglobin A1C (%)   Date Value   10/21/2019 5.2              Subjective:      HPI  Patient is being seen today for follow-up on weight management he is doing well and has lost 5 pounds he did state he is having problems with severe constipation he states currently has not had a bowel movement in at least 4 days he was told to use MiraLAX but he is not taking it every day  He does have a history of bipolar disorder and is having a lot of problems with sleeping would like a prescription for Dariela Smack this is worked well for him in the past    Review of Systems:     Constitutional: Negative for fever, appetite change and fatigue. Family social and medical history reviewed and unchanged     HENT: Negative. Negative for nosebleeds, trouble swallowing and neck pain. Eyes: Negative for photophobia and visual disturbance. Respiratory: Negative. Negative for chest tightness and shortness of breath. Cardiovascular: Negative. Negative for chest pain and leg swelling. Gastrointestinal: Negative. Negative for abdominal pain and blood in stool. Endocrine: Negative for cold intolerance and polyuria. Genitourinary: Negative for dysuria and hematuria. Musculoskeletal: Negative. Skin: Negative for rash. Allergic/Immunologic: Negative. Neurological: Negative. Negative for dizziness, weakness and numbness. Hematological: Negative. Negative for adenopathy. Does not bruise/bleed easily. Psychiatric/Behavioral: Negative for sleep disturbance, dysphoric mood and  decreased concentration. The patient is not nervous/anxious. Objective:     Physical Exam:     Nursing note and vitals reviewed. /85   Pulse 82   Ht 5' 11\" (1.803 m)   Wt (!) 350 lb 6.4 oz (158.9 kg)   SpO2 93%   BMI 48.87 kg/m²   Constitutional: He is oriented to person, place, and time. He   appears well-developed and well-nourished. HENT:   Head: Normocephalic and atraumatic. Right Ear: External ear normal. Tympanic membrane is not erythematous. No middle ear effusion. Left Ear: External ear normal. Tympanic membrane is not erythematous. No middle ear effusion. Nose: No mucosal edema. Mouth/Throat: Oropharynx is clear and moist. No posterior oropharyngeal erythema. Eyes: Conjunctivae and EOM are normal. Pupils are equal, round, and reactive to light. Neck: Normal range of motion. Neck supple. No thyromegaly present. Cardiovascular: Normal rate, regular rhythm and normal heart sounds. No murmur heard. Pulmonary/Chest: Effort normal and breath sounds normal. He has no wheezes. Hehas no rales. Abdominal: Soft. Bowel sounds are normal. He exhibits no distension and no mass. There is no tenderness. There is no rebound and no guarding. Genitourinary/Anorectal:deferred  Musculoskeletal: Normal range of motion. He exhibits no edema or tenderness. Lymphadenopathy: He has no cervical adenopathy. Neurological: He is alert and oriented to person, place, and time. He has normal reflexes. Skin: Skin is warm and dry. No rash noted. Psychiatric: He has a normal mood and affect. His   behavior is normal.       Assessment:      1. Drug-induced constipation    2. Primary insomnia    3. Class 3 severe obesity due to excess calories with serious comorbidity and body mass index (BMI) of 50.0 to 59.9 in adult [facility])          Plan:      Call or return to clinic prn if these symptoms worsen or fail to improve as anticipated. I have reviewed the instructions with the patient, answering all questions to his satisfaction. No follow-ups on file. No orders of the defined types were placed in this encounter. Orders Placed This Encounter   Medications    eszopiclone (ESZOPICLONE) 3 MG TABS     Sig: Take 1 tablet by mouth nightly as needed (insomnia) for up to 30 days. Dispense:  30 tablet     Refill:  0    phentermine (ADIPEX-P) 37.5 MG tablet     Sig: Take 1 tablet by mouth every morning (before breakfast) for 30 days.      Dispense:  30 tablet     Refill:  0     Take MiraLAX every day use magnesium citrate to alleviate current constipation electronically signed by Albertina Christy DO on 9/1/2021 at 9:50 AM

## 2021-09-30 DIAGNOSIS — F51.01 PRIMARY INSOMNIA: ICD-10-CM

## 2021-09-30 NOTE — TELEPHONE ENCOUNTER
Mony Chambers is calling to request a refill on the following medication(s):    Last Visit Date (If Applicable):  1/0/4370    Next Visit Date:    Visit date not found    Medication Request:  Requested Prescriptions     Pending Prescriptions Disp Refills    eszopiclone (ESZOPICLONE) 3 MG TABS 30 tablet 0     Sig: Take 1 tablet by mouth nightly as needed (insomnia) for up to 30 days.

## 2021-10-01 DIAGNOSIS — M54.50 LOW BACK PAIN WITHOUT SCIATICA, UNSPECIFIED BACK PAIN LATERALITY, UNSPECIFIED CHRONICITY: ICD-10-CM

## 2021-10-01 RX ORDER — ESZOPICLONE 3 MG/1
3 TABLET, FILM COATED ORAL NIGHTLY PRN
Qty: 30 TABLET | Refills: 0 | Status: SHIPPED | OUTPATIENT
Start: 2021-10-01 | End: 2021-11-03 | Stop reason: SDUPTHER

## 2021-10-01 RX ORDER — GABAPENTIN 800 MG/1
TABLET ORAL
Qty: 90 TABLET | Refills: 2 | Status: SHIPPED | OUTPATIENT
Start: 2021-10-01 | End: 2022-01-14 | Stop reason: SDUPTHER

## 2021-10-01 NOTE — TELEPHONE ENCOUNTER
-    Nash Charlesis is calling to request a refill on the following medication(s):    Last Visit Date (If Applicable):  6/4/8583    Next Visit Date:    Visit date not found    Medication Request:  Requested Prescriptions     Pending Prescriptions Disp Refills    gabapentin (NEURONTIN) 800 MG tablet 90 tablet 2     Sig: take 1 tablet by mouth three times a day

## 2021-10-08 ENCOUNTER — PATIENT MESSAGE (OUTPATIENT)
Dept: FAMILY MEDICINE CLINIC | Age: 46
End: 2021-10-08

## 2021-10-08 RX ORDER — SILDENAFIL CITRATE 20 MG/1
20 TABLET ORAL DAILY PRN
Qty: 5 TABLET | Refills: 11 | Status: SHIPPED | OUTPATIENT
Start: 2021-10-08

## 2021-10-08 NOTE — TELEPHONE ENCOUNTER
From: Umm Guillaume  To: Blane Guevara DO  Sent: 10/8/2021 12:42 PM EDT  Subject: Prescription Question    May I have the script for generic Viagra for five pills sent to my pharmacy ?

## 2021-11-03 DIAGNOSIS — F51.01 PRIMARY INSOMNIA: ICD-10-CM

## 2021-11-03 RX ORDER — ESZOPICLONE 3 MG/1
3 TABLET, FILM COATED ORAL NIGHTLY PRN
Qty: 30 TABLET | Refills: 0 | Status: SHIPPED | OUTPATIENT
Start: 2021-11-03 | End: 2021-12-03 | Stop reason: SDUPTHER

## 2021-11-03 NOTE — TELEPHONE ENCOUNTER
Kristyn Maki is calling to request a refill on the following medication(s):    Last Visit Date (If Applicable):  7/6/3049    Next Visit Date:    Visit date not found    Medication Request:  Requested Prescriptions     Pending Prescriptions Disp Refills    eszopiclone (ESZOPICLONE) 3 MG TABS 30 tablet 0     Sig: Take 1 tablet by mouth nightly as needed (insomnia) for up to 30 days.

## 2022-01-05 ENCOUNTER — TELEPHONE (OUTPATIENT)
Dept: FAMILY MEDICINE CLINIC | Age: 47
End: 2022-01-05

## 2022-01-05 DIAGNOSIS — F51.01 PRIMARY INSOMNIA: ICD-10-CM

## 2022-01-05 RX ORDER — ESZOPICLONE 3 MG/1
3 TABLET, FILM COATED ORAL NIGHTLY PRN
Qty: 30 TABLET | Refills: 0 | Status: SHIPPED | OUTPATIENT
Start: 2022-01-05 | End: 2022-02-04 | Stop reason: SDUPTHER

## 2022-01-05 NOTE — TELEPHONE ENCOUNTER
Pt called in stating that he would like a refill on Lunesta. He also stated that he would like you to prescribe him something for anxiety.     Please advise

## 2022-01-14 DIAGNOSIS — M54.50 LOW BACK PAIN WITHOUT SCIATICA, UNSPECIFIED BACK PAIN LATERALITY, UNSPECIFIED CHRONICITY: ICD-10-CM

## 2022-01-14 RX ORDER — GABAPENTIN 800 MG/1
TABLET ORAL
Qty: 90 TABLET | Refills: 2 | Status: SHIPPED | OUTPATIENT
Start: 2022-01-14 | End: 2022-04-11 | Stop reason: SDUPTHER

## 2022-01-14 NOTE — TELEPHONE ENCOUNTER
Guera Gerber is calling to request a refill on the following medication(s):    Last Visit Date (If Applicable):  2/2/3608    Next Visit Date:    Visit date not found    Medication Request:  Requested Prescriptions     Pending Prescriptions Disp Refills    gabapentin (NEURONTIN) 800 MG tablet 90 tablet 2     Sig: take 1 tablet by mouth three times a day

## 2022-02-04 DIAGNOSIS — F51.01 PRIMARY INSOMNIA: ICD-10-CM

## 2022-02-07 RX ORDER — ESZOPICLONE 3 MG/1
3 TABLET, FILM COATED ORAL NIGHTLY PRN
Qty: 30 TABLET | Refills: 0 | Status: SHIPPED | OUTPATIENT
Start: 2022-02-07 | End: 2022-03-09

## 2022-02-07 NOTE — TELEPHONE ENCOUNTER
Benjamín Hopkins is calling to request a refill on the following medication(s):    Last Visit Date (If Applicable):  6/5/4686    Next Visit Date:    4/1/2022    Medication Request:  Requested Prescriptions     Pending Prescriptions Disp Refills    eszopiclone (ESZOPICLONE) 3 MG TABS 30 tablet 0     Sig: Take 1 tablet by mouth nightly as needed (insomnia) for up to 30 days.

## 2022-03-16 ENCOUNTER — TELEPHONE (OUTPATIENT)
Dept: GASTROENTEROLOGY | Age: 47
End: 2022-03-16

## 2022-03-16 DIAGNOSIS — K62.5 RECTAL BLEEDING: Primary | ICD-10-CM

## 2022-03-21 ENCOUNTER — TELEPHONE (OUTPATIENT)
Dept: GASTROENTEROLOGY | Age: 47
End: 2022-03-21

## 2022-03-21 RX ORDER — POLYETHYLENE GLYCOL 3350 17 G/17G
POWDER, FOR SOLUTION ORAL
Qty: 17 G | Refills: 2 | Status: SHIPPED | OUTPATIENT
Start: 2022-03-21 | End: 2022-06-27

## 2022-03-21 RX ORDER — POLYETHYLENE GLYCOL 3350 17 G/17G
POWDER, FOR SOLUTION ORAL
Qty: 255 G | Refills: 0 | Status: SHIPPED | OUTPATIENT
Start: 2022-03-21 | End: 2022-06-27

## 2022-03-21 RX ORDER — BISACODYL 5 MG
TABLET, DELAYED RELEASE (ENTERIC COATED) ORAL
Qty: 4 TABLET | Refills: 0 | Status: SHIPPED | OUTPATIENT
Start: 2022-03-21 | End: 2022-06-27

## 2022-03-21 NOTE — TELEPHONE ENCOUNTER
Called pt back; LVM stating that miralax and dulcolax can be purchased OTC which is why they may not be covered. Pt also advised that we could switch to another prep but it will likely cost more OOP or not be covered at all.

## 2022-03-21 NOTE — TELEPHONE ENCOUNTER
[facility] called and LVM stating the pt was upset, because the bowel prep that was given is not covered by ins. Pt would like something else sent over.

## 2022-03-21 NOTE — TELEPHONE ENCOUNTER
Procedure Scheduled/Dr. Randy Long  Screening Colonoscopy  6/6/22  8:45 am   Redgie Aase    Extended Miralax prep instructions mailed    Vaccinated    Patient advised by phone/mail.

## 2022-03-22 RX ORDER — POLYETHYLENE GLYCOL-3350 AND ELECTROLYTES WITH FLAVOR PACK 240; 5.84; 2.98; 6.72; 22.72 G/278.26G; G/278.26G; G/278.26G; G/278.26G; G/278.26G
POWDER, FOR SOLUTION ORAL
Qty: 4000 ML | Refills: 0 | Status: SHIPPED | OUTPATIENT
Start: 2022-03-22 | End: 2022-06-27

## 2022-03-22 NOTE — TELEPHONE ENCOUNTER
Patient called today wanting his bowel prep changed. Changed to Miralax/Golytely prep. Patient advised instructions will be mailed today. Went over prep with Ofelia Carnes in office. There is no bowel prep that is covered 100% with patient insurance. If he has issue paying copay, he will need to call his insurance.

## 2022-04-11 DIAGNOSIS — M54.50 LOW BACK PAIN WITHOUT SCIATICA, UNSPECIFIED BACK PAIN LATERALITY, UNSPECIFIED CHRONICITY: ICD-10-CM

## 2022-04-11 RX ORDER — GABAPENTIN 800 MG/1
TABLET ORAL
Qty: 90 TABLET | Refills: 2 | Status: SHIPPED | OUTPATIENT
Start: 2022-04-11 | End: 2022-07-01 | Stop reason: SDUPTHER

## 2022-04-11 NOTE — TELEPHONE ENCOUNTER
Kylee Rucker is calling to request a refill on the following medication(s):    Medication Request:  Requested Prescriptions     Pending Prescriptions Disp Refills    gabapentin (NEURONTIN) 800 MG tablet 90 tablet 2     Sig: take 1 tablet by mouth three times a day       Last Visit Date (If Applicable):  3/9/4577    Next Visit Date:    4/26/2022

## 2022-04-26 ENCOUNTER — OFFICE VISIT (OUTPATIENT)
Dept: FAMILY MEDICINE CLINIC | Age: 47
End: 2022-04-26
Payer: COMMERCIAL

## 2022-04-26 VITALS
HEIGHT: 70 IN | OXYGEN SATURATION: 95 % | SYSTOLIC BLOOD PRESSURE: 136 MMHG | HEART RATE: 77 BPM | BODY MASS INDEX: 45.1 KG/M2 | DIASTOLIC BLOOD PRESSURE: 93 MMHG | WEIGHT: 315 LBS

## 2022-04-26 DIAGNOSIS — I10 BENIGN ESSENTIAL HTN: Primary | ICD-10-CM

## 2022-04-26 DIAGNOSIS — F19.20: ICD-10-CM

## 2022-04-26 DIAGNOSIS — L30.9 DERMATITIS: ICD-10-CM

## 2022-04-26 DIAGNOSIS — I82.629 DEEP VEIN THROMBOSIS (DVT) OF UPPER EXTREMITY, UNSPECIFIED CHRONICITY, UNSPECIFIED LATERALITY, UNSPECIFIED VEIN (HCC): ICD-10-CM

## 2022-04-26 DIAGNOSIS — I42.9 SECONDARY CARDIOMYOPATHY (HCC): ICD-10-CM

## 2022-04-26 DIAGNOSIS — F33.0 MAJOR DEPRESSIVE DISORDER, RECURRENT, MILD (HCC): ICD-10-CM

## 2022-04-26 DIAGNOSIS — R73.9 HYPERGLYCEMIA: ICD-10-CM

## 2022-04-26 DIAGNOSIS — E66.01 CLASS 3 SEVERE OBESITY DUE TO EXCESS CALORIES WITH SERIOUS COMORBIDITY AND BODY MASS INDEX (BMI) OF 50.0 TO 59.9 IN ADULT (HCC): ICD-10-CM

## 2022-04-26 DIAGNOSIS — F51.01 PRIMARY INSOMNIA: ICD-10-CM

## 2022-04-26 DIAGNOSIS — G89.29 CHRONIC BILATERAL LOW BACK PAIN WITH BILATERAL SCIATICA: ICD-10-CM

## 2022-04-26 DIAGNOSIS — Z93.0 TRACHEOSTOMY STATUS (HCC): ICD-10-CM

## 2022-04-26 DIAGNOSIS — M54.42 CHRONIC BILATERAL LOW BACK PAIN WITH BILATERAL SCIATICA: ICD-10-CM

## 2022-04-26 DIAGNOSIS — M54.41 CHRONIC BILATERAL LOW BACK PAIN WITH BILATERAL SCIATICA: ICD-10-CM

## 2022-04-26 PROCEDURE — G8417 CALC BMI ABV UP PARAM F/U: HCPCS | Performed by: FAMILY MEDICINE

## 2022-04-26 PROCEDURE — G8427 DOCREV CUR MEDS BY ELIG CLIN: HCPCS | Performed by: FAMILY MEDICINE

## 2022-04-26 PROCEDURE — 1036F TOBACCO NON-USER: CPT | Performed by: FAMILY MEDICINE

## 2022-04-26 PROCEDURE — 99214 OFFICE O/P EST MOD 30 MIN: CPT | Performed by: FAMILY MEDICINE

## 2022-04-26 RX ORDER — ESZOPICLONE 3 MG/1
TABLET, FILM COATED ORAL
COMMUNITY
Start: 2022-04-08

## 2022-04-26 RX ORDER — PHENTERMINE HYDROCHLORIDE 37.5 MG/1
37.5 TABLET ORAL
Qty: 30 TABLET | Refills: 0 | Status: SHIPPED | OUTPATIENT
Start: 2022-04-26 | End: 2022-05-27 | Stop reason: SDUPTHER

## 2022-04-26 RX ORDER — DICLOFENAC SODIUM 75 MG/1
75 TABLET, DELAYED RELEASE ORAL 2 TIMES DAILY
Qty: 60 TABLET | Refills: 3 | Status: SHIPPED | OUTPATIENT
Start: 2022-04-26 | End: 2022-08-25

## 2022-04-26 RX ORDER — LURASIDONE HYDROCHLORIDE 60 MG/1
120 TABLET, FILM COATED ORAL
COMMUNITY
Start: 2022-04-01

## 2022-04-26 RX ORDER — VORTIOXETINE 5 MG/1
10 TABLET, FILM COATED ORAL
COMMUNITY
Start: 2022-04-01

## 2022-04-26 RX ORDER — CLONAZEPAM 0.5 MG/1
1 TABLET ORAL
COMMUNITY
Start: 2022-03-31

## 2022-04-26 RX ORDER — CYCLOBENZAPRINE HCL 10 MG
10 TABLET ORAL NIGHTLY PRN
Qty: 30 TABLET | Refills: 0 | Status: SHIPPED | OUTPATIENT
Start: 2022-04-26 | End: 2022-05-06

## 2022-04-28 ENCOUNTER — HOSPITAL ENCOUNTER (OUTPATIENT)
Dept: GENERAL RADIOLOGY | Facility: CLINIC | Age: 47
Discharge: HOME | End: 2022-04-30
Payer: COMMERCIAL

## 2022-04-28 DIAGNOSIS — M54.42 CHRONIC BILATERAL LOW BACK PAIN WITH BILATERAL SCIATICA: ICD-10-CM

## 2022-04-28 DIAGNOSIS — G89.29 CHRONIC BILATERAL LOW BACK PAIN WITH BILATERAL SCIATICA: ICD-10-CM

## 2022-04-28 DIAGNOSIS — M54.41 CHRONIC BILATERAL LOW BACK PAIN WITH BILATERAL SCIATICA: ICD-10-CM

## 2022-04-28 PROCEDURE — 72100 X-RAY EXAM L-S SPINE 2/3 VWS: CPT

## 2022-04-29 ENCOUNTER — HOSPITAL ENCOUNTER (OUTPATIENT)
Facility: CLINIC | Age: 47
Discharge: HOME | End: 2022-04-29
Payer: COMMERCIAL

## 2022-04-29 DIAGNOSIS — M54.41 CHRONIC BILATERAL LOW BACK PAIN WITH BILATERAL SCIATICA: ICD-10-CM

## 2022-04-29 DIAGNOSIS — F33.0 MAJOR DEPRESSIVE DISORDER, RECURRENT, MILD (HCC): ICD-10-CM

## 2022-04-29 DIAGNOSIS — L30.9 DERMATITIS: ICD-10-CM

## 2022-04-29 DIAGNOSIS — R73.9 HYPERGLYCEMIA: ICD-10-CM

## 2022-04-29 DIAGNOSIS — I10 BENIGN ESSENTIAL HTN: ICD-10-CM

## 2022-04-29 DIAGNOSIS — E66.01 CLASS 3 SEVERE OBESITY DUE TO EXCESS CALORIES WITH SERIOUS COMORBIDITY AND BODY MASS INDEX (BMI) OF 50.0 TO 59.9 IN ADULT (HCC): ICD-10-CM

## 2022-04-29 DIAGNOSIS — G89.29 CHRONIC BILATERAL LOW BACK PAIN WITH BILATERAL SCIATICA: ICD-10-CM

## 2022-04-29 DIAGNOSIS — F51.01 PRIMARY INSOMNIA: ICD-10-CM

## 2022-04-29 DIAGNOSIS — M54.42 CHRONIC BILATERAL LOW BACK PAIN WITH BILATERAL SCIATICA: ICD-10-CM

## 2022-04-29 DIAGNOSIS — I42.9 SECONDARY CARDIOMYOPATHY (HCC): ICD-10-CM

## 2022-04-29 LAB
ABSOLUTE EOS #: 0.29 K/UL (ref 0–0.44)
ABSOLUTE IMMATURE GRANULOCYTE: 0.04 K/UL (ref 0–0.3)
ABSOLUTE LYMPH #: 1.63 K/UL (ref 1.1–3.7)
ABSOLUTE MONO #: 0.67 K/UL (ref 0.1–1.2)
ALBUMIN SERPL-MCNC: 4.2 G/DL (ref 3.5–5.2)
ALBUMIN/GLOBULIN RATIO: 1.5 (ref 1–2.5)
ALP BLD-CCNC: 86 U/L (ref 40–129)
ALT SERPL-CCNC: 19 U/L (ref 5–41)
ANION GAP SERPL CALCULATED.3IONS-SCNC: 12 MMOL/L (ref 9–17)
AST SERPL-CCNC: 20 U/L
BASOPHILS # BLD: 1 % (ref 0–2)
BASOPHILS ABSOLUTE: 0.07 K/UL (ref 0–0.2)
BILIRUB SERPL-MCNC: 0.64 MG/DL (ref 0.3–1.2)
BUN BLDV-MCNC: 14 MG/DL (ref 6–20)
CALCIUM SERPL-MCNC: 9.4 MG/DL (ref 8.6–10.4)
CHLORIDE BLD-SCNC: 101 MMOL/L (ref 98–107)
CHOLESTEROL/HDL RATIO: 4
CHOLESTEROL: 204 MG/DL
CO2: 25 MMOL/L (ref 20–31)
CREAT SERPL-MCNC: 0.75 MG/DL (ref 0.7–1.2)
EOSINOPHILS RELATIVE PERCENT: 3 % (ref 1–4)
GFR AFRICAN AMERICAN: >60 ML/MIN
GFR NON-AFRICAN AMERICAN: >60 ML/MIN
GFR SERPL CREATININE-BSD FRML MDRD: NORMAL ML/MIN/{1.73_M2}
GLUCOSE BLD-MCNC: 99 MG/DL (ref 70–99)
HCT VFR BLD CALC: 58.4 % (ref 40.7–50.3)
HDLC SERPL-MCNC: 51 MG/DL
HEMOGLOBIN: 19.3 G/DL (ref 13–17)
IMMATURE GRANULOCYTES: 0 %
LDL CHOLESTEROL: 117 MG/DL (ref 0–130)
LYMPHOCYTES # BLD: 17 % (ref 24–43)
MCH RBC QN AUTO: 29.9 PG (ref 25.2–33.5)
MCHC RBC AUTO-ENTMCNC: 33 G/DL (ref 28.4–34.8)
MCV RBC AUTO: 90.4 FL (ref 82.6–102.9)
MONOCYTES # BLD: 7 % (ref 3–12)
NRBC AUTOMATED: 0 PER 100 WBC
PDW BLD-RTO: 12.9 % (ref 11.8–14.4)
PLATELET # BLD: 202 K/UL (ref 138–453)
PMV BLD AUTO: 11.2 FL (ref 8.1–13.5)
POTASSIUM SERPL-SCNC: 4.7 MMOL/L (ref 3.7–5.3)
RBC # BLD: 6.46 M/UL (ref 4.21–5.77)
SEG NEUTROPHILS: 72 % (ref 36–65)
SEGMENTED NEUTROPHILS ABSOLUTE COUNT: 7.09 K/UL (ref 1.5–8.1)
SEX HORMONE BINDING GLOBULIN: 30 NMOL/L (ref 11–80)
SODIUM BLD-SCNC: 138 MMOL/L (ref 135–144)
TESTOSTERONE FREE-NONMALE: 40.4 PG/ML (ref 47–244)
TESTOSTERONE TOTAL: 201 NG/DL (ref 220–1000)
THYROXINE, FREE: 1.29 NG/DL (ref 0.93–1.7)
TOTAL PROTEIN: 7 G/DL (ref 6.4–8.3)
TRIGL SERPL-MCNC: 180 MG/DL
TSH SERPL DL<=0.05 MIU/L-ACNC: 1.17 UIU/ML (ref 0.3–5)
VITAMIN D 25-HYDROXY: 28.8 NG/ML
WBC # BLD: 9.8 K/UL (ref 3.5–11.3)

## 2022-04-29 PROCEDURE — 80053 COMPREHEN METABOLIC PANEL: CPT

## 2022-04-29 PROCEDURE — 84443 ASSAY THYROID STIM HORMONE: CPT

## 2022-04-29 PROCEDURE — 84439 ASSAY OF FREE THYROXINE: CPT

## 2022-04-29 PROCEDURE — 85025 COMPLETE CBC W/AUTO DIFF WBC: CPT

## 2022-04-29 PROCEDURE — 84403 ASSAY OF TOTAL TESTOSTERONE: CPT

## 2022-04-29 PROCEDURE — 36415 COLL VENOUS BLD VENIPUNCTURE: CPT

## 2022-04-29 PROCEDURE — 82306 VITAMIN D 25 HYDROXY: CPT

## 2022-04-29 PROCEDURE — 80061 LIPID PANEL: CPT

## 2022-04-29 PROCEDURE — 84270 ASSAY OF SEX HORMONE GLOBUL: CPT

## 2022-04-30 NOTE — PROGRESS NOTES
Medications   Medication Sig Dispense Refill    clonazePAM (KLONOPIN) 0.5 MG tablet take 1 tablet by mouth once daily if needed      eszopiclone (LUNESTA) 3 MG TABS take 1 tablet by mouth at bedtime      LATUDA 60 MG TABS tablet take 1 tablet by mouth once daily      TRINTELLIX 5 MG tablet take 1 tablet by mouth once daily      cyclobenzaprine (FLEXERIL) 10 MG tablet Take 1 tablet by mouth nightly as needed for Muscle spasms 30 tablet 0    diclofenac (VOLTAREN) 75 MG EC tablet Take 1 tablet by mouth 2 times daily 60 tablet 3    phentermine (ADIPEX-P) 37.5 MG tablet Take 1 tablet by mouth every morning (before breakfast) for 30 days. 30 tablet 0    gabapentin (NEURONTIN) 800 MG tablet take 1 tablet by mouth three times a day 90 tablet 2    polyethylene glycol (GAVILYTE-C) 240 g solution Take as directed for bowel prep/colonoscopy 4000 mL 0    polyethylene glycol (GLYCOLAX) 17 GM/SCOOP powder Take as directed for bowel prep/colonoscopy 17 g 2    polyethylene glycol (GLYCOLAX) 17 GM/SCOOP powder Take as directed for bowel prep/Colonoscopy 255 g 0    bisacodyl (BISACODYL) 5 MG EC tablet Take as directed for bowel prep/colonoscopy 4 tablet 0    sildenafil (REVATIO) 20 MG tablet Take 1 tablet by mouth daily as needed (ED) 5 tablet 11    carvedilol (COREG) 6.25 MG tablet Take 1 tablet by mouth 2 times daily 180 tablet 3    gemfibrozil (LOPID) 600 MG tablet take 1 tablet by mouth twice a day 180 tablet 3     No current facility-administered medications for this visit.      ALLERGIES:  No Known Allergies    Social History     Tobacco Use    Smoking status: Former Smoker     Packs/day: 0.50     Types: Cigarettes     Quit date: 9/3/2017     Years since quittin.6    Smokeless tobacco: Never Used   Substance Use Topics    Alcohol use: Not Currently     Comment: social        LDL Cholesterol (mg/dL)   Date Value   2022 117     HDL (mg/dL)   Date Value   2022 51     BUN (mg/dL)   Date Value 04/29/2022 14     CREATININE (mg/dL)   Date Value   04/29/2022 0.75     Glucose (mg/dL)   Date Value   04/29/2022 99     Hemoglobin A1C (%)   Date Value   10/21/2019 5.2              Subjective:      HPI  Today with multiple issues he is having ongoing back issues fortunately he has gained more weight he has not doing exercise and cannot afford to go to physical therapy  He also would be late willing to try and lose weight and would like to go back on Adipex  He is taking all of his medications and following with psychiatry    Review of Systems:     Constitutional: Negative for fever, appetite change and fatigue. Family social and medical history reviewed and unchanged     HENT: Negative. Negative for nosebleeds, trouble swallowing and neck pain. Eyes: Negative for photophobia and visual disturbance. Respiratory: Negative. Negative for chest tightness and shortness of breath. Cardiovascular: Negative. Negative for chest pain and leg swelling. Gastrointestinal: Negative. Negative for abdominal pain and blood in stool. Endocrine: Negative for cold intolerance and polyuria. Genitourinary: Negative for dysuria and hematuria. Musculoskeletal: Negative. Skin: Negative for rash. Allergic/Immunologic: Negative. Neurological: Negative. Negative for dizziness, weakness and numbness. Hematological: Negative. Negative for adenopathy. Does not bruise/bleed easily. Psychiatric/Behavioral: Negative for sleep disturbance, dysphoric mood and  decreased concentration. The patient is not nervous/anxious. Objective:     Physical Exam:     Nursing note and vitals reviewed. BP (!) 136/93   Pulse 77   Ht 5' 10\" (1.778 m)   Wt (!) 354 lb (160.6 kg)   SpO2 95%   BMI 50.79 kg/m²   Constitutional: He is oriented to person, place, and time. He   appears well-developed and well-nourished. HENT:   Head: Normocephalic and atraumatic.     Right Ear: External ear normal. Tympanic membrane is not erythematous. No middle ear effusion. Left Ear: External ear normal. Tympanic membrane is not erythematous. No middle ear effusion. Nose: No mucosal edema. Mouth/Throat: Oropharynx is clear and moist. No posterior oropharyngeal erythema. Eyes: Conjunctivae and EOM are normal. Pupils are equal, round, and reactive to light. Neck: Normal range of motion. Neck supple. No thyromegaly present. Cardiovascular: Normal rate, regular rhythm and normal heart sounds. No murmur heard. Pulmonary/Chest: Effort normal and breath sounds normal. He has no wheezes. Hehas no rales. Abdominal: Soft. Bowel sounds are normal. He exhibits no distension and no mass. There is no tenderness. There is no rebound and no guarding. Genitourinary/Anorectal:deferred  Musculoskeletal: Normal range of motion. He exhibits no edema or tenderness. Lymphadenopathy: He has no cervical adenopathy. Neurological: He is alert and oriented to person, place, and time. He has normal reflexes. Skin: Skin is warm and dry. No rash noted. Psychiatric: He has a normal mood and affect. His   behavior is normal.       Assessment:      1. Benign essential HTN    2. Secondary cardiomyopathy ([address]    3. Hyperglycemia    4. Chronic bilateral low back pain with bilateral sciatica    5. Primary insomnia    6. Major depressive disorder, recurrent, mild ([address]    7. Dermatitis    8. Class 3 severe obesity due to excess calories with serious comorbidity and body mass index (BMI) of 50.0 to 59.9 in adult ([address]    9. Tracheostomy status ([address]    10. Combined opioid with non-opioid drug dependence, with abuse ([address]    11. Deep vein thrombosis (DVT) of upper extremity, unspecified chronicity, unspecified laterality, unspecified vein (HCC)          Plan:      Call or return to clinic prn if these symptoms worsen or fail to improve as anticipated.   I have reviewed the instructions with the patient, answering all questions to his satisfaction. No follow-ups on file. Orders Placed This Encounter   Procedures    XR LUMBAR SPINE (2-3 VIEWS)     Standing Status:   Future     Number of Occurrences:   1     Standing Expiration Date:   4/26/2023     Order Specific Question:   Reason for exam:     Answer:   pain    CBC with Auto Differential     Standing Status:   Future     Number of Occurrences:   1     Standing Expiration Date:   10/26/2022    Comprehensive Metabolic Panel     Standing Status:   Future     Number of Occurrences:   1     Standing Expiration Date:   10/26/2022    Lipid Panel     Standing Status:   Future     Number of Occurrences:   1     Standing Expiration Date:   5/26/2022     Order Specific Question:   Is Patient Fasting?/# of Hours     Answer: Yes    Testosterone, Free     Standing Status:   Future     Number of Occurrences:   1     Standing Expiration Date:   5/26/2022    T4, Free     Standing Status:   Future     Number of Occurrences:   1     Standing Expiration Date:   10/26/2022    TSH     Standing Status:   Future     Number of Occurrences:   1     Standing Expiration Date:   10/26/2022    Vitamin D 25 Hydroxy     Standing Status:   Future     Number of Occurrences:   1     Standing Expiration Date:   4/26/2023     Orders Placed This Encounter   Medications    cyclobenzaprine (FLEXERIL) 10 MG tablet     Sig: Take 1 tablet by mouth nightly as needed for Muscle spasms     Dispense:  30 tablet     Refill:  0    diclofenac (VOLTAREN) 75 MG EC tablet     Sig: Take 1 tablet by mouth 2 times daily     Dispense:  60 tablet     Refill:  3    phentermine (ADIPEX-P) 37.5 MG tablet     Sig: Take 1 tablet by mouth every morning (before breakfast) for 30 days.      Dispense:  30 tablet     Refill:  0     Cannot afford therapy I suggested he at least try some online exercising on a daily basis really work on losing weight I think that will greatly help I tried a different anti-inflammatory for him  Electronically signed by Emilia Barnes DO on 4/30/2022 at 11:59 AM

## 2022-05-02 DIAGNOSIS — D58.2 ABNORMAL HEMOGLOBIN (HCC): Primary | ICD-10-CM

## 2022-05-19 ENCOUNTER — TELEPHONE (OUTPATIENT)
Dept: ONCOLOGY | Age: 47
End: 2022-05-19

## 2022-05-19 ENCOUNTER — INITIAL CONSULT (OUTPATIENT)
Dept: ONCOLOGY | Age: 47
End: 2022-05-19
Payer: COMMERCIAL

## 2022-05-19 ENCOUNTER — HOSPITAL ENCOUNTER (OUTPATIENT)
Facility: MEDICAL CENTER | Age: 47
Discharge: HOME | End: 2022-05-19
Payer: COMMERCIAL

## 2022-05-19 VITALS
SYSTOLIC BLOOD PRESSURE: 124 MMHG | RESPIRATION RATE: 18 BRPM | HEART RATE: 83 BPM | DIASTOLIC BLOOD PRESSURE: 88 MMHG | BODY MASS INDEX: 44.1 KG/M2 | TEMPERATURE: 97.7 F | WEIGHT: 315 LBS | HEIGHT: 71 IN

## 2022-05-19 DIAGNOSIS — D75.1 POLYCYTHEMIA: ICD-10-CM

## 2022-05-19 DIAGNOSIS — Z72.0 TOBACCO ABUSE: ICD-10-CM

## 2022-05-19 DIAGNOSIS — Z71.6 TOBACCO ABUSE COUNSELING: ICD-10-CM

## 2022-05-19 DIAGNOSIS — D75.1 POLYCYTHEMIA: Primary | ICD-10-CM

## 2022-05-19 LAB
ABSOLUTE EOS #: 0.22 K/UL (ref 0–0.44)
ABSOLUTE IMMATURE GRANULOCYTE: 0.04 K/UL (ref 0–0.3)
ABSOLUTE LYMPH #: 1.85 K/UL (ref 1.1–3.7)
ABSOLUTE MONO #: 0.8 K/UL (ref 0.1–1.2)
BASOPHILS # BLD: 1 % (ref 0–2)
BASOPHILS ABSOLUTE: 0.08 K/UL (ref 0–0.2)
CARBOXYHEMOGLOBIN: 12 % (ref 0–2)
EOSINOPHILS RELATIVE PERCENT: 2 % (ref 1–4)
HCT VFR BLD CALC: 57.1 % (ref 40.7–50.3)
HEMOGLOBIN: 18.7 G/DL (ref 13–17)
IMMATURE GRANULOCYTES: 0 %
LYMPHOCYTES # BLD: 18 % (ref 24–43)
MCH RBC QN AUTO: 30 PG (ref 25.2–33.5)
MCHC RBC AUTO-ENTMCNC: 32.7 G/DL (ref 28.4–34.8)
MCV RBC AUTO: 91.7 FL (ref 82.6–102.9)
MONOCYTES # BLD: 8 % (ref 3–12)
NRBC AUTOMATED: 0 PER 100 WBC
PDW BLD-RTO: 13.2 % (ref 11.8–14.4)
PLATELET # BLD: 194 K/UL (ref 138–453)
PMV BLD AUTO: 11.4 FL (ref 8.1–13.5)
RBC # BLD: 6.23 M/UL (ref 4.21–5.77)
SEG NEUTROPHILS: 71 % (ref 36–65)
SEGMENTED NEUTROPHILS ABSOLUTE COUNT: 7.13 K/UL (ref 1.5–8.1)
WBC # BLD: 10.1 K/UL (ref 3.5–11.3)

## 2022-05-19 PROCEDURE — G8417 CALC BMI ABV UP PARAM F/U: HCPCS | Performed by: INTERNAL MEDICINE

## 2022-05-19 PROCEDURE — 82375 ASSAY CARBOXYHB QUANT: CPT

## 2022-05-19 PROCEDURE — 36415 COLL VENOUS BLD VENIPUNCTURE: CPT

## 2022-05-19 PROCEDURE — G8427 DOCREV CUR MEDS BY ELIG CLIN: HCPCS | Performed by: INTERNAL MEDICINE

## 2022-05-19 PROCEDURE — 99202 OFFICE O/P NEW SF 15 MIN: CPT

## 2022-05-19 PROCEDURE — 99202 OFFICE O/P NEW SF 15 MIN: CPT | Performed by: INTERNAL MEDICINE

## 2022-05-19 PROCEDURE — 99245 OFF/OP CONSLTJ NEW/EST HI 55: CPT | Performed by: INTERNAL MEDICINE

## 2022-05-19 PROCEDURE — 81270 JAK2 GENE: CPT

## 2022-05-19 PROCEDURE — 82668 ASSAY OF ERYTHROPOIETIN: CPT

## 2022-05-19 PROCEDURE — 85025 COMPLETE CBC W/AUTO DIFF WBC: CPT

## 2022-05-19 RX ORDER — 0.9 % SODIUM CHLORIDE 0.9 %
500 INTRAVENOUS SOLUTION INTRAVENOUS ONCE
OUTPATIENT
Start: 2022-05-19 | End: 2022-05-19

## 2022-05-19 RX ORDER — 0.9 % SODIUM CHLORIDE 0.9 %
250 INTRAVENOUS SOLUTION INTRAVENOUS ONCE
OUTPATIENT
Start: 2022-05-19 | End: 2022-05-19

## 2022-05-19 NOTE — TELEPHONE ENCOUNTER
[facility] ARRIVES AMBULATORY FOR CONSULTATION APPOINTMENT  DR Amna Lipscomb IN TO SEE PATIENT  ORDERS RECEIVED  THERAPEUTIC PHLEBOTOMY ONCE, SOON  LABS SOON  RV 3 MONTHS WITH CBC  LABS ON EXIT VICKIE 2 GENE MUTATION ERYTHROPOIETIN CARBOXYHEMOGLOBIN CDP  PHLEBOTOMY 05/24/22 @2PM  LABS CDP 08/25/22  MD VISIT 08/25/22 @10AM  AVS PRINTED AND GIVEN TO PATIENT WITH INSTRUCTIONS  PATIENT DISCHARGED AMBULATORY

## 2022-05-19 NOTE — PROGRESS NOTES
_           Mr. Meeta Hannah is a very pleasant 48 y.o. male with history of multiple co morbidities as listed. Patient is referred for evaluation of polycythemia. He had morbid obesity. He smokes only few cigarettes a day. He drinks 6 beers every week. Patient was noted to have elevated hemoglobin level. He is referred for further management. Patient denies any headaches or dizziness. No chest pain. No angina symptoms. No weight loss or decreased appetite. No fever or night sweats. No weight loss or decreased appetite. Patient had history of pneumonia 2017. He was in the hospital for about 2 months. He developed DVT and PE. Patient had problem with sleep apnea. Natalia Dorsey PAST MEDICAL HISTORY: has a past medical history of Anxiety, Back pain, Bipolar 1 disorder ([address] Dvt femoral (deep venous thrombosis) ([address] Hypertension, Morbid obesity due to excess calories ([address] CHRIS (obstructive sleep apnea), Pneumonia, Respiratory failure ([address] and Smoking. PAST SURGICAL HISTORY: has a past surgical history that includes tracheostomy (2017); Colonoscopy (N/A, 12/12/2019); and Colonoscopy (N/A, 6/2/2020). CURRENT MEDICATIONS:  has a current medication list which includes the following prescription(s): clonazepam, eszopiclone, latuda, trintellix, gabapentin, sildenafil, carvedilol, gemfibrozil, phentermine, diclofenac, gavilyte-c, polyethylene glycol, polyethylene glycol, and bisacodyl. ALLERGIES:  has No Known Allergies. FAMILY HISTORY: Grandfather had colon cancer. Mother had some sort of cancer. Otherwise negative for any hematological or oncological conditions. SOCIAL HISTORY:  reports that he quit smoking about 4 years ago. His smoking use included cigarettes. He smoked 0.50 packs per day. He has never used smokeless tobacco. He reports previous alcohol use.  He reports that he does not use drugs.    REVIEW OF SYSTEMS:     · General: Positive for weakness and fatigue. No unanticipated weight loss or decreased appetite. No fever or chills. · Eyes: No blurred vision, eye pain or double vision. · Ears: No hearing problems or drainage. No tinnitus. · Throat: No sore throat, problems with swallowing or dysphagia. · Respiratory: No cough, sputum or hemoptysis. No shortness of breath. No pleuritic chest pain. · Cardiovascular: No chest pain, orthopnea or PND. No lower extremity edema. No palpitation. · Gastrointestinal: No problems with swallowing. No abdominal pain or bloating. No nausea or vomiting. No diarrhea or constipation. No GI bleeding. · Genitourinary: No dysuria, hematuria, frequency or urgency. · Musculoskeletal: No muscle aches or pains. No limitation of movement. No back pain. No gait disturbance, No joint complaints. · Dermatologic: No skin rashes or pruritus. No skin lesions or discolorations. · Psychiatric: No depression, anxiety, or stress or signs of schizophrenia. No change in mood or affect. · Hematologic: No history of bleeding tendency. No bruises or ecchymosis. No history of clotting problems. · Infectious disease: No fever, chills or frequent infections. · Endocrine: No polydipsia or polyuria. No temperature intolerance. · Neurologic: No headaches or dizziness. No weakness or numbness of the extremities. No changes in balance, coordination,  memory, mentation, behavior. · Allergic/Immunologic: No nasal congestion or hives. No repeated infections. PHYSICAL EXAM:  The patient is not in acute distress. Vital signs: Blood pressure 124/88, pulse 83, temperature 97.7 °F (36.5 °C), temperature source Temporal, resp. rate 18, height 5' 11\" (1.803 m), weight (!) 340 lb 8 oz (154.4 kg).      General appearance - well appearing, not in pain or distress  Mental status - good mood, alert and oriented  Eyes - pupils equal and reactive, extraocular eye movements intact  Ears - bilateral TM's and external ear canals normal  Nose - normal and patent, no erythema, discharge or polyps  Mouth - mucous membranes moist, pharynx normal without lesions  Neck - supple, no significant adenopathy  Lymphatics - no palpable lymphadenopathy, no hepatosplenomegaly  Chest - clear to auscultation, no wheezes, rales or rhonchi, symmetric air entry  Heart - normal rate, regular rhythm, normal S1, S2, no murmurs, rubs, clicks or gallops  Abdomen - soft, nontender, nondistended, no masses or organomegaly  Neurological - alert, oriented, normal speech, no focal findings or movement disorder noted  Musculoskeletal - no joint tenderness, deformity or swelling  Extremities - peripheral pulses normal, no pedal edema, no clubbing or cyanosis  Skin - normal coloration and turgor, no rashes, no suspicious skin lesions noted     Review of Diagnostic data:   Lab Results   Component Value Date    WBC 10.1 05/19/2022    HGB 18.7 (H) 05/19/2022    HCT 57.1 (H) 05/19/2022    MCV 91.7 05/19/2022     05/19/2022       Chemistry        Component Value Date/Time     04/29/2022 0704    K 4.7 04/29/2022 0704     04/29/2022 0704    CO2 25 04/29/2022 0704    BUN 14 04/29/2022 0704    CREATININE 0.75 04/29/2022 0704        Component Value Date/Time    CALCIUM 9.4 04/29/2022 0704    ALKPHOS 86 04/29/2022 0704    AST 20 04/29/2022 0704    ALT 19 04/29/2022 0704    BILITOT 0.64 04/29/2022 0704          @[facility]@      IMPRESSION:   Polycythemia. Likely reactive polycythemia. Morbid obesity. Sleep apnea. Tobacco use  Multiple comorbidities as listed. PLAN: I reviewed the labs available to me and discussed with the patient. For more than 40 minutes of face to face discussion, I explained to the patient the nature of this hematologic problem. I explained the significance of these abnormalities in layman language. Patient has versus polycythemia.   Likely it is reactive secondary to underlying sleep apnea and tobacco use. However I will do further work-up to rule out PCV. In the interim with the current level of hemoglobin I will do therapeutic phlebotomy once. We will see him again in 3 months with repeated CBC. Patient will pursue further care for his sleep apnea. Counseled about importance of smoking cessation. Patient's questions were answered to the best of his satisfaction and he verbalized full understanding and agreement.

## 2022-05-21 LAB — ERYTHROPOIETIN: 15 MU/ML (ref 4–27)

## 2022-05-23 ENCOUNTER — HOSPITAL ENCOUNTER (OUTPATIENT)
Facility: MEDICAL CENTER | Age: 47
End: 2022-05-23

## 2022-05-24 ENCOUNTER — HOSPITAL ENCOUNTER (OUTPATIENT)
Dept: INFUSION THERAPY | Facility: MEDICAL CENTER | Age: 47
Discharge: HOME | End: 2022-05-24

## 2022-05-24 VITALS
RESPIRATION RATE: 18 BRPM | HEART RATE: 65 BPM | SYSTOLIC BLOOD PRESSURE: 99 MMHG | TEMPERATURE: 98.1 F | DIASTOLIC BLOOD PRESSURE: 65 MMHG

## 2022-05-24 NOTE — PROGRESS NOTES
Patient arrived ambulatory per self for 1x phlebotomy per Dr. Rabia Avendano. Patient denies complaints or concerns. Vitals obtained, patient BP even after oral hydration. Writer discussed with Dr. Rabia Avendano, [facility] to withhold phlebotomy today and for patient to reschedule. Patient counseled on hydration prior to phlebotomy and the avoidance of alcohol. Patient voiced understanding. He is rescheduled for 6/2/22.

## 2022-05-27 ENCOUNTER — PATIENT MESSAGE (OUTPATIENT)
Dept: FAMILY MEDICINE CLINIC | Age: 47
End: 2022-05-27

## 2022-05-27 ENCOUNTER — OFFICE VISIT (OUTPATIENT)
Dept: FAMILY MEDICINE CLINIC | Age: 47
End: 2022-05-27
Payer: COMMERCIAL

## 2022-05-27 VITALS
RESPIRATION RATE: 17 BRPM | WEIGHT: 315 LBS | BODY MASS INDEX: 44.1 KG/M2 | HEART RATE: 74 BPM | OXYGEN SATURATION: 96 % | HEIGHT: 71 IN | SYSTOLIC BLOOD PRESSURE: 133 MMHG | DIASTOLIC BLOOD PRESSURE: 98 MMHG | TEMPERATURE: 92.7 F

## 2022-05-27 DIAGNOSIS — E66.01 CLASS 3 SEVERE OBESITY DUE TO EXCESS CALORIES WITH SERIOUS COMORBIDITY AND BODY MASS INDEX (BMI) OF 50.0 TO 59.9 IN ADULT (HCC): ICD-10-CM

## 2022-05-27 DIAGNOSIS — E34.9 HYPOTESTOSTERONISM: Primary | ICD-10-CM

## 2022-05-27 DIAGNOSIS — I10 BENIGN ESSENTIAL HTN: Primary | ICD-10-CM

## 2022-05-27 PROCEDURE — 99213 OFFICE O/P EST LOW 20 MIN: CPT | Performed by: FAMILY MEDICINE

## 2022-05-27 PROCEDURE — G8427 DOCREV CUR MEDS BY ELIG CLIN: HCPCS | Performed by: FAMILY MEDICINE

## 2022-05-27 PROCEDURE — G8417 CALC BMI ABV UP PARAM F/U: HCPCS | Performed by: FAMILY MEDICINE

## 2022-05-27 PROCEDURE — 1036F TOBACCO NON-USER: CPT | Performed by: FAMILY MEDICINE

## 2022-05-27 RX ORDER — PHENTERMINE HYDROCHLORIDE 37.5 MG/1
37.5 TABLET ORAL
Qty: 30 TABLET | Refills: 0 | Status: SHIPPED | OUTPATIENT
Start: 2022-05-27 | End: 2022-06-27 | Stop reason: SDUPTHER

## 2022-05-27 ASSESSMENT — PATIENT HEALTH QUESTIONNAIRE - PHQ9
7. TROUBLE CONCENTRATING ON THINGS, SUCH AS READING THE NEWSPAPER OR WATCHING TELEVISION: 3
3. TROUBLE FALLING OR STAYING ASLEEP: 1
5. POOR APPETITE OR OVEREATING: 0
10. IF YOU CHECKED OFF ANY PROBLEMS, HOW DIFFICULT HAVE THESE PROBLEMS MADE IT FOR YOU TO DO YOUR WORK, TAKE CARE OF THINGS AT HOME, OR GET ALONG WITH OTHER PEOPLE: 1
SUM OF ALL RESPONSES TO PHQ QUESTIONS 1-9: 7
2. FEELING DOWN, DEPRESSED OR HOPELESS: 1
6. FEELING BAD ABOUT YOURSELF - OR THAT YOU ARE A FAILURE OR HAVE LET YOURSELF OR YOUR FAMILY DOWN: 1
SUM OF ALL RESPONSES TO PHQ QUESTIONS 1-9: 7
SUM OF ALL RESPONSES TO PHQ QUESTIONS 1-9: 7
4. FEELING TIRED OR HAVING LITTLE ENERGY: 1
8. MOVING OR SPEAKING SO SLOWLY THAT OTHER PEOPLE COULD HAVE NOTICED. OR THE OPPOSITE, BEING SO FIGETY OR RESTLESS THAT YOU HAVE BEEN MOVING AROUND A LOT MORE THAN USUAL: 0
SUM OF ALL RESPONSES TO PHQ QUESTIONS 1-9: 7
9. THOUGHTS THAT YOU WOULD BE BETTER OFF DEAD, OR OF HURTING YOURSELF: 0

## 2022-05-27 NOTE — TELEPHONE ENCOUNTER
From: Antonieta Marie  To: Dr. Sevilla Porter: 5/27/2022 1:23 PM EDT  Subject: Testosterone levels    Can I get a referral for a urologist. I need to clarify some things

## 2022-05-27 NOTE — PROGRESS NOTES
[facility]  [address] [place] [address] [address] [facility] [address]  Dept: [phone]      Agueda Bradley is a 48 y.o. male who presents today for follow up on his  medical conditions as noted below. Chief Complaint   Patient presents with    Medication Refill     patient need adipex refill . Patient Active Problem List:     Bilateral low back pain with bilateral sciatica     Morbid obesity due to excess calories (HCC)     Benign essential HTN     Acute systolic congestive heart failure (HCC)     Secondary cardiomyopathy ([address]     DVT (deep venous thrombosis) (HCC)     Hyperglycemia     Rhabdomyolysis     Tubular adenoma of colon     Tubular adenoma of rectum     Hyperplastic colon polyp     Serrated adenoma of colon     Polyp of colon     Tracheostomy status ([address]     Combined opioid with non-opioid drug dependence, with abuse ([address]     Morbid obesity with BMI of 40.0-44.9, adult ([address]     History of psychiatric disorder     Hx of drug abuse ([address]     Major depressive disorder, recurrent, mild     Major depressive disorder, recurrent, moderate     Major depressive disorder, recurrent, unspecified     Polycythemia     Past Medical History:   Diagnosis Date    Anxiety     Back pain     Bipolar 1 disorder ([address]     Dvt femoral (deep venous thrombosis) ([address]     Hypertension     Morbid obesity due to excess calories ([address]     CHRIS (obstructive sleep apnea)     Pneumonia 2017    DUE TO OTHER AEROBIC GRAM-NEGATIVE BACTERIA, UNSPECIFIED LATERALITY, UNSPECIFIED PART OF LUNG     Respiratory failure ([address] 2017    Smoking       Past Surgical History:   Procedure Laterality Date    COLONOSCOPY N/A 12/12/2019    COLONOSCOPY POLYPECTOMY HOT BIOPSY performed by Anna Stallings MD at [facility] N/A 6/2/2020    COLONOSCOPY POLYPECTOMY SNARE/COLD BIOPSY performed by Anna Stallings MD at [address] [place] [address], S.  2017     History reviewed.  No pertinent family history. Current Outpatient Medications   Medication Sig Dispense Refill    phentermine (ADIPEX-P) 37.5 MG tablet Take 1 tablet by mouth every morning (before breakfast) for 30 days. 30 tablet 0    clonazePAM (KLONOPIN) 0.5 MG tablet take 1 tablet by mouth once daily if needed      eszopiclone (LUNESTA) 3 MG TABS take 1 tablet by mouth at bedtime      LATUDA 60 MG TABS tablet take 1 tablet by mouth once daily      TRINTELLIX 5 MG tablet take 1 tablet by mouth once daily      diclofenac (VOLTAREN) 75 MG EC tablet Take 1 tablet by mouth 2 times daily 60 tablet 3    gabapentin (NEURONTIN) 800 MG tablet take 1 tablet by mouth three times a day 90 tablet 2    polyethylene glycol (GAVILYTE-C) 240 g solution Take as directed for bowel prep/colonoscopy 4000 mL 0    polyethylene glycol (GLYCOLAX) 17 GM/SCOOP powder Take as directed for bowel prep/colonoscopy 17 g 2    polyethylene glycol (GLYCOLAX) 17 GM/SCOOP powder Take as directed for bowel prep/Colonoscopy 255 g 0    bisacodyl (BISACODYL) 5 MG EC tablet Take as directed for bowel prep/colonoscopy 4 tablet 0    sildenafil (REVATIO) 20 MG tablet Take 1 tablet by mouth daily as needed (ED) 5 tablet 11    carvedilol (COREG) 6.25 MG tablet Take 1 tablet by mouth 2 times daily 180 tablet 3    gemfibrozil (LOPID) 600 MG tablet take 1 tablet by mouth twice a day 180 tablet 3     No current facility-administered medications for this visit.      ALLERGIES:  No Known Allergies    Social History     Tobacco Use    Smoking status: Former Smoker     Packs/day: 0.50     Types: Cigarettes     Quit date: 9/3/2017     Years since quittin.7    Smokeless tobacco: Never Used   Substance Use Topics    Alcohol use: Not Currently     Comment: social        LDL Cholesterol (mg/dL)   Date Value   2022 117     HDL (mg/dL)   Date Value   2022 51     BUN (mg/dL)   Date Value   2022 14     CREATININE (mg/dL)   Date Value   2022 0.75     Glucose (mg/dL)   Date Value   04/29/2022 99     Hemoglobin A1C (%)   Date Value   10/21/2019 5.2              Subjective:      HPI    HPI  He is being seen today for follow-up on her blood pressure is running a little elevated today which he normally is under control he did take his meds but right before he came but did say was rushing around this morning  He is following with some specialist in regards to his abnormal blood counts  He is also seeing psychiatry still he is here also today for weight loss he is doing better with that and wants a refill on his meds    Review of Systems:      Review of Systems:     Constitutional: Negative for fever, appetite change and fatigue. Family social and medical history reviewed and unchanged     HENT: Negative. Negative for nosebleeds, trouble swallowing and neck pain. Eyes: Negative for photophobia and visual disturbance. Respiratory: Negative. Negative for chest tightness and shortness of breath. Cardiovascular: Negative. Negative for chest pain and leg swelling. Gastrointestinal: Negative. Negative for abdominal pain and blood in stool. Endocrine: Negative for cold intolerance and polyuria. Genitourinary: Negative for dysuria and hematuria. Musculoskeletal: Negative. Skin: Negative for rash. Allergic/Immunologic: Negative. Neurological: Negative. Negative for dizziness, weakness and numbness. Hematological: Negative. Negative for adenopathy. Does not bruise/bleed easily. Psychiatric/Behavioral: Negative for sleep disturbance, dysphoric mood and  decreased concentration. The patient is not nervous/anxious. Objective:     Physical Exam:     Nursing note and vitals reviewed. BP (!) 133/98 (Site: Left Upper Arm, Position: Sitting)   Pulse 74   Temp (!) 92.7 °F (33.7 °C)   Resp 17   Ht 5' 11\" (1.803 m)   Wt (!) 338 lb 6.4 oz (153.5 kg)   SpO2 96%   BMI 47.20 kg/m²   Constitutional: He is oriented to person, place, and time.  He appears well-developed and well-nourished. HENT:   Head: Normocephalic and atraumatic. Right Ear: External ear normal. Tympanic membrane is not erythematous. No middle ear effusion. Left Ear: External ear normal. Tympanic membrane is not erythematous. No middle ear effusion. Nose: No mucosal edema. Mouth/Throat: Oropharynx is clear and moist. No posterior oropharyngeal erythema. Eyes: Conjunctivae and EOM are normal. Pupils are equal, round, and reactive to light. Neck: Normal range of motion. Neck supple. No thyromegaly present. Cardiovascular: Normal rate, regular rhythm and normal heart sounds. No murmur heard. Pulmonary/Chest: Effort normal and breath sounds normal. He has no wheezes. Hehas no rales. Abdominal: Soft. Bowel sounds are normal. He exhibits no distension and no mass. There is no tenderness. There is no rebound and no guarding. Genitourinary/Anorectal:deferred  Musculoskeletal: Normal range of motion. He exhibits no edema or tenderness. Lymphadenopathy: He has no cervical adenopathy. Neurological: He is alert and oriented to person, place, and time. He has normal reflexes. Skin: Skin is warm and dry. No rash noted. Psychiatric: He has a normal mood and affect. His   behavior is normal.       Assessment:      1. Benign essential HTN    2. Class 3 severe obesity due to excess calories with serious comorbidity and body mass index (BMI) of 50.0 to 59.9 in adult [facility])          Plan:      Call or return to clinic prn if these symptoms worsen or fail to improve as anticipated. I have reviewed the instructions with the patient, answering all questions to his satisfaction. No follow-ups on file. No orders of the defined types were placed in this encounter. Orders Placed This Encounter   Medications    phentermine (ADIPEX-P) 37.5 MG tablet     Sig: Take 1 tablet by mouth every morning (before breakfast) for 30 days.      Dispense:  30 tablet     Refill:  0   cont to follow low  calorie diet and exercise and follow-up in the office in 1 month    Electronically signed by Alfonso Good DO on 5/27/2022 at 8:37 AM

## 2022-06-01 LAB
JAK2 QNT, SOURCE: NORMAL
V617 MUTATION, PERCENT: 0 %
V617F MUTATION, QNT: NOT DETECTED

## 2022-06-02 ENCOUNTER — HOSPITAL ENCOUNTER (OUTPATIENT)
Dept: INFUSION THERAPY | Facility: MEDICAL CENTER | Age: 47
Discharge: HOME | End: 2022-06-02
Payer: COMMERCIAL

## 2022-06-02 VITALS
SYSTOLIC BLOOD PRESSURE: 133 MMHG | TEMPERATURE: 98 F | RESPIRATION RATE: 17 BRPM | DIASTOLIC BLOOD PRESSURE: 86 MMHG | HEART RATE: 75 BPM

## 2022-06-02 PROCEDURE — 99195 PHLEBOTOMY: CPT

## 2022-06-02 NOTE — PROGRESS NOTES
Pt arrives ambulatory per self   Order for phlebotomy reviewed . Labs reviewed   Phlebotomy done using closed system to Right  AC remove 29 , lost access   Phlebotomy done using closed system to Left  AC  221 ml blood removed lost access , inform MD   Taking fluids well  Post procedure vital signs stable   Pt advised no strenuous activity for the remainder of the day  , may remove dressing tomorrow .   Discharged per ambulatory

## 2022-06-06 ENCOUNTER — ANESTHESIA (OUTPATIENT)
Dept: ENDOSCOPY | Age: 47
End: 2022-06-06
Payer: COMMERCIAL

## 2022-06-06 ENCOUNTER — HOSPITAL ENCOUNTER (OUTPATIENT)
Age: 47
Setting detail: OUTPATIENT SURGERY
Discharge: HOME | End: 2022-06-06
Attending: INTERNAL MEDICINE | Admitting: INTERNAL MEDICINE
Payer: COMMERCIAL

## 2022-06-06 ENCOUNTER — ANESTHESIA EVENT (OUTPATIENT)
Dept: ENDOSCOPY | Age: 47
End: 2022-06-06
Payer: COMMERCIAL

## 2022-06-06 VITALS
HEART RATE: 71 BPM | DIASTOLIC BLOOD PRESSURE: 101 MMHG | BODY MASS INDEX: 44.1 KG/M2 | OXYGEN SATURATION: 95 % | HEIGHT: 71 IN | TEMPERATURE: 98.4 F | WEIGHT: 315 LBS | RESPIRATION RATE: 11 BRPM | SYSTOLIC BLOOD PRESSURE: 134 MMHG

## 2022-06-06 DIAGNOSIS — Z86.010 HISTORY OF COLON POLYPS: ICD-10-CM

## 2022-06-06 DIAGNOSIS — Z12.11 SCREENING FOR COLORECTAL CANCER: ICD-10-CM

## 2022-06-06 DIAGNOSIS — Z12.12 SCREENING FOR COLORECTAL CANCER: ICD-10-CM

## 2022-06-06 PROBLEM — K62.1 RECTAL POLYP: Status: ACTIVE | Noted: 2019-12-19

## 2022-06-06 PROCEDURE — 7100000011 HC PHASE II RECOVERY - ADDTL 15 MIN: Performed by: INTERNAL MEDICINE

## 2022-06-06 PROCEDURE — 45380 COLONOSCOPY AND BIOPSY: CPT | Performed by: INTERNAL MEDICINE

## 2022-06-06 PROCEDURE — 3700000000 HC ANESTHESIA ATTENDED CARE: Performed by: INTERNAL MEDICINE

## 2022-06-06 PROCEDURE — 7100000010 HC PHASE II RECOVERY - FIRST 15 MIN: Performed by: INTERNAL MEDICINE

## 2022-06-06 PROCEDURE — 3609010300 HC COLONOSCOPY W/BIOPSY SINGLE/MULTIPLE: Performed by: INTERNAL MEDICINE

## 2022-06-06 PROCEDURE — 2500000003 HC RX 250 WO HCPCS

## 2022-06-06 PROCEDURE — 2580000003 HC RX 258: Performed by: INTERNAL MEDICINE

## 2022-06-06 PROCEDURE — 45385 COLONOSCOPY W/LESION REMOVAL: CPT | Performed by: INTERNAL MEDICINE

## 2022-06-06 PROCEDURE — 3609010600 HC COLONOSCOPY POLYPECTOMY SNARE/COLD BIOPSY: Performed by: INTERNAL MEDICINE

## 2022-06-06 PROCEDURE — 3700000001 HC ADD 15 MINUTES (ANESTHESIA): Performed by: INTERNAL MEDICINE

## 2022-06-06 PROCEDURE — 6360000002 HC RX W HCPCS

## 2022-06-06 PROCEDURE — 2709999900 HC NON-CHARGEABLE SUPPLY: Performed by: INTERNAL MEDICINE

## 2022-06-06 PROCEDURE — 88305 TISSUE EXAM BY PATHOLOGIST: CPT

## 2022-06-06 PROCEDURE — 3609010400 HC COLONOSCOPY POLYPECTOMY HOT BIOPSY: Performed by: INTERNAL MEDICINE

## 2022-06-06 RX ORDER — LIDOCAINE HYDROCHLORIDE 10 MG/ML
INJECTION, SOLUTION EPIDURAL; INFILTRATION; INTRACAUDAL; PERINEURAL PRN
Status: DISCONTINUED | OUTPATIENT
Start: 2022-06-06 | End: 2022-06-06 | Stop reason: SDUPTHER

## 2022-06-06 RX ORDER — PROPOFOL 10 MG/ML
INJECTION, EMULSION INTRAVENOUS PRN
Status: DISCONTINUED | OUTPATIENT
Start: 2022-06-06 | End: 2022-06-06 | Stop reason: SDUPTHER

## 2022-06-06 RX ORDER — KETAMINE HCL IN NACL, ISO-OSM 100MG/10ML
SYRINGE (ML) INJECTION PRN
Status: DISCONTINUED | OUTPATIENT
Start: 2022-06-06 | End: 2022-06-06 | Stop reason: SDUPTHER

## 2022-06-06 RX ORDER — SODIUM CHLORIDE 9 MG/ML
INJECTION, SOLUTION INTRAVENOUS CONTINUOUS
Status: DISCONTINUED | OUTPATIENT
Start: 2022-06-06 | End: 2022-06-06 | Stop reason: HOSPADM

## 2022-06-06 RX ORDER — FENTANYL CITRATE 50 UG/ML
INJECTION, SOLUTION INTRAMUSCULAR; INTRAVENOUS PRN
Status: DISCONTINUED | OUTPATIENT
Start: 2022-06-06 | End: 2022-06-06 | Stop reason: SDUPTHER

## 2022-06-06 RX ORDER — MIDAZOLAM HYDROCHLORIDE 1 MG/ML
INJECTION INTRAMUSCULAR; INTRAVENOUS PRN
Status: DISCONTINUED | OUTPATIENT
Start: 2022-06-06 | End: 2022-06-06 | Stop reason: SDUPTHER

## 2022-06-06 RX ADMIN — FENTANYL CITRATE 25 MCG: 50 INJECTION, SOLUTION INTRAMUSCULAR; INTRAVENOUS at 09:24

## 2022-06-06 RX ADMIN — MIDAZOLAM HYDROCHLORIDE 2 MG: 1 INJECTION, SOLUTION INTRAMUSCULAR; INTRAVENOUS at 09:16

## 2022-06-06 RX ADMIN — SODIUM CHLORIDE: 9 INJECTION, SOLUTION INTRAVENOUS at 09:22

## 2022-06-06 RX ADMIN — SODIUM CHLORIDE: 9 INJECTION, SOLUTION INTRAVENOUS at 07:47

## 2022-06-06 RX ADMIN — FENTANYL CITRATE 25 MCG: 50 INJECTION, SOLUTION INTRAMUSCULAR; INTRAVENOUS at 09:06

## 2022-06-06 RX ADMIN — FENTANYL CITRATE 50 MCG: 50 INJECTION, SOLUTION INTRAMUSCULAR; INTRAVENOUS at 09:13

## 2022-06-06 RX ADMIN — PROPOFOL 400 MG: 10 INJECTION, EMULSION INTRAVENOUS at 09:06

## 2022-06-06 RX ADMIN — Medication 25 MG: at 09:09

## 2022-06-06 RX ADMIN — Medication 10 MG: at 09:11

## 2022-06-06 RX ADMIN — LIDOCAINE HYDROCHLORIDE 25 MG: 10 INJECTION, SOLUTION EPIDURAL; INFILTRATION; INTRACAUDAL; PERINEURAL at 09:06

## 2022-06-06 RX ADMIN — Medication 15 MG: at 09:13

## 2022-06-06 ASSESSMENT — PAIN DESCRIPTION - DESCRIPTORS
DESCRIPTORS: ACHING
DESCRIPTORS: ACHING

## 2022-06-06 ASSESSMENT — PAIN SCALES - GENERAL
PAINLEVEL_OUTOF10: 0
PAINLEVEL_OUTOF10: 0
PAINLEVEL_OUTOF10: 6

## 2022-06-06 ASSESSMENT — PAIN DESCRIPTION - PAIN TYPE: TYPE: CHRONIC PAIN

## 2022-06-06 ASSESSMENT — PAIN DESCRIPTION - LOCATION
LOCATION: ABDOMEN
LOCATION: ABDOMEN

## 2022-06-06 ASSESSMENT — PAIN - FUNCTIONAL ASSESSMENT: PAIN_FUNCTIONAL_ASSESSMENT: 0-10

## 2022-06-06 NOTE — ANESTHESIA POSTPROCEDURE EVALUATION
Department of Anesthesiology  Postprocedure Note    Patient: Lori Agarwal  MRN: [id number]  YOB: 1975  Date of evaluation: 6/6/2022  Time:  11:18 AM     Procedure Summary     Date: 06/06/22 Room / Location: [address]    Anesthesia Start: 0900 Anesthesia Stop: 8929    Procedures:       COLONOSCOPY POLYPECTOMY SNARE/COLD BIOPSY (N/A )      COLONOSCOPY POLYPECTOMY HOT BIOPSY      COLONOSCOPY WITH BIOPSY Diagnosis:       Screening for colorectal cancer      History of colon polyps      (SCREENING COLON, HISTORY OF POLYPS)    Surgeons: Namita Burton MD Responsible Provider: Ladonna Garcia MD    Anesthesia Type: MAC ASA Status: 3          Anesthesia Type: No value filed. Che Phase I: Che Score: 10    Che Phase II: Che Score: 10    Last vitals: Reviewed and per EMR flowsheets.    POST-OP ANESTHESIA NOTE       BP (!) 134/101   Pulse 71   Temp 98.4 °F (36.9 °C) (Skin)   Resp 11   Ht 5' 11\" (1.803 m)   Wt (!) 337 lb (152.9 kg)   SpO2 95%   BMI 47.00 kg/m²    Pain Assessment: 0-10  Pain Level: 0    Anesthesia Post Evaluation    Patient location during evaluation: PACU  Patient participation: complete - patient participated  Level of consciousness: awake  Pain score: 0  Airway patency: patent  Nausea & Vomiting: no vomiting and no nausea  Complications: no  Cardiovascular status: hemodynamically stable  Respiratory status: acceptable  Hydration status: stable

## 2022-06-06 NOTE — OP NOTE
polyp 7mm s/p hot snare polypectomy and removal  3) Sigmoid polyp, 6mm s/p cold snare polypectomy and removal  4) Rectal polyp, 5mm s/p cold biopsy and removal  5) Few small mouthed diverticula in the left colon  6) Moderate external hemorrhoids    MEDICATIONS:     MAC per anesthesia     EBL <10cc    INSTRUMENT: Olympus CF-H190 AL Pediatric flexible Colonoscope. PREPARATION: The nature and character of the procedure as well as risks, benefits, and alternatives were discussed with the patient and informed consent was obtained. Complications were said to include, but were not limited to: medication allergy, medication reaction, cardiovascular and respiratory problems, bleeding, perforation, infection, and/or missed diagnosis. Following arrival in the endoscopy room, the patient was placed in the left lateral decubitus position and final time-out accomplished in the presence of the nursing staff. Baseline vital signs were obtained and reviewed, and IV sedation was subsequently initiated. FINDINGS: Rectal examination demonstrated no significant visible external abnormality and digital palpation was unremarkable. Following adequate conscious sedation the colonoscope was introduced and advanced under direct visualization to the cecum, which was identified by the ileocecal valve and appendiceal orifice. The bowel preparation was felt to be FAIR. This included moderate amounts of green stool that was mostly able to be adequately irrigated and aspirated. Cecal intubation time was 8 minutes. Once maximally inserted, the endoscope was withdrawn and the mucosa was carefully inspected. The mucosal exam revealed colon polyps, diverticulosis. Retroflexion was performed in the rectum and hemorrhoids seen. Withdrawal time was 15 minutes.        IMPRESSION:     FAIR PREP    1) Proximal transverse colon polyp, 8mm s/p hot snare polypectomy and removal  2) Distal transverse colon polyp 7mm s/p hot snare polypectomy and removal  3) Sigmoid polyp, 6mm s/p cold snare polypectomy and removal  4) Rectal polyp, 5mm s/p cold biopsy and removal  5) Few small mouthed diverticula in the left colon  6) Moderate external hemorrhoids    RECOMMENDATIONS:   1) Follow up with referring provider, as previously scheduled. 2) Repeat Colonoscopy in 5 yrs  3) Follow up path in GI clinic. [facility]  Gastroenterology   06/06/22    This note is created with the assistance of a speech recognition program.  While intending to generate a document that actually reflects the content of the visit, the document can still have some errors including those of syntax and sound a like substitutions which may escape proof reading. It such instances, actual meaning can be extrapolated by contextual diversion. The patient was counseled at length about the risks of jone Covid-19 during their perioperative period and any recovery window from their procedure. The patient was made aware that jone Covid-19  may worsen their prognosis for recovering from their procedure  and lend to a higher morbidity and/or mortality risk. All material risks, benefits, and reasonable alternatives including postponing the procedure were discussed. The patient DOES wish to proceed with the procedure at this time.         Electronically signed by Veronica Chase MD on 6/6/2022 at 9:35 AM

## 2022-06-06 NOTE — H&P
History and Physical    Pt Name: Simran Baltazar  MRN: [id number]  YOB: 1975  Date of evaluation: 6/6/2022  Primary Care Physician: Ari Bermeo DO    SUBJECTIVE:   History of Chief Complaint:    Simran Baltazar is a 48 y.o. male who is scheduled today for COLONOSCOPY DIAGNOSTIC. Patient reports he is here today for follow up from his previous colonoscopy in 2020 with findings of polyps. He denies any symptoms including diarrhea, constipation, rectal bleeding or abdominal pain. Allergies  has No Known Allergies. Medications  Prior to Admission medications    Medication Sig Start Date End Date Taking? Authorizing Provider   phentermine (ADIPEX-P) 37.5 MG tablet Take 1 tablet by mouth every morning (before breakfast) for 30 days.  5/27/22 6/26/22  Jesenia Miller DO   clonazePAM (KLONOPIN) 0.5 MG tablet take 1 tablet by mouth once daily if needed 3/31/22   Historical Provider, MD   eszopiclone (LUNESTA) 3 MG TABS take 1 tablet by mouth at bedtime 4/8/22   Historical Provider, MD   LATUDA 60 MG TABS tablet take 1 tablet by mouth once daily 4/1/22   Historical Provider, MD   TRINTELLIX 5 MG tablet take 1 tablet by mouth once daily 4/1/22   Historical Provider, MD   diclofenac (VOLTAREN) 75 MG EC tablet Take 1 tablet by mouth 2 times daily 4/26/22   Jesenia Miller DO   gabapentin (NEURONTIN) 800 MG tablet take 1 tablet by mouth three times a day 4/11/22 6/17/22  Jesenia Miller DO   polyethylene glycol (GAVILYTE-C) 240 g solution Take as directed for bowel prep/colonoscopy 3/22/22   Veronica Chase MD   polyethylene glycol [facility]) 17 GM/SCOOP powder Take as directed for bowel prep/colonoscopy 3/21/22   Veronica Chase MD   polyethylene glycol [facility]) 17 GM/SCOOP powder Take as directed for bowel prep/Colonoscopy 3/21/22   Veronica Chase MD   bisacodyl (BISACODYL) 5 MG EC tablet Take as directed for bowel prep/colonoscopy 3/21/22   Veronica Chase MD   sildenafil (REVATIO) 20 MG tablet Take 1 tablet by mouth daily as height is 5' 11\" (1.803 m) and weight is 337 lb (152.9 kg) (abnormal). His temperature is 98.1 °F (36.7 °C). His blood pressure is 130/97 (abnormal) and his pulse is 86. His respiration is 19 and oxygen saturation is 93%. CONSTITUTIONAL:alert & oriented x 3, no acute distress. Calm and pleasant. SKIN:  Warm and dry, no rashes to exposed areas of skin. HEAD:  Normocephalic, atraumatic. EYES: PERRL. EOMs intact. EARS:  Intact and equal bilaterally. No edema or thickening, without lumps, lesions, or discharge. Hearing grossly WNL. NOSE:  Nares patent. No rhinorrhea. MOUTH/THROAT:  Mucous membranes pink and moist, uvula midline, teeth appear to be intact. Large tongue. NECK: Supple, no lymphadenopathy. LUNGS: Respirations even and non-labored. Clear to auscultation bilaterally, no wheezes, rales, or rhonchi. CARDIOVASCULAR: Regular rate and rhythm, no murmurs/rubs/gallops. ABDOMEN: soft, non-tender and non-distended, bowel sounds active x 4. EXTREMITIES: No edema to bilateral lower extremities. No varicosities to bilateral lower extremities. NEUROLOGIC: CN II-XII are grossly intact. Gait not assessed. IMPRESSIONS:   SCREENING COLON, HISTORY OF POLYPS. PLANS:   COLONOSCOPY DIAGNOSTIC.     ROBERT Braun CNP   Electronically signed 6/6/2022 at 8:03 AM

## 2022-06-06 NOTE — ANESTHESIA PRE PROCEDURE
Department of Anesthesiology  Preprocedure Note       Name:  Abdirahman Funes   Age:  48 y.o.  :  1975                                          MRN:  [id number]         Date:  2022      Surgeon: Lloyd Hdez):  Peace Nayak MD    Procedure: Procedure(s):  COLONOSCOPY DIAGNOSTIC    Medications prior to admission:   Prior to Admission medications    Medication Sig Start Date End Date Taking? Authorizing Provider   phentermine (ADIPEX-P) 37.5 MG tablet Take 1 tablet by mouth every morning (before breakfast) for 30 days.  22  Jesenia Miller DO   clonazePAM (KLONOPIN) 0.5 MG tablet take 1 tablet by mouth once daily if needed 3/31/22   Historical Provider, MD   eszopiclone (LUNESTA) 3 MG TABS take 1 tablet by mouth at bedtime 22   Historical Provider, MD   LATUDA 60 MG TABS tablet take 1 tablet by mouth once daily 22   Historical Provider, MD   TRINTELLIX 5 MG tablet take 1 tablet by mouth once daily 22   Historical Provider, MD   diclofenac (VOLTAREN) 75 MG EC tablet Take 1 tablet by mouth 2 times daily 22   Jesenia Miller DO   gabapentin (NEURONTIN) 800 MG tablet take 1 tablet by mouth three times a day 22  Jesenia Miller DO   polyethylene glycol (GAVILYTE-C) 240 g solution Take as directed for bowel prep/colonoscopy 3/22/22   Peace Nayak MD   polyethylene glycol [facility]) 17 GM/SCOOP powder Take as directed for bowel prep/colonoscopy 3/21/22   Peace Nayak MD   polyethylene glycol [facility]) 17 GM/SCOOP powder Take as directed for bowel prep/Colonoscopy 3/21/22   Peace Nayak MD   bisacodyl (BISACODYL) 5 MG EC tablet Take as directed for bowel prep/colonoscopy 3/21/22   Peace Nayak MD   sildenafil (REVATIO) 20 MG tablet Take 1 tablet by mouth daily as needed (ED) 10/8/21   Jesenia Miller DO   carvedilol (COREG) 6.25 MG tablet Take 1 tablet by mouth 2 times daily 21   Jesenia Miller DO   gemfibrozil (LOPID) 600 MG tablet take 1 tablet by mouth twice a day 21 Jesenia Miller,        Current medications:    Current Facility-Administered Medications   Medication Dose Route Frequency Provider Last Rate Last Admin    0.9 % sodium chloride infusion   IntraVENous Continuous Sera Macdonald  mL/hr at 06/06/22 0747 New Bag at 06/06/22 0747       Allergies:  No Known Allergies    Problem List:    Patient Active Problem List   Diagnosis Code    Bilateral low back pain with bilateral sciatica M54.42, M54.41    Morbid obesity due to excess calories ([facility]) E66.01    Benign essential HTN S23    Acute systolic congestive heart failure ([facility]) I50.21    Secondary cardiomyopathy ([facility] I42.9    DVT (deep venous thrombosis) ([facility]) I82.409    Hyperglycemia R73.9    Rhabdomyolysis M62.82    Tubular adenoma of colon D12.6    Tubular adenoma of rectum D12.8    Hyperplastic colon polyp K63.5    Serrated adenoma of colon D12.6    Polyp of colon K63.5    Tracheostomy status ([facility] Z93.0    Combined opioid with non-opioid drug dependence, with abuse ([facility] F19.20    Morbid obesity with BMI of 40.0-44.9, adult ([facility] E66.01, Z68.41    History of psychiatric disorder Z86.59    Hx of drug abuse ([facility] F19.11    Major depressive disorder, recurrent, mild F33.0    Major depressive disorder, recurrent, moderate F33.1    Major depressive disorder, recurrent, unspecified F33.9    Polycythemia D75.1       Past Medical History:        Diagnosis Date    Anxiety     Arthritis     back    Back pain     Bipolar 1 disorder ([facility]     COPD (chronic obstructive pulmonary disease) ([facility]     Dvt femoral (deep venous thrombosis) ([facility]     Hx of blood clots     2017    Hypertension     Morbid obesity due to excess calories ([address]     CHRIS (obstructive sleep apnea)     Pneumonia 2017    DUE TO OTHER AEROBIC GRAM-NEGATIVE BACTERIA, UNSPECIFIED LATERALITY, UNSPECIFIED PART OF LUNG     Respiratory failure ([address] 2017    Smoking        Past Surgical History:        Procedure Laterality Date    ABDOMEN SURGERY      PEG tube 2017    COLONOSCOPY N/A 2019    COLONOSCOPY POLYPECTOMY HOT BIOPSY performed by Zamzam Ott MD at [facility] COLONOSCOPY N/A 2020    COLONOSCOPY POLYPECTOMY SNARE/COLD BIOPSY performed by Zamzam Ott MD at [address] [place] [address]  2017       Social History:    Social History     Tobacco Use    Smoking status: Current Every Day Smoker     Packs/day: 0.50     Types: Cigarettes     Last attempt to quit: 9/3/2017     Years since quittin.7    Smokeless tobacco: Never Used    Tobacco comment: one PPD   Substance Use Topics    Alcohol use: Not Currently     Comment: social                                Ready to quit: Not Answered  Counseling given: Not Answered  Comment: one PPD      Vital Signs (Current):   Vitals:    22 0735   BP: (!) 130/97   Pulse: 86   Resp: 19   Temp: 98.1 °F (36.7 °C)   SpO2: 93%   Weight: (!) 337 lb (152.9 kg)   Height: 5' 11\" (1.803 m)                                              BP Readings from Last 3 Encounters:   22 (!) 130/97   22 133/86   22 (!) 133/98       NPO Status: Time of last liquid consumption:                         Time of last solid consumption:                         Date of last liquid consumption: 22                        Date of last solid food consumption: 22    BMI:   Wt Readings from Last 3 Encounters:   22 (!) 337 lb (152.9 kg)   22 (!) 338 lb 6.4 oz (153.5 kg)   22 (!) 340 lb 8 oz (154.4 kg)     Body mass index is 47 kg/m².     CBC:   Lab Results   Component Value Date    WBC 10.1 2022    RBC 6.23 2022    HGB 18.7 2022    HCT 57.1 2022    MCV 91.7 2022    RDW 13.2 2022     2022       CMP:   Lab Results   Component Value Date     2022    K 4.7 2022     2022    CO2 25 2022    BUN 14 2022    CREATININE 0.75 2022    GFRAA >60 2022    LABGLOM >60 2022

## 2022-06-07 LAB — SURGICAL PATHOLOGY REPORT: NORMAL

## 2022-06-27 ENCOUNTER — OFFICE VISIT (OUTPATIENT)
Dept: GASTROENTEROLOGY | Age: 47
End: 2022-06-27
Payer: COMMERCIAL

## 2022-06-27 ENCOUNTER — OFFICE VISIT (OUTPATIENT)
Dept: FAMILY MEDICINE CLINIC | Age: 47
End: 2022-06-27
Payer: COMMERCIAL

## 2022-06-27 VITALS
HEART RATE: 80 BPM | WEIGHT: 315 LBS | BODY MASS INDEX: 44.1 KG/M2 | HEIGHT: 71 IN | SYSTOLIC BLOOD PRESSURE: 122 MMHG | DIASTOLIC BLOOD PRESSURE: 94 MMHG | OXYGEN SATURATION: 97 %

## 2022-06-27 VITALS
SYSTOLIC BLOOD PRESSURE: 123 MMHG | WEIGHT: 315 LBS | DIASTOLIC BLOOD PRESSURE: 80 MMHG | BODY MASS INDEX: 44.1 KG/M2 | HEART RATE: 87 BPM | HEIGHT: 71 IN

## 2022-06-27 DIAGNOSIS — R89.7 ABNORMAL BIOPSY RESULT: ICD-10-CM

## 2022-06-27 DIAGNOSIS — E34.9 HYPOTESTOSTERONISM: ICD-10-CM

## 2022-06-27 DIAGNOSIS — R10.13 DYSPEPSIA: ICD-10-CM

## 2022-06-27 DIAGNOSIS — D12.6 ADENOMATOUS POLYP OF COLON, UNSPECIFIED PART OF COLON: Primary | ICD-10-CM

## 2022-06-27 DIAGNOSIS — E66.01 CLASS 3 SEVERE OBESITY DUE TO EXCESS CALORIES WITH SERIOUS COMORBIDITY AND BODY MASS INDEX (BMI) OF 50.0 TO 59.9 IN ADULT (HCC): ICD-10-CM

## 2022-06-27 DIAGNOSIS — E78.1 HYPERTRIGLYCERIDEMIA: Primary | ICD-10-CM

## 2022-06-27 PROCEDURE — G8427 DOCREV CUR MEDS BY ELIG CLIN: HCPCS | Performed by: FAMILY MEDICINE

## 2022-06-27 PROCEDURE — G8417 CALC BMI ABV UP PARAM F/U: HCPCS | Performed by: FAMILY MEDICINE

## 2022-06-27 PROCEDURE — 99213 OFFICE O/P EST LOW 20 MIN: CPT | Performed by: INTERNAL MEDICINE

## 2022-06-27 PROCEDURE — G8428 CUR MEDS NOT DOCUMENT: HCPCS | Performed by: INTERNAL MEDICINE

## 2022-06-27 PROCEDURE — 4004F PT TOBACCO SCREEN RCVD TLK: CPT | Performed by: FAMILY MEDICINE

## 2022-06-27 PROCEDURE — 4004F PT TOBACCO SCREEN RCVD TLK: CPT | Performed by: INTERNAL MEDICINE

## 2022-06-27 PROCEDURE — 99214 OFFICE O/P EST MOD 30 MIN: CPT | Performed by: FAMILY MEDICINE

## 2022-06-27 PROCEDURE — G8417 CALC BMI ABV UP PARAM F/U: HCPCS | Performed by: INTERNAL MEDICINE

## 2022-06-27 RX ORDER — FAMOTIDINE 20 MG/1
20 TABLET, FILM COATED ORAL DAILY
Qty: 60 TABLET | Refills: 3 | Status: SHIPPED | OUTPATIENT
Start: 2022-06-27

## 2022-06-27 RX ORDER — PHENTERMINE HYDROCHLORIDE 37.5 MG/1
37.5 TABLET ORAL
Qty: 30 TABLET | Refills: 0 | Status: SHIPPED | OUTPATIENT
Start: 2022-06-27 | End: 2022-07-27

## 2022-06-27 RX ORDER — HYDROCORTISONE 25 MG/G
CREAM TOPICAL
Qty: 28 G | Refills: 0 | Status: SHIPPED | OUTPATIENT
Start: 2022-06-27 | End: 2022-08-25

## 2022-06-27 RX ORDER — SILDENAFIL 100 MG/1
100 TABLET, FILM COATED ORAL DAILY PRN
Qty: 5 TABLET | Refills: 12 | Status: SHIPPED | OUTPATIENT
Start: 2022-06-27

## 2022-06-27 RX ORDER — PHENTERMINE HYDROCHLORIDE 37.5 MG/1
37.5 TABLET ORAL
Qty: 30 TABLET | Refills: 0 | Status: SHIPPED | OUTPATIENT
Start: 2022-06-27 | End: 2022-06-27 | Stop reason: SDUPTHER

## 2022-06-27 RX ORDER — DOCUSATE SODIUM 100 MG/1
100 CAPSULE, LIQUID FILLED ORAL 2 TIMES DAILY PRN
Qty: 60 CAPSULE | Refills: 0 | Status: SHIPPED | OUTPATIENT
Start: 2022-06-27 | End: 2022-07-27

## 2022-06-27 RX ORDER — ICOSAPENT ETHYL 1000 MG/1
2 CAPSULE ORAL 2 TIMES DAILY
Qty: 360 CAPSULE | Refills: 5 | Status: SHIPPED | OUTPATIENT
Start: 2022-06-27 | End: 2022-08-25

## 2022-06-27 ASSESSMENT — ENCOUNTER SYMPTOMS
BLOOD IN STOOL: 0
COUGH: 0
SORE THROAT: 0
CONSTIPATION: 1
ABDOMINAL PAIN: 0
VOICE CHANGE: 0
COLOR CHANGE: 0
RECTAL PAIN: 0
ANAL BLEEDING: 0
TROUBLE SWALLOWING: 0
DIARRHEA: 0
ABDOMINAL DISTENTION: 0
WHEEZING: 0
VOMITING: 0
NAUSEA: 0
APNEA: 0
SHORTNESS OF BREATH: 1
CHOKING: 0

## 2022-06-27 ASSESSMENT — PATIENT HEALTH QUESTIONNAIRE - PHQ9
2. FEELING DOWN, DEPRESSED OR HOPELESS: 0
SUM OF ALL RESPONSES TO PHQ QUESTIONS 1-9: 0
SUM OF ALL RESPONSES TO PHQ QUESTIONS 1-9: 0
1. LITTLE INTEREST OR PLEASURE IN DOING THINGS: 0
SUM OF ALL RESPONSES TO PHQ9 QUESTIONS 1 & 2: 0
SUM OF ALL RESPONSES TO PHQ QUESTIONS 1-9: 0
SUM OF ALL RESPONSES TO PHQ QUESTIONS 1-9: 0

## 2022-06-27 NOTE — PROGRESS NOTES
[facility]  [address] [place] [address] [address] [facility] [address]  Dept: [phone]      Starla Denney is a 48 y.o. male who presents today for follow up on his  medical conditions as noted below.       Chief Complaint   Patient presents with    Weight Loss       Patient Active Problem List:     Bilateral low back pain with bilateral sciatica     Morbid obesity due to excess calories ([facility])     Benign essential HTN     Acute systolic congestive heart failure ([address]     Secondary cardiomyopathy ([address]     DVT (deep venous thrombosis) ([address]     Hyperglycemia     Rhabdomyolysis     Screening for colorectal cancer     Tubular adenoma of colon     Rectal polyp     Hyperplastic colon polyp     Serrated adenoma of colon     Polyp of colon     Tracheostomy status ([address]     Combined opioid with non-opioid drug dependence, with abuse ([address]     Morbid obesity with BMI of 40.0-44.9, adult ([address]     History of psychiatric disorder     Hx of drug abuse ([address]     Major depressive disorder, recurrent, mild     Major depressive disorder, recurrent, moderate     Major depressive disorder, recurrent, unspecified     Polycythemia     History of colon polyps     Past Medical History:   Diagnosis Date    Anxiety     Arthritis     back    Back pain     Bipolar 1 disorder ([address]     COPD (chronic obstructive pulmonary disease) ([address]     Dvt femoral (deep venous thrombosis) ([address]     Hx of blood clots     2017    Hypertension     Morbid obesity due to excess calories ([address]     CHRIS (obstructive sleep apnea)     Pneumonia 2017    DUE TO OTHER AEROBIC GRAM-NEGATIVE BACTERIA, UNSPECIFIED LATERALITY, UNSPECIFIED PART OF LUNG     Respiratory failure ([address] 2017    Smoking       Past Surgical History:   Procedure Laterality Date    ABDOMEN SURGERY      PEG tube 2017    COLONOSCOPY N/A 12/12/2019    COLONOSCOPY POLYPECTOMY HOT BIOPSY performed by John Stein MD at [facility] COLONOSCOPY N/A 6/2/2020    COLONOSCOPY POLYPECTOMY SNARE/COLD BIOPSY performed by Ramiro Petit MD at [address] [place] [address] COLONOSCOPY N/A 6/6/2022    COLONOSCOPY POLYPECTOMY SNARE/COLD BIOPSY performed by Ramiro Petit MD at [facility]  6/6/2022    COLONOSCOPY POLYPECTOMY HOT BIOPSY performed by Ramiro Petit MD at [facility]    COLONOSCOPY  6/6/2022    COLONOSCOPY WITH BIOPSY performed by Ramiro Petit MD at [address] [place] [address]     Family History   Problem Relation Age of Onset    Asthma Mother     COPD Mother     Hypothyroidism Mother     No Known Problems Father        Current Outpatient Medications   Medication Sig Dispense Refill    sildenafil (VIAGRA) 100 MG tablet Take 1 tablet by mouth daily as needed for Erectile Dysfunction 5 tablet 12    VASCEPA 1 g CAPS capsule Take 2 capsules by mouth 2 times daily 360 capsule 5    phentermine (ADIPEX-P) 37.5 MG tablet Take 1 tablet by mouth every morning (before breakfast) for 30 days. 30 tablet 0    clonazePAM (KLONOPIN) 0.5 MG tablet 1 mg.        eszopiclone (LUNESTA) 3 MG TABS take 1 tablet by mouth at bedtime      LATUDA 60 MG TABS tablet 120 mg       TRINTELLIX 5 MG tablet 10 mg       gabapentin (NEURONTIN) 800 MG tablet take 1 tablet by mouth three times a day 90 tablet 2    carvedilol (COREG) 6.25 MG tablet Take 1 tablet by mouth 2 times daily 180 tablet 3    gemfibrozil (LOPID) 600 MG tablet take 1 tablet by mouth twice a day 180 tablet 3    famotidine (PEPCID) 20 MG tablet Take 1 tablet by mouth daily 60 tablet 3    hydrocortisone (ANUSOL-HC) 2.5 % CREA rectal cream Apply topically 28 g 0    docusate sodium (COLACE) 100 MG capsule Take 1 capsule by mouth 2 times daily as needed for Constipation 60 capsule 0    diclofenac (VOLTAREN) 75 MG EC tablet Take 1 tablet by mouth 2 times daily 60 tablet 3    sildenafil (REVATIO) 20 MG tablet Take 1 tablet by mouth daily as needed (ED) 5 tablet 11     No current facility-administered medications for this visit. ALLERGIES:  No Known Allergies    Social History     Tobacco Use    Smoking status: Current Every Day Smoker     Packs/day: 0.50     Types: Cigarettes     Last attempt to quit: 9/3/2017     Years since quittin.8    Smokeless tobacco: Never Used    Tobacco comment: one PPD   Substance Use Topics    Alcohol use: Not Currently     Comment: social        LDL Cholesterol (mg/dL)   Date Value   2022 117     HDL (mg/dL)   Date Value   2022 51     BUN (mg/dL)   Date Value   2022 14     CREATININE (mg/dL)   Date Value   2022 0.75     Glucose (mg/dL)   Date Value   2022 99     Hemoglobin A1C (%)   Date Value   10/21/2019 5.2              Subjective:      HPI  Is here today with several issues he states that he has I am able to get his triglycerides under control with the Lopid despite watching weight and wonders of what else he can take  He wants increase in Viagra 100 mg  And he is also here for weight check    Review of Systems:     Constitutional: Negative for fever, appetite change and fatigue. Family social and medical history reviewed and unchanged     HENT: Negative. Negative for nosebleeds, trouble swallowing and neck pain. Eyes: Negative for photophobia and visual disturbance. Respiratory: Negative. Negative for chest tightness and shortness of breath. Cardiovascular: Negative. Negative for chest pain and leg swelling. Gastrointestinal: Negative. Negative for abdominal pain and blood in stool. Endocrine: Negative for cold intolerance and polyuria. Genitourinary: Negative for dysuria and hematuria. Musculoskeletal: Negative. Skin: Negative for rash. Allergic/Immunologic: Negative. Neurological: Negative. Negative for dizziness, weakness and numbness. Hematological: Negative. Negative for adenopathy. Does not bruise/bleed easily.    Psychiatric/Behavioral: Negative for sleep disturbance, dysphoric mood and  decreased concentration. The patient is not nervous/anxious. Objective:     Physical Exam:     Nursing note and vitals reviewed. BP (!) 122/94   Pulse 80   Ht 5' 11\" (1.803 m)   Wt (!) 329 lb 9.6 oz (149.5 kg)   SpO2 97%   BMI 45.97 kg/m²   Constitutional: He is oriented to person, place, and time. He   appears well-developed and well-nourished. HENT:   Head: Normocephalic and atraumatic. Right Ear: External ear normal. Tympanic membrane is not erythematous. No middle ear effusion. Left Ear: External ear normal. Tympanic membrane is not erythematous. No middle ear effusion. Nose: No mucosal edema. Mouth/Throat: Oropharynx is clear and moist. No posterior oropharyngeal erythema. Eyes: Conjunctivae and EOM are normal. Pupils are equal, round, and reactive to light. Neck: Normal range of motion. Neck supple. No thyromegaly present. Cardiovascular: Normal rate, regular rhythm and normal heart sounds. No murmur heard. Pulmonary/Chest: Effort normal and breath sounds normal. He has no wheezes. Hehas no rales. Abdominal: Soft. Bowel sounds are normal. He exhibits no distension and no mass. There is no tenderness. There is no rebound and no guarding. Genitourinary/Anorectal:deferred  Musculoskeletal: Normal range of motion. He exhibits no edema or tenderness. Lymphadenopathy: He has no cervical adenopathy. Neurological: He is alert and oriented to person, place, and time. He has normal reflexes. Skin: Skin is warm and dry. No rash noted. Psychiatric: He has a normal mood and affect. His   behavior is normal.       Assessment:      1. Hypertriglyceridemia    2. Hypotestosteronism    3. Class 3 severe obesity due to excess calories with serious comorbidity and body mass index (BMI) of 50.0 to 59.9 in adult ([facility]    4. Abnormal biopsy result          Plan:      Call or return to clinic prn if these symptoms worsen or fail to improve as anticipated.   I have reviewed the instructions with the patient, answering all questions to his satisfaction. No follow-ups on file. No orders of the defined types were placed in this encounter. Orders Placed This Encounter   Medications    sildenafil (VIAGRA) 100 MG tablet     Sig: Take 1 tablet by mouth daily as needed for Erectile Dysfunction     Dispense:  5 tablet     Refill:  12    VASCEPA 1 g CAPS capsule     Sig: Take 2 capsules by mouth 2 times daily     Dispense:  360 capsule     Refill:  5    DISCONTD: phentermine (ADIPEX-P) 37.5 MG tablet     Sig: Take 1 tablet by mouth every morning (before breakfast) for 30 days. Dispense:  30 tablet     Refill:  0    phentermine (ADIPEX-P) 37.5 MG tablet     Sig: Take 1 tablet by mouth every morning (before breakfast) for 30 days.      Dispense:  30 tablet     Refill:  0     Continue on all meds sent [facility] increase Viagra follow calorie diet and exercise  Electronically signed by Eloise Goodman DO on 6/27/2022 at 4:01 PM

## 2022-06-27 NOTE — PROGRESS NOTES
GI FOLLOW UP    INTERVAL HISTORY:     Colon polyps  Status post recent colonoscopy  Rectal bleeding likely related to hemorrhoids    Chief Complaint   Patient presents with    Follow-up    Colonoscopy       1. Adenomatous polyp of colon, unspecified part of colon    2. Dyspepsia          HISTORY OF PRESENT ILLNESS: FADI Petersen is a 53 y. o. male with a pasthistory remarkable for anxiety, Bipolar, on wellbutrin, Trazadone, Klonopin, and abilify, morbid obesity, DVT, off AC, HTN, back pain, CHRIS, prior history of complicated PNA, prior trach, 2017, referred for evaluation of intermittent rectal bleeding (ongoing x 6 months). S/p colonoscopy to identified TA and rectal hemorrhoids, internal and external, likely source of recurrent intermittent bleeding.     Prior colonoscopy with tubular adenomas- patient is due for colonoscopy in 1 month, had a poor bowel prep in January 2020.  Findings were discussed with the patient again. Past Medical,Family, and Social History reviewed and does contribute to the patient presenting condition. Patient's PMH/PSH,SH,PSYCH Hx, MEDs, ALLERGIES, and ROS were all reviewed and updated in the appropriate sections.     PAST MEDICAL HISTORY:  Past Medical History:   Diagnosis Date    Anxiety     Arthritis     back    Back pain     Bipolar 1 disorder ([address]     COPD (chronic obstructive pulmonary disease) (HCC)     Dvt femoral (deep venous thrombosis) (HCC)     Hx of blood clots     2017    Hypertension     Morbid obesity due to excess calories ([address]     CHRIS (obstructive sleep apnea)     Pneumonia 2017    DUE TO OTHER AEROBIC GRAM-NEGATIVE BACTERIA, UNSPECIFIED LATERALITY, UNSPECIFIED PART OF LUNG     Respiratory failure ([address] 2017    Smoking        Past Surgical History:   Procedure Laterality Date    ABDOMINAL SURGERY      PEG tube 2017    COLONOSCOPY N/A SOCIAL HISTORY:   Social History     Socioeconomic History    Marital status: Single     Spouse name: Not on file    Number of children: Not on file    Years of education: Not on file    Highest education level: Not on file   Occupational History    Not on file   Tobacco Use    Smoking status: Current Every Day Smoker     Packs/day: 0.50     Types: Cigarettes     Last attempt to quit: 9/3/2017     Years since quittin.8    Smokeless tobacco: Never Used    Tobacco comment: one PPD   Vaping Use    Vaping Use: Former   Substance and Sexual Activity    Alcohol use: Not Currently     Comment: social    Drug use: No    Sexual activity: Not on file   Other Topics Concern    Not on file   Social History Narrative    Not on file     Social Determinants of Health     Financial Resource Strain: High Risk    Difficulty of Paying Living Expenses: Hard   Food Insecurity: No Food Insecurity    Worried About Running Out of Food in the Last Year: Never true    Swathi of Food in the Last Year: Never true   Transportation Needs:     Lack of Transportation (Medical): Not on file    Lack of Transportation (Non-Medical):  Not on file   Physical Activity:     Days of Exercise per Week: Not on file    Minutes of Exercise per Session: Not on file   Stress:     Feeling of Stress : Not on file   Social Connections:     Frequency of Communication with Friends and Family: Not on file    Frequency of Social Gatherings with Friends and Family: Not on file    Attends Zoroastrian Services: Not on file    Active Member of Clubs or Organizations: Not on file    Attends Club or Organization Meetings: Not on file    Marital Status: Not on file   Intimate Partner Violence:     Fear of Current or Ex-Partner: Not on file    Emotionally Abused: Not on file    Physically Abused: Not on file    Sexually Abused: Not on file   Housing Stability:     Unable to Pay for Housing in the Last Year: Not on file    Number of Places Lived in the Last Year: Not on file    Unstable Housing in the Last Year: Not on file       REVIEW OF SYSTEMS: A 12-point review of systems was obtained and pertinent positives and negatives were listed below. REVIEW OF SYSTEMS:     Constitutional: No fever, no chills, no lethargy, no weakness. HEENT:  No headache, otalgia, itchy eyes, nasal discharge or sore throat. Cardiac:  No chest pain, dyspnea, orthopnea or PND. Chest:   No cough, phlegm or wheezing. Abdomen:      Detailed by MA   Neuro:  No focal weakness, abnormal movements or seizure like activity. Skin:   No rashes, no itching. :   No hematuria, no pyuria, no dysuria, no flank pain. Extremities:  No swelling or joint pains. ROS was otherwise negative    Review of Systems   Constitutional: Positive for fatigue. Negative for appetite change, fever and unexpected weight change. HENT: Negative for sore throat, trouble swallowing and voice change. Eyes: Negative for visual disturbance. Respiratory: Positive for shortness of breath (last night, bringing stuff up). Negative for apnea, cough, choking and wheezing. Cardiovascular: Positive for palpitations. Negative for chest pain and leg swelling. Gastrointestinal: Positive for constipation (4 to 5 days). Negative for abdominal distention, abdominal pain, anal bleeding, blood in stool, diarrhea, nausea, rectal pain and vomiting. Genitourinary: Negative for difficulty urinating. Skin: Negative for color change and rash. Neurological: Positive for weakness. Negative for dizziness, seizures, light-headedness, numbness and headaches. Hematological: Does not bruise/bleed easily. Psychiatric/Behavioral: Positive for sleep disturbance. Negative for confusion. The patient is nervous/anxious. PHYSICAL EXAMINATION: Vital signs reviewed per the nursing documentation.      /80   Pulse 87   Ht 5' 11\" (1.803 m)   Wt (!) 334 lb (151.5 kg)   BMI 46.58 kg/m²   Body mass index is 46.58 kg/m². Physical Exam    Physical Exam   Constitutional: Patient is oriented to person, place, and time. Patient appears well-developed and well-nourished. HENT:   Head: Normocephalic and atraumatic. Eyes: Pupils are equal, round, and reactive to light. EOM are normal.   Neck: Normal range of motion. Neck supple. No JVD present. No tracheal deviation present. No thyromegaly present. Cardiovascular: Normal rate, regular rhythm, normal heart sounds and intact distal pulses. Pulmonary/Chest: Effort normal and breath sounds normal. No stridor. No respiratory distress. He has no wheezes. He has no rales. He exhibits no tenderness. Abdominal: Soft. Bowel sounds are normal. He exhibits no distension and no mass. There is no tenderness. There is no rebound and no guarding. No hernia. Musculoskeletal: Normal range of motion. Lymphadenopathy:    Patient has no cervical adenopathy. Neurological: Patient is alert and oriented to person, place, and time. Psychiatric: Patient has a normal mood and affect.  Patient behavior is normal.       LABORATORY DATA: Reviewed  Lab Results   Component Value Date    WBC 10.1 05/19/2022    HGB 18.7 (H) 05/19/2022    HCT 57.1 (H) 05/19/2022    MCV 91.7 05/19/2022     05/19/2022     04/29/2022    K 4.7 04/29/2022     04/29/2022    CO2 25 04/29/2022    BUN 14 04/29/2022    CREATININE 0.75 04/29/2022    LABALBU 4.2 04/29/2022    BILITOT 0.64 04/29/2022    ALKPHOS 86 04/29/2022    AST 20 04/29/2022    ALT 19 04/29/2022    INR 1.0 10/16/2017         Lab Results   Component Value Date    RBC 6.23 (H) 05/19/2022    HGB 18.7 (H) 05/19/2022    MCV 91.7 05/19/2022    MCH 30.0 05/19/2022    MCHC 32.7 05/19/2022    RDW 13.2 05/19/2022    MPV 11.4 05/19/2022    BASOPCT 1 05/19/2022    LYMPHSABS 1.85 05/19/2022    MONOSABS 0.80 05/19/2022    NEUTROABS 7.13 05/19/2022    EOSABS 0.22 05/19/2022    BASOSABS 0.08 05/19/2022         DIAGNOSTIC TESTING:     No results found. IMPRESSION: 43-year-old male with a history of bipolar disorder, on psychiatric medication, morbid obesity, hypertension, CHRIS, rectal bleeding secondary to internal/external hemorrhoids, prior colonoscopy which identified tubular adenomas and sessile serrated adenomas (see operative report).  Repeat colonoscopy performed with finding discussed with patient, patient was identified to have 3 additional small polyps. Status post repeat colonoscopy with the patient was identified to have subcentimeter polyps that were removed      Assessment  1. Adenomatous polyp of colon, unspecified part of colon    2. Dyspepsia        Amanda Carvalho was seen today for follow-up and colonoscopy. Diagnoses and all orders for this visit:    Adenomatous polyp of colon, unspecified part of colon-repeat colonoscopy in 5 years based on histopathological recent colonoscopy findings. Dyspepsia-likely GERD related, will continue with Prilosec and dietary modifications recommended. RTC: 3 months. Additional comments: Thank you for allowing me to participate in the care of Mr. Vin Duran. For any further questions please do not hesitate to contact me. I have reviewed and agree with the ROS entered by the MA/LPN from today's encounter documented in a separate note. Becca Salgado MD, MPH   Board Certified in Gastroenterology  Board Certified in [address] [place] [address] #: [phone]    this note is created with the assistance of a speech recognition program.  While intending to generate a document that actually reflects the content of the visit, the document can still have some errors including those of syntax and sound a like substitutions which may escape proof reading. It such instances, actual meaning can be extrapolated by contextual diversion.

## 2022-07-01 DIAGNOSIS — M54.50 LOW BACK PAIN WITHOUT SCIATICA, UNSPECIFIED BACK PAIN LATERALITY, UNSPECIFIED CHRONICITY: ICD-10-CM

## 2022-07-01 RX ORDER — GABAPENTIN 800 MG/1
TABLET ORAL
Qty: 90 TABLET | Refills: 2 | Status: SHIPPED | OUTPATIENT
Start: 2022-07-01 | End: 2022-09-29

## 2022-07-01 NOTE — TELEPHONE ENCOUNTER
Ramirez Stone is calling to request a refill on the following medication(s):    Last Visit Date (If Applicable):  5/25/5643    Next Visit Date:    Visit date not found    Medication Request:  Requested Prescriptions     Pending Prescriptions Disp Refills    gabapentin (NEURONTIN) 800 MG tablet 90 tablet 2     Sig: take 1 tablet by mouth three times a day

## 2022-07-26 ENCOUNTER — HOSPITAL ENCOUNTER (OUTPATIENT)
Facility: CLINIC | Age: 47
Discharge: HOME | End: 2022-07-26
Payer: COMMERCIAL

## 2022-07-26 LAB
ESTRADIOL LEVEL: 55.7 PG/ML (ref 27–52)
HCT VFR BLD CALC: 56.4 % (ref 40.7–50.3)
HEMOGLOBIN: 18.9 G/DL (ref 13–17)
MCH RBC QN AUTO: 30.5 PG (ref 25.2–33.5)
MCHC RBC AUTO-ENTMCNC: 33.5 G/DL (ref 28.4–34.8)
MCV RBC AUTO: 91 FL (ref 82.6–102.9)
NRBC AUTOMATED: 0 PER 100 WBC
PDW BLD-RTO: 13.3 % (ref 11.8–14.4)
PLATELET # BLD: 189 K/UL (ref 138–453)
PMV BLD AUTO: 11.6 FL (ref 8.1–13.5)
RBC # BLD: 6.2 M/UL (ref 4.21–5.77)
WBC # BLD: 8.9 K/UL (ref 3.5–11.3)

## 2022-07-26 PROCEDURE — 84403 ASSAY OF TOTAL TESTOSTERONE: CPT

## 2022-07-26 PROCEDURE — G0103 PSA SCREENING: HCPCS

## 2022-07-26 PROCEDURE — 85027 COMPLETE CBC AUTOMATED: CPT

## 2022-07-26 PROCEDURE — 82670 ASSAY OF TOTAL ESTRADIOL: CPT

## 2022-07-26 PROCEDURE — 84270 ASSAY OF SEX HORMONE GLOBUL: CPT

## 2022-07-26 PROCEDURE — 36415 COLL VENOUS BLD VENIPUNCTURE: CPT

## 2022-07-27 LAB
PROSTATE SPECIFIC ANTIGEN: 0.14 NG/ML
SEX HORMONE BINDING GLOBULIN: 35 NMOL/L (ref 11–80)
TESTOSTERONE FREE-NONMALE: 20.9 PG/ML (ref 47–244)
TESTOSTERONE TOTAL: 117 NG/DL (ref 220–1000)

## 2022-08-08 RX ORDER — GEMFIBROZIL 600 MG/1
TABLET, FILM COATED ORAL
Qty: 180 TABLET | Refills: 3 | Status: SHIPPED | OUTPATIENT
Start: 2022-08-08

## 2022-08-08 NOTE — TELEPHONE ENCOUNTER
Mony Chambers is calling to request a refill on the following medication(s):    Last Visit Date (If Applicable):  4/25/4257    Next Visit Date:    Visit date not found    Medication Request:  Requested Prescriptions     Pending Prescriptions Disp Refills    gemfibrozil (LOPID) 600 MG tablet [Pharmacy Med Name: GEMFIBROZIL 600 MG TABLET] 180 tablet 3     Sig: take 1 tablet by mouth twice a day

## 2022-08-23 ENCOUNTER — HOSPITAL ENCOUNTER (OUTPATIENT)
Facility: MEDICAL CENTER | Age: 47
End: 2022-08-23
Payer: COMMERCIAL

## 2022-08-23 DIAGNOSIS — D75.1 POLYCYTHEMIA: Primary | ICD-10-CM

## 2022-08-25 ENCOUNTER — OFFICE VISIT (OUTPATIENT)
Dept: ONCOLOGY | Age: 47
End: 2022-08-25
Payer: COMMERCIAL

## 2022-08-25 ENCOUNTER — HOSPITAL ENCOUNTER (OUTPATIENT)
Facility: MEDICAL CENTER | Age: 47
Discharge: HOME | End: 2022-08-25
Payer: COMMERCIAL

## 2022-08-25 ENCOUNTER — TELEPHONE (OUTPATIENT)
Dept: ONCOLOGY | Age: 47
End: 2022-08-25

## 2022-08-25 VITALS
HEART RATE: 80 BPM | SYSTOLIC BLOOD PRESSURE: 114 MMHG | TEMPERATURE: 97.2 F | WEIGHT: 315 LBS | DIASTOLIC BLOOD PRESSURE: 80 MMHG | RESPIRATION RATE: 18 BRPM | BODY MASS INDEX: 45.24 KG/M2

## 2022-08-25 DIAGNOSIS — D75.1 POLYCYTHEMIA: ICD-10-CM

## 2022-08-25 DIAGNOSIS — D75.1 POLYCYTHEMIA: Primary | ICD-10-CM

## 2022-08-25 LAB
ABSOLUTE EOS #: 0.26 K/UL (ref 0–0.44)
ABSOLUTE IMMATURE GRANULOCYTE: 0.09 K/UL (ref 0–0.3)
ABSOLUTE LYMPH #: 1.92 K/UL (ref 1.1–3.7)
ABSOLUTE MONO #: 1.03 K/UL (ref 0.1–1.2)
BASOPHILS # BLD: 1 % (ref 0–2)
BASOPHILS ABSOLUTE: 0.09 K/UL (ref 0–0.2)
EOSINOPHILS RELATIVE PERCENT: 2 % (ref 1–4)
HCT VFR BLD CALC: 57.8 % (ref 40.7–50.3)
HEMOGLOBIN: 18.7 G/DL (ref 13–17)
IMMATURE GRANULOCYTES: 1 %
LYMPHOCYTES # BLD: 17 % (ref 24–43)
MCH RBC QN AUTO: 29.6 PG (ref 25.2–33.5)
MCHC RBC AUTO-ENTMCNC: 32.4 G/DL (ref 28.4–34.8)
MCV RBC AUTO: 91.6 FL (ref 82.6–102.9)
MONOCYTES # BLD: 9 % (ref 3–12)
NRBC AUTOMATED: 0 PER 100 WBC
PDW BLD-RTO: 14.7 % (ref 11.8–14.4)
PLATELET # BLD: 193 K/UL (ref 138–453)
PMV BLD AUTO: 10.6 FL (ref 8.1–13.5)
RBC # BLD: 6.31 M/UL (ref 4.21–5.77)
RBC # BLD: ABNORMAL 10*6/UL
SEG NEUTROPHILS: 70 % (ref 36–65)
SEGMENTED NEUTROPHILS ABSOLUTE COUNT: 8.24 K/UL (ref 1.5–8.1)
WBC # BLD: 11.6 K/UL (ref 3.5–11.3)

## 2022-08-25 PROCEDURE — 85025 COMPLETE CBC W/AUTO DIFF WBC: CPT

## 2022-08-25 PROCEDURE — G8427 DOCREV CUR MEDS BY ELIG CLIN: HCPCS | Performed by: INTERNAL MEDICINE

## 2022-08-25 PROCEDURE — 36415 COLL VENOUS BLD VENIPUNCTURE: CPT

## 2022-08-25 PROCEDURE — 99214 OFFICE O/P EST MOD 30 MIN: CPT | Performed by: INTERNAL MEDICINE

## 2022-08-25 PROCEDURE — 4004F PT TOBACCO SCREEN RCVD TLK: CPT | Performed by: INTERNAL MEDICINE

## 2022-08-25 PROCEDURE — 99211 OFF/OP EST MAY X REQ PHY/QHP: CPT | Performed by: INTERNAL MEDICINE

## 2022-08-25 PROCEDURE — G8417 CALC BMI ABV UP PARAM F/U: HCPCS | Performed by: INTERNAL MEDICINE

## 2022-08-25 RX ORDER — TESTOSTERONE CYPIONATE 200 MG/ML
VIAL (ML) INTRAMUSCULAR
COMMUNITY
Start: 2022-07-25

## 2022-08-25 RX ORDER — PALIPERIDONE 3 MG/1
3 TABLET, EXTENDED RELEASE ORAL EVERY MORNING
COMMUNITY

## 2022-08-25 NOTE — TELEPHONE ENCOUNTER
Kelley Regalado MD VISIT  Therapeutic phlebotomy monthly   RV 3 months with cbc at RV  PHLEBOTOMY IS ON 9/6/22 @ 2PM  LABS CDP 11/10/22  MD VISIT 11/10/22 @ 10AM  AVS PRINTED W/ INSTRUCTIONS AND GIVEN TO PT ON EXIT

## 2022-08-25 NOTE — PROGRESS NOTES
_        Chief Complaint   Patient presents with    Follow-up    Discuss Labs     DIAGNOSIS:        Polycythemia. Reactive polycythemia. Morbid obesity. Sleep apnea. Tobacco use  Testosterone treatment for hormone deficiency. Multiple comorbidities as listed. CURRENT THERAPY:         Therapeutic phlebotomy. BRIEF CASE HISTORY:      Mr. Faye Patten is a very pleasant 48 y.o. male with history of multiple co morbidities as listed. Patient is referred for evaluation of polycythemia. He had morbid obesity. He smokes only few cigarettes a day. He drinks 6 beers every week. Patient was noted to have elevated hemoglobin level. He is referred for further management. Patient denies any headaches or dizziness. No chest pain. No angina symptoms. No weight loss or decreased appetite. No fever or night sweats. No weight loss or decreased appetite. Patient had history of pneumonia 2017. He was in the hospital for about 2 months. He developed DVT and PE. Patient had problem with sleep apnea. .     INTERIM HISTORY:   Patient is seen for follow up polycythemia. Clinically no changes. No headaches. No CP. No resp distress. PAST MEDICAL HISTORY: has a past medical history of Anxiety, Arthritis, Back pain, Bipolar 1 disorder ([address] COPD (chronic obstructive pulmonary disease) ([address] Dvt femoral (deep venous thrombosis) ([address] Hx of blood clots, Hypertension, Morbid obesity due to excess calories ([address] CHRIS (obstructive sleep apnea), Pneumonia, Respiratory failure ([address] and Smoking. PAST SURGICAL HISTORY: has a past surgical history that includes tracheostomy (2017); Colonoscopy (N/A, 12/12/2019); Colonoscopy (N/A, 6/2/2020); Abdomen surgery; Colonoscopy (N/A, 6/6/2022); Colonoscopy (6/6/2022); and Colonoscopy (6/6/2022).      CURRENT MEDICATIONS:  has a current medication list which includes the following extremities. No changes in balance, coordination,  memory, mentation, behavior. Allergic/Immunologic: No nasal congestion or hives. No repeated infections. PHYSICAL EXAM:  The patient is not in acute distress. Vital signs: Blood pressure 114/80, pulse 80, temperature 97.2 °F (36.2 °C), temperature source Temporal, resp. rate 18, weight (!) 324 lb 6.4 oz (147.1 kg).      General appearance - well appearing, not in pain or distress  Mental status - good mood, alert and oriented  Eyes - pupils equal and reactive, extraocular eye movements intact  Ears - bilateral TM's and external ear canals normal  Nose - normal and patent, no erythema, discharge or polyps  Mouth - mucous membranes moist, pharynx normal without lesions  Neck - supple, no significant adenopathy  Lymphatics - no palpable lymphadenopathy, no hepatosplenomegaly  Chest - clear to auscultation, no wheezes, rales or rhonchi, symmetric air entry  Heart - normal rate, regular rhythm, normal S1, S2, no murmurs, rubs, clicks or gallops  Abdomen - soft, nontender, nondistended, no masses or organomegaly  Neurological - alert, oriented, normal speech, no focal findings or movement disorder noted  Musculoskeletal - no joint tenderness, deformity or swelling  Extremities - peripheral pulses normal, no pedal edema, no clubbing or cyanosis  Skin - normal coloration and turgor, no rashes, no suspicious skin lesions noted     Review of Diagnostic data:   Lab Results   Component Value Date    WBC 11.6 (H) 08/25/2022    HGB 18.7 (H) 08/25/2022    HCT 57.8 (H) 08/25/2022    MCV 91.6 08/25/2022     08/25/2022       Chemistry        Component Value Date/Time     04/29/2022 0704    K 4.7 04/29/2022 0704     04/29/2022 0704    CO2 25 04/29/2022 0704    BUN 14 04/29/2022 0704    CREATININE 0.75 04/29/2022 0704        Component Value Date/Time    CALCIUM 9.4 04/29/2022 0704    ALKPHOS 86 04/29/2022 0704    AST 20 04/29/2022 0704    ALT 19 04/29/2022 0704    [facility] Center[facility]LE[facility] 0.64 04/29/2022 0704          @[facility]@      IMPRESSION:   Polycythemia. Reactive polycythemia. Morbid obesity. Sleep apnea. Tobacco use  Testosterone treatment for hormone deficiency. Multiple comorbidities as listed. PLAN: I reviewed the labs as above and discussed with the patient. I explained to the patient the nature of this hematologic problem. I explained the significance of these abnormalities in layman language. Patient has  polycythemia. Michael 2 gene mutation and erythropoietin were normal. Carboxyhemoglobin level elevated. PCV unlikely. This is reactive polycythemia secondary to smoking, sleep apnea and now testosterone use. Labs were reviewed. Will do phlebotomy monthly. RV 3 months with labs. Patient will pursue further care for his sleep apnea. Counseled about importance of smoking cessation. Patient's questions were answered to the best of his satisfaction and he verbalized full understanding and agreement.

## 2022-09-06 ENCOUNTER — HOSPITAL ENCOUNTER (OUTPATIENT)
Dept: INFUSION THERAPY | Facility: MEDICAL CENTER | Age: 47
Discharge: HOME | End: 2022-09-06
Payer: COMMERCIAL

## 2022-09-06 VITALS
HEART RATE: 80 BPM | RESPIRATION RATE: 18 BRPM | DIASTOLIC BLOOD PRESSURE: 81 MMHG | SYSTOLIC BLOOD PRESSURE: 123 MMHG | TEMPERATURE: 98.1 F

## 2022-09-06 PROCEDURE — 99195 PHLEBOTOMY: CPT

## 2022-09-06 NOTE — PROGRESS NOTES
Pt  arrives ambulatory per self   Order for phlebotomy reviewed . Labs reviewed   Phlebotomy done using closed system to Right   ml blood removed with out difficulty and pressure dressing applied. Tolerated procedure well  Taking fluids well  Post procedure vital signs stable   Pt advised no strenuous activity for the remainder of the day  , may remove dressing tomorrow .   Discharged per ambulatory

## 2022-09-15 ENCOUNTER — HOSPITAL ENCOUNTER (OUTPATIENT)
Facility: CLINIC | Age: 47
Discharge: HOME | End: 2022-09-15
Payer: COMMERCIAL

## 2022-09-15 LAB
ESTRADIOL LEVEL: 113.8 PG/ML (ref 27–52)
HCT VFR BLD CALC: 55 % (ref 40.7–50.3)
HEMOGLOBIN: 17.8 G/DL (ref 13–17)
MCH RBC QN AUTO: 29.8 PG (ref 25.2–33.5)
MCHC RBC AUTO-ENTMCNC: 32.4 G/DL (ref 28.4–34.8)
MCV RBC AUTO: 92.1 FL (ref 82.6–102.9)
NRBC AUTOMATED: 0 PER 100 WBC
PDW BLD-RTO: 13.7 % (ref 11.8–14.4)
PLATELET # BLD: 239 K/UL (ref 138–453)
PMV BLD AUTO: 11.1 FL (ref 8.1–13.5)
RBC # BLD: 5.97 M/UL (ref 4.21–5.77)
SEX HORMONE BINDING GLOBULIN: 29 NMOL/L (ref 11–80)
TESTOSTERONE FREE-NONMALE: 271.3 PG/ML (ref 47–244)
TESTOSTERONE TOTAL: 1041 NG/DL (ref 220–1000)
WBC # BLD: 12.9 K/UL (ref 3.5–11.3)

## 2022-09-15 PROCEDURE — 82670 ASSAY OF TOTAL ESTRADIOL: CPT

## 2022-09-15 PROCEDURE — 85027 COMPLETE CBC AUTOMATED: CPT

## 2022-09-15 PROCEDURE — 84403 ASSAY OF TOTAL TESTOSTERONE: CPT

## 2022-09-15 PROCEDURE — 36415 COLL VENOUS BLD VENIPUNCTURE: CPT

## 2022-09-15 PROCEDURE — 84270 ASSAY OF SEX HORMONE GLOBUL: CPT

## 2022-09-16 ENCOUNTER — NURSE TRIAGE (OUTPATIENT)
Dept: OTHER | Facility: CLINIC | Age: 47
End: 2022-09-16

## 2022-09-28 DIAGNOSIS — M54.50 LOW BACK PAIN WITHOUT SCIATICA, UNSPECIFIED BACK PAIN LATERALITY, UNSPECIFIED CHRONICITY: ICD-10-CM

## 2022-09-29 RX ORDER — GABAPENTIN 800 MG/1
TABLET ORAL
Qty: 90 TABLET | Refills: 2 | Status: SHIPPED | OUTPATIENT
Start: 2022-09-29 | End: 2022-09-29

## 2022-09-29 NOTE — TELEPHONE ENCOUNTER
Ramirez Stone is calling to request a refill on the following medication(s):    Medication Request:  Requested Prescriptions     Pending Prescriptions Disp Refills    gabapentin (NEURONTIN) 800 MG tablet [Pharmacy Med Name: GABAPENTIN 800 MG TABLET] 90 tablet 2     Sig: take 1 tablet by mouth three times a day       Last Visit Date (If Applicable):  8/09/5840    Next Visit Date:    Visit date not found

## 2022-09-30 DIAGNOSIS — I10 BENIGN ESSENTIAL HTN: ICD-10-CM

## 2022-09-30 NOTE — TELEPHONE ENCOUNTER
Augustine Morales is calling to request a refill on the following medication(s):    Last Visit Date (If Applicable):  1/58/6058    Next Visit Date:    Visit date not found    Medication Request:  Requested Prescriptions     Pending Prescriptions Disp Refills    carvedilol (COREG) 6.25 MG tablet 180 tablet 3     Sig: Take 1 tablet by mouth 2 times daily

## 2022-10-03 RX ORDER — CARVEDILOL 6.25 MG/1
6.25 TABLET ORAL 2 TIMES DAILY
Qty: 180 TABLET | Refills: 3 | Status: SHIPPED | OUTPATIENT
Start: 2022-10-03

## 2022-10-06 ENCOUNTER — HOSPITAL ENCOUNTER (OUTPATIENT)
Dept: INFUSION THERAPY | Facility: MEDICAL CENTER | Age: 47
Discharge: HOME | End: 2022-10-06
Payer: COMMERCIAL

## 2022-10-06 VITALS
DIASTOLIC BLOOD PRESSURE: 81 MMHG | RESPIRATION RATE: 16 BRPM | SYSTOLIC BLOOD PRESSURE: 118 MMHG | HEART RATE: 85 BPM | TEMPERATURE: 98.2 F

## 2022-10-06 PROCEDURE — 99195 PHLEBOTOMY: CPT

## 2022-10-06 RX ORDER — ALPRAZOLAM 0.25 MG/1
0.25 TABLET ORAL 2 TIMES DAILY
COMMUNITY

## 2022-10-06 NOTE — PROGRESS NOTES
Patient arrives ambulatory per self for phlebotomy. Order for phlebotomy reviewed. Patient states he has had chest pain for the last few days and describes it as achy. Patient denies sharp/shooting chest pain and denies any other signs or symptoms. Per MD, no labs necessary until MD visit. MD additionally notified of patients chest pain complaint, no further orders. Vitals as charted. Phlebotomy attempted in right AC; unsuccessful, pressure dressing applied. Phlebotomy done using closed system to left  AC; 500 ml blood removed with out difficulty and pressure dressing applied. Tolerated procedure well  Taking fluids well  Post procedure vital signs stable   Pt advised no strenuous activity for the remainder of the day, may remove dressing tomorrow. Discharged per ambulatory with calendar in hand. Patient instructed to go to ER if chest pain persists/worsens and if any new symptoms occur; patient verbalizes understanding. Writer also encouraged patient to make an appointment with his PCP.

## 2022-10-17 RX ORDER — VARENICLINE TARTRATE 1 MG/1
1 TABLET, FILM COATED ORAL 2 TIMES DAILY
Qty: 60 TABLET | Refills: 5 | Status: SHIPPED | OUTPATIENT
Start: 2022-10-17

## 2022-10-28 ENCOUNTER — HOSPITAL ENCOUNTER (OUTPATIENT)
Facility: CLINIC | Age: 47
Discharge: HOME | End: 2022-10-28
Payer: COMMERCIAL

## 2022-10-28 LAB
ESTRADIOL LEVEL: 77.8 PG/ML (ref 27–52)
HCT VFR BLD CALC: 60.1 % (ref 40.7–50.3)
HEMOGLOBIN: 18.9 G/DL (ref 13–17)
MCH RBC QN AUTO: 26.6 PG (ref 25.2–33.5)
MCHC RBC AUTO-ENTMCNC: 31.4 G/DL (ref 28.4–34.8)
MCV RBC AUTO: 84.6 FL (ref 82.6–102.9)
NRBC AUTOMATED: 0 PER 100 WBC
PDW BLD-RTO: 16.1 % (ref 11.8–14.4)
PLATELET # BLD: 245 K/UL (ref 138–453)
PMV BLD AUTO: 11.8 FL (ref 8.1–13.5)
RBC # BLD: 7.1 M/UL (ref 4.21–5.77)
TESTOSTERONE TOTAL: 966 NG/DL (ref 220–1000)
WBC # BLD: 11.6 K/UL (ref 3.5–11.3)

## 2022-10-28 PROCEDURE — 85027 COMPLETE CBC AUTOMATED: CPT

## 2022-10-28 PROCEDURE — 36415 COLL VENOUS BLD VENIPUNCTURE: CPT

## 2022-10-28 PROCEDURE — 82670 ASSAY OF TOTAL ESTRADIOL: CPT

## 2022-10-28 PROCEDURE — 84403 ASSAY OF TOTAL TESTOSTERONE: CPT

## 2022-11-03 ENCOUNTER — HOSPITAL ENCOUNTER (OUTPATIENT)
Dept: INFUSION THERAPY | Facility: MEDICAL CENTER | Age: 47
Discharge: HOME | End: 2022-11-03
Payer: COMMERCIAL

## 2022-11-03 VITALS
RESPIRATION RATE: 16 BRPM | HEART RATE: 76 BPM | SYSTOLIC BLOOD PRESSURE: 121 MMHG | DIASTOLIC BLOOD PRESSURE: 82 MMHG | TEMPERATURE: 97.9 F

## 2022-11-03 PROCEDURE — 99195 PHLEBOTOMY: CPT

## 2022-11-03 NOTE — PROGRESS NOTES
Pt arrives ambulatory per self   Order for phlebotomy reviewed . Labs reviewed   Phlebotomy done using closed system to Left  AC remove 100ml , lost access   Phlebotomy done using closed system to Right  AC  400 ml blood removed and pressure dressing applied. Taking fluids well  Post procedure vital signs stable   Pt advised no strenuous activity for the remainder of the day  , may remove dressing tomorrow .   Pt discharged

## 2022-11-16 DIAGNOSIS — D75.1 POLYCYTHEMIA: Primary | ICD-10-CM

## 2022-11-17 ENCOUNTER — TELEPHONE (OUTPATIENT)
Dept: ONCOLOGY | Age: 47
End: 2022-11-17

## 2022-11-17 ENCOUNTER — HOSPITAL ENCOUNTER (OUTPATIENT)
Age: 47
Discharge: HOME | End: 2022-11-17
Payer: COMMERCIAL

## 2022-11-17 ENCOUNTER — OFFICE VISIT (OUTPATIENT)
Dept: ONCOLOGY | Age: 47
End: 2022-11-17
Payer: COMMERCIAL

## 2022-11-17 VITALS
BODY MASS INDEX: 46.74 KG/M2 | TEMPERATURE: 97.1 F | SYSTOLIC BLOOD PRESSURE: 116 MMHG | RESPIRATION RATE: 18 BRPM | WEIGHT: 315 LBS | HEART RATE: 70 BPM | DIASTOLIC BLOOD PRESSURE: 79 MMHG

## 2022-11-17 DIAGNOSIS — D75.1 POLYCYTHEMIA: Primary | ICD-10-CM

## 2022-11-17 DIAGNOSIS — D75.1 POLYCYTHEMIA: ICD-10-CM

## 2022-11-17 LAB
ABSOLUTE EOS #: 0.35 K/UL (ref 0–0.44)
ABSOLUTE IMMATURE GRANULOCYTE: 0.03 K/UL (ref 0–0.3)
ABSOLUTE LYMPH #: 2.38 K/UL (ref 1.1–3.7)
ABSOLUTE MONO #: 0.71 K/UL (ref 0.1–1.2)
BASOPHILS # BLD: 1 % (ref 0–2)
BASOPHILS ABSOLUTE: 0.1 K/UL (ref 0–0.2)
EOSINOPHILS RELATIVE PERCENT: 3 % (ref 1–4)
HCT VFR BLD CALC: 52.3 % (ref 40.7–50.3)
HEMOGLOBIN: 15.8 G/DL (ref 13–17)
IMMATURE GRANULOCYTES: 0 %
LYMPHOCYTES # BLD: 22 % (ref 24–43)
MCH RBC QN AUTO: 25 PG (ref 25.2–33.5)
MCHC RBC AUTO-ENTMCNC: 30.2 G/DL (ref 28.4–34.8)
MCV RBC AUTO: 82.8 FL (ref 82.6–102.9)
MONOCYTES # BLD: 7 % (ref 3–12)
NRBC AUTOMATED: 0 PER 100 WBC
PDW BLD-RTO: 15.5 % (ref 11.8–14.4)
PLATELET # BLD: 226 K/UL (ref 138–453)
PMV BLD AUTO: 10.3 FL (ref 8.1–13.5)
RBC # BLD: 6.32 M/UL (ref 4.21–5.77)
RBC # BLD: ABNORMAL 10*6/UL
SEG NEUTROPHILS: 67 % (ref 36–65)
SEGMENTED NEUTROPHILS ABSOLUTE COUNT: 7.43 K/UL (ref 1.5–8.1)
WBC # BLD: 11 K/UL (ref 3.5–11.3)

## 2022-11-17 PROCEDURE — 99211 OFF/OP EST MAY X REQ PHY/QHP: CPT | Performed by: INTERNAL MEDICINE

## 2022-11-17 PROCEDURE — G8427 DOCREV CUR MEDS BY ELIG CLIN: HCPCS | Performed by: INTERNAL MEDICINE

## 2022-11-17 PROCEDURE — G8484 FLU IMMUNIZE NO ADMIN: HCPCS | Performed by: INTERNAL MEDICINE

## 2022-11-17 PROCEDURE — 36415 COLL VENOUS BLD VENIPUNCTURE: CPT

## 2022-11-17 PROCEDURE — 85025 COMPLETE CBC W/AUTO DIFF WBC: CPT

## 2022-11-17 PROCEDURE — G8417 CALC BMI ABV UP PARAM F/U: HCPCS | Performed by: INTERNAL MEDICINE

## 2022-11-17 PROCEDURE — 99214 OFFICE O/P EST MOD 30 MIN: CPT | Performed by: INTERNAL MEDICINE

## 2022-11-17 PROCEDURE — 4004F PT TOBACCO SCREEN RCVD TLK: CPT | Performed by: INTERNAL MEDICINE

## 2022-11-17 PROCEDURE — 3078F DIAST BP <80 MM HG: CPT | Performed by: INTERNAL MEDICINE

## 2022-11-17 PROCEDURE — 3074F SYST BP LT 130 MM HG: CPT | Performed by: INTERNAL MEDICINE

## 2022-11-17 NOTE — TELEPHONE ENCOUNTER
PAL ARRIVES AMBULATORY FOR MD VISIT  DR Chang Brody IN TO SEE PATIENT  ORDERS RECEIVED  THERAPEUTIC PHLEBOTOMY EVERY 2 MONTHS  RV 6 MONTHS WITH CBC  LABS CDP 05/30/23  MD VISIT 05/30/23 @1:45PM PHLEBOTOMY @2PM  AVS PRINTED AND GIVEN TO PATIENT WITH INSTRUCTIONS  PATIENT DISCHARGED AMBULATORY

## 2022-11-24 NOTE — PROGRESS NOTES
_        Chief Complaint   Patient presents with    Follow-up    Discuss Labs     DIAGNOSIS:        Polycythemia. Reactive polycythemia. Morbid obesity. Sleep apnea. Tobacco use  Testosterone treatment for hormone deficiency. Multiple comorbidities as listed. CURRENT THERAPY:         Therapeutic phlebotomy. BRIEF CASE HISTORY:      Mr. Martha Neal is a very pleasant 50 y.o. male with history of multiple co morbidities as listed. Patient is referred for evaluation of polycythemia. He had morbid obesity. He smokes only few cigarettes a day. He drinks 6 beers every week. Patient was noted to have elevated hemoglobin level. He is referred for further management. Patient denies any headaches or dizziness. No chest pain. No angina symptoms. No weight loss or decreased appetite. No fever or night sweats. No weight loss or decreased appetite. Patient had history of pneumonia 2017. He was in the hospital for about 2 months. He developed DVT and PE. Patient had problem with sleep apnea. .     INTERIM HISTORY:   Patient is seen for follow up polycythemia. Clinically improving with phlebotomies. No headaches. No CP. No resp distress. PAST MEDICAL HISTORY: has a past medical history of Anxiety, Arthritis, Back pain, Bipolar 1 disorder ([address] COPD (chronic obstructive pulmonary disease) ([address] Dvt femoral (deep venous thrombosis) ([address] Hx of blood clots, Hypertension, Morbid obesity due to excess calories ([address] CHRIS (obstructive sleep apnea), Pneumonia, Respiratory failure ([address] and Smoking. PAST SURGICAL HISTORY: has a past surgical history that includes tracheostomy (2017); Colonoscopy (N/A, 12/12/2019); Colonoscopy (N/A, 6/2/2020); Abdomen surgery; Colonoscopy (N/A, 6/6/2022); Colonoscopy (6/6/2022); and Colonoscopy (6/6/2022).      CURRENT MEDICATIONS:  has a current medication list which includes the following prescription(s): alprazolam, carvedilol, gabapentin, paliperidone, gemfibrozil, sildenafil, eszopiclone, latuda, trintellix, and testosterone cypionate. ALLERGIES:  has No Known Allergies. FAMILY HISTORY: Grandfather had colon cancer. Mother had some sort of cancer. Otherwise negative for any hematological or oncological conditions. SOCIAL HISTORY:  reports that he has been smoking cigarettes. He has been smoking an average of .5 packs per day. He has never used smokeless tobacco. He reports that he does not currently use alcohol. He reports that he does not use drugs. REVIEW OF SYSTEMS:     General: Positive for weakness and fatigue. No unanticipated weight loss or decreased appetite. No fever or chills. Eyes: No blurred vision, eye pain or double vision. Ears: No hearing problems or drainage. No tinnitus. Throat: No sore throat, problems with swallowing or dysphagia. Respiratory: No cough, sputum or hemoptysis. No shortness of breath. No pleuritic chest pain. Cardiovascular: No chest pain, orthopnea or PND. No lower extremity edema. No palpitation. Gastrointestinal: No problems with swallowing. No abdominal pain or bloating. No nausea or vomiting. No diarrhea or constipation. No GI bleeding. Genitourinary: No dysuria, hematuria, frequency or urgency. Musculoskeletal: No muscle aches or pains. No limitation of movement. No back pain. No gait disturbance, No joint complaints. Dermatologic: No skin rashes or pruritus. No skin lesions or discolorations. Psychiatric: No depression, anxiety, or stress or signs of schizophrenia. No change in mood or affect. Hematologic: No history of bleeding tendency. No bruises or ecchymosis. No history of clotting problems. Infectious disease: No fever, chills or frequent infections. Endocrine: No polydipsia or polyuria. No temperature intolerance. Neurologic: No headaches or dizziness. No weakness or numbness of the extremities. No changes in balance, coordination,  memory, mentation, behavior. Allergic/Immunologic: No nasal congestion or hives. No repeated infections. PHYSICAL EXAM:  The patient is not in acute distress. Vital signs: Blood pressure 116/79, pulse 70, temperature 97.1 °F (36.2 °C), temperature source Temporal, resp. rate 18, weight (!) 335 lb 1.6 oz (152 kg).      General appearance - well appearing, not in pain or distress  Mental status - good mood, alert and oriented  Eyes - pupils equal and reactive, extraocular eye movements intact  Ears - bilateral TM's and external ear canals normal  Nose - normal and patent, no erythema, discharge or polyps  Mouth - mucous membranes moist, pharynx normal without lesions  Neck - supple, no significant adenopathy  Lymphatics - no palpable lymphadenopathy, no hepatosplenomegaly  Chest - clear to auscultation, no wheezes, rales or rhonchi, symmetric air entry  Heart - normal rate, regular rhythm, normal S1, S2, no murmurs, rubs, clicks or gallops  Abdomen - soft, nontender, nondistended, no masses or organomegaly  Neurological - alert, oriented, normal speech, no focal findings or movement disorder noted  Musculoskeletal - no joint tenderness, deformity or swelling  Extremities - peripheral pulses normal, no pedal edema, no clubbing or cyanosis  Skin - normal coloration and turgor, no rashes, no suspicious skin lesions noted     Review of Diagnostic data:   Lab Results   Component Value Date    WBC 11.0 11/17/2022    HGB 15.8 11/17/2022    HCT 52.3 (H) 11/17/2022    MCV 82.8 11/17/2022     11/17/2022       Chemistry        Component Value Date/Time     04/29/2022 0704    K 4.7 04/29/2022 0704     04/29/2022 0704    CO2 25 04/29/2022 0704    BUN 14 04/29/2022 0704    CREATININE 0.75 04/29/2022 0704        Component Value Date/Time    CALCIUM 9.4 04/29/2022 0704    ALKPHOS 86 04/29/2022 0704    AST 20 04/29/2022 0704    ALT 19 04/29/2022 0704    BILITOT 0.64 04/29/2022 0704          @[facility]@      IMPRESSION:   Polycythemia. Reactive polycythemia. Morbid obesity. Sleep apnea. Tobacco use  Testosterone treatment for hormone deficiency. Multiple comorbidities as listed. PLAN: I reviewed the labs as above and discussed with the patient. I explained to the patient the nature of this hematologic problem. I explained the significance of these abnormalities in layman language. Patient has  polycythemia. Michael 2 gene mutation and erythropoietin were normal. Carboxyhemoglobin level elevated. PCV unlikely. This is reactive polycythemia secondary to smoking, sleep apnea and now testosterone use. Labs were reviewed. Will make phlebotomy every 2 months. RV 6 months with labs. Patient will pursue further care for his sleep apnea. Counseled about importance of smoking cessation. Patient's questions were answered to the best of his satisfaction and he verbalized full understanding and agreement.

## 2022-12-01 ENCOUNTER — HOSPITAL ENCOUNTER (OUTPATIENT)
Dept: INFUSION THERAPY | Facility: MEDICAL CENTER | Age: 47
Discharge: HOME | End: 2022-12-01

## 2022-12-01 ENCOUNTER — HOSPITAL ENCOUNTER (OUTPATIENT)
Facility: CLINIC | Age: 47
Discharge: HOME | End: 2022-12-01
Payer: COMMERCIAL

## 2022-12-01 ENCOUNTER — TELEPHONE (OUTPATIENT)
Dept: INFUSION THERAPY | Facility: MEDICAL CENTER | Age: 47
End: 2022-12-01

## 2022-12-01 LAB
ESTRADIOL LEVEL: 75.1 PG/ML (ref 27–52)
HCT VFR BLD CALC: 55.4 % (ref 40.7–50.3)
HEMOGLOBIN: 16.8 G/DL (ref 13–17)
MCH RBC QN AUTO: 24.7 PG (ref 25.2–33.5)
MCHC RBC AUTO-ENTMCNC: 30.3 G/DL (ref 28.4–34.8)
MCV RBC AUTO: 81.4 FL (ref 82.6–102.9)
NRBC AUTOMATED: 0 PER 100 WBC
PDW BLD-RTO: 16.8 % (ref 11.8–14.4)
PLATELET # BLD: 254 K/UL (ref 138–453)
PMV BLD AUTO: 11.3 FL (ref 8.1–13.5)
RBC # BLD: 6.81 M/UL (ref 4.21–5.77)
TESTOSTERONE TOTAL: 695 NG/DL (ref 220–1000)
WBC # BLD: 12.1 K/UL (ref 3.5–11.3)

## 2022-12-01 PROCEDURE — 85027 COMPLETE CBC AUTOMATED: CPT

## 2022-12-01 PROCEDURE — 36415 COLL VENOUS BLD VENIPUNCTURE: CPT

## 2022-12-01 PROCEDURE — 82670 ASSAY OF TOTAL ESTRADIOL: CPT

## 2022-12-01 PROCEDURE — 84403 ASSAY OF TOTAL TESTOSTERONE: CPT

## 2022-12-01 NOTE — TELEPHONE ENCOUNTER
Call placed to patient to inform him that phlebotomy is not needed today as he was last treated 11/3/22 and at his last visit 11/17/22, Dr Sandra Gil stated that phlebotomy is to occur every two months. This was also confirmed verbally by writer with MD.   Next Phlebotomy to occur in January, call sent to front office to reschedule.

## 2022-12-26 DIAGNOSIS — M54.50 LOW BACK PAIN WITHOUT SCIATICA, UNSPECIFIED BACK PAIN LATERALITY, UNSPECIFIED CHRONICITY: ICD-10-CM

## 2022-12-27 DIAGNOSIS — M54.50 LOW BACK PAIN WITHOUT SCIATICA, UNSPECIFIED BACK PAIN LATERALITY, UNSPECIFIED CHRONICITY: ICD-10-CM

## 2022-12-28 RX ORDER — GABAPENTIN 800 MG/1
TABLET ORAL
Qty: 90 TABLET | Refills: 2 | Status: SHIPPED | OUTPATIENT
Start: 2022-12-28 | End: 2023-01-28

## 2022-12-28 RX ORDER — GABAPENTIN 800 MG/1
TABLET ORAL
Qty: 90 TABLET | Refills: 2 | Status: SHIPPED | OUTPATIENT
Start: 2022-12-28 | End: 2023-02-13

## 2022-12-28 NOTE — TELEPHONE ENCOUNTER
Madelaine Byrd is calling to request a refill on the following medication(s):    Last Visit Date (If Applicable):  9/04/6062    Next Visit Date:    12/27/2022    Medication Request:  Requested Prescriptions     Pending Prescriptions Disp Refills    gabapentin (NEURONTIN) 800 MG tablet 90 tablet 2     Sig: take 1 tablet by mouth three times a day

## 2023-01-31 ENCOUNTER — HOSPITAL ENCOUNTER (OUTPATIENT)
Dept: INFUSION THERAPY | Facility: MEDICAL CENTER | Age: 48
Discharge: HOME | End: 2023-01-31
Payer: COMMERCIAL

## 2023-01-31 VITALS
DIASTOLIC BLOOD PRESSURE: 75 MMHG | SYSTOLIC BLOOD PRESSURE: 109 MMHG | RESPIRATION RATE: 18 BRPM | TEMPERATURE: 98.4 F | HEART RATE: 86 BPM

## 2023-01-31 PROCEDURE — 99195 PHLEBOTOMY: CPT

## 2023-01-31 NOTE — PROGRESS NOTES
Pt  arrives ambulatory per self   Order for phlebotomy reviewed . Labs reviewed   Phlebotomy done using closed system to Left  ml blood removed with out difficulty and pressure dressing applied. Tolerated procedure well  Taking fluids well  Post procedure vital signs stable   Pt advised no strenuous activity for the remainder of the day  , may remove dressing tomorrow .   Discharged per ambulatory

## 2023-03-15 ENCOUNTER — OFFICE VISIT (OUTPATIENT)
Dept: FAMILY MEDICINE CLINIC | Age: 48
End: 2023-03-15
Payer: MEDICARE

## 2023-03-15 VITALS
WEIGHT: 315 LBS | DIASTOLIC BLOOD PRESSURE: 99 MMHG | OXYGEN SATURATION: 94 % | SYSTOLIC BLOOD PRESSURE: 141 MMHG | BODY MASS INDEX: 44.1 KG/M2 | HEIGHT: 71 IN | HEART RATE: 92 BPM

## 2023-03-15 DIAGNOSIS — I42.9 SECONDARY CARDIOMYOPATHY (HCC): ICD-10-CM

## 2023-03-15 DIAGNOSIS — I82.629 DEEP VEIN THROMBOSIS (DVT) OF UPPER EXTREMITY, UNSPECIFIED CHRONICITY, UNSPECIFIED LATERALITY, UNSPECIFIED VEIN (HCC): ICD-10-CM

## 2023-03-15 DIAGNOSIS — Z93.0 TRACHEOSTOMY STATUS (HCC): ICD-10-CM

## 2023-03-15 DIAGNOSIS — E66.01 CLASS 3 SEVERE OBESITY DUE TO EXCESS CALORIES WITH SERIOUS COMORBIDITY AND BODY MASS INDEX (BMI) OF 50.0 TO 59.9 IN ADULT (HCC): ICD-10-CM

## 2023-03-15 DIAGNOSIS — E66.01 OBESITY, CLASS III, BMI 40-49.9 (MORBID OBESITY) (HCC): ICD-10-CM

## 2023-03-15 DIAGNOSIS — F33.0 MAJOR DEPRESSIVE DISORDER, RECURRENT, MILD (HCC): ICD-10-CM

## 2023-03-15 DIAGNOSIS — M54.50 LOW BACK PAIN WITHOUT SCIATICA, UNSPECIFIED BACK PAIN LATERALITY, UNSPECIFIED CHRONICITY: ICD-10-CM

## 2023-03-15 DIAGNOSIS — F19.20: ICD-10-CM

## 2023-03-15 PROCEDURE — 99214 OFFICE O/P EST MOD 30 MIN: CPT | Performed by: FAMILY MEDICINE

## 2023-03-15 PROCEDURE — 3077F SYST BP >= 140 MM HG: CPT | Performed by: FAMILY MEDICINE

## 2023-03-15 PROCEDURE — 3080F DIAST BP >= 90 MM HG: CPT | Performed by: FAMILY MEDICINE

## 2023-03-15 RX ORDER — PHENTERMINE HYDROCHLORIDE 37.5 MG/1
37.5 TABLET ORAL
Qty: 30 TABLET | Refills: 0 | Status: SHIPPED | OUTPATIENT
Start: 2023-03-15 | End: 2023-04-14

## 2023-03-15 RX ORDER — GABAPENTIN 800 MG/1
TABLET ORAL
Qty: 90 TABLET | Refills: 5 | Status: SHIPPED | OUTPATIENT
Start: 2023-03-15 | End: 2023-04-15

## 2023-03-15 SDOH — ECONOMIC STABILITY: INCOME INSECURITY: HOW HARD IS IT FOR YOU TO PAY FOR THE VERY BASICS LIKE FOOD, HOUSING, MEDICAL CARE, AND HEATING?: NOT VERY HARD

## 2023-03-15 SDOH — ECONOMIC STABILITY: FOOD INSECURITY: WITHIN THE PAST 12 MONTHS, YOU WORRIED THAT YOUR FOOD WOULD RUN OUT BEFORE YOU GOT MONEY TO BUY MORE.: NEVER TRUE

## 2023-03-15 SDOH — ECONOMIC STABILITY: HOUSING INSECURITY
IN THE LAST 12 MONTHS, WAS THERE A TIME WHEN YOU DID NOT HAVE A STEADY PLACE TO SLEEP OR SLEPT IN A SHELTER (INCLUDING NOW)?: NO

## 2023-03-15 SDOH — ECONOMIC STABILITY: FOOD INSECURITY: WITHIN THE PAST 12 MONTHS, THE FOOD YOU BOUGHT JUST DIDN'T LAST AND YOU DIDN'T HAVE MONEY TO GET MORE.: NEVER TRUE

## 2023-03-15 ASSESSMENT — PATIENT HEALTH QUESTIONNAIRE - PHQ9
3. TROUBLE FALLING OR STAYING ASLEEP: 3
8. MOVING OR SPEAKING SO SLOWLY THAT OTHER PEOPLE COULD HAVE NOTICED. OR THE OPPOSITE, BEING SO FIGETY OR RESTLESS THAT YOU HAVE BEEN MOVING AROUND A LOT MORE THAN USUAL: 2
6. FEELING BAD ABOUT YOURSELF - OR THAT YOU ARE A FAILURE OR HAVE LET YOURSELF OR YOUR FAMILY DOWN: 0
4. FEELING TIRED OR HAVING LITTLE ENERGY: 3
9. THOUGHTS THAT YOU WOULD BE BETTER OFF DEAD, OR OF HURTING YOURSELF: 0
SUM OF ALL RESPONSES TO PHQ QUESTIONS 1-9: 14
1. LITTLE INTEREST OR PLEASURE IN DOING THINGS: 0
SUM OF ALL RESPONSES TO PHQ QUESTIONS 1-9: 14
5. POOR APPETITE OR OVEREATING: 2
SUM OF ALL RESPONSES TO PHQ QUESTIONS 1-9: 14
10. IF YOU CHECKED OFF ANY PROBLEMS, HOW DIFFICULT HAVE THESE PROBLEMS MADE IT FOR YOU TO DO YOUR WORK, TAKE CARE OF THINGS AT HOME, OR GET ALONG WITH OTHER PEOPLE: 1
7. TROUBLE CONCENTRATING ON THINGS, SUCH AS READING THE NEWSPAPER OR WATCHING TELEVISION: 1
SUM OF ALL RESPONSES TO PHQ QUESTIONS 1-9: 14
SUM OF ALL RESPONSES TO PHQ9 QUESTIONS 1 & 2: 3
2. FEELING DOWN, DEPRESSED OR HOPELESS: 3

## 2023-03-15 NOTE — PROGRESS NOTES
[facility] [address] [place] [address] [address] \[facility] [place] [facility]\"" [id number]  Dept: [phone]      Crystal Anderson is a 50 y.o. male who presents today for follow up on his  medical conditions as noted below.       Chief Complaint   Patient presents with    Weight Loss       Patient Active Problem List:     Bilateral low back pain with bilateral sciatica     Morbid obesity due to excess calories (HCC)     Benign essential HTN     Acute systolic congestive heart failure ([address]     Secondary cardiomyopathy ([address]     DVT (deep venous thrombosis) (HCC)     Hyperglycemia     Rhabdomyolysis     Tubular adenoma of colon     Rectal polyp     Hyperplastic colon polyp     Serrated adenoma of colon     Polyp of colon     Tracheostomy status ([address]     Combined opioid with non-opioid drug dependence, with abuse ([address]     Morbid obesity with BMI of 40.0-44.9, adult ([address]     History of psychiatric disorder     Hx of drug abuse ([address]     Major depressive disorder, recurrent, mild     Major depressive disorder, recurrent, moderate     Major depressive disorder, recurrent, unspecified     Polycythemia     History of colon polyps     Past Medical History:   Diagnosis Date    Anxiety     Arthritis     back    Back pain     Bipolar 1 disorder ([address]     COPD (chronic obstructive pulmonary disease) ([address]     Dvt femoral (deep venous thrombosis) (HCC)     Hx of blood clots     2017    Hypertension     Morbid obesity due to excess calories (HCC)     CHRIS (obstructive sleep apnea)     Pneumonia 2017    DUE TO OTHER AEROBIC GRAM-NEGATIVE BACTERIA, UNSPECIFIED LATERALITY, UNSPECIFIED PART OF LUNG     Respiratory failure ([address] 2017    Smoking       Past Surgical History:   Procedure Laterality Date    ABDOMEN SURGERY      PEG tube 2017    COLONOSCOPY N/A 12/12/2019    COLONOSCOPY POLYPECTOMY HOT BIOPSY performed by Monty Holguin MD at [address] [place] [address] N/A 6/2/2020    COLONOSCOPY POLYPECTOMY SNARE/COLD BIOPSY performed by Ten Boyd MD at [address] [place] [address] N/A 2022    COLONOSCOPY POLYPECTOMY SNARE/COLD BIOPSY performed by Ten Boyd MD at [facility]    COLONOSCOPY  2022    COLONOSCOPY POLYPECTOMY HOT BIOPSY performed by Ten Boyd MD at [facility]    COLONOSCOPY  2022    COLONOSCOPY WITH BIOPSY performed by Ten Boyd MD at [address] [place] [address],[place] [address]       Family History   Problem Relation Age of Onset    Asthma Mother     COPD Mother     Hypothyroidism Mother     No Known Problems Father        Current Outpatient Medications   Medication Sig Dispense Refill    gabapentin (NEURONTIN) 800 MG tablet 1 po tid 90 tablet 5    phentermine (ADIPEX-P) 37.5 MG tablet Take 1 tablet by mouth every morning (before breakfast) for 30 days. 30 tablet 0    gabapentin (NEURONTIN) 800 MG tablet take 1 tablet by mouth three times a day 90 tablet 2    ALPRAZolam (XANAX) 0.25 MG tablet Take 1 mg by mouth 2 times daily. carvedilol (COREG) 6.25 MG tablet Take 1 tablet by mouth 2 times daily 180 tablet 3    Testosterone Cypionate 200 MG/ML SOLN Once a week      paliperidone (INVEGA) 3 MG extended release tablet Take 3 mg by mouth every morning 1.5 ER daily      gemfibrozil (LOPID) 600 MG tablet take 1 tablet by mouth twice a day 180 tablet 3    sildenafil (VIAGRA) 100 MG tablet Take 1 tablet by mouth daily as needed for Erectile Dysfunction 5 tablet 12    LATUDA 60 MG TABS tablet 120 mg       eszopiclone (LUNESTA) 3 MG TABS take 1 tablet by mouth at bedtime (Patient not taking: Reported on 3/15/2023)      TRINTELLIX 5 MG tablet 20 mg daily (Patient not taking: Reported on 3/15/2023)       No current facility-administered medications for this visit.      ALLERGIES:  No Known Allergies    Social History     Tobacco Use    Smoking status: Every Day     Packs/day: 0.50     Types: Cigarettes     Last attempt to quit: 9/3/2017     Years since quittin.5    Smokeless tobacco: Never    Tobacco comments:     Pt. Declined smoking sensation referral   Substance Use Topics    Alcohol use: Not Currently     Comment: social        LDL Cholesterol (mg/dL)   Date Value   04/29/2022 117     HDL (mg/dL)   Date Value   04/29/2022 51     BUN (mg/dL)   Date Value   04/29/2022 14     Creatinine (mg/dL)   Date Value   04/29/2022 0.75     Glucose (mg/dL)   Date Value   04/29/2022 99     Hemoglobin A1C (%)   Date Value   10/21/2019 5.2              Subjective:      HPI  Is being seen today for follow-up on ongoing back problems and some of his psych issues  He does still see a psychiatrist  He needs a refill of Neurontin he would also like to restart Adipex he did lose weight continuing even being off of it unfortunately did not take his blood pressure medication this morning so his blood pressure is a bit elevated in the past has always been running good    Review of Systems:     Constitutional: Negative for fever, appetite change and fatigue. Family social and medical history reviewed and unchanged     HENT: Negative. Negative for nosebleeds, trouble swallowing and neck pain. Eyes: Negative for photophobia and visual disturbance. Respiratory: Negative. Negative for chest tightness and shortness of breath. Cardiovascular: Negative. Negative for chest pain and leg swelling. Gastrointestinal: Negative. Negative for abdominal pain and blood in stool. Endocrine: Negative for cold intolerance and polyuria. Genitourinary: Negative for dysuria and hematuria. Musculoskeletal: Negative. Skin: Negative for rash. Allergic/Immunologic: Negative. Neurological: Negative. Negative for dizziness, weakness and numbness. Hematological: Negative. Negative for adenopathy. Does not bruise/bleed easily. Psychiatric/Behavioral: Negative for sleep disturbance, dysphoric mood and  decreased concentration. The patient is not nervous/anxious.         Objective:     Physical Exam:     Nursing note and vitals reviewed. BP (!) 141/99   Pulse 92   Ht 5' 11\" (1.803 m)   Wt (!) 318 lb (144.2 kg)   SpO2 94%   BMI 44.35 kg/m²   Constitutional: He is oriented to person, place, and time. He   appears well-developed and well-nourished. HENT:   Head: Normocephalic and atraumatic. Right Ear: External ear normal. Tympanic membrane is not erythematous. No middle ear effusion. Left Ear: External ear normal. Tympanic membrane is not erythematous. No middle ear effusion. Nose: No mucosal edema. Mouth/Throat: Oropharynx is clear and moist. No posterior oropharyngeal erythema. Eyes: Conjunctivae and EOM are normal. Pupils are equal, round, and reactive to light. Neck: Normal range of motion. Neck supple. No thyromegaly present. Cardiovascular: Normal rate, regular rhythm and normal heart sounds. No murmur heard. Pulmonary/Chest: Effort normal and breath sounds normal. He has no wheezes. Hehas no rales. Abdominal: Soft. Bowel sounds are normal. He exhibits no distension and no mass. There is no tenderness. There is no rebound and no guarding. Genitourinary/Anorectal:deferred  Musculoskeletal: Normal range of motion. He exhibits no edema or tenderness. Lymphadenopathy: He has no cervical adenopathy. Neurological: He is alert and oriented to person, place, and time. He has normal reflexes. Skin: Skin is warm and dry. No rash noted. Psychiatric: He has a normal mood and affect. His   behavior is normal.       Assessment:      1. Low back pain without sciatica, unspecified back pain laterality, unspecified chronicity    2. Obesity, Class III, BMI 40-49.9 (morbid obesity) ([address]    3. Tracheostomy status ([address]    4. Secondary cardiomyopathy ([address]    5. Combined opioid with non-opioid drug dependence, with abuse ([address]    6. Major depressive disorder, recurrent, mild ([address]    7. Deep vein thrombosis (DVT) of upper extremity, unspecified chronicity, unspecified laterality, unspecified vein (HCC)    8. Class 3 severe obesity due to excess calories with serious comorbidity and body mass index (BMI) of 50.0 to 59.9 in adult [facility])          Plan:      Call or return to clinic prn if these symptoms worsen or fail to improve as anticipated. I have reviewed the instructions with the patient, answering all questions to his satisfaction. Return in about 4 weeks (around 2023) for re-check. No orders of the defined types were placed in this encounter. Orders Placed This Encounter   Medications    gabapentin (NEURONTIN) 800 MG tablet     Si po tid     Dispense:  90 tablet     Refill:  5    phentermine (ADIPEX-P) 37.5 MG tablet     Sig: Take 1 tablet by mouth every morning (before breakfast) for 30 days.      Dispense:  30 tablet     Refill:  0     He is to continue on all meds follow 1 [facility] get some exercise and follow-up in 1 month  Electronically signed by Emilee Cuh DO on 3/15/2023 at 9:37 AM

## 2023-03-30 ENCOUNTER — HOSPITAL ENCOUNTER (OUTPATIENT)
Dept: INFUSION THERAPY | Facility: MEDICAL CENTER | Age: 48
Discharge: HOME | End: 2023-03-30
Payer: MEDICARE

## 2023-03-30 ENCOUNTER — HOSPITAL ENCOUNTER (OUTPATIENT)
Age: 48
Setting detail: SPECIMEN
Discharge: HOME | End: 2023-03-30

## 2023-03-30 VITALS
DIASTOLIC BLOOD PRESSURE: 81 MMHG | RESPIRATION RATE: 16 BRPM | HEART RATE: 81 BPM | TEMPERATURE: 97.8 F | SYSTOLIC BLOOD PRESSURE: 115 MMHG

## 2023-03-30 DIAGNOSIS — D75.1 POLYCYTHEMIA: ICD-10-CM

## 2023-03-30 DIAGNOSIS — D75.1 POLYCYTHEMIA: Primary | ICD-10-CM

## 2023-03-30 PROCEDURE — 99195 PHLEBOTOMY: CPT

## 2023-03-30 NOTE — PROGRESS NOTES
Patient arrives ambulatory per self for phlebotomy treatment. Phlebotomy order reviewed. Writer had patient get CBC drawn as per usual protocol prior to phlebotomy. Upon further inspection and after speaking with patient, he does not complete labs prior to phlebotomies. Writer called down to lab and cancelled lab work prior to it resulting. Patient verbalizes understanding. Vitals as charted. Phlebotomy done using closed system to left AC; 500 mL blood removed without difficulty. Pressure dressing applied. Pt tolerated treatment well. Pt tolerating oral fluids. Vitals post-treatment stable. Pt advised no strenuous activity for remainder of day. May remove dressing tomorrow. Pt verbalizes understanding. Discharged with calendar in hand.

## 2023-04-26 RX ORDER — FLUTICASONE FUROATE, UMECLIDINIUM BROMIDE AND VILANTEROL TRIFENATATE 200; 62.5; 25 UG/1; UG/1; UG/1
1 POWDER RESPIRATORY (INHALATION) DAILY
Qty: 1 EACH | Refills: 11 | Status: SHIPPED | OUTPATIENT
Start: 2023-04-26

## 2023-05-30 ENCOUNTER — HOSPITAL ENCOUNTER (OUTPATIENT)
Dept: INFUSION THERAPY | Facility: MEDICAL CENTER | Age: 48
Discharge: HOME | End: 2023-05-30
Payer: MEDICARE

## 2023-05-30 VITALS
TEMPERATURE: 99.1 F | DIASTOLIC BLOOD PRESSURE: 81 MMHG | HEART RATE: 83 BPM | RESPIRATION RATE: 20 BRPM | SYSTOLIC BLOOD PRESSURE: 112 MMHG

## 2023-05-30 PROCEDURE — 99195 PHLEBOTOMY: CPT

## 2023-05-30 RX ORDER — GABAPENTIN 800 MG/1
800 TABLET ORAL 3 TIMES DAILY
COMMUNITY

## 2023-05-30 RX ORDER — ZOLPIDEM TARTRATE 10 MG/1
TABLET ORAL NIGHTLY PRN
COMMUNITY

## 2023-05-30 RX ORDER — LORAZEPAM 1 MG/1
1 TABLET ORAL 2 TIMES DAILY
COMMUNITY

## 2023-05-30 ASSESSMENT — PAIN SCALES - GENERAL: PAINLEVEL_OUTOF10: 7

## 2023-05-30 ASSESSMENT — PAIN DESCRIPTION - LOCATION: LOCATION: BACK

## 2023-05-30 NOTE — PROGRESS NOTES
PT ARRIVES PER AMB PER SELF AND PT TO RECEIVE Q 2 MONTH PHLEBOTOMY UNTIL SEEN PER MD IN 6 MONTHS, LABS LAST DONE ON 12/1/22 HCT 55.4 AND WILL BE RECHECKED WITH NEXT MD VISIT PER NOTE PER DR ISLAS. PHLEBOTOMY TO LEFT ANTECUB WITH #16 CLOSED BAG COLLECTION UNIT WITH SCALE AND SITE CLEANSED WITH CHLOROPREP AND TOURNIQUET APPLIED  ML BLOOD REMOVED PER SCALE AND PT TOLERATED WELL AND NO LIGHTHEADEDNESS OR DIZZINESS. THEN TOURNIQUET AND NEEDLE REMOVED AND GAUZE AND COFLEX TO SITE AND PT STATES WILL LEAVE DRESSING IN PLACE FOR 24 HOURS. PT DISCHARGED PER AMB PER SELF AND STATES HAS NEXT APPT.

## 2023-06-20 ENCOUNTER — TELEPHONE (OUTPATIENT)
Dept: FAMILY MEDICINE CLINIC | Age: 48
End: 2023-06-20

## 2023-06-20 NOTE — TELEPHONE ENCOUNTER
Patient is having issues with stomach pain , diarrhea with blood in stools x 1 day.  Patient denies any other symptoms and would like to be seen in office this morning, pls advise

## 2023-06-27 ENCOUNTER — OFFICE VISIT (OUTPATIENT)
Dept: FAMILY MEDICINE CLINIC | Age: 48
End: 2023-06-27
Payer: MEDICARE

## 2023-06-27 VITALS
TEMPERATURE: 97.2 F | DIASTOLIC BLOOD PRESSURE: 86 MMHG | SYSTOLIC BLOOD PRESSURE: 136 MMHG | OXYGEN SATURATION: 96 % | BODY MASS INDEX: 44.1 KG/M2 | HEART RATE: 81 BPM | HEIGHT: 71 IN | WEIGHT: 315 LBS

## 2023-06-27 DIAGNOSIS — S30.0XXD CONTUSION OF SACRUM, SUBSEQUENT ENCOUNTER: Primary | ICD-10-CM

## 2023-06-27 DIAGNOSIS — J44.9 CHRONIC OBSTRUCTIVE PULMONARY DISEASE, UNSPECIFIED COPD TYPE (HCC): ICD-10-CM

## 2023-06-27 DIAGNOSIS — I10 BENIGN ESSENTIAL HTN: ICD-10-CM

## 2023-06-27 PROCEDURE — 3074F SYST BP LT 130 MM HG: CPT | Performed by: FAMILY MEDICINE

## 2023-06-27 PROCEDURE — 3078F DIAST BP <80 MM HG: CPT | Performed by: FAMILY MEDICINE

## 2023-06-27 PROCEDURE — 99213 OFFICE O/P EST LOW 20 MIN: CPT | Performed by: FAMILY MEDICINE

## 2023-06-27 RX ORDER — HYDROCODONE BITARTRATE AND ACETAMINOPHEN 5; 325 MG/1; MG/1
1 TABLET ORAL 2 TIMES DAILY
Qty: 14 TABLET | Refills: 0 | Status: SHIPPED | OUTPATIENT
Start: 2023-06-27 | End: 2023-07-04

## 2023-06-27 RX ORDER — CARVEDILOL 12.5 MG/1
12.5 TABLET ORAL 2 TIMES DAILY
Qty: 60 TABLET | Refills: 0 | Status: SHIPPED | OUTPATIENT
Start: 2023-06-27

## 2023-06-27 ASSESSMENT — PATIENT HEALTH QUESTIONNAIRE - PHQ9
2. FEELING DOWN, DEPRESSED OR HOPELESS: 1
SUM OF ALL RESPONSES TO PHQ QUESTIONS 1-9: 5
5. POOR APPETITE OR OVEREATING: 1
SUM OF ALL RESPONSES TO PHQ QUESTIONS 1-9: 5
9. THOUGHTS THAT YOU WOULD BE BETTER OFF DEAD, OR OF HURTING YOURSELF: 0
SUM OF ALL RESPONSES TO PHQ QUESTIONS 1-9: 5
3. TROUBLE FALLING OR STAYING ASLEEP: 1
4. FEELING TIRED OR HAVING LITTLE ENERGY: 1
7. TROUBLE CONCENTRATING ON THINGS, SUCH AS READING THE NEWSPAPER OR WATCHING TELEVISION: 0
SUM OF ALL RESPONSES TO PHQ QUESTIONS 1-9: 5
1. LITTLE INTEREST OR PLEASURE IN DOING THINGS: 0
6. FEELING BAD ABOUT YOURSELF - OR THAT YOU ARE A FAILURE OR HAVE LET YOURSELF OR YOUR FAMILY DOWN: 1
10. IF YOU CHECKED OFF ANY PROBLEMS, HOW DIFFICULT HAVE THESE PROBLEMS MADE IT FOR YOU TO DO YOUR WORK, TAKE CARE OF THINGS AT HOME, OR GET ALONG WITH OTHER PEOPLE: 0
8. MOVING OR SPEAKING SO SLOWLY THAT OTHER PEOPLE COULD HAVE NOTICED. OR THE OPPOSITE, BEING SO FIGETY OR RESTLESS THAT YOU HAVE BEEN MOVING AROUND A LOT MORE THAN USUAL: 0
SUM OF ALL RESPONSES TO PHQ9 QUESTIONS 1 & 2: 1

## 2023-08-21 RX ORDER — CARVEDILOL 12.5 MG/1
12.5 TABLET ORAL 2 TIMES DAILY
Qty: 60 TABLET | Refills: 0 | Status: SHIPPED | OUTPATIENT
Start: 2023-08-21

## 2023-08-21 RX ORDER — GABAPENTIN 800 MG/1
800 TABLET ORAL 3 TIMES DAILY
Qty: 90 TABLET | Refills: 0 | Status: SHIPPED | OUTPATIENT
Start: 2023-08-21 | End: 2023-09-20

## 2023-09-01 ENCOUNTER — OFFICE VISIT (OUTPATIENT)
Dept: FAMILY MEDICINE CLINIC | Age: 48
End: 2023-09-01
Payer: MEDICARE

## 2023-09-01 ENCOUNTER — HOSPITAL ENCOUNTER (OUTPATIENT)
Age: 48
Setting detail: SPECIMEN
Discharge: HOME | End: 2023-09-01

## 2023-09-01 VITALS
WEIGHT: 315 LBS | SYSTOLIC BLOOD PRESSURE: 128 MMHG | HEART RATE: 81 BPM | HEIGHT: 70 IN | OXYGEN SATURATION: 96 % | DIASTOLIC BLOOD PRESSURE: 80 MMHG | BODY MASS INDEX: 45.1 KG/M2

## 2023-09-01 DIAGNOSIS — J02.9 ACUTE PHARYNGITIS, UNSPECIFIED ETIOLOGY: Primary | ICD-10-CM

## 2023-09-01 DIAGNOSIS — F10.10 ALCOHOL ABUSE: ICD-10-CM

## 2023-09-01 DIAGNOSIS — J02.9 ACUTE PHARYNGITIS, UNSPECIFIED ETIOLOGY: ICD-10-CM

## 2023-09-01 LAB
ALBUMIN SERPL-MCNC: 4.2 G/DL (ref 3.5–5.2)
ALBUMIN/GLOB SERPL: 1.4 {RATIO} (ref 1–2.5)
ALP SERPL-CCNC: 89 U/L (ref 40–129)
ALT SERPL-CCNC: 14 U/L (ref 5–41)
AST SERPL-CCNC: 15 U/L
BASOPHILS # BLD: 0.14 K/UL (ref 0–0.2)
BASOPHILS NFR BLD: 1 % (ref 0–2)
BILIRUB DIRECT SERPL-MCNC: 0.2 MG/DL
BILIRUB INDIRECT SERPL-MCNC: 0.6 MG/DL (ref 0–1)
BILIRUB SERPL-MCNC: 0.8 MG/DL (ref 0.3–1.2)
EOSINOPHIL # BLD: 0.34 K/UL (ref 0–0.44)
EOSINOPHILS RELATIVE PERCENT: 3 % (ref 1–4)
ERYTHROCYTE [DISTWIDTH] IN BLOOD BY AUTOMATED COUNT: 18.7 % (ref 11.8–14.4)
GGT SERPL-CCNC: 16 U/L (ref 8–61)
HCT VFR BLD AUTO: 58.3 % (ref 40.7–50.3)
HGB BLD-MCNC: 18.4 G/DL (ref 13–17)
IMM GRANULOCYTES # BLD AUTO: 0.1 K/UL (ref 0–0.3)
IMM GRANULOCYTES NFR BLD: 1 %
LYMPHOCYTES NFR BLD: 2.24 K/UL (ref 1.1–3.7)
LYMPHOCYTES RELATIVE PERCENT: 17 % (ref 24–43)
MCH RBC QN AUTO: 25.5 PG (ref 25.2–33.5)
MCHC RBC AUTO-ENTMCNC: 31.6 G/DL (ref 28.4–34.8)
MCV RBC AUTO: 80.9 FL (ref 82.6–102.9)
MONOCYTES NFR BLD: 1.11 K/UL (ref 0.1–1.2)
MONOCYTES NFR BLD: 9 % (ref 3–12)
NEUTROPHILS NFR BLD: 69 % (ref 36–65)
NEUTS SEG NFR BLD: 9.06 K/UL (ref 1.5–8.1)
NRBC BLD-RTO: 0 PER 100 WBC
PLATELET # BLD AUTO: 248 K/UL (ref 138–453)
PMV BLD AUTO: 11.2 FL (ref 8.1–13.5)
PROT SERPL-MCNC: 7.2 G/DL (ref 6.4–8.3)
RBC # BLD AUTO: 7.21 M/UL (ref 4.21–5.77)
RBC # BLD: ABNORMAL 10*6/UL
WBC OTHER # BLD: 13 K/UL (ref 3.5–11.3)

## 2023-09-01 PROCEDURE — 99213 OFFICE O/P EST LOW 20 MIN: CPT | Performed by: FAMILY MEDICINE

## 2023-09-01 PROCEDURE — 3079F DIAST BP 80-89 MM HG: CPT | Performed by: FAMILY MEDICINE

## 2023-09-01 PROCEDURE — 3074F SYST BP LT 130 MM HG: CPT | Performed by: FAMILY MEDICINE

## 2023-09-01 RX ORDER — AMOXICILLIN AND CLAVULANATE POTASSIUM 875; 125 MG/1; MG/1
1 TABLET, FILM COATED ORAL 2 TIMES DAILY
Qty: 20 TABLET | Refills: 0 | Status: SHIPPED | OUTPATIENT
Start: 2023-09-01 | End: 2023-09-11

## 2023-09-01 RX ORDER — SILDENAFIL 100 MG/1
100 TABLET, FILM COATED ORAL DAILY PRN
Qty: 10 TABLET | Refills: 12 | Status: SHIPPED | OUTPATIENT
Start: 2023-09-01

## 2023-09-01 ASSESSMENT — PATIENT HEALTH QUESTIONNAIRE - PHQ9
SUM OF ALL RESPONSES TO PHQ QUESTIONS 1-9: 0
SUM OF ALL RESPONSES TO PHQ9 QUESTIONS 1 & 2: 0
2. FEELING DOWN, DEPRESSED OR HOPELESS: 0
SUM OF ALL RESPONSES TO PHQ QUESTIONS 1-9: 0
1. LITTLE INTEREST OR PLEASURE IN DOING THINGS: 0

## 2023-09-03 LAB
MICROORGANISM SPEC CULT: NORMAL
SPECIMEN DESCRIPTION: NORMAL

## 2023-09-14 ENCOUNTER — TELEPHONE (OUTPATIENT)
Dept: PULMONOLOGY | Age: 48
End: 2023-09-14

## 2023-09-14 NOTE — TELEPHONE ENCOUNTER
Pt called office requesting an appt. - pt last seen 2018- informed pt he will need a new referral.    Pt thinks he may have pneumonia - and he wakes up gasping for air / last seen for CHRIS & PNEUMONIA   PT NOT USING CPAP.    Advised urgent care or PCP - should get CXR

## 2023-09-15 ENCOUNTER — PATIENT MESSAGE (OUTPATIENT)
Dept: FAMILY MEDICINE CLINIC | Age: 48
End: 2023-09-15

## 2023-09-15 DIAGNOSIS — K14.8 ENLARGED TONGUE: Primary | ICD-10-CM

## 2023-09-15 NOTE — TELEPHONE ENCOUNTER
From: Duane Shown  To: Dr. Delacruz Alamo: 9/15/2023 8:47 AM EDT  Subject: ENT    [facility] because my throat is swollen red. It makes it very difficult to breathe. I need a referral for ENT. They gave me prednisone for that but I can hardly sleep carroll I'm gasping for air.  Please let me know thank you

## 2023-09-18 NOTE — TELEPHONE ENCOUNTER
Referral completed and sent to Dr. Debbie Villarreal. Patient also asked for a referral to pulmonary d/t recent CT of chest at [facility]. Patient states he is having SOB for a couple weeks medication from ER is not helping.

## 2023-09-19 NOTE — TELEPHONE ENCOUNTER
Patient called in today stating he is having a hard time sleeping and he is also gasping for air and he is wanting to know if the referral can be faxed over today if possible      Please advise

## 2023-09-26 NOTE — TELEPHONE ENCOUNTER
Marcie Dominique is calling to request a refill on the following medication(s):    Last Visit Date (If Applicable):  6/0/5848    Next Visit Date:    Visit date not found    Medication Request:  Requested Prescriptions     Pending Prescriptions Disp Refills    gabapentin (NEURONTIN) 800 MG tablet 90 tablet 0     Sig: Take 1 tablet by mouth 3 times daily for 30 days.     carvedilol (COREG) 12.5 MG tablet 60 tablet 0     Sig: Take 1 tablet by mouth 2 times daily

## 2023-09-27 RX ORDER — GABAPENTIN 800 MG/1
800 TABLET ORAL 3 TIMES DAILY
Qty: 90 TABLET | Refills: 0 | Status: SHIPPED | OUTPATIENT
Start: 2023-09-27 | End: 2023-10-27

## 2023-09-27 RX ORDER — CARVEDILOL 12.5 MG/1
12.5 TABLET ORAL 2 TIMES DAILY
Qty: 60 TABLET | Refills: 0 | Status: SHIPPED | OUTPATIENT
Start: 2023-09-27

## 2023-10-04 ENCOUNTER — HOSPITAL ENCOUNTER (OUTPATIENT)
Dept: SLEEP CENTER | Age: 48
Discharge: HOME | End: 2023-10-06
Payer: MEDICARE

## 2023-10-04 PROCEDURE — 95810 POLYSOM 6/> YRS 4/> PARAM: CPT

## 2023-10-09 RX ORDER — GABAPENTIN 800 MG/1
800 TABLET ORAL 3 TIMES DAILY
Qty: 90 TABLET | Refills: 0 | Status: SHIPPED | OUTPATIENT
Start: 2023-10-09 | End: 2023-11-08

## 2023-10-09 NOTE — TELEPHONE ENCOUNTER
Jacqui Gould is calling to request a refill on the following medication(s):    Last Visit Date (If Applicable):  7/5/7318    Next Visit Date:    Visit date not found    Medication Request:  Requested Prescriptions     Pending Prescriptions Disp Refills    gabapentin (NEURONTIN) 800 MG tablet 90 tablet 0     Sig: Take 1 tablet by mouth 3 times daily for 30 days.

## 2023-10-19 ENCOUNTER — TELEPHONE (OUTPATIENT)
Dept: FAMILY MEDICINE CLINIC | Age: 48
End: 2023-10-19

## 2023-10-19 DIAGNOSIS — J39.8 TRACHEAL STENOSIS: Primary | ICD-10-CM

## 2023-10-19 NOTE — TELEPHONE ENCOUNTER
Referral into Dr. Ava Ocasio all ENTs treat tracheal stenosis I specifically spoke to this doctor and he actually does and is very familiar with handling this disorder and will get him in soon to be seen.   Fax the referral over to his office

## 2023-10-19 NOTE — TELEPHONE ENCOUNTER
Patient called in stating he is needing a referral for a otolaryngologist he mentioned he has tracheal stenosis      Please advise

## 2023-10-23 LAB — STATUS: NORMAL

## 2023-10-26 ENCOUNTER — TELEPHONE (OUTPATIENT)
Dept: OTOLARYNGOLOGY | Age: 48
End: 2023-10-26

## 2023-10-26 ENCOUNTER — TELEPHONE (OUTPATIENT)
Dept: ONCOLOGY | Age: 48
End: 2023-10-26

## 2023-10-26 NOTE — TELEPHONE ENCOUNTER
PATIENT CALLED ON 10/26/23 TO CANCEL HIS MD VISIT AND PHLEBOTOMY FOR 12/14/23, AS HE HAS \"BIGGER FISH TO [place]. \" PATIENT SAID HE HAS TO FIND AN MD AT [facility] FOR HIS THROAT AS IT IS CLOSING. PATIENT SAID HE WILL CALL BACK TO RESCHEDULE IF HE IS AROUND.

## 2023-10-26 NOTE — TELEPHONE ENCOUNTER
Sw consulted to assess pt for services. Sw is covering for [place] in ENT. Sw met with pt who reports he lives with his mom and mom has 1 car and pt will borrow for his medical appointments. Pt reports [facility] [employer] [facility]. Pt reports he has [employer] from [facility] which helps with covering his [facility] premiums. Sw asked pt if he has a care coordinator from [address] and pt stated he does not. Sw asked pt if he has applied for [facility] benefits and pt stated he does not have DAVIAN other than [employer] services. Sw informed pt that writer is aware that he needs transportation to seek treatment. Pt states he does not have support to help with the transportation because his mom is 66 yrs old and he can't expect her to drive that far nor can pt take the only car. Pt stated he could attempt to get his brother to assist but rarely sees him. Sw asked for time to contact [facility] to see if a care coordinator can assist him with the transportation. Sw will follow up with pt. Pt states he was diagnosed with schizophrenia, bipolar ,and other mental health conditions that he was awarded SSDI but only rec's $1,500's a month. Dougie called and spoke with [employer] and reached the care coord line but was then informed since pt is on the [address] HMO he would require a different dept for HMO patients. RN Garrett Maldonado reports that she is taking down the request that pt needs and  especially the transportation. Garrett Maldonado noted there was a bold note on pt's information. It was a note where Care Coordination was attempted in past and pt stated to never, ever ,call him again. Sw informed Garrett Maldonado that pt is stable and understands he needs the medical attention. Dougie offered writers phn information for the coordinator to reach writer if pt is not cooperating. Sw reached out to pt to let him know that writer had contacted care coordination to provide pt with services. Sw informed pt that they would be calling to gather information.   Pt stated that

## 2023-10-27 ENCOUNTER — TELEPHONE (OUTPATIENT)
Dept: FAMILY MEDICINE CLINIC | Age: 48
End: 2023-10-27

## 2023-10-27 ENCOUNTER — TELEPHONE (OUTPATIENT)
Dept: PEDIATRIC GASTROENTEROLOGY | Age: 48
End: 2023-10-27

## 2023-10-27 ENCOUNTER — HOSPITAL ENCOUNTER (OUTPATIENT)
Dept: SLEEP CENTER | Age: 48
Discharge: HOME | End: 2023-10-27
Payer: MEDICARE

## 2023-10-27 PROCEDURE — 95811 POLYSOM 6/>YRS CPAP 4/> PARM: CPT

## 2023-10-27 NOTE — TELEPHONE ENCOUNTER
Dougie rec'd cl from pt with update. Pt reports he received a call from [facility] and has a two session appointment the first is December 21st.  Pt reports he may have found two people that may be able to help with transportation. Pt also wanted writer to know that care Mgt from [facility] have not reached out to him. Sw informed pt that they will reach out to him it usually takes couple days to a week after assigned to reach out to pt. Pt stated okay. Sw  asked that if by Wednesday he has not heard from [facility] to reach back to writer. Pt verbalized understanding. Alex Cole

## 2023-10-27 NOTE — TELEPHONE ENCOUNTER
Patient called requesting arthiominson or penicillin patient stated that its for his throat. For his inflammatory. Please advise.

## 2023-10-28 VITALS
OXYGEN SATURATION: 94 % | WEIGHT: 315 LBS | HEART RATE: 70 BPM | RESPIRATION RATE: 16 BRPM | BODY MASS INDEX: 45.1 KG/M2 | HEIGHT: 70 IN

## 2023-11-03 ENCOUNTER — TELEPHONE (OUTPATIENT)
Dept: GASTROENTEROLOGY | Age: 48
End: 2023-11-03

## 2023-11-03 DIAGNOSIS — J44.9 CHRONIC OBSTRUCTIVE PULMONARY DISEASE, UNSPECIFIED COPD TYPE (HCC): ICD-10-CM

## 2023-11-03 DIAGNOSIS — J39.8 TRACHEAL STENOSIS: Primary | ICD-10-CM

## 2023-11-03 DIAGNOSIS — E66.01 CLASS 3 SEVERE OBESITY WITH BODY MASS INDEX (BMI) OF 45.0 TO 49.9 IN ADULT, UNSPECIFIED OBESITY TYPE, UNSPECIFIED WHETHER SERIOUS COMORBIDITY PRESENT (HCC): ICD-10-CM

## 2023-11-03 NOTE — TELEPHONE ENCOUNTER
Patient LVM on Surgery Scheduling VM that he was referred by [facility]. Joy's and needs to schedule an \"Immediate\" EGD. States Dr. Segundo Fernandes has done his colonoscopies every 2 years. He can be reached at [phone].

## 2023-11-03 NOTE — TELEPHONE ENCOUNTER
Writer called patient , patient cancelled his scheduled appointment and did not wish to reschedule at this time. Made patient aware that Disha Berg is leaving [facility] and that he will be getting a letter with all the different GI doctors and he can choose a different one for his future care.

## 2023-11-13 DIAGNOSIS — J39.8 TRACHEAL STENOSIS: Primary | ICD-10-CM

## 2023-11-17 LAB — STATUS: NORMAL

## 2023-11-17 RX ORDER — CARVEDILOL 12.5 MG/1
12.5 TABLET ORAL 2 TIMES DAILY
Qty: 60 TABLET | Refills: 0 | Status: SHIPPED | OUTPATIENT
Start: 2023-11-17

## 2023-11-17 NOTE — TELEPHONE ENCOUNTER
Юлия Gorman is calling to request a refill on the following medication(s):    Last Visit Date (If Applicable):  3/1/9969    Next Visit Date:    Visit date not found    Medication Request:  Requested Prescriptions     Pending Prescriptions Disp Refills    carvedilol (COREG) 12.5 MG tablet 60 tablet 0     Sig: Take 1 tablet by mouth 2 times daily

## 2023-12-11 RX ORDER — GABAPENTIN 800 MG/1
800 TABLET ORAL 3 TIMES DAILY
Qty: 90 TABLET | Refills: 0 | Status: ON HOLD | OUTPATIENT
Start: 2023-12-11 | End: 2024-01-10

## 2023-12-11 RX ORDER — ALBUTEROL SULFATE 90 UG/1
2 AEROSOL, METERED RESPIRATORY (INHALATION) 4 TIMES DAILY PRN
Qty: 18 G | Refills: 5 | Status: ON HOLD | OUTPATIENT
Start: 2023-12-11

## 2023-12-11 NOTE — TELEPHONE ENCOUNTER
Current Outpatient Medications on File Prior to Visit   Medication Sig Dispense Refill    carvedilol (COREG) 12.5 MG tablet Take 1 tablet by mouth 2 times daily 60 tablet 0    gabapentin (NEURONTIN) 800 MG tablet Take 1 tablet by mouth 3 times daily for 30 days. 90 tablet 0    sildenafil (VIAGRA) 100 MG tablet Take 1 tablet by mouth daily as needed for Erectile Dysfunction (Patient not taking: Reported on 10/26/2023) 10 tablet 12    LORazepam (ATIVAN) 1 MG tablet Take 1 tablet by mouth in the morning and 1 tablet in the evening. fluticasone-umeclidin-vilant (TRELEGY ELLIPTA) 200-62.5-25 MCG/ACT AEPB inhaler Inhale 1 puff into the lungs daily 1 each 11    albuterol sulfate HFA (VENTOLIN HFA) 108 (90 Base) MCG/ACT inhaler Inhale 2 puffs into the lungs 4 times daily as needed for Wheezing 18 g 5    Testosterone Cypionate 200 MG/ML SOLN Once a week      paliperidone (INVEGA) 3 MG extended release tablet Take 1 tablet by mouth every morning 1.5 ER daily      gemfibrozil (LOPID) 600 MG tablet take 1 tablet by mouth twice a day 180 tablet 3     No current facility-administered medications on file prior to visit.

## 2023-12-13 RX ORDER — CARVEDILOL 12.5 MG/1
12.5 TABLET ORAL 2 TIMES DAILY
Qty: 60 TABLET | Refills: 0 | Status: ON HOLD | OUTPATIENT
Start: 2023-12-13

## 2023-12-17 ENCOUNTER — APPOINTMENT (OUTPATIENT)
Dept: GENERAL RADIOLOGY | Age: 48
DRG: 917 | End: 2023-12-17
Payer: MEDICARE

## 2023-12-17 ENCOUNTER — HOSPITAL ENCOUNTER (EMERGENCY)
Age: 48
Discharge: ANOTHER HOSPITAL | DRG: 917 | End: 2023-12-17
Attending: EMERGENCY MEDICINE
Payer: MEDICARE

## 2023-12-17 ENCOUNTER — APPOINTMENT (OUTPATIENT)
Dept: CT IMAGING | Age: 48
DRG: 917 | End: 2023-12-17
Payer: MEDICARE

## 2023-12-17 ENCOUNTER — HOSPITAL ENCOUNTER (INPATIENT)
Age: 48
LOS: 9 days | Discharge: ANOTHER HOSPITAL | DRG: 917 | End: 2023-12-26
Attending: INTERNAL MEDICINE | Admitting: INTERNAL MEDICINE
Payer: MEDICARE

## 2023-12-17 ENCOUNTER — APPOINTMENT (OUTPATIENT)
Dept: GENERAL RADIOLOGY | Age: 48
DRG: 917 | End: 2023-12-17
Attending: INTERNAL MEDICINE
Payer: MEDICARE

## 2023-12-17 VITALS
DIASTOLIC BLOOD PRESSURE: 108 MMHG | TEMPERATURE: 99 F | SYSTOLIC BLOOD PRESSURE: 150 MMHG | HEIGHT: 70 IN | OXYGEN SATURATION: 100 % | WEIGHT: 315 LBS | BODY MASS INDEX: 45.1 KG/M2 | RESPIRATION RATE: 22 BRPM | HEART RATE: 53 BPM

## 2023-12-17 DIAGNOSIS — M79.89 SWELLING OF RIGHT UPPER EXTREMITY: ICD-10-CM

## 2023-12-17 DIAGNOSIS — J18.9 PNEUMONIA DUE TO INFECTIOUS ORGANISM, UNSPECIFIED LATERALITY, UNSPECIFIED PART OF LUNG: ICD-10-CM

## 2023-12-17 DIAGNOSIS — I42.9 SECONDARY CARDIOMYOPATHY (HCC): Primary | ICD-10-CM

## 2023-12-17 DIAGNOSIS — M79.89 LEFT UPPER EXTREMITY SWELLING: ICD-10-CM

## 2023-12-17 DIAGNOSIS — R45.1 AGITATION REQUIRING SEDATION PROTOCOL: ICD-10-CM

## 2023-12-17 DIAGNOSIS — D75.1 POLYCYTHEMIA: ICD-10-CM

## 2023-12-17 DIAGNOSIS — I50.21 ACUTE SYSTOLIC CONGESTIVE HEART FAILURE (HCC): ICD-10-CM

## 2023-12-17 DIAGNOSIS — R06.03 ACUTE RESPIRATORY DISTRESS: Primary | ICD-10-CM

## 2023-12-17 PROBLEM — Z87.09 HISTORY OF TRACHEAL STENOSIS: Status: ACTIVE | Noted: 2023-12-17

## 2023-12-17 PROBLEM — G47.33 OBSTRUCTIVE SLEEP APNEA: Status: ACTIVE | Noted: 2023-12-17

## 2023-12-17 PROBLEM — T50.904A DRUG OVERDOSE OF UNDETERMINED INTENT: Status: ACTIVE | Noted: 2023-12-17

## 2023-12-17 PROBLEM — R41.82 AMS (ALTERED MENTAL STATUS): Status: ACTIVE | Noted: 2023-12-17

## 2023-12-17 PROBLEM — Z98.890 HISTORY OF TRACHEOSTOMY: Status: ACTIVE | Noted: 2020-08-07

## 2023-12-17 LAB
ALBUMIN SERPL-MCNC: 3.4 G/DL (ref 3.5–5.2)
ALBUMIN SERPL-MCNC: 3.8 G/DL (ref 3.5–5.2)
ALBUMIN/GLOB SERPL: 1.3 {RATIO} (ref 1–2.5)
ALP SERPL-CCNC: 74 U/L (ref 40–129)
ALP SERPL-CCNC: 78 U/L (ref 40–129)
ALT SERPL-CCNC: 32 U/L (ref 5–41)
ALT SERPL-CCNC: 37 U/L (ref 5–41)
AMMONIA PLAS-SCNC: 50 UMOL/L (ref 16–60)
AMMONIA PLAS-SCNC: 53 UMOL/L (ref 16–60)
AMPHET UR QL SCN: NEGATIVE
ANION GAP SERPL CALCULATED.3IONS-SCNC: 11 MMOL/L (ref 9–17)
ANION GAP SERPL CALCULATED.3IONS-SCNC: 9 MMOL/L (ref 9–17)
APAP SERPL-MCNC: <10 UG/ML (ref 10–30)
AST SERPL-CCNC: 30 U/L
AST SERPL-CCNC: 38 U/L
B PARAP IS1001 DNA NPH QL NAA+NON-PROBE: NOT DETECTED
B PERT DNA SPEC QL NAA+PROBE: NOT DETECTED
BARBITURATES UR QL SCN: NEGATIVE
BASOPHILS # BLD: 0.12 K/UL (ref 0–0.2)
BASOPHILS NFR BLD: 1 %
BENZODIAZ UR QL: NEGATIVE
BILIRUB DIRECT SERPL-MCNC: 0.2 MG/DL
BILIRUB DIRECT SERPL-MCNC: 0.2 MG/DL
BILIRUB INDIRECT SERPL-MCNC: 0.4 MG/DL (ref 0–1)
BILIRUB INDIRECT SERPL-MCNC: 0.4 MG/DL (ref 0–1)
BILIRUB SERPL-MCNC: 0.6 MG/DL (ref 0.3–1.2)
BILIRUB SERPL-MCNC: 0.6 MG/DL (ref 0.3–1.2)
BILIRUB UR QL STRIP: NEGATIVE
BNP SERPL-MCNC: 3513 PG/ML
BUN SERPL-MCNC: 23 MG/DL (ref 6–20)
BUN SERPL-MCNC: 24 MG/DL (ref 6–20)
BUN/CREAT SERPL: 12 (ref 9–20)
C PNEUM DNA NPH QL NAA+NON-PROBE: NOT DETECTED
CALCIUM SERPL-MCNC: 8.7 MG/DL (ref 8.6–10.4)
CALCIUM SERPL-MCNC: 8.9 MG/DL (ref 8.6–10.4)
CANNABINOIDS UR QL SCN: POSITIVE
CHLORIDE SERPL-SCNC: 102 MMOL/L (ref 98–107)
CHLORIDE SERPL-SCNC: 95 MMOL/L (ref 98–107)
CK SERPL-CCNC: 205 U/L (ref 39–308)
CLARITY UR: ABNORMAL
CO2 SERPL-SCNC: 24 MMOL/L (ref 20–31)
CO2 SERPL-SCNC: 28 MMOL/L (ref 20–31)
COCAINE UR QL SCN: POSITIVE
COLOR UR: ABNORMAL
CREAT SERPL-MCNC: 1.3 MG/DL (ref 0.7–1.2)
CREAT SERPL-MCNC: 2 MG/DL (ref 0.7–1.2)
D DIMER PPP FEU-MCNC: 2.62 UG/ML FEU (ref 0–0.59)
EOSINOPHIL # BLD: 0.12 K/UL (ref 0–0.4)
EOSINOPHILS RELATIVE PERCENT: 1 % (ref 1–4)
EPI CELLS #/AREA URNS HPF: NORMAL /HPF (ref 0–5)
ERYTHROCYTE [DISTWIDTH] IN BLOOD BY AUTOMATED COUNT: 19.9 % (ref 11.8–14.4)
ERYTHROCYTE [DISTWIDTH] IN BLOOD BY AUTOMATED COUNT: 19.9 % (ref 11.8–14.4)
ETHANOL PERCENT: <0.01 %
ETHANOLAMINE SERPL-MCNC: <10 MG/DL
FENTANYL UR QL: NEGATIVE
FIO2: 100
FLUAV RNA NPH QL NAA+NON-PROBE: NOT DETECTED
FLUAV RNA RESP QL NAA+PROBE: NOT DETECTED
FLUBV RNA NPH QL NAA+NON-PROBE: NOT DETECTED
FLUBV RNA RESP QL NAA+PROBE: NOT DETECTED
GFR SERPL CREATININE-BSD FRML MDRD: 40 ML/MIN/1.73M2
GFR SERPL CREATININE-BSD FRML MDRD: >60 ML/MIN/1.73M2
GLUCOSE BLD-MCNC: 140 MG/DL (ref 74–100)
GLUCOSE BLD-MCNC: 151 MG/DL (ref 75–110)
GLUCOSE BLD-MCNC: 166 MG/DL (ref 75–110)
GLUCOSE SERPL-MCNC: 143 MG/DL (ref 70–99)
GLUCOSE SERPL-MCNC: 151 MG/DL (ref 70–99)
GLUCOSE UR STRIP-MCNC: NEGATIVE MG/DL
HADV DNA NPH QL NAA+NON-PROBE: NOT DETECTED
HCOV 229E RNA NPH QL NAA+NON-PROBE: NOT DETECTED
HCOV HKU1 RNA NPH QL NAA+NON-PROBE: NOT DETECTED
HCOV NL63 RNA NPH QL NAA+NON-PROBE: NOT DETECTED
HCOV OC43 RNA NPH QL NAA+NON-PROBE: NOT DETECTED
HCT VFR BLD AUTO: 52.2 % (ref 40.7–50.3)
HCT VFR BLD AUTO: 52.8 % (ref 40.7–50.3)
HGB BLD-MCNC: 14.9 G/DL (ref 13–17)
HGB BLD-MCNC: 15.5 G/DL (ref 13–17)
HGB UR QL STRIP.AUTO: ABNORMAL
HMPV RNA NPH QL NAA+NON-PROBE: NOT DETECTED
HPIV1 RNA NPH QL NAA+NON-PROBE: NOT DETECTED
HPIV2 RNA NPH QL NAA+NON-PROBE: NOT DETECTED
HPIV3 RNA NPH QL NAA+NON-PROBE: NOT DETECTED
HPIV4 RNA NPH QL NAA+NON-PROBE: NOT DETECTED
IMM GRANULOCYTES # BLD AUTO: 0.12 K/UL (ref 0–0.3)
IMM GRANULOCYTES NFR BLD: 1 %
INR PPP: 1.2
KETONES UR STRIP-MCNC: ABNORMAL MG/DL
LACTATE BLDV-SCNC: 1.2 MMOL/L (ref 0.5–1.9)
LACTATE BLDV-SCNC: 1.7 MMOL/L (ref 0.5–1.9)
LEUKOCYTE ESTERASE UR QL STRIP: ABNORMAL
LYMPHOCYTES NFR BLD: 1.12 K/UL (ref 1–4.8)
LYMPHOCYTES RELATIVE PERCENT: 9 % (ref 24–44)
M PNEUMO DNA NPH QL NAA+NON-PROBE: NOT DETECTED
MAGNESIUM SERPL-MCNC: 2.2 MG/DL (ref 1.6–2.6)
MCH RBC QN AUTO: 22.1 PG (ref 25.2–33.5)
MCH RBC QN AUTO: 22.9 PG (ref 25.2–33.5)
MCHC RBC AUTO-ENTMCNC: 28.2 G/DL (ref 28.4–34.8)
MCHC RBC AUTO-ENTMCNC: 29.7 G/DL (ref 28.4–34.8)
MCV RBC AUTO: 77.2 FL (ref 82.6–102.9)
MCV RBC AUTO: 78.2 FL (ref 82.6–102.9)
METHADONE UR QL: NEGATIVE
MODE: AC
MONOCYTES NFR BLD: 1.49 K/UL (ref 0.2–0.8)
MONOCYTES NFR BLD: 12 % (ref 1–7)
MORPHOLOGY: ABNORMAL
MYOGLOBIN SERPL-MCNC: 48 NG/ML (ref 28–72)
NEUTROPHILS NFR BLD: 76 % (ref 36–66)
NEUTS SEG NFR BLD: 9.43 K/UL (ref 1.8–7.7)
NITRITE UR QL STRIP: NEGATIVE
NRBC BLD-RTO: 0.3 PER 100 WBC
NRBC BLD-RTO: 0.4 PER 100 WBC
O2 DELIVERY DEVICE: ABNORMAL
OPIATES UR QL SCN: NEGATIVE
OXYCODONE UR QL SCN: NEGATIVE
PARTIAL THROMBOPLASTIN TIME: 33.1 SEC (ref 23.9–33.8)
PATIENT TEMP: 37
PCP UR QL SCN: NEGATIVE
PH UR STRIP: 6 [PH] (ref 5–8)
PHOSPHATE SERPL-MCNC: 3.1 MG/DL (ref 2.5–4.5)
PLATELET # BLD AUTO: 209 K/UL (ref 138–453)
PLATELET # BLD AUTO: 259 K/UL (ref 138–453)
PMV BLD AUTO: 10.1 FL (ref 8.1–13.5)
PMV BLD AUTO: 10.2 FL (ref 8.1–13.5)
POC HCO3: 28.1 MMOL/L (ref 21–28)
POC HCO3: 28.5 MMOL/L (ref 21–28)
POC HCO3: 29 MMOL/L (ref 21–28)
POC LACTIC ACID: 1.1 MMOL/L (ref 0.56–1.39)
POC O2 SATURATION: 94 % (ref 94–98)
POC O2 SATURATION: 97.8 % (ref 94–98)
POC O2 SATURATION: 99.5 % (ref 94–98)
POC PCO2: 51.1 MM HG (ref 35–48)
POC PCO2: 52.6 MM HG (ref 35–48)
POC PCO2: 52.8 MM HG (ref 35–48)
POC PH: 7.34 (ref 7.35–7.45)
POC PH: 7.35 (ref 7.35–7.45)
POC PH: 7.35 (ref 7.35–7.45)
POC PO2: 108.1 MM HG (ref 83–108)
POC PO2: 180 MM HG (ref 83–108)
POC PO2: 75.8 MM HG (ref 83–108)
POSITIVE BASE EXCESS, ART: 1.2 MMOL/L (ref 0–3)
POSITIVE BASE EXCESS, ART: 1.4 MMOL/L (ref 0–3)
POSITIVE BASE EXCESS, ART: 1.9 MMOL/L (ref 0–3)
POTASSIUM SERPL-SCNC: 4.6 MMOL/L (ref 3.7–5.3)
POTASSIUM SERPL-SCNC: 5.2 MMOL/L (ref 3.7–5.3)
PROCALCITONIN SERPL-MCNC: 0.11 NG/ML (ref 0–0.09)
PROT SERPL-MCNC: 6.1 G/DL (ref 6.4–8.3)
PROT SERPL-MCNC: 6.5 G/DL (ref 6.4–8.3)
PROT UR STRIP-MCNC: ABNORMAL MG/DL
PROTHROMBIN TIME: 15.2 SEC (ref 11.5–14.2)
RBC # BLD AUTO: 6.75 M/UL (ref 4.21–5.77)
RBC # BLD AUTO: 6.76 M/UL (ref 4.21–5.77)
RBC #/AREA URNS HPF: NORMAL /HPF (ref 0–2)
RSV RNA NPH QL NAA+NON-PROBE: NOT DETECTED
RV+EV RNA NPH QL NAA+NON-PROBE: NOT DETECTED
SALICYLATES SERPL-MCNC: <1 MG/DL (ref 3–10)
SARS-COV-2 RNA NPH QL NAA+NON-PROBE: NOT DETECTED
SARS-COV-2 RNA RESP QL NAA+PROBE: NOT DETECTED
SODIUM SERPL-SCNC: 132 MMOL/L (ref 135–144)
SODIUM SERPL-SCNC: 137 MMOL/L (ref 135–144)
SOURCE: NORMAL
SP GR UR STRIP: 1.03 (ref 1–1.03)
SPECIMEN DESCRIPTION: NORMAL
SPECIMEN DESCRIPTION: NORMAL
TEST INFORMATION: ABNORMAL
TROPONIN I SERPL HS-MCNC: 17 NG/L (ref 0–22)
TROPONIN I SERPL HS-MCNC: 25 NG/L (ref 0–22)
TROPONIN I SERPL HS-MCNC: 33 NG/L (ref 0–22)
UROBILINOGEN UR STRIP-ACNC: NORMAL EU/DL (ref 0–1)
WBC #/AREA URNS HPF: NORMAL /HPF (ref 0–5)
WBC OTHER # BLD: 10 K/UL (ref 3.5–11.3)
WBC OTHER # BLD: 12.4 K/UL (ref 3.5–11.3)

## 2023-12-17 PROCEDURE — A4216 STERILE WATER/SALINE, 10 ML: HCPCS | Performed by: STUDENT IN AN ORGANIZED HEALTH CARE EDUCATION/TRAINING PROGRAM

## 2023-12-17 PROCEDURE — 96375 TX/PRO/DX INJ NEW DRUG ADDON: CPT

## 2023-12-17 PROCEDURE — 81001 URINALYSIS AUTO W/SCOPE: CPT

## 2023-12-17 PROCEDURE — 37799 UNLISTED PX VASCULAR SURGERY: CPT

## 2023-12-17 PROCEDURE — 0DH67UZ INSERTION OF FEEDING DEVICE INTO STOMACH, VIA NATURAL OR ARTIFICIAL OPENING: ICD-10-PCS | Performed by: STUDENT IN AN ORGANIZED HEALTH CARE EDUCATION/TRAINING PROGRAM

## 2023-12-17 PROCEDURE — 84100 ASSAY OF PHOSPHORUS: CPT

## 2023-12-17 PROCEDURE — 86403 PARTICLE AGGLUT ANTBDY SCRN: CPT

## 2023-12-17 PROCEDURE — 85025 COMPLETE CBC W/AUTO DIFF WBC: CPT

## 2023-12-17 PROCEDURE — 87186 SC STD MICRODIL/AGAR DIL: CPT

## 2023-12-17 PROCEDURE — 2700000000 HC OXYGEN THERAPY PER DAY

## 2023-12-17 PROCEDURE — 99285 EMERGENCY DEPT VISIT HI MDM: CPT

## 2023-12-17 PROCEDURE — 6360000002 HC RX W HCPCS: Performed by: STUDENT IN AN ORGANIZED HEALTH CARE EDUCATION/TRAINING PROGRAM

## 2023-12-17 PROCEDURE — 93005 ELECTROCARDIOGRAM TRACING: CPT | Performed by: STUDENT IN AN ORGANIZED HEALTH CARE EDUCATION/TRAINING PROGRAM

## 2023-12-17 PROCEDURE — 36620 INSERTION CATHETER ARTERY: CPT

## 2023-12-17 PROCEDURE — 93005 ELECTROCARDIOGRAM TRACING: CPT | Performed by: EMERGENCY MEDICINE

## 2023-12-17 PROCEDURE — 83605 ASSAY OF LACTIC ACID: CPT

## 2023-12-17 PROCEDURE — 85027 COMPLETE CBC AUTOMATED: CPT

## 2023-12-17 PROCEDURE — 80048 BASIC METABOLIC PNL TOTAL CA: CPT

## 2023-12-17 PROCEDURE — 96366 THER/PROPH/DIAG IV INF ADDON: CPT

## 2023-12-17 PROCEDURE — 84484 ASSAY OF TROPONIN QUANT: CPT

## 2023-12-17 PROCEDURE — 87636 SARSCOV2 & INF A&B AMP PRB: CPT

## 2023-12-17 PROCEDURE — 94002 VENT MGMT INPAT INIT DAY: CPT

## 2023-12-17 PROCEDURE — 6370000000 HC RX 637 (ALT 250 FOR IP): Performed by: STUDENT IN AN ORGANIZED HEALTH CARE EDUCATION/TRAINING PROGRAM

## 2023-12-17 PROCEDURE — 80076 HEPATIC FUNCTION PANEL: CPT

## 2023-12-17 PROCEDURE — 85730 THROMBOPLASTIN TIME PARTIAL: CPT

## 2023-12-17 PROCEDURE — 87641 MR-STAPH DNA AMP PROBE: CPT

## 2023-12-17 PROCEDURE — 71045 X-RAY EXAM CHEST 1 VIEW: CPT

## 2023-12-17 PROCEDURE — 96374 THER/PROPH/DIAG INJ IV PUSH: CPT

## 2023-12-17 PROCEDURE — 82947 ASSAY GLUCOSE BLOOD QUANT: CPT

## 2023-12-17 PROCEDURE — 87040 BLOOD CULTURE FOR BACTERIA: CPT

## 2023-12-17 PROCEDURE — 36600 WITHDRAWAL OF ARTERIAL BLOOD: CPT

## 2023-12-17 PROCEDURE — 94761 N-INVAS EAR/PLS OXIMETRY MLT: CPT

## 2023-12-17 PROCEDURE — 96367 TX/PROPH/DG ADDL SEQ IV INF: CPT

## 2023-12-17 PROCEDURE — 2500000003 HC RX 250 WO HCPCS: Performed by: STUDENT IN AN ORGANIZED HEALTH CARE EDUCATION/TRAINING PROGRAM

## 2023-12-17 PROCEDURE — 96365 THER/PROPH/DIAG IV INF INIT: CPT

## 2023-12-17 PROCEDURE — 2580000003 HC RX 258: Performed by: STUDENT IN AN ORGANIZED HEALTH CARE EDUCATION/TRAINING PROGRAM

## 2023-12-17 PROCEDURE — 82803 BLOOD GASES ANY COMBINATION: CPT

## 2023-12-17 PROCEDURE — 87205 SMEAR GRAM STAIN: CPT

## 2023-12-17 PROCEDURE — 96368 THER/DIAG CONCURRENT INF: CPT

## 2023-12-17 PROCEDURE — 03HY32Z INSERTION OF MONITORING DEVICE INTO UPPER ARTERY, PERCUTANEOUS APPROACH: ICD-10-PCS | Performed by: EMERGENCY MEDICINE

## 2023-12-17 PROCEDURE — 0202U NFCT DS 22 TRGT SARS-COV-2: CPT

## 2023-12-17 PROCEDURE — 80307 DRUG TEST PRSMV CHEM ANLYZR: CPT

## 2023-12-17 PROCEDURE — 2580000003 HC RX 258: Performed by: EMERGENCY MEDICINE

## 2023-12-17 PROCEDURE — 71250 CT THORAX DX C-: CPT

## 2023-12-17 PROCEDURE — 87070 CULTURE OTHR SPECIMN AEROBIC: CPT

## 2023-12-17 PROCEDURE — 83735 ASSAY OF MAGNESIUM: CPT

## 2023-12-17 PROCEDURE — 85610 PROTHROMBIN TIME: CPT

## 2023-12-17 PROCEDURE — 89220 SPUTUM SPECIMEN COLLECTION: CPT

## 2023-12-17 PROCEDURE — 83874 ASSAY OF MYOGLOBIN: CPT

## 2023-12-17 PROCEDURE — 84145 PROCALCITONIN (PCT): CPT

## 2023-12-17 PROCEDURE — 80179 DRUG ASSAY SALICYLATE: CPT

## 2023-12-17 PROCEDURE — 0BH17EZ INSERTION OF ENDOTRACHEAL AIRWAY INTO TRACHEA, VIA NATURAL OR ARTIFICIAL OPENING: ICD-10-PCS | Performed by: EMERGENCY MEDICINE

## 2023-12-17 PROCEDURE — 82140 ASSAY OF AMMONIA: CPT

## 2023-12-17 PROCEDURE — 87154 CUL TYP ID BLD PTHGN 6+ TRGT: CPT

## 2023-12-17 PROCEDURE — 3E0G76Z INTRODUCTION OF NUTRITIONAL SUBSTANCE INTO UPPER GI, VIA NATURAL OR ARTIFICIAL OPENING: ICD-10-PCS | Performed by: STUDENT IN AN ORGANIZED HEALTH CARE EDUCATION/TRAINING PROGRAM

## 2023-12-17 PROCEDURE — 70450 CT HEAD/BRAIN W/O DYE: CPT

## 2023-12-17 PROCEDURE — 6360000002 HC RX W HCPCS: Performed by: EMERGENCY MEDICINE

## 2023-12-17 PROCEDURE — 2500000003 HC RX 250 WO HCPCS: Performed by: EMERGENCY MEDICINE

## 2023-12-17 PROCEDURE — G0480 DRUG TEST DEF 1-7 CLASSES: HCPCS

## 2023-12-17 PROCEDURE — 85379 FIBRIN DEGRADATION QUANT: CPT

## 2023-12-17 PROCEDURE — 2000000000 HC ICU R&B

## 2023-12-17 PROCEDURE — 82550 ASSAY OF CK (CPK): CPT

## 2023-12-17 PROCEDURE — 99291 CRITICAL CARE FIRST HOUR: CPT | Performed by: INTERNAL MEDICINE

## 2023-12-17 PROCEDURE — 31500 INSERT EMERGENCY AIRWAY: CPT

## 2023-12-17 PROCEDURE — 80143 DRUG ASSAY ACETAMINOPHEN: CPT

## 2023-12-17 PROCEDURE — 83880 ASSAY OF NATRIURETIC PEPTIDE: CPT

## 2023-12-17 PROCEDURE — 6360000002 HC RX W HCPCS

## 2023-12-17 RX ORDER — PROPOFOL 10 MG/ML
5-50 INJECTION, EMULSION INTRAVENOUS CONTINUOUS
Status: DISCONTINUED | OUTPATIENT
Start: 2023-12-17 | End: 2023-12-17 | Stop reason: HOSPADM

## 2023-12-17 RX ORDER — SUCCINYLCHOLINE CHLORIDE 20 MG/ML
100 INJECTION INTRAMUSCULAR; INTRAVENOUS ONCE
Status: DISCONTINUED | OUTPATIENT
Start: 2023-12-17 | End: 2023-12-17 | Stop reason: SDUPTHER

## 2023-12-17 RX ORDER — NALOXONE HYDROCHLORIDE 0.4 MG/ML
0.4 INJECTION, SOLUTION INTRAMUSCULAR; INTRAVENOUS; SUBCUTANEOUS ONCE
Status: COMPLETED | OUTPATIENT
Start: 2023-12-17 | End: 2023-12-17

## 2023-12-17 RX ORDER — DEXMEDETOMIDINE HYDROCHLORIDE 4 UG/ML
.1-1.5 INJECTION, SOLUTION INTRAVENOUS CONTINUOUS
Status: DISCONTINUED | OUTPATIENT
Start: 2023-12-17 | End: 2023-12-17

## 2023-12-17 RX ORDER — PROPOFOL 10 MG/ML
5-50 INJECTION, EMULSION INTRAVENOUS CONTINUOUS
Status: DISCONTINUED | OUTPATIENT
Start: 2023-12-17 | End: 2023-12-21

## 2023-12-17 RX ORDER — SODIUM CHLORIDE 0.9 % (FLUSH) 0.9 %
5-40 SYRINGE (ML) INJECTION PRN
Status: DISCONTINUED | OUTPATIENT
Start: 2023-12-17 | End: 2023-12-26 | Stop reason: HOSPADM

## 2023-12-17 RX ORDER — ACETAMINOPHEN 650 MG/1
650 SUPPOSITORY RECTAL EVERY 6 HOURS PRN
Status: DISCONTINUED | OUTPATIENT
Start: 2023-12-17 | End: 2023-12-26 | Stop reason: HOSPADM

## 2023-12-17 RX ORDER — PROPOFOL 10 MG/ML
INJECTION, EMULSION INTRAVENOUS
Status: COMPLETED
Start: 2023-12-17 | End: 2023-12-17

## 2023-12-17 RX ORDER — MAGNESIUM SULFATE IN WATER 40 MG/ML
2000 INJECTION, SOLUTION INTRAVENOUS PRN
Status: DISCONTINUED | OUTPATIENT
Start: 2023-12-17 | End: 2023-12-26 | Stop reason: HOSPADM

## 2023-12-17 RX ORDER — ETOMIDATE 2 MG/ML
20 INJECTION INTRAVENOUS ONCE
Status: COMPLETED | OUTPATIENT
Start: 2023-12-17 | End: 2023-12-17

## 2023-12-17 RX ORDER — POLYETHYLENE GLYCOL 3350 17 G/17G
17 POWDER, FOR SOLUTION ORAL DAILY PRN
Status: DISCONTINUED | OUTPATIENT
Start: 2023-12-17 | End: 2023-12-18

## 2023-12-17 RX ORDER — HEPARIN SODIUM 5000 [USP'U]/ML
5000 INJECTION, SOLUTION INTRAVENOUS; SUBCUTANEOUS EVERY 8 HOURS SCHEDULED
Status: DISCONTINUED | OUTPATIENT
Start: 2023-12-17 | End: 2023-12-26 | Stop reason: HOSPADM

## 2023-12-17 RX ORDER — ONDANSETRON 2 MG/ML
4 INJECTION INTRAMUSCULAR; INTRAVENOUS EVERY 6 HOURS PRN
Status: DISCONTINUED | OUTPATIENT
Start: 2023-12-17 | End: 2023-12-26 | Stop reason: HOSPADM

## 2023-12-17 RX ORDER — ACETAMINOPHEN 325 MG/1
650 TABLET ORAL EVERY 6 HOURS PRN
Status: DISCONTINUED | OUTPATIENT
Start: 2023-12-17 | End: 2023-12-26 | Stop reason: HOSPADM

## 2023-12-17 RX ORDER — ALBUTEROL SULFATE 2.5 MG/3ML
2.5 SOLUTION RESPIRATORY (INHALATION)
Status: DISCONTINUED | OUTPATIENT
Start: 2023-12-17 | End: 2023-12-20

## 2023-12-17 RX ORDER — POTASSIUM CHLORIDE 7.45 MG/ML
10 INJECTION INTRAVENOUS PRN
Status: DISCONTINUED | OUTPATIENT
Start: 2023-12-17 | End: 2023-12-26 | Stop reason: HOSPADM

## 2023-12-17 RX ORDER — HEPARIN SODIUM 10000 [USP'U]/100ML
5-30 INJECTION, SOLUTION INTRAVENOUS CONTINUOUS
Status: DISCONTINUED | OUTPATIENT
Start: 2023-12-17 | End: 2023-12-17

## 2023-12-17 RX ORDER — SUCCINYLCHOLINE/SOD CL,ISO/PF 100 MG/5ML
100 SYRINGE (ML) INTRAVENOUS ONCE
Status: COMPLETED | OUTPATIENT
Start: 2023-12-17 | End: 2023-12-17

## 2023-12-17 RX ORDER — 0.9 % SODIUM CHLORIDE 0.9 %
1000 INTRAVENOUS SOLUTION INTRAVENOUS ONCE
Status: COMPLETED | OUTPATIENT
Start: 2023-12-17 | End: 2023-12-17

## 2023-12-17 RX ORDER — HEPARIN SODIUM 1000 [USP'U]/ML
10000 INJECTION, SOLUTION INTRAVENOUS; SUBCUTANEOUS PRN
Status: DISCONTINUED | OUTPATIENT
Start: 2023-12-17 | End: 2023-12-17

## 2023-12-17 RX ORDER — MIDAZOLAM HYDROCHLORIDE 1 MG/ML
1 INJECTION INTRAMUSCULAR; INTRAVENOUS EVERY 30 MIN PRN
Status: DISCONTINUED | OUTPATIENT
Start: 2023-12-17 | End: 2023-12-17 | Stop reason: HOSPADM

## 2023-12-17 RX ORDER — POTASSIUM CHLORIDE 29.8 MG/ML
20 INJECTION INTRAVENOUS PRN
Status: DISCONTINUED | OUTPATIENT
Start: 2023-12-17 | End: 2023-12-26 | Stop reason: HOSPADM

## 2023-12-17 RX ORDER — PROPOFOL 10 MG/ML
5-50 INJECTION, EMULSION INTRAVENOUS CONTINUOUS
Status: DISCONTINUED | OUTPATIENT
Start: 2023-12-17 | End: 2023-12-17 | Stop reason: SDUPTHER

## 2023-12-17 RX ORDER — ENOXAPARIN SODIUM 100 MG/ML
40 INJECTION SUBCUTANEOUS DAILY
Status: DISCONTINUED | OUTPATIENT
Start: 2023-12-17 | End: 2023-12-17

## 2023-12-17 RX ORDER — HEPARIN SODIUM 1000 [USP'U]/ML
10000 INJECTION, SOLUTION INTRAVENOUS; SUBCUTANEOUS ONCE
Status: DISCONTINUED | OUTPATIENT
Start: 2023-12-17 | End: 2023-12-17

## 2023-12-17 RX ORDER — HEPARIN SODIUM 1000 [USP'U]/ML
5000 INJECTION, SOLUTION INTRAVENOUS; SUBCUTANEOUS PRN
Status: DISCONTINUED | OUTPATIENT
Start: 2023-12-17 | End: 2023-12-17

## 2023-12-17 RX ORDER — CHLORHEXIDINE GLUCONATE ORAL RINSE 1.2 MG/ML
15 SOLUTION DENTAL 2 TIMES DAILY
Status: DISCONTINUED | OUTPATIENT
Start: 2023-12-17 | End: 2023-12-17

## 2023-12-17 RX ORDER — ONDANSETRON 4 MG/1
4 TABLET, ORALLY DISINTEGRATING ORAL EVERY 8 HOURS PRN
Status: DISCONTINUED | OUTPATIENT
Start: 2023-12-17 | End: 2023-12-26 | Stop reason: HOSPADM

## 2023-12-17 RX ORDER — METHYLPREDNISOLONE SODIUM SUCCINATE 125 MG/2ML
125 INJECTION, POWDER, LYOPHILIZED, FOR SOLUTION INTRAMUSCULAR; INTRAVENOUS ONCE
Status: COMPLETED | OUTPATIENT
Start: 2023-12-17 | End: 2023-12-17

## 2023-12-17 RX ORDER — PREDNISONE 20 MG/1
60 TABLET ORAL DAILY
Status: COMPLETED | OUTPATIENT
Start: 2023-12-17 | End: 2023-12-21

## 2023-12-17 RX ORDER — SODIUM CHLORIDE 0.9 % (FLUSH) 0.9 %
5-40 SYRINGE (ML) INJECTION EVERY 12 HOURS SCHEDULED
Status: DISCONTINUED | OUTPATIENT
Start: 2023-12-17 | End: 2023-12-26 | Stop reason: HOSPADM

## 2023-12-17 RX ORDER — SODIUM CHLORIDE 9 MG/ML
INJECTION, SOLUTION INTRAVENOUS CONTINUOUS
Status: DISCONTINUED | OUTPATIENT
Start: 2023-12-17 | End: 2023-12-26 | Stop reason: HOSPADM

## 2023-12-17 RX ORDER — SODIUM CHLORIDE 9 MG/ML
INJECTION, SOLUTION INTRAVENOUS PRN
Status: DISCONTINUED | OUTPATIENT
Start: 2023-12-17 | End: 2023-12-26 | Stop reason: HOSPADM

## 2023-12-17 RX ORDER — METRONIDAZOLE 500 MG/100ML
500 INJECTION, SOLUTION INTRAVENOUS ONCE
Status: DISCONTINUED | OUTPATIENT
Start: 2023-12-17 | End: 2023-12-17

## 2023-12-17 RX ORDER — ONDANSETRON 2 MG/ML
4 INJECTION INTRAMUSCULAR; INTRAVENOUS ONCE
Status: COMPLETED | OUTPATIENT
Start: 2023-12-17 | End: 2023-12-17

## 2023-12-17 RX ORDER — ROCURONIUM BROMIDE 10 MG/ML
0.6 INJECTION, SOLUTION INTRAVENOUS ONCE
Status: COMPLETED | OUTPATIENT
Start: 2023-12-17 | End: 2023-12-17

## 2023-12-17 RX ADMIN — SODIUM CHLORIDE 1000 ML: 9 INJECTION, SOLUTION INTRAVENOUS at 03:52

## 2023-12-17 RX ADMIN — ROCURONIUM BROMIDE 90 MG: 10 INJECTION, SOLUTION INTRAVENOUS at 03:53

## 2023-12-17 RX ADMIN — Medication 100 MG: at 03:33

## 2023-12-17 RX ADMIN — PROPOFOL 20 MCG/KG/MIN: 10 INJECTION, EMULSION INTRAVENOUS at 10:22

## 2023-12-17 RX ADMIN — SODIUM CHLORIDE: 9 INJECTION, SOLUTION INTRAVENOUS at 22:58

## 2023-12-17 RX ADMIN — ETOMIDATE 20 MG: 2 INJECTION, SOLUTION INTRAVENOUS at 03:22

## 2023-12-17 RX ADMIN — DEXMEDETOMIDINE HYDROCHLORIDE 0.4 MCG/KG/HR: 400 INJECTION, SOLUTION INTRAVENOUS at 10:18

## 2023-12-17 RX ADMIN — ONDANSETRON 4 MG: 2 INJECTION INTRAMUSCULAR; INTRAVENOUS at 03:10

## 2023-12-17 RX ADMIN — Medication 4 MG/HR: at 03:31

## 2023-12-17 RX ADMIN — SODIUM CHLORIDE, PRESERVATIVE FREE 10 ML: 5 INJECTION INTRAVENOUS at 10:23

## 2023-12-17 RX ADMIN — METHYLPREDNISOLONE SODIUM SUCCINATE 125 MG: 125 INJECTION INTRAMUSCULAR; INTRAVENOUS at 03:10

## 2023-12-17 RX ADMIN — PROPOFOL 35 MCG/KG/MIN: 10 INJECTION, EMULSION INTRAVENOUS at 23:05

## 2023-12-17 RX ADMIN — Medication 25 MCG/HR: at 10:19

## 2023-12-17 RX ADMIN — PROPOFOL 35 MCG/KG/MIN: 10 INJECTION, EMULSION INTRAVENOUS at 20:29

## 2023-12-17 RX ADMIN — CEFEPIME 2000 MG: 2 INJECTION, POWDER, FOR SOLUTION INTRAVENOUS at 07:27

## 2023-12-17 RX ADMIN — AZITHROMYCIN MONOHYDRATE 500 MG: 500 INJECTION, POWDER, LYOPHILIZED, FOR SOLUTION INTRAVENOUS at 08:07

## 2023-12-17 RX ADMIN — PROPOFOL 20 MCG/KG/MIN: 10 INJECTION, EMULSION INTRAVENOUS at 14:34

## 2023-12-17 RX ADMIN — NALXONE HYDROCHLORIDE 0.4 MG: 0.4 INJECTION INTRAMUSCULAR; INTRAVENOUS; SUBCUTANEOUS at 03:10

## 2023-12-17 RX ADMIN — SODIUM CHLORIDE, PRESERVATIVE FREE 10 ML: 5 INJECTION INTRAVENOUS at 19:29

## 2023-12-17 RX ADMIN — FAMOTIDINE 20 MG: 10 INJECTION, SOLUTION INTRAVENOUS at 11:31

## 2023-12-17 RX ADMIN — PREDNISONE 60 MG: 20 TABLET ORAL at 11:36

## 2023-12-17 RX ADMIN — SODIUM CHLORIDE: 9 INJECTION, SOLUTION INTRAVENOUS at 11:17

## 2023-12-17 RX ADMIN — HEPARIN SODIUM 5000 UNITS: 5000 INJECTION INTRAVENOUS; SUBCUTANEOUS at 21:39

## 2023-12-17 RX ADMIN — METRONIDAZOLE 500 MG: 500 INJECTION, SOLUTION INTRAVENOUS at 07:33

## 2023-12-17 RX ADMIN — SODIUM CHLORIDE 1000 ML: 9 INJECTION, SOLUTION INTRAVENOUS at 07:45

## 2023-12-17 RX ADMIN — HEPARIN SODIUM 5000 UNITS: 5000 INJECTION INTRAVENOUS; SUBCUTANEOUS at 14:30

## 2023-12-17 RX ADMIN — CHLORHEXIDINE GLUCONATE 15 ML: 1.2 SOLUTION ORAL at 11:31

## 2023-12-17 RX ADMIN — Medication 1000 MG: at 23:43

## 2023-12-17 RX ADMIN — DEXMEDETOMIDINE 0.2 MCG/KG/HR: 100 INJECTION, SOLUTION INTRAVENOUS at 04:25

## 2023-12-17 ASSESSMENT — PULMONARY FUNCTION TESTS
PIF_VALUE: 33
PIF_VALUE: 29
PIF_VALUE: 28
PIF_VALUE: 28
PIF_VALUE: 29
PIF_VALUE: 31
PIF_VALUE: 24
PIF_VALUE: 25
PIF_VALUE: 25
PIF_VALUE: 24
PIF_VALUE: 24
PIF_VALUE: 28
PIF_VALUE: 25
PIF_VALUE: 23
PIF_VALUE: 50
PIF_VALUE: 29
PIF_VALUE: 26
PIF_VALUE: 29
PIF_VALUE: 31
PIF_VALUE: 28
PIF_VALUE: 32
PIF_VALUE: 28
PIF_VALUE: 28
PIF_VALUE: 29

## 2023-12-17 ASSESSMENT — PAIN - FUNCTIONAL ASSESSMENT: PAIN_FUNCTIONAL_ASSESSMENT: 0-10

## 2023-12-17 ASSESSMENT — PAIN SCALES - GENERAL: PAINLEVEL_OUTOF10: 0

## 2023-12-17 NOTE — ED PROVIDER NOTES
EMERGENCY DEPARTMENT ENCOUNTER    Pt Name: Maureen Mendoza  MRN: [id number]  [address] [facility] 1975  Date of evaluation: 12/17/23  CHIEF COMPLAINT       Chief Complaint   Patient presents with    Altered Mental Status     Dad called EMS, pt has \"not been acting like himself\" for the last few days. HISTORY OF PRESENT ILLNESS   23-year-old male presents emergency room by EMS. EMS report was that they had been called to the house by patient's son. He had been somewhat confused for the last couple of days. No family member was present here in the ED to give additional information. Patient is altered here. He is unable to tell us any significant medical history. He did admit to occasional drug use but was unsure about any recent drug use. Medical history includes back pain bipolar disorder blood clots COPD.   Blood thinner not in patient's medication list.             REVIEW OF SYSTEMS     Review of Systems   Unable to perform ROS: Mental status change     PASTMEDICAL HISTORY     Past Medical History:   Diagnosis Date    Anxiety     Arthritis     back    Back pain     Bipolar 1 disorder ([address])     COPD (chronic obstructive pulmonary disease) ([address])     Dvt femoral (deep venous thrombosis) ([facility])     Hx of blood clots     2017    Hypertension     Morbid obesity due to excess calories ([facility])     CHRIS (obstructive sleep apnea)     Pneumonia 2017    DUE TO OTHER AEROBIC GRAM-NEGATIVE BACTERIA, UNSPECIFIED LATERALITY, UNSPECIFIED PART OF LUNG     Respiratory failure ([address]) 2017    Smoking      Past Problem List  Patient Active Problem List   Diagnosis Code    Bilateral low back pain with bilateral sciatica M54.42, M54.41    Morbid obesity due to excess calories ([facility]) E66.01    Benign essential HTN Y72    Acute systolic congestive heart failure (HCC) I50.21    Secondary cardiomyopathy (HCC) I42.9    DVT (deep venous thrombosis) ([address]) I82.409    Hyperglycemia R73.9    Rhabdomyolysis M62.82    Tubular adenoma of colon D12.6 This Visit: Altered mental status, respiratory distress    Differential Diagnosis: Hypoxia, hypercapnia, acute respiratory failure, pulmonary embolism, acute coronary syndrome, drug use, overdose, pneumonia      Pertinent Comorbid Conditions: COPD, history of drug use, bipolar disorder, history of respiratory failure, congestive heart failure    2)  Data Reviewed  My EKG interpretation: Sinus rhythm ventricular rate 84  Nonspecific ST and T wave changes  T wave inversion V2 V3 V4, lead III  No STEMI criteria  Right axis deviation    QTc 449  Abnormal EKG does not meet STEMI criteria    Imaging that is independently reviewed and interpreted by me as: ET tube in appropriate position on chest x-ray no obvious pneumonia or significant vascular congestion    See more data below for the lab and radiology tests and orders.    3)  Treatment and Disposition    Patient was intubated shortly after arrival here in the ED.  Patient did have somewhat difficult intubation due to small mouth, large tongue, upper airway swelling.  Patient was intubated successfully.  Patient currently on Precedex and Versed for sedation.    Acute respiratory distress-patient has history of COPD did have wheezing here in the emergency room evidence for pneumonia on CT chest.  Patient intubated for airway protection and worsening respiratory status.  IV antibiotics initiated here in the ED.    Altered mental status may be secondary to respiratory distress hypoxia and illness.  Substance misuse should still be considered.  Patient positive for cocaine and cannabis on drug screen here in the ED.      \"ED Course\" Notes From Epic Narrator:  ED Course as of 12/17/23 0754   Sun Dec 17, 2023   0332 Patient is somnolent and confused.  He is unable to answer any simple questions.  He is in respiratory distress with wheezing.  Patient has history of COPD.  Patient is not a good candidate for BiPAP.  On 6 L nasal cannula he is satting between 89 and 91%.   Unknown Etiology     Order Specific Question:   Sepsis duration of therapy     Answer: Other    DISCONTD: vancomycin (VANCOCIN) 2,500 mg in sodium chloride 0.9 % 500 mL IVPB     Order Specific Question:   Antimicrobial Indications     Answer:   Sepsis of Unknown Etiology     Order Specific Question:   Sepsis duration of therapy     Answer:   7 days    azithromycin (ZITHROMAX) 500 mg in 250 mL addavial     Order Specific Question:   Antimicrobial Indications     Answer:   Pneumonia (CAP)    sodium chloride 0.9 % bolus 1,000 mL     DISCHARGE PRESCRIPTIONS:  New Prescriptions    No medications on file     PHYSICIAN CONSULTS ORDERED THIS ENCOUNTER:  PHARMACY TO DOSE VANCOMYCIN  IP CONSULT TO CRITICAL CARE  IP CONSULT TO INTERNAL MEDICINE  FINAL IMPRESSION      1. Acute respiratory distress    2. Pneumonia due to infectious organism, unspecified laterality, unspecified part of lung          DISPOSITION/PLAN   DISPOSITION Decision To Transfer 12/17/2023 07:46:18 AM      OUTPATIENT FOLLOW UP THE PATIENT:  No follow-up provider specified.     MD Sung Johnson MD  12/17/23 8746

## 2023-12-17 NOTE — PLAN OF CARE
Problem: Chronic Conditions and Co-morbidities  Goal: Patient's chronic conditions and co-morbidity symptoms are monitored and maintained or improved  Outcome: Progressing     Problem: Discharge Planning  Goal: Discharge to home or other facility with appropriate resources  Outcome: Progressing     Problem: Safety - Medical Restraint  Goal: Remains free of injury from restraints (Restraint for Interference with Medical Device)  Description: INTERVENTIONS:  1. Determine that other, less restrictive measures have been tried or would not be effective before applying the restraint  2. Evaluate the patient's condition at the time of restraint application  3. Inform patient/family regarding the reason for restraint  4.  Q2H: Monitor safety, psychosocial status, comfort, nutrition and hydration  Outcome: Progressing  Flowsheets  Taken 12/17/2023 1600  Remains free of injury from restraints (restraint for interference with medical device):   Determine that other, less restrictive measures have been tried or would not be effective before applying the restraint   Evaluate the patient's condition at the time of restraint application   Inform patient/family regarding the reason for restraint   Every 2 hours: Monitor safety, psychosocial status, comfort, nutrition and hydration  Taken 12/17/2023 1200  Remains free of injury from restraints (restraint for interference with medical device):   Determine that other, less restrictive measures have been tried or would not be effective before applying the restraint   Evaluate the patient's condition at the time of restraint application   Inform patient/family regarding the reason for restraint   Every 2 hours: Monitor safety, psychosocial status, comfort, nutrition and hydration  Taken 12/17/2023 1045  Remains free of injury from restraints (restraint for interference with medical device):   Determine that other, less restrictive measures have been tried or would not be effective

## 2023-12-17 NOTE — ED NOTES
Pt back from CT. Accompanied by writer, RT and ER charge nurse.       [address] [place] [address]  12/17/23 8418

## 2023-12-17 NOTE — PLAN OF CARE
Problem: Respiratory - Adult  Goal: Achieves optimal ventilation and oxygenation  Outcome: Progressing   PROVIDE ADEQUATE OXYGENATION WITH ACCEPTABLE SP02/ABG'S    [x]  IDENTIFY APPROPRIATE OXYGEN THERAPY  [x]   MONITOR SP02/ABG'S AS NEEDED   [x]   PATIENT EDUCATION AS NEEDED  MOBILIZE SECRETIONS    [x]   ASSESS BREATH SOUNDS  [x]   ASSESS SPUTUM PRODUCTION  [x]   COUGH AND DEEP BREATHING  []  IMPLEMENT SECRETION MANAGEMENT PROTOCOL  [x]   PATIENT EDUCATION AS NEEDED  BRONCHOSPASM/BRONCHOCONSTRICTION     [x]         IMPROVE AERATION/BREATH SOUNDS  [x]   ADMINISTER BRONCHODILATOR THERAPY AS APPROPRIATE  [x]   ASSESS BREATH SOUNDS  []   IMPLEMENT AEROSOL/MDI PROTOCOL  [x]   PATIENT EDUCATION AS NEEDED

## 2023-12-17 NOTE — PROGRESS NOTES
Insert Arterial Line  Date/Time:  12/17/23, 4:36 AM  Performed by: Rose Washburn RCP    Patient identity confirmed: arm band and provided demographic data   Time out: Immediately prior to procedure a \"time out\" was called to verify the correct patient, procedure, equipment, support staff. Preparation: Patient was prepped and draped in the usual sterile fashion.     Location:left radial    Kirk's test normal: yes  Needle gauge: 20     Number of attempts: 1  Post-procedure: transparent dressing applied and line secured    Patient tolerance: well

## 2023-12-17 NOTE — PROGRESS NOTES
Comprehensive Nutrition Assessment    Type and Reason for Visit:  Consult (tube feeding)    Nutrition Recommendations/Plan:   Change TF to Vital High Protein (Peptide based High Protein) at goal rate of 55ml/hr to provide 1320 kcal/d, 115gm protein (with Propofol at current rate)  Monitor TF tolerance-adjust as needed     Malnutrition Assessment:  Malnutrition Status:  Insufficient data (12/17/23 1254)    Context:  Acute Illness     Findings of the 6 clinical characteristics of malnutrition:  Energy Intake:  Unable to assess  Weight Loss:  Unable to assess     Body Fat Loss:  Unable to assess     Muscle Mass Loss:  Unable to assess    Fluid Accumulation:  Unable to assess     Strength:  Not Performed    Nutrition Assessment:    Admitted d/t AMS, resp distress/pneumonia. Currently intubated/sedated. TF of Standard with fiber initiated this am via NG    Nutrition Related Findings:    labs/meds reviewed Wound Type: None       Current Nutrition Intake & Therapies:    Average Meal Intake: NPO  Average Supplements Intake: NPO  Diet NPO  ADULT TUBE FEEDING; Nasogastric; Standard with Fiber; Continuous; 10; Yes; 10; Q 6 hours; 40; 30; Q 4 hours  Current Tube Feeding (TF) Orders:  Feeding Route: Nasogastric  Formula: Standard with Fiber  Schedule: Continuous  Feeding Regimen: 10ml/hr    Additional Calorie Sources:  Propofol at 18.9ml/yn=885 kcal/d    Anthropometric Measures:  Height: 177.8 cm (5' 10\")  Ideal Body Weight (IBW): 166 lbs (75 kg)       Current Body Weight: 157.9 kg (348 lb 1.7 oz), 209.7 % IBW.  Weight Source: Bed Scale  Current BMI (kg/m2): 49.9                          BMI Categories: Obese Class 3 (BMI 40.0 or greater)    Estimated Daily Nutrient Needs:  Energy Requirements Based On: Kcal/kg  Weight Used for Energy Requirements: Ideal  Energy (kcal/day): 7225-3801 kcal/d  Weight Used for Protein Requirements: Ideal  Protein (g/day): 110-150  Method Used for Fluid Requirements: 1 ml/kcal  Fluid (ml/day):

## 2023-12-17 NOTE — ED NOTES
Pt being bagged while Dr. Berta Jacobson prepares to intubate.       [facility], [address] [place] [address]  12/17/23 8733

## 2023-12-17 NOTE — CARE COORDINATION
Case Management Assessment  Initial Evaluation    Date/Time of Evaluation: 12/17/2023 3:59 PM  Assessment Completed by: Fuentes Loera RN    If patient is discharged prior to next notation, then this note serves as note for discharge by case management. Patient Name: Yadiel Storm                   YOB: 1975  Diagnosis: AMS (altered mental status) [R41.82]  Respiratory distress [R06.03]                   Date / Time: 12/17/2023  9:52 AM    Patient Admission Status: Inpatient   Readmission Risk (Low < 19, Mod (19-27), High > 27): No data recorded  Current PCP: Jesenia Miller, DO  PCP verified by CM? (P) Yes (Jesenia Miller DO)    Chart Reviewed: Yes      History Provided by: (P) Child/Family (spoke to his Mom, [employer])  Patient Orientation: (P) Sedated, Other (see comment) (intubated)    Patient Cognition:      Hospitalization in the last 30 days (Readmission):  No    If yes, Readmission Assessment in CM Navigator will be completed.     Advance Directives:      Code Status: Full Code   Patient's Primary Decision Maker is: (P) Legal Next of Kin (patient is not , no children, parents are not always on good terms, he lives with his Mom)      Discharge Planning:    Patient lives with: (P) Parent Type of Home: (P) House  Primary Care Giver: (P) Self  Patient Support Systems include: (P) Parent   Current Financial resources: (P) Medicare  Current community resources: (P) None  Current services prior to admission: (P) None            Current DME:              Type of Home Care services:  (P) None    ADLS  Prior functional level: (P) Independent in ADLs/IADLs  Current functional level: (P) Assistance with the following:, Bathing, Dressing, Toileting, Feeding, Cooking, Housework, Shopping, Mobility    PT AM-PAC:   /24  OT AM-PAC:   /24    Family can provide assistance at DC: (P) Yes (Lives with his Mom)  Would you like Case Management to discuss the discharge plan with any other family members/significant

## 2023-12-17 NOTE — ED NOTES
ET tube placed by Dr. Latrice Gayle. w/ help of glidescope.       [address]  12/17/23 4729

## 2023-12-18 ENCOUNTER — APPOINTMENT (OUTPATIENT)
Dept: GENERAL RADIOLOGY | Age: 48
DRG: 917 | End: 2023-12-18
Attending: INTERNAL MEDICINE
Payer: MEDICARE

## 2023-12-18 ENCOUNTER — APPOINTMENT (OUTPATIENT)
Age: 48
DRG: 917 | End: 2023-12-18
Attending: INTERNAL MEDICINE
Payer: MEDICARE

## 2023-12-18 LAB
ANION GAP SERPL CALCULATED.3IONS-SCNC: 11 MMOL/L (ref 9–17)
BASOPHILS # BLD: <0.03 K/UL (ref 0–0.2)
BASOPHILS NFR BLD: 0 % (ref 0–2)
BUN SERPL-MCNC: 24 MG/DL (ref 6–20)
CALCIUM SERPL-MCNC: 8.6 MG/DL (ref 8.6–10.4)
CHLORIDE SERPL-SCNC: 102 MMOL/L (ref 98–107)
CO2 SERPL-SCNC: 23 MMOL/L (ref 20–31)
CREAT SERPL-MCNC: 1 MG/DL (ref 0.7–1.2)
ECHO AV AREA PEAK VELOCITY: 2.8 CM2
ECHO AV AREA VTI: 2.7 CM2
ECHO AV AREA/BSA PEAK VELOCITY: 1.1 CM2/M2
ECHO AV AREA/BSA VTI: 1 CM2/M2
ECHO AV MEAN GRADIENT: 2 MMHG
ECHO AV MEAN VELOCITY: 0.7 M/S
ECHO AV PEAK GRADIENT: 5 MMHG
ECHO AV PEAK VELOCITY: 1.2 M/S
ECHO AV VELOCITY RATIO: 0.75
ECHO AV VTI: 21.2 CM
ECHO BSA: 2.72 M2
ECHO IVC PROX: 2.6 CM
ECHO LA AREA 2C: 17 CM2
ECHO LA AREA 4C: 19.9 CM2
ECHO LA DIAMETER INDEX: 1.36 CM/M2
ECHO LA DIAMETER: 3.6 CM
ECHO LA MAJOR AXIS: 6.8 CM
ECHO LA MINOR AXIS: 5.2 CM
ECHO LA VOL MOD A2C: 45 ML (ref 18–58)
ECHO LA VOL MOD A4C: 44 ML (ref 18–58)
ECHO LA VOLUME INDEX MOD A2C: 17 ML/M2 (ref 16–34)
ECHO LA VOLUME INDEX MOD A4C: 17 ML/M2 (ref 16–34)
ECHO LV E' LATERAL VELOCITY: 11 CM/S
ECHO LV E' SEPTAL VELOCITY: 8 CM/S
ECHO LV EDV A2C: 53 ML
ECHO LV EDV A4C: 54 ML
ECHO LV EDV INDEX A4C: 20 ML/M2
ECHO LV EDV NDEX A2C: 20 ML/M2
ECHO LV EJECTION FRACTION A2C: 71 %
ECHO LV EJECTION FRACTION A4C: 51 %
ECHO LV EJECTION FRACTION BIPLANE: 62 % (ref 55–100)
ECHO LV ESV A2C: 15 ML
ECHO LV ESV A4C: 27 ML
ECHO LV ESV INDEX A2C: 6 ML/M2
ECHO LV ESV INDEX A4C: 10 ML/M2
ECHO LV FRACTIONAL SHORTENING: 37 % (ref 28–44)
ECHO LV INTERNAL DIMENSION DIASTOLE INDEX: 2.16 CM/M2
ECHO LV INTERNAL DIMENSION DIASTOLIC: 5.7 CM (ref 4.2–5.9)
ECHO LV INTERNAL DIMENSION SYSTOLIC INDEX: 1.36 CM/M2
ECHO LV INTERNAL DIMENSION SYSTOLIC: 3.6 CM
ECHO LV IVSD: 0.9 CM (ref 0.6–1)
ECHO LV MASS 2D: 197.5 G (ref 88–224)
ECHO LV MASS INDEX 2D: 74.8 G/M2 (ref 49–115)
ECHO LV POSTERIOR WALL DIASTOLIC: 0.9 CM (ref 0.6–1)
ECHO LV RELATIVE WALL THICKNESS RATIO: 0.32
ECHO LVOT AREA: 3.8 CM2
ECHO LVOT AV VTI INDEX: 0.72
ECHO LVOT DIAM: 2.2 CM
ECHO LVOT MEAN GRADIENT: 1 MMHG
ECHO LVOT PEAK GRADIENT: 3 MMHG
ECHO LVOT PEAK VELOCITY: 0.9 M/S
ECHO LVOT STROKE VOLUME INDEX: 21.9 ML/M2
ECHO LVOT SV: 57.8 ML
ECHO LVOT VTI: 15.2 CM
ECHO MV A VELOCITY: 0.43 M/S
ECHO MV AREA VTI: 1.6 CM2
ECHO MV E DECELERATION TIME (DT): 186 MS
ECHO MV E VELOCITY: 0.83 M/S
ECHO MV E/A RATIO: 1.93
ECHO MV E/E' LATERAL: 7.55
ECHO MV E/E' RATIO (AVERAGED): 8.96
ECHO MV LVOT VTI INDEX: 2.42
ECHO MV MAX VELOCITY: 0.9 M/S
ECHO MV MEAN GRADIENT: 1 MMHG
ECHO MV MEAN VELOCITY: 0.5 M/S
ECHO MV PEAK GRADIENT: 3 MMHG
ECHO MV VTI: 36.8 CM
ECHO PV MAX VELOCITY: 1.1 M/S
ECHO PV PEAK GRADIENT: 5 MMHG
ECHO PVEIN A VELOCITY: 0.2 M/S
ECHO RV FREE WALL PEAK S': 13 CM/S
ECHO RV INTERNAL DIMENSION: 4 CM
ECHO RV TAPSE: 2.3 CM (ref 1.7–?)
EKG ATRIAL RATE: 51 BPM
EKG ATRIAL RATE: 84 BPM
EKG P AXIS: 50 DEGREES
EKG P AXIS: 62 DEGREES
EKG P-R INTERVAL: 146 MS
EKG P-R INTERVAL: 170 MS
EKG Q-T INTERVAL: 380 MS
EKG Q-T INTERVAL: 490 MS
EKG QRS DURATION: 90 MS
EKG QRS DURATION: 90 MS
EKG QTC CALCULATION (BAZETT): 449 MS
EKG QTC CALCULATION (BAZETT): 451 MS
EKG R AXIS: 72 DEGREES
EKG R AXIS: 90 DEGREES
EKG T AXIS: 0 DEGREES
EKG T AXIS: 17 DEGREES
EKG VENTRICULAR RATE: 51 BPM
EKG VENTRICULAR RATE: 84 BPM
EOSINOPHIL # BLD: <0.03 K/UL (ref 0–0.44)
EOSINOPHILS RELATIVE PERCENT: 0 % (ref 1–4)
ERYTHROCYTE [DISTWIDTH] IN BLOOD BY AUTOMATED COUNT: 19.9 % (ref 11.8–14.4)
FIO2: 40
GFR SERPL CREATININE-BSD FRML MDRD: >60 ML/MIN/1.73M2
GLUCOSE BLD-MCNC: 134 MG/DL (ref 74–100)
GLUCOSE SERPL-MCNC: 145 MG/DL (ref 70–99)
HCT VFR BLD AUTO: 51 % (ref 40.7–50.3)
HGB BLD-MCNC: 14.6 G/DL (ref 13–17)
IMM GRANULOCYTES # BLD AUTO: 0.06 K/UL (ref 0–0.3)
IMM GRANULOCYTES NFR BLD: 1 %
LYMPHOCYTES NFR BLD: 0.78 K/UL (ref 1.1–3.7)
LYMPHOCYTES RELATIVE PERCENT: 7 % (ref 24–43)
MCH RBC QN AUTO: 22.5 PG (ref 25.2–33.5)
MCHC RBC AUTO-ENTMCNC: 28.6 G/DL (ref 28.4–34.8)
MCV RBC AUTO: 78.7 FL (ref 82.6–102.9)
MICROORGANISM SPEC CULT: ABNORMAL
MONOCYTES NFR BLD: 0.76 K/UL (ref 0.1–1.2)
MONOCYTES NFR BLD: 7 % (ref 3–12)
MRSA, DNA, NASAL: ABNORMAL
NEUTROPHILS NFR BLD: 86 % (ref 36–65)
NEUTS SEG NFR BLD: 9.79 K/UL (ref 1.5–8.1)
NRBC BLD-RTO: 0.3 PER 100 WBC
PATIENT TEMP: 36.5
PLATELET # BLD AUTO: ABNORMAL K/UL (ref 138–453)
PLATELET, FLUORESCENCE: 238 K/UL (ref 138–453)
PLATELETS.RETICULATED NFR BLD AUTO: 8 % (ref 1.1–10.3)
POC HCO3: 28 MMOL/L (ref 21–28)
POC O2 SATURATION: 96.4 % (ref 94–98)
POC PCO2: 45.2 MM HG (ref 35–48)
POC PH: 7.4 (ref 7.35–7.45)
POC PO2: 85.8 MM HG (ref 83–108)
POSITIVE BASE EXCESS, ART: 2.5 MMOL/L (ref 0–3)
POTASSIUM SERPL-SCNC: 4.6 MMOL/L (ref 3.7–5.3)
RBC # BLD AUTO: 6.48 M/UL (ref 4.21–5.77)
RBC # BLD: ABNORMAL 10*6/UL
SAMPLE SITE: NORMAL
SERVICE CMNT-IMP: ABNORMAL
SODIUM SERPL-SCNC: 136 MMOL/L (ref 135–144)
SPECIMEN DESCRIPTION: ABNORMAL
SPECIMEN DESCRIPTION: ABNORMAL
WBC OTHER # BLD: 11.4 K/UL (ref 3.5–11.3)

## 2023-12-18 PROCEDURE — 85055 RETICULATED PLATELET ASSAY: CPT

## 2023-12-18 PROCEDURE — 94761 N-INVAS EAR/PLS OXIMETRY MLT: CPT

## 2023-12-18 PROCEDURE — 2580000003 HC RX 258: Performed by: STUDENT IN AN ORGANIZED HEALTH CARE EDUCATION/TRAINING PROGRAM

## 2023-12-18 PROCEDURE — 93306 TTE W/DOPPLER COMPLETE: CPT

## 2023-12-18 PROCEDURE — 71045 X-RAY EXAM CHEST 1 VIEW: CPT

## 2023-12-18 PROCEDURE — 2000000000 HC ICU R&B

## 2023-12-18 PROCEDURE — 82803 BLOOD GASES ANY COMBINATION: CPT

## 2023-12-18 PROCEDURE — 85025 COMPLETE CBC W/AUTO DIFF WBC: CPT

## 2023-12-18 PROCEDURE — C9113 INJ PANTOPRAZOLE SODIUM, VIA: HCPCS | Performed by: STUDENT IN AN ORGANIZED HEALTH CARE EDUCATION/TRAINING PROGRAM

## 2023-12-18 PROCEDURE — 94640 AIRWAY INHALATION TREATMENT: CPT

## 2023-12-18 PROCEDURE — 36415 COLL VENOUS BLD VENIPUNCTURE: CPT

## 2023-12-18 PROCEDURE — 94003 VENT MGMT INPAT SUBQ DAY: CPT

## 2023-12-18 PROCEDURE — 6360000002 HC RX W HCPCS: Performed by: STUDENT IN AN ORGANIZED HEALTH CARE EDUCATION/TRAINING PROGRAM

## 2023-12-18 PROCEDURE — 6370000000 HC RX 637 (ALT 250 FOR IP): Performed by: STUDENT IN AN ORGANIZED HEALTH CARE EDUCATION/TRAINING PROGRAM

## 2023-12-18 PROCEDURE — 6360000002 HC RX W HCPCS: Performed by: INTERNAL MEDICINE

## 2023-12-18 PROCEDURE — 93306 TTE W/DOPPLER COMPLETE: CPT | Performed by: INTERNAL MEDICINE

## 2023-12-18 PROCEDURE — 5A1955Z RESPIRATORY VENTILATION, GREATER THAN 96 CONSECUTIVE HOURS: ICD-10-PCS | Performed by: INTERNAL MEDICINE

## 2023-12-18 PROCEDURE — 82947 ASSAY GLUCOSE BLOOD QUANT: CPT

## 2023-12-18 PROCEDURE — 93010 ELECTROCARDIOGRAM REPORT: CPT | Performed by: INTERNAL MEDICINE

## 2023-12-18 PROCEDURE — 6370000000 HC RX 637 (ALT 250 FOR IP): Performed by: INTERNAL MEDICINE

## 2023-12-18 PROCEDURE — 2700000000 HC OXYGEN THERAPY PER DAY

## 2023-12-18 PROCEDURE — 36600 WITHDRAWAL OF ARTERIAL BLOOD: CPT

## 2023-12-18 PROCEDURE — 2500000003 HC RX 250 WO HCPCS: Performed by: STUDENT IN AN ORGANIZED HEALTH CARE EDUCATION/TRAINING PROGRAM

## 2023-12-18 PROCEDURE — 80048 BASIC METABOLIC PNL TOTAL CA: CPT

## 2023-12-18 PROCEDURE — 99291 CRITICAL CARE FIRST HOUR: CPT | Performed by: INTERNAL MEDICINE

## 2023-12-18 PROCEDURE — A4216 STERILE WATER/SALINE, 10 ML: HCPCS | Performed by: STUDENT IN AN ORGANIZED HEALTH CARE EDUCATION/TRAINING PROGRAM

## 2023-12-18 RX ORDER — TRAZODONE HYDROCHLORIDE 100 MG/1
300 TABLET ORAL NIGHTLY
Status: DISCONTINUED | OUTPATIENT
Start: 2023-12-18 | End: 2023-12-26 | Stop reason: HOSPADM

## 2023-12-18 RX ORDER — POLYETHYLENE GLYCOL 3350 17 G/17G
17 POWDER, FOR SOLUTION ORAL DAILY
Status: DISCONTINUED | OUTPATIENT
Start: 2023-12-18 | End: 2023-12-26 | Stop reason: HOSPADM

## 2023-12-18 RX ORDER — MIDAZOLAM HYDROCHLORIDE 2 MG/2ML
5 INJECTION, SOLUTION INTRAMUSCULAR; INTRAVENOUS ONCE
Status: COMPLETED | OUTPATIENT
Start: 2023-12-18 | End: 2023-12-18

## 2023-12-18 RX ORDER — GABAPENTIN 800 MG/1
800 TABLET ORAL 3 TIMES DAILY
Status: DISCONTINUED | OUTPATIENT
Start: 2023-12-18 | End: 2023-12-26 | Stop reason: HOSPADM

## 2023-12-18 RX ORDER — FENTANYL CITRATE 50 UG/ML
50 INJECTION, SOLUTION INTRAMUSCULAR; INTRAVENOUS
Status: DISCONTINUED | OUTPATIENT
Start: 2023-12-18 | End: 2023-12-19 | Stop reason: SDUPTHER

## 2023-12-18 RX ORDER — IPRATROPIUM BROMIDE AND ALBUTEROL SULFATE 2.5; .5 MG/3ML; MG/3ML
1 SOLUTION RESPIRATORY (INHALATION)
Status: DISCONTINUED | OUTPATIENT
Start: 2023-12-18 | End: 2023-12-19

## 2023-12-18 RX ORDER — ALBUTEROL SULFATE 2.5 MG/3ML
2.5 SOLUTION RESPIRATORY (INHALATION)
Status: DISCONTINUED | OUTPATIENT
Start: 2023-12-19 | End: 2023-12-19

## 2023-12-18 RX ORDER — LORAZEPAM 2 MG/1
2 TABLET ORAL 2 TIMES DAILY
Status: DISCONTINUED | OUTPATIENT
Start: 2023-12-18 | End: 2023-12-18

## 2023-12-18 RX ADMIN — IPRATROPIUM BROMIDE AND ALBUTEROL SULFATE 1 DOSE: 2.5; .5 SOLUTION RESPIRATORY (INHALATION) at 15:26

## 2023-12-18 RX ADMIN — GABAPENTIN 800 MG: 800 TABLET ORAL at 10:18

## 2023-12-18 RX ADMIN — SODIUM CHLORIDE, PRESERVATIVE FREE 40 MG: 5 INJECTION INTRAVENOUS at 08:26

## 2023-12-18 RX ADMIN — PROPOFOL 40 MCG/KG/MIN: 10 INJECTION, EMULSION INTRAVENOUS at 13:31

## 2023-12-18 RX ADMIN — GABAPENTIN 800 MG: 800 TABLET ORAL at 13:03

## 2023-12-18 RX ADMIN — SODIUM CHLORIDE, PRESERVATIVE FREE 40 MG: 5 INJECTION INTRAVENOUS at 20:19

## 2023-12-18 RX ADMIN — HEPARIN SODIUM 5000 UNITS: 5000 INJECTION INTRAVENOUS; SUBCUTANEOUS at 13:03

## 2023-12-18 RX ADMIN — PROPOFOL 40 MCG/KG/MIN: 10 INJECTION, EMULSION INTRAVENOUS at 16:37

## 2023-12-18 RX ADMIN — SODIUM CHLORIDE, PRESERVATIVE FREE 10 ML: 5 INJECTION INTRAVENOUS at 08:17

## 2023-12-18 RX ADMIN — VANCOMYCIN HYDROCHLORIDE 2000 MG: 1 INJECTION, POWDER, LYOPHILIZED, FOR SOLUTION INTRAVENOUS at 15:05

## 2023-12-18 RX ADMIN — SODIUM CHLORIDE, PRESERVATIVE FREE 10 ML: 5 INJECTION INTRAVENOUS at 20:20

## 2023-12-18 RX ADMIN — GABAPENTIN 800 MG: 800 TABLET ORAL at 20:19

## 2023-12-18 RX ADMIN — PROPOFOL 35 MCG/KG/MIN: 10 INJECTION, EMULSION INTRAVENOUS at 05:27

## 2023-12-18 RX ADMIN — HEPARIN SODIUM 5000 UNITS: 5000 INJECTION INTRAVENOUS; SUBCUTANEOUS at 21:51

## 2023-12-18 RX ADMIN — PROPOFOL 40 MCG/KG/MIN: 10 INJECTION, EMULSION INTRAVENOUS at 19:31

## 2023-12-18 RX ADMIN — Medication 175 MCG/HR: at 13:30

## 2023-12-18 RX ADMIN — POLYETHYLENE GLYCOL 3350 17 G: 17 POWDER, FOR SOLUTION ORAL at 08:43

## 2023-12-18 RX ADMIN — TRAZODONE HYDROCHLORIDE 300 MG: 100 TABLET ORAL at 20:19

## 2023-12-18 RX ADMIN — ALBUTEROL SULFATE 2.5 MG: 2.5 SOLUTION RESPIRATORY (INHALATION) at 23:49

## 2023-12-18 RX ADMIN — PROPOFOL 35 MCG/KG/MIN: 10 INJECTION, EMULSION INTRAVENOUS at 02:16

## 2023-12-18 RX ADMIN — HEPARIN SODIUM 5000 UNITS: 5000 INJECTION INTRAVENOUS; SUBCUTANEOUS at 06:01

## 2023-12-18 RX ADMIN — MIDAZOLAM HYDROCHLORIDE 5 MG: 1 INJECTION, SOLUTION INTRAMUSCULAR; INTRAVENOUS at 08:43

## 2023-12-18 RX ADMIN — Medication 75 MCG/HR: at 06:38

## 2023-12-18 RX ADMIN — IPRATROPIUM BROMIDE AND ALBUTEROL SULFATE 1 DOSE: 2.5; .5 SOLUTION RESPIRATORY (INHALATION) at 20:52

## 2023-12-18 RX ADMIN — PROPOFOL 45 MCG/KG/MIN: 10 INJECTION, EMULSION INTRAVENOUS at 08:24

## 2023-12-18 RX ADMIN — PREDNISONE 60 MG: 20 TABLET ORAL at 08:16

## 2023-12-18 RX ADMIN — AZITHROMYCIN MONOHYDRATE 500 MG: 500 INJECTION, POWDER, LYOPHILIZED, FOR SOLUTION INTRAVENOUS at 08:19

## 2023-12-18 RX ADMIN — PROPOFOL 45 MCG/KG/MIN: 10 INJECTION, EMULSION INTRAVENOUS at 11:10

## 2023-12-18 RX ADMIN — PROPOFOL 40 MCG/KG/MIN: 10 INJECTION, EMULSION INTRAVENOUS at 22:20

## 2023-12-18 RX ADMIN — LORAZEPAM 2 MG: 2 TABLET ORAL at 08:49

## 2023-12-18 ASSESSMENT — PULMONARY FUNCTION TESTS
PIF_VALUE: 26
PIF_VALUE: 28
PIF_VALUE: 26
PIF_VALUE: 26
PIF_VALUE: 25
PIF_VALUE: 27
PIF_VALUE: 28
PIF_VALUE: 26
PIF_VALUE: 24
PIF_VALUE: 14
PIF_VALUE: 27
PIF_VALUE: 26
PIF_VALUE: 26
PIF_VALUE: 29
PIF_VALUE: 27
PIF_VALUE: 29
PIF_VALUE: 25
PIF_VALUE: 27

## 2023-12-18 ASSESSMENT — PAIN SCALES - GENERAL
PAINLEVEL_OUTOF10: 0

## 2023-12-18 NOTE — PROGRESS NOTES
Patient admitted on Mechanical Ventilator Protocol. Patients height measured at 70 for an IBW 73    Patient placed on the ventilator on settings as charted on flowsheeet. Ventilator Bronchodilator assessment    Breath sounds: clear,diminished  Inspiratory Pressure: 26  Plateau Pressure: 20    Patient assessed at level 3          []    Bronchodilator Assessment    BRONCHODILATOR ASSESSMENT SCORE  Score 0 (Home) 1 2 3 4   Breath Sounds   []  Chronic Ventilator: Patient at baseline [x]  Mild Wheezes/ Clear []  Intermittent wheezes with good air entry []  Bilateral/unilateral wheezing with diminished air entry []  Insp/Exp wheeze and/or poor aeration   Ventilator Pressures   []  Chronic Ventilator []  Insp. Pressure less than 25 cm H20 []  Insp. Pressure less than 25 cm H20 [x]  Insp. Pressure exceeds 25 cm H20 []  Insp.  Pressure exceeds 30 cm H20   Plateau Pressure []  NA   []  Plateau Pressure less than 4  []  Plateau Pressure less than or equal to 5 [x]  Plateau Pressure greater than or equal to 6 []  Plateau Pressure greater than or equal to 8       NEVILLE CANCHOLA RCP  2:02 PM

## 2023-12-18 NOTE — PLAN OF CARE
Problem: Chronic Conditions and Co-morbidities  Goal: Patient's chronic conditions and co-morbidity symptoms are monitored and maintained or improved  12/18/2023 0907 by Jennifer Cuellar RN  Outcome: Progressing     Problem: Discharge Planning  Goal: Discharge to home or other facility with appropriate resources  12/18/2023 0907 by Jennifer Cuellar RN  Outcome: Progressing     Problem: Safety - Medical Restraint  Goal: Remains free of injury from restraints (Restraint for Interference with Medical Device)  Description: INTERVENTIONS:  1. Determine that other, less restrictive measures have been tried or would not be effective before applying the restraint  2. Evaluate the patient's condition at the time of restraint application  3. Inform patient/family regarding the reason for restraint  4.  Q2H: Monitor safety, psychosocial status, comfort, nutrition and hydration  12/18/2023 0907 by Jennifer Cuellar RN  Outcome: Progressing  Flowsheets  Taken 12/18/2023 0600 by Bigg Preciado RN  Remains free of injury from restraints (restraint for interference with medical device): Every 2 hours: Monitor safety, psychosocial status, comfort, nutrition and hydration  Taken 12/18/2023 0400 by Bigg Preciado RN  Remains free of injury from restraints (restraint for interference with medical device): Every 2 hours: Monitor safety, psychosocial status, comfort, nutrition and hydration  Taken 12/18/2023 0200 by Bigg Preciado RN  Remains free of injury from restraints (restraint for interference with medical device): Every 2 hours: Monitor safety, psychosocial status, comfort, nutrition and hydration  Taken 12/18/2023 0000 by Bigg Preciado RN  Remains free of injury from restraints (restraint for interference with medical device): Every 2 hours: Monitor safety, psychosocial status, comfort, nutrition and hydration  Taken 12/17/2023 2200 by Bigg Preciado RN  Remains free of injury from restraints (restraint for interference with medical device): Every 2 hours: Monitor safety, psychosocial status, comfort, nutrition and hydration     Problem: Skin/Tissue Integrity  Goal: Absence of new skin breakdown  Description: 1.  Monitor for areas of redness and/or skin breakdown  2.  Assess vascular access sites hourly  3.  Every 4-6 hours minimum:  Change oxygen saturation probe site  4.  Every 4-6 hours:  If on nasal continuous positive airway pressure, respiratory therapy assess nares and determine need for appliance change or resting period.  12/18/2023 0907 by Lisa Holguin RN  Outcome: Progressing     Problem: Safety - Adult  Goal: Free from fall injury  12/18/2023 0907 by Lisa Holguin RN  Outcome: Progressing     Problem: Nutrition Deficit:  Goal: Optimize nutritional status  12/18/2023 0907 by Lisa Holguin RN  Outcome: Progressing     Problem: Respiratory - Adult  Goal: Achieves optimal ventilation and oxygenation  12/18/2023 0907 by Lisa Holguin RN  Outcome: Progressing     Problem: ABCDS Injury Assessment  Goal: Absence of physical injury  12/18/2023 0907 by Lisa Holguin RN  Outcome: Progressing

## 2023-12-18 NOTE — PROGRESS NOTES
chart reviewed, labs and medications reviewed overnight events noted. Patient has history of COPD, history of smoking, obstructive sleep apnea, morbid obesity, history of previous intubation/tracheostomy and tracheal stenosis followed by ENT and apparently recommended evaluation in [place] for tracheal stenosis. He also had a history of DVT currently not on anticoagulation. Does have history of psychiatric disorder on Invega, trazodone, he is also on gabapentin. He is currently intubated on propofol 40 mcg and on fentanyl 175 mcg. Urine drug screen initially was positive for cocaine and cannabinoids. Per nursing staff he follows command but very restless and agitated does not able to come down on sedation. His creatinine improved from 2.0-1.0 after fluid and urine output is better. His renal function is better his gabapentin is resumed today. Chest x-ray on 12/18/2023 shows pulm congestion. He is on ceftriaxone and Zithromax he is MRSA positive in the years respiratory culture also growing gram-positive cocci in clusters. Will continue with Rocephin and add vancomycin follow-up final culture. Follow-up urine output currently 2.5 L in last 24 hours. Ventilator settings PRVC/18/550/5/40% ABG 7.4 0/45/86/28. Will resume trazodone unfortunately Invega cannot be started through NG tube as long release. Will ask ENT to evaluate as this patient is history of tracheal stenosis and now intubated. Bronchodilator therapy    Discussed with nursing staff, treatment and plan discussed. Discussed with respiratory therapist.    Total critical care time caring for this patient with life threatening, unstable organ failure, including direct patient contact, management of life support systems, review of data including imaging and labs, discussions with other team members and physicians at least 36  Min so far today, excluding procedures.        Please note that this chart was generated using voice recognition Dragon

## 2023-12-18 NOTE — PLAN OF CARE
Problem: Chronic Conditions and Co-morbidities  Goal: Patient's chronic conditions and co-morbidity symptoms are monitored and maintained or improved  12/17/2023 2102 by Jeffy Goldstein RN  Outcome: Progressing  12/17/2023 1619 by Susan Elizalde RN  Outcome: Progressing     Problem: Discharge Planning  Goal: Discharge to home or other facility with appropriate resources  12/17/2023 2102 by Jeffy Goldstein RN  Outcome: Progressing  12/17/2023 1619 by Susan Elizalde RN  Outcome: Progressing     Problem: Safety - Medical Restraint  Goal: Remains free of injury from restraints (Restraint for Interference with Medical Device)  Description: INTERVENTIONS:  1. Determine that other, less restrictive measures have been tried or would not be effective before applying the restraint  2. Evaluate the patient's condition at the time of restraint application  3. Inform patient/family regarding the reason for restraint  4.  Q2H: Monitor safety, psychosocial status, comfort, nutrition and hydration  12/17/2023 2102 by Jeffy Goldstein RN  Outcome: Progressing  Flowsheets (Taken 12/17/2023 2000)  Remains free of injury from restraints (restraint for interference with medical device): Every 2 hours: Monitor safety, psychosocial status, comfort, nutrition and hydration  12/17/2023 1619 by Susan Elizalde RN  Outcome: Progressing  Flowsheets  Taken 12/17/2023 1600  Remains free of injury from restraints (restraint for interference with medical device):   Determine that other, less restrictive measures have been tried or would not be effective before applying the restraint   Evaluate the patient's condition at the time of restraint application   Inform patient/family regarding the reason for restraint   Every 2 hours: Monitor safety, psychosocial status, comfort, nutrition and hydration  Taken 12/17/2023 1200  Remains free of injury from restraints (restraint for interference with medical device):   Determine that

## 2023-12-18 NOTE — PLAN OF CARE
Problem: Respiratory - Adult  Goal: Achieves optimal ventilation and oxygenation  12/18/2023 0813 by Ann Means RCP  Outcome: Progressing  12/17/2023 2214 by Jade To RCP  Outcome: Progressing  12/17/2023 2102 by Precious Pickett RN  Outcome: Progressing

## 2023-12-19 PROBLEM — J39.8 TRACHEAL STENOSIS: Status: ACTIVE | Noted: 2023-12-19

## 2023-12-19 LAB
ALLEN TEST: POSITIVE
ANION GAP SERPL CALCULATED.3IONS-SCNC: 6 MMOL/L (ref 9–17)
BASOPHILS # BLD: 0 K/UL (ref 0–0.2)
BASOPHILS NFR BLD: 0 % (ref 0–2)
BUN SERPL-MCNC: 20 MG/DL (ref 6–20)
CALCIUM SERPL-MCNC: 8.3 MG/DL (ref 8.6–10.4)
CHLORIDE SERPL-SCNC: 105 MMOL/L (ref 98–107)
CO2 SERPL-SCNC: 26 MMOL/L (ref 20–31)
CREAT SERPL-MCNC: 0.9 MG/DL (ref 0.7–1.2)
EOSINOPHIL # BLD: 0 K/UL (ref 0–0.44)
EOSINOPHILS RELATIVE PERCENT: 0 % (ref 1–4)
ERYTHROCYTE [DISTWIDTH] IN BLOOD BY AUTOMATED COUNT: 20.3 % (ref 11.8–14.4)
FIO2: 40
GFR SERPL CREATININE-BSD FRML MDRD: >60 ML/MIN/1.73M2
GLUCOSE BLD-MCNC: 115 MG/DL (ref 74–100)
GLUCOSE SERPL-MCNC: 90 MG/DL (ref 70–99)
HCT VFR BLD AUTO: 49.4 % (ref 40.7–50.3)
HGB BLD-MCNC: 13.8 G/DL (ref 13–17)
IMM GRANULOCYTES # BLD AUTO: 0.13 K/UL (ref 0–0.3)
IMM GRANULOCYTES NFR BLD: 1 %
LYMPHOCYTES NFR BLD: 1.81 K/UL (ref 1.1–3.7)
LYMPHOCYTES RELATIVE PERCENT: 14 % (ref 24–43)
MCH RBC QN AUTO: 22.6 PG (ref 25.2–33.5)
MCHC RBC AUTO-ENTMCNC: 27.9 G/DL (ref 28.4–34.8)
MCV RBC AUTO: 80.9 FL (ref 82.6–102.9)
MICROORGANISM SPEC CULT: ABNORMAL
MICROORGANISM SPEC CULT: ABNORMAL
MICROORGANISM/AGENT SPEC: ABNORMAL
MODE: ABNORMAL
MONOCYTES NFR BLD: 1.29 K/UL (ref 0.1–1.2)
MONOCYTES NFR BLD: 10 % (ref 3–12)
MORPHOLOGY: ABNORMAL
MORPHOLOGY: ABNORMAL
NEUTROPHILS NFR BLD: 75 % (ref 36–65)
NEUTS SEG NFR BLD: 9.67 K/UL (ref 1.5–8.1)
NRBC BLD-RTO: 0.2 PER 100 WBC
O2 DELIVERY DEVICE: ABNORMAL
PATIENT TEMP: 36.8
PLATELET # BLD AUTO: 199 K/UL (ref 138–453)
PMV BLD AUTO: 10 FL (ref 8.1–13.5)
POC HCO3: 30.6 MMOL/L (ref 21–28)
POC O2 SATURATION: 96.6 % (ref 94–98)
POC PCO2: 50.7 MM HG (ref 35–48)
POC PH: 7.39 (ref 7.35–7.45)
POC PO2: 89.7 MM HG (ref 83–108)
POSITIVE BASE EXCESS, ART: 4.3 MMOL/L (ref 0–3)
POTASSIUM SERPL-SCNC: 4.5 MMOL/L (ref 3.7–5.3)
RBC # BLD AUTO: 6.11 M/UL (ref 4.21–5.77)
RBC # BLD: ABNORMAL 10*6/UL
SAMPLE SITE: ABNORMAL
SODIUM SERPL-SCNC: 137 MMOL/L (ref 135–144)
SPECIMEN DESCRIPTION: ABNORMAL
TRIGL SERPL-MCNC: 126 MG/DL
WBC OTHER # BLD: 12.9 K/UL (ref 3.5–11.3)

## 2023-12-19 PROCEDURE — 2000000000 HC ICU R&B

## 2023-12-19 PROCEDURE — 36415 COLL VENOUS BLD VENIPUNCTURE: CPT

## 2023-12-19 PROCEDURE — A4216 STERILE WATER/SALINE, 10 ML: HCPCS | Performed by: STUDENT IN AN ORGANIZED HEALTH CARE EDUCATION/TRAINING PROGRAM

## 2023-12-19 PROCEDURE — 2700000000 HC OXYGEN THERAPY PER DAY

## 2023-12-19 PROCEDURE — 80048 BASIC METABOLIC PNL TOTAL CA: CPT

## 2023-12-19 PROCEDURE — 82803 BLOOD GASES ANY COMBINATION: CPT

## 2023-12-19 PROCEDURE — 85025 COMPLETE CBC W/AUTO DIFF WBC: CPT

## 2023-12-19 PROCEDURE — 6370000000 HC RX 637 (ALT 250 FOR IP): Performed by: STUDENT IN AN ORGANIZED HEALTH CARE EDUCATION/TRAINING PROGRAM

## 2023-12-19 PROCEDURE — 36600 WITHDRAWAL OF ARTERIAL BLOOD: CPT

## 2023-12-19 PROCEDURE — 82947 ASSAY GLUCOSE BLOOD QUANT: CPT

## 2023-12-19 PROCEDURE — 94003 VENT MGMT INPAT SUBQ DAY: CPT

## 2023-12-19 PROCEDURE — 6360000002 HC RX W HCPCS: Performed by: INTERNAL MEDICINE

## 2023-12-19 PROCEDURE — 6360000002 HC RX W HCPCS: Performed by: STUDENT IN AN ORGANIZED HEALTH CARE EDUCATION/TRAINING PROGRAM

## 2023-12-19 PROCEDURE — 84478 ASSAY OF TRIGLYCERIDES: CPT

## 2023-12-19 PROCEDURE — C9113 INJ PANTOPRAZOLE SODIUM, VIA: HCPCS | Performed by: STUDENT IN AN ORGANIZED HEALTH CARE EDUCATION/TRAINING PROGRAM

## 2023-12-19 PROCEDURE — 2580000003 HC RX 258: Performed by: STUDENT IN AN ORGANIZED HEALTH CARE EDUCATION/TRAINING PROGRAM

## 2023-12-19 PROCEDURE — 6360000002 HC RX W HCPCS

## 2023-12-19 PROCEDURE — 99291 CRITICAL CARE FIRST HOUR: CPT | Performed by: INTERNAL MEDICINE

## 2023-12-19 PROCEDURE — 2500000003 HC RX 250 WO HCPCS: Performed by: STUDENT IN AN ORGANIZED HEALTH CARE EDUCATION/TRAINING PROGRAM

## 2023-12-19 PROCEDURE — 94761 N-INVAS EAR/PLS OXIMETRY MLT: CPT

## 2023-12-19 PROCEDURE — 94640 AIRWAY INHALATION TREATMENT: CPT

## 2023-12-19 PROCEDURE — 99222 1ST HOSP IP/OBS MODERATE 55: CPT | Performed by: OTOLARYNGOLOGY

## 2023-12-19 RX ORDER — ALBUTEROL SULFATE 2.5 MG/3ML
2.5 SOLUTION RESPIRATORY (INHALATION)
Status: DISCONTINUED | OUTPATIENT
Start: 2023-12-20 | End: 2023-12-26 | Stop reason: HOSPADM

## 2023-12-19 RX ORDER — ARIPIPRAZOLE 15 MG/1
7.5 TABLET ORAL DAILY
Status: DISCONTINUED | OUTPATIENT
Start: 2023-12-19 | End: 2023-12-24

## 2023-12-19 RX ORDER — IPRATROPIUM BROMIDE AND ALBUTEROL SULFATE 2.5; .5 MG/3ML; MG/3ML
1 SOLUTION RESPIRATORY (INHALATION)
Status: DISCONTINUED | OUTPATIENT
Start: 2023-12-19 | End: 2023-12-26 | Stop reason: HOSPADM

## 2023-12-19 RX ORDER — OXYCODONE HYDROCHLORIDE 5 MG/1
5 TABLET ORAL EVERY 6 HOURS
Status: DISCONTINUED | OUTPATIENT
Start: 2023-12-19 | End: 2023-12-20

## 2023-12-19 RX ORDER — MIDAZOLAM HYDROCHLORIDE 2 MG/2ML
2 INJECTION, SOLUTION INTRAMUSCULAR; INTRAVENOUS PRN
Status: DISCONTINUED | OUTPATIENT
Start: 2023-12-19 | End: 2023-12-19

## 2023-12-19 RX ORDER — FENTANYL CITRATE 50 UG/ML
50 INJECTION, SOLUTION INTRAMUSCULAR; INTRAVENOUS
Status: DISCONTINUED | OUTPATIENT
Start: 2023-12-19 | End: 2023-12-26 | Stop reason: HOSPADM

## 2023-12-19 RX ORDER — MIDAZOLAM HYDROCHLORIDE 2 MG/2ML
2 INJECTION, SOLUTION INTRAMUSCULAR; INTRAVENOUS ONCE
Status: COMPLETED | OUTPATIENT
Start: 2023-12-19 | End: 2023-12-19

## 2023-12-19 RX ORDER — MIDAZOLAM HYDROCHLORIDE 1 MG/ML
1-10 INJECTION, SOLUTION INTRAVENOUS CONTINUOUS
Status: DISCONTINUED | OUTPATIENT
Start: 2023-12-19 | End: 2023-12-26 | Stop reason: HOSPADM

## 2023-12-19 RX ORDER — MIDAZOLAM HYDROCHLORIDE 1 MG/ML
INJECTION INTRAMUSCULAR; INTRAVENOUS
Status: COMPLETED
Start: 2023-12-19 | End: 2023-12-19

## 2023-12-19 RX ORDER — DEXMEDETOMIDINE HYDROCHLORIDE 4 UG/ML
.1-1.5 INJECTION, SOLUTION INTRAVENOUS CONTINUOUS
Status: DISCONTINUED | OUTPATIENT
Start: 2023-12-19 | End: 2023-12-26 | Stop reason: HOSPADM

## 2023-12-19 RX ORDER — ALBUTEROL SULFATE 2.5 MG/3ML
2.5 SOLUTION RESPIRATORY (INHALATION)
Status: DISCONTINUED | OUTPATIENT
Start: 2023-12-19 | End: 2023-12-19

## 2023-12-19 RX ORDER — HALOPERIDOL 5 MG/ML
5 INJECTION INTRAMUSCULAR ONCE
Status: COMPLETED | OUTPATIENT
Start: 2023-12-19 | End: 2023-12-19

## 2023-12-19 RX ORDER — MIDAZOLAM HYDROCHLORIDE 2 MG/2ML
2 INJECTION, SOLUTION INTRAMUSCULAR; INTRAVENOUS
Status: DISCONTINUED | OUTPATIENT
Start: 2023-12-19 | End: 2023-12-19

## 2023-12-19 RX ADMIN — IPRATROPIUM BROMIDE AND ALBUTEROL SULFATE 1 DOSE: 2.5; .5 SOLUTION RESPIRATORY (INHALATION) at 11:27

## 2023-12-19 RX ADMIN — POLYETHYLENE GLYCOL 3350 17 G: 17 POWDER, FOR SOLUTION ORAL at 07:38

## 2023-12-19 RX ADMIN — ALBUTEROL SULFATE 2.5 MG: 2.5 SOLUTION RESPIRATORY (INHALATION) at 03:40

## 2023-12-19 RX ADMIN — GABAPENTIN 800 MG: 800 TABLET ORAL at 13:26

## 2023-12-19 RX ADMIN — OXYCODONE HYDROCHLORIDE 5 MG: 5 TABLET ORAL at 16:11

## 2023-12-19 RX ADMIN — Medication 2 MG/HR: at 11:51

## 2023-12-19 RX ADMIN — MIDAZOLAM HYDROCHLORIDE 2 MG: 1 INJECTION, SOLUTION INTRAMUSCULAR; INTRAVENOUS at 04:32

## 2023-12-19 RX ADMIN — HEPARIN SODIUM 5000 UNITS: 5000 INJECTION INTRAVENOUS; SUBCUTANEOUS at 22:26

## 2023-12-19 RX ADMIN — PROPOFOL 45 MCG/KG/MIN: 10 INJECTION, EMULSION INTRAVENOUS at 09:47

## 2023-12-19 RX ADMIN — PROPOFOL 45 MCG/KG/MIN: 10 INJECTION, EMULSION INTRAVENOUS at 05:02

## 2023-12-19 RX ADMIN — IPRATROPIUM BROMIDE AND ALBUTEROL SULFATE 1 DOSE: 2.5; .5 SOLUTION RESPIRATORY (INHALATION) at 15:28

## 2023-12-19 RX ADMIN — OXYCODONE HYDROCHLORIDE 5 MG: 5 TABLET ORAL at 23:59

## 2023-12-19 RX ADMIN — OXYCODONE HYDROCHLORIDE 5 MG: 5 TABLET ORAL at 11:47

## 2023-12-19 RX ADMIN — AZITHROMYCIN MONOHYDRATE 500 MG: 500 INJECTION, POWDER, LYOPHILIZED, FOR SOLUTION INTRAVENOUS at 07:41

## 2023-12-19 RX ADMIN — HALOPERIDOL LACTATE 5 MG: 5 INJECTION, SOLUTION INTRAMUSCULAR at 08:04

## 2023-12-19 RX ADMIN — Medication 175 MCG/HR: at 03:52

## 2023-12-19 RX ADMIN — DEXMEDETOMIDINE HYDROCHLORIDE 0.2 MCG/KG/HR: 400 INJECTION, SOLUTION INTRAVENOUS at 16:53

## 2023-12-19 RX ADMIN — ARIPIPRAZOLE 7.5 MG: 15 TABLET ORAL at 14:22

## 2023-12-19 RX ADMIN — VANCOMYCIN HYDROCHLORIDE 1500 MG: 1.5 INJECTION, POWDER, LYOPHILIZED, FOR SOLUTION INTRAVENOUS at 03:32

## 2023-12-19 RX ADMIN — PROPOFOL 5 MCG/KG/MIN: 10 INJECTION, EMULSION INTRAVENOUS at 13:36

## 2023-12-19 RX ADMIN — HEPARIN SODIUM 5000 UNITS: 5000 INJECTION INTRAVENOUS; SUBCUTANEOUS at 13:26

## 2023-12-19 RX ADMIN — IPRATROPIUM BROMIDE AND ALBUTEROL SULFATE 1 DOSE: 2.5; .5 SOLUTION RESPIRATORY (INHALATION) at 20:01

## 2023-12-19 RX ADMIN — SODIUM CHLORIDE, PRESERVATIVE FREE 10 ML: 5 INJECTION INTRAVENOUS at 20:20

## 2023-12-19 RX ADMIN — Medication 1000 MG: at 01:39

## 2023-12-19 RX ADMIN — SODIUM CHLORIDE, PRESERVATIVE FREE 40 MG: 5 INJECTION INTRAVENOUS at 20:20

## 2023-12-19 RX ADMIN — Medication 200 MCG/HR: at 17:26

## 2023-12-19 RX ADMIN — Medication 1000 MG: at 23:59

## 2023-12-19 RX ADMIN — HEPARIN SODIUM 5000 UNITS: 5000 INJECTION INTRAVENOUS; SUBCUTANEOUS at 05:47

## 2023-12-19 RX ADMIN — SODIUM CHLORIDE: 9 INJECTION, SOLUTION INTRAVENOUS at 01:39

## 2023-12-19 RX ADMIN — IPRATROPIUM BROMIDE AND ALBUTEROL SULFATE 1 DOSE: 2.5; .5 SOLUTION RESPIRATORY (INHALATION) at 08:27

## 2023-12-19 RX ADMIN — MIDAZOLAM HYDROCHLORIDE 2 MG: 2 INJECTION, SOLUTION INTRAMUSCULAR; INTRAVENOUS at 04:32

## 2023-12-19 RX ADMIN — PROPOFOL 30 MCG/KG/MIN: 10 INJECTION, EMULSION INTRAVENOUS at 02:02

## 2023-12-19 RX ADMIN — SODIUM CHLORIDE, PRESERVATIVE FREE 10 ML: 5 INJECTION INTRAVENOUS at 07:39

## 2023-12-19 RX ADMIN — SODIUM CHLORIDE, PRESERVATIVE FREE 40 MG: 5 INJECTION INTRAVENOUS at 07:39

## 2023-12-19 RX ADMIN — GABAPENTIN 800 MG: 800 TABLET ORAL at 07:38

## 2023-12-19 RX ADMIN — TRAZODONE HYDROCHLORIDE 300 MG: 100 TABLET ORAL at 20:20

## 2023-12-19 RX ADMIN — PREDNISONE 60 MG: 20 TABLET ORAL at 07:38

## 2023-12-19 RX ADMIN — PROPOFOL 45 MCG/KG/MIN: 10 INJECTION, EMULSION INTRAVENOUS at 07:21

## 2023-12-19 RX ADMIN — VANCOMYCIN HYDROCHLORIDE 1500 MG: 1.5 INJECTION, POWDER, LYOPHILIZED, FOR SOLUTION INTRAVENOUS at 14:41

## 2023-12-19 RX ADMIN — GABAPENTIN 800 MG: 800 TABLET ORAL at 20:20

## 2023-12-19 RX ADMIN — ALBUTEROL SULFATE 2.5 MG: 2.5 SOLUTION RESPIRATORY (INHALATION) at 23:55

## 2023-12-19 ASSESSMENT — PAIN SCALES - GENERAL
PAINLEVEL_OUTOF10: 0

## 2023-12-19 ASSESSMENT — PULMONARY FUNCTION TESTS
PIF_VALUE: 27
PIF_VALUE: 21
PIF_VALUE: 26
PIF_VALUE: 14
PIF_VALUE: 31
PIF_VALUE: 25

## 2023-12-19 NOTE — PLAN OF CARE
Problem: Safety - Medical Restraint  Goal: Remains free of injury from restraints (Restraint for Interference with Medical Device)  Description: INTERVENTIONS:  1. Determine that other, less restrictive measures have been tried or would not be effective before applying the restraint  2. Evaluate the patient's condition at the time of restraint application  3. Inform patient/family regarding the reason for restraint  4.  Q2H: Monitor safety, psychosocial status, comfort, nutrition and hydration  Recent Flowsheet Documentation  Taken 12/19/2023 0000 by Oscar Cisneros RN  Remains free of injury from restraints (restraint for interference with medical device): Every 2 hours: Monitor safety, psychosocial status, comfort, nutrition and hydration  Taken 12/18/2023 2200 by Oscar Cisneros RN  Remains free of injury from restraints (restraint for interference with medical device): Every 2 hours: Monitor safety, psychosocial status, comfort, nutrition and hydration  Taken 12/18/2023 2000 by Oscar Cisneros RN  Remains free of injury from restraints (restraint for interference with medical device): Every 2 hours: Monitor safety, psychosocial status, comfort, nutrition and hydration  Taken 12/18/2023 1800 by Jarad Moran RN  Remains free of injury from restraints (restraint for interference with medical device): Every 2 hours: Monitor safety, psychosocial status, comfort, nutrition and hydration  Taken 12/18/2023 1600 by Jarad Moran RN  Remains free of injury from restraints (restraint for interference with medical device): Every 2 hours: Monitor safety, psychosocial status, comfort, nutrition and hydration  Taken 12/18/2023 1400 by Jarad Moran RN  Remains free of injury from restraints (restraint for interference with medical device): Every 2 hours: Monitor safety, psychosocial status, comfort, nutrition and hydration  Taken 12/18/2023 1200 by Jarad Moran RN  Remains free of injury from restraints (restraint for interference with medical device): Every 2 hours: Monitor safety, psychosocial status, comfort, nutrition and hydration     Problem: Respiratory - Adult  Goal: Achieves optimal ventilation and oxygenation  12/18/2023 2103 by Jerome Hernandez, ASHLEIGH  Outcome: Progressing

## 2023-12-19 NOTE — PLAN OF CARE
Problem: Chronic Conditions and Co-morbidities  Goal: Patient's chronic conditions and co-morbidity symptoms are monitored and maintained or improved  Outcome: Progressing     Problem: Discharge Planning  Goal: Discharge to home or other facility with appropriate resources  Outcome: Progressing     Problem: Safety - Medical Restraint  Goal: Remains free of injury from restraints (Restraint for Interference with Medical Device)  Description: INTERVENTIONS:  1. Determine that other, less restrictive measures have been tried or would not be effective before applying the restraint  2. Evaluate the patient's condition at the time of restraint application  3. Inform patient/family regarding the reason for restraint  4.  Q2H: Monitor safety, psychosocial status, comfort, nutrition and hydration  Outcome: Progressing  Flowsheets  Taken 12/19/2023 0600 by Vivek Garrido RN  Remains free of injury from restraints (restraint for interference with medical device): Every 2 hours: Monitor safety, psychosocial status, comfort, nutrition and hydration  Taken 12/19/2023 0400 by Vivek Garrido RN  Remains free of injury from restraints (restraint for interference with medical device): Every 2 hours: Monitor safety, psychosocial status, comfort, nutrition and hydration  Taken 12/19/2023 0200 by Vivek Garrido RN  Remains free of injury from restraints (restraint for interference with medical device): Every 2 hours: Monitor safety, psychosocial status, comfort, nutrition and hydration  Taken 12/19/2023 0000 by Vivek Garrido RN  Remains free of injury from restraints (restraint for interference with medical device): Every 2 hours: Monitor safety, psychosocial status, comfort, nutrition and hydration  Taken 12/18/2023 2200 by Vivek Garrido RN  Remains free of injury from restraints (restraint for interference with medical device): Every 2 hours: Monitor safety, psychosocial status, comfort, nutrition and hydration  Taken

## 2023-12-19 NOTE — PROGRESS NOTES
Comprehensive Nutrition Assessment    Type and Reason for Visit:  Reassess    Nutrition Recommendations/Plan:   Continue Peptide Based High Protein TF at decreased goal rate of 50 mL/hr with high rate of propofol being provided. Monitor TF tolerance/adequacy of intakes and rate of propofol - modify as needed. Will monitor labs, weights, and plan of care. Malnutrition Assessment:  Malnutrition Status:  Insufficient data (12/19/23 1213)    Context:  Acute Illness       Nutrition Assessment:    Pt remains intubated and sedated. Propofol running at 42.6 mL/hr at visit. Pt receiving TF of Peptide Based High Protein formula at goal 55 mL/hr - tolerating well. Discussed with RN about decreasing goal rate to 50 mL/hr with increased rate of propofol. No BM since admission. Weights reviewed. Labs reviewed. Meds include: Glycolax, Prednisone. Nutrition Related Findings:    labs/meds reviewed. hypoactive bowel sounds. Wound Type: None       Current Nutrition Intake & Therapies:    Average Meal Intake: NPO  Average Supplements Intake: NPO  Diet NPO  ADULT TUBE FEEDING; Nasogastric; Peptide Based High Protein; Continuous; 10; Yes; 10; Q 6 hours; 50; 30; Q 4 hours  Current Tube Feeding (TF) Orders:  Feeding Route: Nasogastric  Formula: Peptide Based High Protein  Schedule: Continuous  Feeding Regimen: 55 mL/hr  Additives/Modulars: None  Water Flushes: 30 mL q 4 hrs  Current TF & Flush Orders Provides: At 55 mL/hr = 1320 kcals (+propofol kcals), 115 gm protein  Goal TF & Flush Orders Provides: Peptide Based High Protein at 50 mL/hr = 1200 kcals (+propofol kcals), 105 gm protein    Additional Calorie Sources:  Propofol at 42.6 mL/hr = 1125 kcals    Anthropometric Measures:  Height: 177.8 cm (5' 10\")  Ideal Body Weight (IBW): 166 lbs (75 kg)    Admission Body Weight: 157.9 kg (348 lb 1.7 oz)  Current Body Weight: 157.9 kg (348 lb 1.7 oz), 209.7 % IBW.  Weight Source: Bed Scale  Current BMI (kg/m2): 49.9

## 2023-12-19 NOTE — PROGRESS NOTES
INTENSIVE CARE UNIT  Resident Physician Progress Note    Patient - Nirmal Petersen  Date of Admission -  12/17/2023  9:52 AM  Date of Evaluation -  12/19/2023  Room and Bed Number -  3014/3014-01   Hospital Day - 2    SUBJECTIVE:     HISTORY OF PRESENT ILLNESS:    Nirmal Petersen is a 48 y.o. with PMH of COPD, previous trach, morbid obesity, joão, previous hx of DVT in 2017 not on AC?, bipolar 1. Apparently his son called EMS due to persistent confusion. Does have a hx of drug use, labs at OSH show cocaine and cannabinoid use. Patient apparently was satting in 70s and thus the decision was made to intubate.  CT imaging shows bilateral multifocal pna. Patient was started on abx. Cultures sent. Labs remarkable for elevated creatnine of 2. Patient's baseline is normal. Currently is making urine. Covid and flu are neg. Troponins are elevated at 33 down trending to 25. Patient's EKG shows polymorphologic p waves which may be due to patient's pulmonary hx. Currently HDS afebrile has art line     CODE STATUS: Full Code    OVERNIGHT EVENTS:      No acute events overnight.  Patient 1 episode of agitation received Versed with improvement in symptoms.  Patient's urine output did decrease slightly.    TODAY:    This morning patient seen and examined at bedside.  Does follow commands.  Is currently on minimal vent settings.  Does have coarse breath sounds.  Patient is only getting a total of 100 cc/h and some radiation of all drips feeds and medications.  If needed we will plan on diuresis.  Will continue breathing treatments that are scheduled.  Will consult ENT for evaluation of patient's tracheal stenosis as patient is on minimal vent settings and would like to wean vent as able.     AWAKE & FOLLOWING COMMANDS: []   No  [x]   Yes                           SECRETIONS                 Amount:  [x]   Small []   Moderate []   Large []   None        Color: [x]   White []   Colored []   Bloody                         SEDATION:               software. Although every effort was made to ensure the accuracy of this automated transcription, some errors in transcription may have occurred.      Arnulfo Birmingham MD  12/19/2023 11:16 AM

## 2023-12-19 NOTE — CARE COORDINATION
Transitional planning: Patient remains intubated and sedated. [facility]NT [facility] due to tracheal stenosis.

## 2023-12-19 NOTE — PLAN OF CARE
Problem: Respiratory - Adult  Goal: Achieves optimal ventilation and oxygenation  12/19/2023 0837 by Blayne Templeton RCP  Outcome: Progressing

## 2023-12-20 ENCOUNTER — APPOINTMENT (OUTPATIENT)
Dept: GENERAL RADIOLOGY | Age: 48
DRG: 917 | End: 2023-12-20
Attending: INTERNAL MEDICINE
Payer: MEDICARE

## 2023-12-20 LAB
ANION GAP SERPL CALCULATED.3IONS-SCNC: 8 MMOL/L (ref 9–17)
BASOPHILS # BLD: 0 K/UL (ref 0–0.2)
BASOPHILS NFR BLD: 0 % (ref 0–2)
BUN SERPL-MCNC: 17 MG/DL (ref 6–20)
CALCIUM SERPL-MCNC: 8.2 MG/DL (ref 8.6–10.4)
CHLORIDE SERPL-SCNC: 104 MMOL/L (ref 98–107)
CO2 SERPL-SCNC: 27 MMOL/L (ref 20–31)
CREAT SERPL-MCNC: 0.6 MG/DL (ref 0.7–1.2)
EOSINOPHIL # BLD: 0.1 K/UL (ref 0–0.44)
EOSINOPHILS RELATIVE PERCENT: 1 % (ref 1–4)
ERYTHROCYTE [DISTWIDTH] IN BLOOD BY AUTOMATED COUNT: 20.5 % (ref 11.8–14.4)
FIO2: 35
GFR SERPL CREATININE-BSD FRML MDRD: >60 ML/MIN/1.73M2
GLUCOSE BLD-MCNC: 109 MG/DL (ref 74–100)
GLUCOSE SERPL-MCNC: 92 MG/DL (ref 70–99)
HCT VFR BLD AUTO: 47.5 % (ref 40.7–50.3)
HGB BLD-MCNC: 13.7 G/DL (ref 13–17)
IMM GRANULOCYTES # BLD AUTO: 0.1 K/UL (ref 0–0.3)
IMM GRANULOCYTES NFR BLD: 1 %
LYMPHOCYTES NFR BLD: 1.62 K/UL (ref 1.1–3.7)
LYMPHOCYTES RELATIVE PERCENT: 16 % (ref 24–43)
MCH RBC QN AUTO: 23.1 PG (ref 25.2–33.5)
MCHC RBC AUTO-ENTMCNC: 28.8 G/DL (ref 28.4–34.8)
MCV RBC AUTO: 80.1 FL (ref 82.6–102.9)
MONOCYTES NFR BLD: 1.01 K/UL (ref 0.1–1.2)
MONOCYTES NFR BLD: 10 % (ref 3–12)
MORPHOLOGY: ABNORMAL
MORPHOLOGY: ABNORMAL
NEUTROPHILS NFR BLD: 72 % (ref 36–65)
NEUTS SEG NFR BLD: 7.27 K/UL (ref 1.5–8.1)
NRBC BLD-RTO: 0.3 PER 100 WBC
PLATELET # BLD AUTO: 252 K/UL (ref 138–453)
PMV BLD AUTO: 10.7 FL (ref 8.1–13.5)
POC HCO3: 33.8 MMOL/L (ref 21–28)
POC O2 SATURATION: 92 % (ref 94–98)
POC PCO2: 55.4 MM HG (ref 35–48)
POC PH: 7.39 (ref 7.35–7.45)
POC PO2: 66 MM HG (ref 83–108)
POSITIVE BASE EXCESS, ART: 6.8 MMOL/L (ref 0–3)
POTASSIUM SERPL-SCNC: 4.6 MMOL/L (ref 3.7–5.3)
RBC # BLD AUTO: 5.93 M/UL (ref 4.21–5.77)
SODIUM SERPL-SCNC: 139 MMOL/L (ref 135–144)
VANCOMYCIN TROUGH SERPL-MCNC: 13.4 UG/ML (ref 10–20)
WBC OTHER # BLD: 10.1 K/UL (ref 3.5–11.3)

## 2023-12-20 PROCEDURE — 82803 BLOOD GASES ANY COMBINATION: CPT

## 2023-12-20 PROCEDURE — 36600 WITHDRAWAL OF ARTERIAL BLOOD: CPT

## 2023-12-20 PROCEDURE — C9113 INJ PANTOPRAZOLE SODIUM, VIA: HCPCS | Performed by: STUDENT IN AN ORGANIZED HEALTH CARE EDUCATION/TRAINING PROGRAM

## 2023-12-20 PROCEDURE — 2500000003 HC RX 250 WO HCPCS: Performed by: STUDENT IN AN ORGANIZED HEALTH CARE EDUCATION/TRAINING PROGRAM

## 2023-12-20 PROCEDURE — 94640 AIRWAY INHALATION TREATMENT: CPT

## 2023-12-20 PROCEDURE — 6360000002 HC RX W HCPCS: Performed by: STUDENT IN AN ORGANIZED HEALTH CARE EDUCATION/TRAINING PROGRAM

## 2023-12-20 PROCEDURE — 80202 ASSAY OF VANCOMYCIN: CPT

## 2023-12-20 PROCEDURE — 2580000003 HC RX 258: Performed by: STUDENT IN AN ORGANIZED HEALTH CARE EDUCATION/TRAINING PROGRAM

## 2023-12-20 PROCEDURE — 85025 COMPLETE CBC W/AUTO DIFF WBC: CPT

## 2023-12-20 PROCEDURE — 6370000000 HC RX 637 (ALT 250 FOR IP): Performed by: STUDENT IN AN ORGANIZED HEALTH CARE EDUCATION/TRAINING PROGRAM

## 2023-12-20 PROCEDURE — 99291 CRITICAL CARE FIRST HOUR: CPT | Performed by: INTERNAL MEDICINE

## 2023-12-20 PROCEDURE — 94761 N-INVAS EAR/PLS OXIMETRY MLT: CPT

## 2023-12-20 PROCEDURE — 94003 VENT MGMT INPAT SUBQ DAY: CPT

## 2023-12-20 PROCEDURE — 71045 X-RAY EXAM CHEST 1 VIEW: CPT

## 2023-12-20 PROCEDURE — 36415 COLL VENOUS BLD VENIPUNCTURE: CPT

## 2023-12-20 PROCEDURE — 2700000000 HC OXYGEN THERAPY PER DAY

## 2023-12-20 PROCEDURE — 6370000000 HC RX 637 (ALT 250 FOR IP)

## 2023-12-20 PROCEDURE — 82947 ASSAY GLUCOSE BLOOD QUANT: CPT

## 2023-12-20 PROCEDURE — 80048 BASIC METABOLIC PNL TOTAL CA: CPT

## 2023-12-20 PROCEDURE — 2000000000 HC ICU R&B

## 2023-12-20 PROCEDURE — A4216 STERILE WATER/SALINE, 10 ML: HCPCS | Performed by: STUDENT IN AN ORGANIZED HEALTH CARE EDUCATION/TRAINING PROGRAM

## 2023-12-20 RX ORDER — ALBUTEROL SULFATE 2.5 MG/3ML
2.5 SOLUTION RESPIRATORY (INHALATION) EVERY 4 HOURS PRN
Status: DISCONTINUED | OUTPATIENT
Start: 2023-12-20 | End: 2023-12-26 | Stop reason: HOSPADM

## 2023-12-20 RX ORDER — OXYCODONE HYDROCHLORIDE 5 MG/1
10 TABLET ORAL EVERY 6 HOURS
Status: DISCONTINUED | OUTPATIENT
Start: 2023-12-20 | End: 2023-12-24

## 2023-12-20 RX ADMIN — Medication 200 MCG/HR: at 05:51

## 2023-12-20 RX ADMIN — HEPARIN SODIUM 5000 UNITS: 5000 INJECTION INTRAVENOUS; SUBCUTANEOUS at 13:04

## 2023-12-20 RX ADMIN — GABAPENTIN 800 MG: 800 TABLET ORAL at 13:04

## 2023-12-20 RX ADMIN — Medication 7 MG/HR: at 01:33

## 2023-12-20 RX ADMIN — PREDNISONE 60 MG: 20 TABLET ORAL at 07:33

## 2023-12-20 RX ADMIN — Medication 5 MG/HR: at 18:49

## 2023-12-20 RX ADMIN — IPRATROPIUM BROMIDE AND ALBUTEROL SULFATE 1 DOSE: 2.5; .5 SOLUTION RESPIRATORY (INHALATION) at 15:53

## 2023-12-20 RX ADMIN — OXYCODONE HYDROCHLORIDE 5 MG: 5 TABLET ORAL at 10:04

## 2023-12-20 RX ADMIN — GABAPENTIN 800 MG: 800 TABLET ORAL at 21:29

## 2023-12-20 RX ADMIN — Medication 200 MCG/HR: at 17:44

## 2023-12-20 RX ADMIN — ARIPIPRAZOLE 7.5 MG: 15 TABLET ORAL at 07:33

## 2023-12-20 RX ADMIN — DEXMEDETOMIDINE HYDROCHLORIDE 0.2 MCG/KG/HR: 400 INJECTION, SOLUTION INTRAVENOUS at 06:05

## 2023-12-20 RX ADMIN — SODIUM CHLORIDE: 9 INJECTION, SOLUTION INTRAVENOUS at 03:44

## 2023-12-20 RX ADMIN — OXYCODONE HYDROCHLORIDE 5 MG: 5 TABLET ORAL at 05:49

## 2023-12-20 RX ADMIN — OXYCODONE 10 MG: 5 TABLET ORAL at 21:36

## 2023-12-20 RX ADMIN — AZITHROMYCIN MONOHYDRATE 500 MG: 500 INJECTION, POWDER, LYOPHILIZED, FOR SOLUTION INTRAVENOUS at 07:37

## 2023-12-20 RX ADMIN — TRAZODONE HYDROCHLORIDE 300 MG: 100 TABLET ORAL at 21:29

## 2023-12-20 RX ADMIN — POLYETHYLENE GLYCOL 3350 17 G: 17 POWDER, FOR SOLUTION ORAL at 07:34

## 2023-12-20 RX ADMIN — IPRATROPIUM BROMIDE AND ALBUTEROL SULFATE 1 DOSE: 2.5; .5 SOLUTION RESPIRATORY (INHALATION) at 20:45

## 2023-12-20 RX ADMIN — GABAPENTIN 800 MG: 800 TABLET ORAL at 07:33

## 2023-12-20 RX ADMIN — SODIUM CHLORIDE, PRESERVATIVE FREE 40 MG: 5 INJECTION INTRAVENOUS at 07:34

## 2023-12-20 RX ADMIN — SODIUM CHLORIDE, PRESERVATIVE FREE 10 ML: 5 INJECTION INTRAVENOUS at 07:36

## 2023-12-20 RX ADMIN — VANCOMYCIN HYDROCHLORIDE 1500 MG: 1.5 INJECTION, POWDER, LYOPHILIZED, FOR SOLUTION INTRAVENOUS at 03:47

## 2023-12-20 RX ADMIN — HEPARIN SODIUM 5000 UNITS: 5000 INJECTION INTRAVENOUS; SUBCUTANEOUS at 21:36

## 2023-12-20 RX ADMIN — OXYCODONE 10 MG: 5 TABLET ORAL at 15:19

## 2023-12-20 RX ADMIN — ALBUTEROL SULFATE 2.5 MG: 2.5 SOLUTION RESPIRATORY (INHALATION) at 03:35

## 2023-12-20 RX ADMIN — DEXMEDETOMIDINE HYDROCHLORIDE 0.2 MCG/KG/HR: 400 INJECTION, SOLUTION INTRAVENOUS at 18:48

## 2023-12-20 RX ADMIN — Medication 1000 MG: at 23:57

## 2023-12-20 RX ADMIN — IPRATROPIUM BROMIDE AND ALBUTEROL SULFATE 1 DOSE: 2.5; .5 SOLUTION RESPIRATORY (INHALATION) at 08:02

## 2023-12-20 RX ADMIN — SODIUM CHLORIDE, PRESERVATIVE FREE 10 ML: 5 INJECTION INTRAVENOUS at 21:36

## 2023-12-20 RX ADMIN — SODIUM CHLORIDE, PRESERVATIVE FREE 40 MG: 5 INJECTION INTRAVENOUS at 21:28

## 2023-12-20 RX ADMIN — HEPARIN SODIUM 5000 UNITS: 5000 INJECTION INTRAVENOUS; SUBCUTANEOUS at 05:49

## 2023-12-20 RX ADMIN — IPRATROPIUM BROMIDE AND ALBUTEROL SULFATE 1 DOSE: 2.5; .5 SOLUTION RESPIRATORY (INHALATION) at 11:37

## 2023-12-20 RX ADMIN — VANCOMYCIN HYDROCHLORIDE 1500 MG: 1.5 INJECTION, POWDER, LYOPHILIZED, FOR SOLUTION INTRAVENOUS at 15:21

## 2023-12-20 ASSESSMENT — PULMONARY FUNCTION TESTS
PIF_VALUE: 14
PIF_VALUE: 25
PIF_VALUE: 14
PIF_VALUE: 27
PIF_VALUE: 19

## 2023-12-20 ASSESSMENT — PAIN SCALES - GENERAL
PAINLEVEL_OUTOF10: 0

## 2023-12-20 NOTE — PROGRESS NOTES
INTENSIVE CARE UNIT  Resident Physician Progress Note    Patient - Chaim Rosario  Date of Admission -  12/17/2023  9:52 AM  Date of Evaluation -  12/20/2023  Room and Bed Number -  1211/9768-96   Hospital Day - 3    SUBJECTIVE:     HISTORY OF PRESENT ILLNESS:    Chaim Rosario is a 46 y.o. with PMH of COPD, previous trach, morbid obesity, joão, previous hx of DVT in 2017 not on AC?, bipolar 1. Apparently his son called EMS due to persistent confusion. Does have a hx of drug use, labs at OSH show cocaine and cannabinoid use. Patient apparently was satting in 76s and thus the decision was made to intubate. CT imaging shows bilateral multifocal pna. Patient was started on abx. Cultures sent. Labs remarkable for elevated creatnine of 2. Patient's baseline is normal. Currently is making urine. Covid and flu are neg. Troponins are elevated at 33 down trending to 25. Patient's EKG shows polymorphologic p waves which may be due to patient's pulmonary hx. Currently HDS afebrile has art line     CODE STATUS: Full Code    OVERNIGHT EVENTS:      No acute events overnight. Patient remains intubated and sedated on fentanyl, Versed, and Precedex. Remains hemodynamically stable, no pressors.   Tube feeds at goal.     AWAKE & FOLLOWING COMMANDS: []   No  [x]   Yes                           SECRETIONS                 Amount:  [x]   Small []   Moderate []   Large []   None        Color: [x]   White []   Colored []   Bloody                         SEDATION:                 RAAS Score: [x]   +1 to -1 []   -2 []   -3 []   -4        Sedation Agent: [x]   Propofol, fent  gtt [x]   Versed gtt  []   Ativan gtt  []   No Sedation        Paralytic Agent: [x]   Precedex []   Norcuron []   Nimbex []                         VASOPRESSORS:  [x]   No  []   Yes            Vasopressor Agent []   Levophed []   Dopamine []   Vasopressin []   Dobutamine  []   Phenylephrine  []   Epinephrine     OBJECTIVE:     VITAL SIGNS:  Patient Vitals for the past 8

## 2023-12-20 NOTE — PLAN OF CARE
Problem: Safety - Medical Restraint  Goal: Remains free of injury from restraints (Restraint for Interference with Medical Device)  Description: INTERVENTIONS:  1. Determine that other, less restrictive measures have been tried or would not be effective before applying the restraint  2. Evaluate the patient's condition at the time of restraint application  3. Inform patient/family regarding the reason for restraint  4.  Q2H: Monitor safety, psychosocial status, comfort, nutrition and hydration  Outcome: Progressing  Flowsheets  Taken 12/20/2023 0000 by Nadira Rock RN  Remains free of injury from restraints (restraint for interference with medical device): Every 2 hours: Monitor safety, psychosocial status, comfort, nutrition and hydration  Taken 12/19/2023 2200 by Nadira Rock RN  Remains free of injury from restraints (restraint for interference with medical device): Every 2 hours: Monitor safety, psychosocial status, comfort, nutrition and hydration  Taken 12/19/2023 2000 by Nadira Rock RN  Remains free of injury from restraints (restraint for interference with medical device): Every 2 hours: Monitor safety, psychosocial status, comfort, nutrition and hydration  Taken 12/19/2023 1800 by Tamica Saldaña RN  Remains free of injury from restraints (restraint for interference with medical device): Every 2 hours: Monitor safety, psychosocial status, comfort, nutrition and hydration  Taken 12/19/2023 1600 by Tamica Saldaña RN  Remains free of injury from restraints (restraint for interference with medical device): Every 2 hours: Monitor safety, psychosocial status, comfort, nutrition and hydration  Taken 12/19/2023 1400 by Tamica Saldaña RN  Remains free of injury from restraints (restraint for interference with medical device): Every 2 hours: Monitor safety, psychosocial status, comfort, nutrition and hydration  Taken 12/19/2023 1200 by Tamica Saldaña RN  Remains free of injury from restraints (restraint

## 2023-12-20 NOTE — PLAN OF CARE
Problem: Respiratory - Adult  Goal: Achieves optimal ventilation and oxygenation  12/20/2023 0809 by Lottie Daniel RCP  Outcome: Progressing

## 2023-12-20 NOTE — PLAN OF CARE
Problem: Respiratory - Adult  Goal: Achieves optimal ventilation and oxygenation  12/19/2023 2010 by Flor Anthony RCP  Flowsheets (Taken 12/19/2023 2010)  Achieves optimal ventilation and oxygenation:   Respiratory therapy support as indicated   Assess the need for suctioning and aspirate as needed   Assess for changes in respiratory status   Oxygen supplementation based on oxygen saturation or arterial blood gases   Assess for changes in mentation and behavior

## 2023-12-20 NOTE — CARE COORDINATION
Transitional planning/Quality flow rounds. Currently on CPAP PS8, Precedex, versed, fentanyl. Plan is to higher level of care for special ENT procedure. 1400 Per Dr Nickolas Hedrick, [facility] notified of intent to transfer to [facility].

## 2023-12-20 NOTE — PLAN OF CARE
Problem: Chronic Conditions and Co-morbidities  Goal: Patient's chronic conditions and co-morbidity symptoms are monitored and maintained or improved  12/20/2023 0754 by Megan Chavarria RN  Outcome: Progressing     Problem: Discharge Planning  Goal: Discharge to home or other facility with appropriate resources  12/20/2023 0754 by Megan Chavarria RN  Outcome: Progressing     Problem: Safety - Medical Restraint  Goal: Remains free of injury from restraints (Restraint for Interference with Medical Device)  Description: INTERVENTIONS:  1. Determine that other, less restrictive measures have been tried or would not be effective before applying the restraint  2. Evaluate the patient's condition at the time of restraint application  3. Inform patient/family regarding the reason for restraint  4. Q2H: Monitor safety, psychosocial status, comfort, nutrition and hydration  12/20/2023 0754 by Megan Chavarria RN  Outcome: Progressing  Flowsheets  Taken 12/20/2023 0600 by Jethro Lares RN  Remains free of injury from restraints (restraint for interference with medical device): Every 2 hours: Monitor safety, psychosocial status, comfort, nutrition and hydration  Taken 12/20/2023 0400 by Jethro Lares RN  Remains free of injury from restraints (restraint for interference with medical device): Every 2 hours: Monitor safety, psychosocial status, comfort, nutrition and hydration     Problem: Skin/Tissue Integrity  Goal: Absence of new skin breakdown  Description: 1. Monitor for areas of redness and/or skin breakdown  2. Assess vascular access sites hourly  3. Every 4-6 hours minimum:  Change oxygen saturation probe site  4. Every 4-6 hours:  If on nasal continuous positive airway pressure, respiratory therapy assess nares and determine need for appliance change or resting period.   12/20/2023 0754 by Megan Chavarria RN  Outcome: Progressing     Problem: Safety - Adult  Goal: Free from fall injury  12/20/2023 0754 by Anthony Shah

## 2023-12-21 LAB
ALLEN TEST: POSITIVE
ANION GAP SERPL CALCULATED.3IONS-SCNC: 10 MMOL/L (ref 9–17)
BASOPHILS # BLD: 0 K/UL (ref 0–0.2)
BASOPHILS NFR BLD: 0 % (ref 0–2)
BUN SERPL-MCNC: 16 MG/DL (ref 6–20)
CALCIUM SERPL-MCNC: 8.6 MG/DL (ref 8.6–10.4)
CHLORIDE SERPL-SCNC: 102 MMOL/L (ref 98–107)
CO2 SERPL-SCNC: 26 MMOL/L (ref 20–31)
CREAT SERPL-MCNC: 0.6 MG/DL (ref 0.7–1.2)
EOSINOPHIL # BLD: 0.11 K/UL (ref 0–0.44)
EOSINOPHILS RELATIVE PERCENT: 1 % (ref 1–4)
ERYTHROCYTE [DISTWIDTH] IN BLOOD BY AUTOMATED COUNT: 20.3 % (ref 11.8–14.4)
FIO2: 35
GFR SERPL CREATININE-BSD FRML MDRD: >60 ML/MIN/1.73M2
GLUCOSE BLD-MCNC: 116 MG/DL (ref 75–110)
GLUCOSE BLD-MCNC: 133 MG/DL (ref 74–100)
GLUCOSE SERPL-MCNC: 86 MG/DL (ref 70–99)
HCT VFR BLD AUTO: 50.7 % (ref 40.7–50.3)
HGB BLD-MCNC: 14.2 G/DL (ref 13–17)
IMM GRANULOCYTES # BLD AUTO: 0.11 K/UL (ref 0–0.3)
IMM GRANULOCYTES NFR BLD: 1 %
LYMPHOCYTES NFR BLD: 1.65 K/UL (ref 1.1–3.7)
LYMPHOCYTES RELATIVE PERCENT: 15 % (ref 24–43)
MCH RBC QN AUTO: 22.4 PG (ref 25.2–33.5)
MCHC RBC AUTO-ENTMCNC: 28 G/DL (ref 28.4–34.8)
MCV RBC AUTO: 80 FL (ref 82.6–102.9)
MODE: ABNORMAL
MONOCYTES NFR BLD: 0.99 K/UL (ref 0.1–1.2)
MONOCYTES NFR BLD: 9 % (ref 3–12)
MORPHOLOGY: ABNORMAL
MORPHOLOGY: ABNORMAL
NEUTROPHILS NFR BLD: 74 % (ref 36–65)
NEUTS SEG NFR BLD: 8.14 K/UL (ref 1.5–8.1)
NRBC BLD-RTO: 0 PER 100 WBC
O2 DELIVERY DEVICE: ABNORMAL
PLATELET # BLD AUTO: 193 K/UL (ref 138–453)
PMV BLD AUTO: 10.3 FL (ref 8.1–13.5)
POC HCO3: 30.7 MMOL/L (ref 21–28)
POC O2 SATURATION: 95 % (ref 94–98)
POC PCO2: 48.6 MM HG (ref 35–48)
POC PH: 7.41 (ref 7.35–7.45)
POC PO2: 76.3 MM HG (ref 83–108)
POSITIVE BASE EXCESS, ART: 4.8 MMOL/L (ref 0–3)
POTASSIUM SERPL-SCNC: 4.3 MMOL/L (ref 3.7–5.3)
RBC # BLD AUTO: 6.34 M/UL (ref 4.21–5.77)
SAMPLE SITE: ABNORMAL
SODIUM SERPL-SCNC: 138 MMOL/L (ref 135–144)
WBC OTHER # BLD: 11 K/UL (ref 3.5–11.3)

## 2023-12-21 PROCEDURE — 94003 VENT MGMT INPAT SUBQ DAY: CPT

## 2023-12-21 PROCEDURE — 80048 BASIC METABOLIC PNL TOTAL CA: CPT

## 2023-12-21 PROCEDURE — 6370000000 HC RX 637 (ALT 250 FOR IP): Performed by: STUDENT IN AN ORGANIZED HEALTH CARE EDUCATION/TRAINING PROGRAM

## 2023-12-21 PROCEDURE — 2000000000 HC ICU R&B

## 2023-12-21 PROCEDURE — 2500000003 HC RX 250 WO HCPCS: Performed by: STUDENT IN AN ORGANIZED HEALTH CARE EDUCATION/TRAINING PROGRAM

## 2023-12-21 PROCEDURE — A4216 STERILE WATER/SALINE, 10 ML: HCPCS | Performed by: STUDENT IN AN ORGANIZED HEALTH CARE EDUCATION/TRAINING PROGRAM

## 2023-12-21 PROCEDURE — 82803 BLOOD GASES ANY COMBINATION: CPT

## 2023-12-21 PROCEDURE — 51701 INSERT BLADDER CATHETER: CPT

## 2023-12-21 PROCEDURE — 2580000003 HC RX 258: Performed by: STUDENT IN AN ORGANIZED HEALTH CARE EDUCATION/TRAINING PROGRAM

## 2023-12-21 PROCEDURE — 6370000000 HC RX 637 (ALT 250 FOR IP)

## 2023-12-21 PROCEDURE — 6360000002 HC RX W HCPCS: Performed by: STUDENT IN AN ORGANIZED HEALTH CARE EDUCATION/TRAINING PROGRAM

## 2023-12-21 PROCEDURE — 85025 COMPLETE CBC W/AUTO DIFF WBC: CPT

## 2023-12-21 PROCEDURE — 51798 US URINE CAPACITY MEASURE: CPT

## 2023-12-21 PROCEDURE — 2700000000 HC OXYGEN THERAPY PER DAY

## 2023-12-21 PROCEDURE — 36415 COLL VENOUS BLD VENIPUNCTURE: CPT

## 2023-12-21 PROCEDURE — 94640 AIRWAY INHALATION TREATMENT: CPT

## 2023-12-21 PROCEDURE — 94761 N-INVAS EAR/PLS OXIMETRY MLT: CPT

## 2023-12-21 PROCEDURE — 36600 WITHDRAWAL OF ARTERIAL BLOOD: CPT

## 2023-12-21 PROCEDURE — 82947 ASSAY GLUCOSE BLOOD QUANT: CPT

## 2023-12-21 PROCEDURE — 99291 CRITICAL CARE FIRST HOUR: CPT | Performed by: INTERNAL MEDICINE

## 2023-12-21 PROCEDURE — C9113 INJ PANTOPRAZOLE SODIUM, VIA: HCPCS | Performed by: STUDENT IN AN ORGANIZED HEALTH CARE EDUCATION/TRAINING PROGRAM

## 2023-12-21 RX ADMIN — ALBUTEROL SULFATE 2.5 MG: 2.5 SOLUTION RESPIRATORY (INHALATION) at 03:42

## 2023-12-21 RX ADMIN — HEPARIN SODIUM 5000 UNITS: 5000 INJECTION INTRAVENOUS; SUBCUTANEOUS at 05:50

## 2023-12-21 RX ADMIN — HEPARIN SODIUM 5000 UNITS: 5000 INJECTION INTRAVENOUS; SUBCUTANEOUS at 13:25

## 2023-12-21 RX ADMIN — ALBUTEROL SULFATE 2.5 MG: 2.5 SOLUTION RESPIRATORY (INHALATION) at 23:47

## 2023-12-21 RX ADMIN — Medication 8 MG/HR: at 05:40

## 2023-12-21 RX ADMIN — POLYETHYLENE GLYCOL 3350 17 G: 17 POWDER, FOR SOLUTION ORAL at 08:29

## 2023-12-21 RX ADMIN — Medication 200 MCG/HR: at 05:40

## 2023-12-21 RX ADMIN — GABAPENTIN 800 MG: 800 TABLET ORAL at 13:25

## 2023-12-21 RX ADMIN — Medication 5 MG/HR: at 19:48

## 2023-12-21 RX ADMIN — SODIUM CHLORIDE, PRESERVATIVE FREE 40 MG: 5 INJECTION INTRAVENOUS at 08:28

## 2023-12-21 RX ADMIN — GABAPENTIN 800 MG: 800 TABLET ORAL at 19:45

## 2023-12-21 RX ADMIN — IPRATROPIUM BROMIDE AND ALBUTEROL SULFATE 1 DOSE: 2.5; .5 SOLUTION RESPIRATORY (INHALATION) at 07:55

## 2023-12-21 RX ADMIN — Medication 175 MCG/HR: at 13:14

## 2023-12-21 RX ADMIN — HEPARIN SODIUM 5000 UNITS: 5000 INJECTION INTRAVENOUS; SUBCUTANEOUS at 20:00

## 2023-12-21 RX ADMIN — ALBUTEROL SULFATE 2.5 MG: 2.5 SOLUTION RESPIRATORY (INHALATION) at 00:10

## 2023-12-21 RX ADMIN — OXYCODONE 10 MG: 5 TABLET ORAL at 08:34

## 2023-12-21 RX ADMIN — GABAPENTIN 800 MG: 800 TABLET ORAL at 08:29

## 2023-12-21 RX ADMIN — SODIUM CHLORIDE, PRESERVATIVE FREE 40 MG: 5 INJECTION INTRAVENOUS at 19:45

## 2023-12-21 RX ADMIN — SODIUM CHLORIDE: 9 INJECTION, SOLUTION INTRAVENOUS at 22:02

## 2023-12-21 RX ADMIN — SODIUM CHLORIDE: 9 INJECTION, SOLUTION INTRAVENOUS at 03:36

## 2023-12-21 RX ADMIN — DEXMEDETOMIDINE HYDROCHLORIDE 0.4 MCG/KG/HR: 400 INJECTION, SOLUTION INTRAVENOUS at 09:51

## 2023-12-21 RX ADMIN — ARIPIPRAZOLE 7.5 MG: 15 TABLET ORAL at 08:29

## 2023-12-21 RX ADMIN — TRAZODONE HYDROCHLORIDE 300 MG: 100 TABLET ORAL at 19:45

## 2023-12-21 RX ADMIN — VANCOMYCIN HYDROCHLORIDE 1500 MG: 1.5 INJECTION, POWDER, LYOPHILIZED, FOR SOLUTION INTRAVENOUS at 15:30

## 2023-12-21 RX ADMIN — IPRATROPIUM BROMIDE AND ALBUTEROL SULFATE 1 DOSE: 2.5; .5 SOLUTION RESPIRATORY (INHALATION) at 15:07

## 2023-12-21 RX ADMIN — PREDNISONE 60 MG: 20 TABLET ORAL at 08:29

## 2023-12-21 RX ADMIN — DEXMEDETOMIDINE HYDROCHLORIDE 0.4 MCG/KG/HR: 400 INJECTION, SOLUTION INTRAVENOUS at 22:04

## 2023-12-21 RX ADMIN — OXYCODONE 10 MG: 5 TABLET ORAL at 04:32

## 2023-12-21 RX ADMIN — OXYCODONE 10 MG: 5 TABLET ORAL at 15:30

## 2023-12-21 RX ADMIN — SODIUM CHLORIDE, PRESERVATIVE FREE 10 ML: 5 INJECTION INTRAVENOUS at 08:29

## 2023-12-21 RX ADMIN — DEXMEDETOMIDINE HYDROCHLORIDE 0.4 MCG/KG/HR: 400 INJECTION, SOLUTION INTRAVENOUS at 16:32

## 2023-12-21 RX ADMIN — OXYCODONE 10 MG: 5 TABLET ORAL at 20:15

## 2023-12-21 RX ADMIN — IPRATROPIUM BROMIDE AND ALBUTEROL SULFATE 1 DOSE: 2.5; .5 SOLUTION RESPIRATORY (INHALATION) at 20:08

## 2023-12-21 RX ADMIN — VANCOMYCIN HYDROCHLORIDE 1500 MG: 1.5 INJECTION, POWDER, LYOPHILIZED, FOR SOLUTION INTRAVENOUS at 03:45

## 2023-12-21 RX ADMIN — SODIUM CHLORIDE, PRESERVATIVE FREE 10 ML: 5 INJECTION INTRAVENOUS at 19:45

## 2023-12-21 RX ADMIN — DEXMEDETOMIDINE HYDROCHLORIDE 0.2 MCG/KG/HR: 400 INJECTION, SOLUTION INTRAVENOUS at 04:37

## 2023-12-21 RX ADMIN — IPRATROPIUM BROMIDE AND ALBUTEROL SULFATE 1 DOSE: 2.5; .5 SOLUTION RESPIRATORY (INHALATION) at 11:29

## 2023-12-21 ASSESSMENT — PULMONARY FUNCTION TESTS
PIF_VALUE: 25
PIF_VALUE: 27
PIF_VALUE: 29
PIF_VALUE: 28
PIF_VALUE: 27
PIF_VALUE: 26

## 2023-12-21 NOTE — PROGRESS NOTES
INTENSIVE CARE UNIT  Resident Physician Progress Note    Patient - Nathen Maya  Date of Admission -  12/17/2023  9:52 AM  Date of Evaluation -  12/21/2023  Room and Bed Number -  8597/5047-38   Hospital Day - 4    SUBJECTIVE:     HISTORY OF PRESENT ILLNESS:    Nathen Maya is a 46 y.o. with PMH of COPD, previous trach, morbid obesity, joão, previous hx of DVT in 2017 not on AC?, bipolar 1. Apparently his son called EMS due to persistent confusion. Does have a hx of drug use, labs at OSH show cocaine and cannabinoid use. Patient apparently was satting in 76s and thus the decision was made to intubate. CT imaging shows bilateral multifocal pna. Patient was started on abx. Cultures sent. Labs remarkable for elevated creatnine of 2. Patient's baseline is normal. Currently is making urine. Covid and flu are neg. Troponins are elevated at 33 down trending to 25. Patient's EKG shows polymorphologic p waves which may be due to patient's pulmonary hx. Currently HDS afebrile has art line     CODE STATUS: Full Code    OVERNIGHT EVENTS:      No acute events overnight. Patient remains intubated and sedated on fentanyl, Versed, and Precedex. Remains hemodynamically stable, no pressors. Tube feeds at goal. [facility] contacted yesterday [facility].      AWAKE & FOLLOWING COMMANDS: []   No  [x]   Yes                           SECRETIONS                 Amount:  [x]   Small []   Moderate []   Large []   None        Color: [x]   White []   Colored []   Bloody                         SEDATION:                 RAAS Score: [x]   +1 to -1 []   -2 []   -3 []   -4        Sedation Agent: [x]   Propofol, fent  gtt [x]   Versed gtt  []   Ativan gtt  []   No Sedation        Paralytic Agent: [x]   Precedex []   Norcuron []   Nimbex []                         VASOPRESSORS:  [x]   No  []   Yes            Vasopressor Agent []   Levophed []   Dopamine []   Vasopressin []   Dobutamine  []   Phenylephrine  []   Epinephrine trazodone.  He is also on oxycodone 10 mg every 6 hours and will try to wean down fentanyl drip.  Continue with bronchodilators.  Daily spontaneous breathing trial.    Discussed with mother at bedside and updated  Discussed with nursing staff, treatment and plan discussed.  Discussed with respiratory therapist.    Total critical care time caring for this patient with life threatening, unstable organ failure, including direct patient contact, management of life support systems, review of data including imaging and labs, discussions with other team members and physicians at least 30  Min so far today, excluding procedures.       Please note that this chart was generated using voice recognition Dragon dictation software. Although every effort was made to ensure the accuracy of this automated transcription, some errors in transcription may have occurred.     Mahin Faith MD  12/21/2023 11:29 AM

## 2023-12-21 NOTE — PLAN OF CARE
Problem: Chronic Conditions and Co-morbidities  Goal: Patient's chronic conditions and co-morbidity symptoms are monitored and maintained or improved  Outcome: Progressing     Problem: Safety - Medical Restraint  Goal: Remains free of injury from restraints (Restraint for Interference with Medical Device)  Description: INTERVENTIONS:  1. Determine that other, less restrictive measures have been tried or would not be effective before applying the restraint  2. Evaluate the patient's condition at the time of restraint application  3. Inform patient/family regarding the reason for restraint  4.  Q2H: Monitor safety, psychosocial status, comfort, nutrition and hydration  Outcome: Progressing  Flowsheets  Taken 12/21/2023 0000 by Nadira Rock RN  Remains free of injury from restraints (restraint for interference with medical device): Every 2 hours: Monitor safety, psychosocial status, comfort, nutrition and hydration  Taken 12/20/2023 2200 by Nadira Rock RN  Remains free of injury from restraints (restraint for interference with medical device): Every 2 hours: Monitor safety, psychosocial status, comfort, nutrition and hydration  Taken 12/20/2023 2000 by Nadira Rock RN  Remains free of injury from restraints (restraint for interference with medical device): Every 2 hours: Monitor safety, psychosocial status, comfort, nutrition and hydration  Taken 12/20/2023 1800 by Tamica Saldaña RN  Remains free of injury from restraints (restraint for interference with medical device): Every 2 hours: Monitor safety, psychosocial status, comfort, nutrition and hydration  Taken 12/20/2023 1600 by Tamica Saldaña RN  Remains free of injury from restraints (restraint for interference with medical device): Every 2 hours: Monitor safety, psychosocial status, comfort, nutrition and hydration  Taken 12/20/2023 1400 by Tamica Saldaña RN  Remains free of injury from restraints (restraint for interference with medical device): Every 2 hours: Monitor safety, psychosocial status, comfort, nutrition and hydration  Taken 12/20/2023 1200 by Lisa Holguin RN  Remains free of injury from restraints (restraint for interference with medical device): Every 2 hours: Monitor safety, psychosocial status, comfort, nutrition and hydration     Problem: Respiratory - Adult  Goal: Achieves optimal ventilation and oxygenation  Outcome: Progressing

## 2023-12-21 NOTE — PLAN OF CARE
Problem: Chronic Conditions and Co-morbidities  Goal: Patient's chronic conditions and co-morbidity symptoms are monitored and maintained or improved  12/21/2023 0944 by Sudheer Michelle RN  Outcome: Progressing     Problem: Discharge Planning  Goal: Discharge to home or other facility with appropriate resources  12/21/2023 0944 by Sudheer Michelle RN  Outcome: Progressing     Problem: Safety - Medical Restraint  Goal: Remains free of injury from restraints (Restraint for Interference with Medical Device)  Description: INTERVENTIONS:  1. Determine that other, less restrictive measures have been tried or would not be effective before applying the restraint  2. Evaluate the patient's condition at the time of restraint application  3. Inform patient/family regarding the reason for restraint  4. Q2H: Monitor safety, psychosocial status, comfort, nutrition and hydration  12/21/2023 0944 by Sudheer Michelle RN  Outcome: Progressing  Flowsheets  Taken 12/21/2023 0800 by Sudheer Michelle RN  Remains free of injury from restraints (restraint for interference with medical device): Every 2 hours: Monitor safety, psychosocial status, comfort, nutrition and hydration  Taken 12/21/2023 0600 by Alejandra Galicia RN  Remains free of injury from restraints (restraint for interference with medical device): Every 2 hours: Monitor safety, psychosocial status, comfort, nutrition and hydration  Taken 12/21/2023 0400 by Alejandra Galicia RN  Remains free of injury from restraints (restraint for interference with medical device): Every 2 hours: Monitor safety, psychosocial status, comfort, nutrition and hydration  Taken 12/21/2023 0200 by Alejandra Galicia RN  Remains free of injury from restraints (restraint for interference with medical device): Every 2 hours: Monitor safety, psychosocial status, comfort, nutrition and hydration     Problem: Skin/Tissue Integrity  Goal: Absence of new skin breakdown  Description: 1.  Monitor for areas  of redness and/or skin breakdown  2. Assess vascular access sites hourly  3. Every 4-6 hours minimum:  Change oxygen saturation probe site  4. Every 4-6 hours:  If on nasal continuous positive airway pressure, respiratory therapy assess nares and determine need for appliance change or resting period.   12/21/2023 0944 by Juan Robertson RN  Outcome: Progressing

## 2023-12-21 NOTE — CARE COORDINATION
Transitional planing. I/S FiO2 35%, PEEP of 5, following commands, Tracheal Stenosis. Accepted at [facility]- awaiting bed placement. Pt's RN, Vita Flores confirmed w/ physician that pt can transport by [facility].     [address] [place] in [place] lab, she will have cardiac imaging electronically sent to [address] [facility] [place] in [facility]- she will electronically send all imaging to [address] Spoke to aleksey Sheridan/ [employer] [phone], still awaiting bed at [facility], accepting physician is Dr. Linda Barajas    Cardiac and all images electronically sent to [facility], on \"Will Call\" with [employer]

## 2023-12-22 ENCOUNTER — APPOINTMENT (OUTPATIENT)
Dept: GENERAL RADIOLOGY | Age: 48
DRG: 917 | End: 2023-12-22
Attending: INTERNAL MEDICINE
Payer: MEDICARE

## 2023-12-22 LAB
ALLEN TEST: POSITIVE
ANION GAP SERPL CALCULATED.3IONS-SCNC: 9 MMOL/L (ref 9–17)
BASOPHILS # BLD: 0 K/UL (ref 0–0.2)
BASOPHILS NFR BLD: 0 % (ref 0–2)
BUN SERPL-MCNC: 16 MG/DL (ref 6–20)
CALCIUM SERPL-MCNC: 8.9 MG/DL (ref 8.6–10.4)
CHLORIDE SERPL-SCNC: 101 MMOL/L (ref 98–107)
CO2 SERPL-SCNC: 22 MMOL/L (ref 20–31)
CREAT SERPL-MCNC: 0.6 MG/DL (ref 0.7–1.2)
EOSINOPHIL # BLD: 0.13 K/UL (ref 0–0.44)
EOSINOPHILS RELATIVE PERCENT: 1 % (ref 1–4)
ERYTHROCYTE [DISTWIDTH] IN BLOOD BY AUTOMATED COUNT: 20 % (ref 11.8–14.4)
FIO2: 45
GFR SERPL CREATININE-BSD FRML MDRD: >60 ML/MIN/1.73M2
GLUCOSE BLD-MCNC: 124 MG/DL (ref 74–100)
GLUCOSE SERPL-MCNC: 128 MG/DL (ref 70–99)
HCT VFR BLD AUTO: 49.9 % (ref 40.7–50.3)
HGB BLD-MCNC: 14 G/DL (ref 13–17)
IMM GRANULOCYTES # BLD AUTO: 0.13 K/UL (ref 0–0.3)
IMM GRANULOCYTES NFR BLD: 1 %
LYMPHOCYTES NFR BLD: 1.39 K/UL (ref 1.1–3.7)
LYMPHOCYTES RELATIVE PERCENT: 11 % (ref 24–43)
MCH RBC QN AUTO: 22.4 PG (ref 25.2–33.5)
MCHC RBC AUTO-ENTMCNC: 28.1 G/DL (ref 28.4–34.8)
MCV RBC AUTO: 79.8 FL (ref 82.6–102.9)
MICROORGANISM SPEC CULT: NORMAL
MONOCYTES NFR BLD: 1.01 K/UL (ref 0.1–1.2)
MONOCYTES NFR BLD: 8 % (ref 3–12)
MORPHOLOGY: ABNORMAL
MORPHOLOGY: ABNORMAL
NEUTROPHILS NFR BLD: 79 % (ref 36–65)
NEUTS SEG NFR BLD: 9.94 K/UL (ref 1.5–8.1)
NRBC BLD-RTO: 0 PER 100 WBC
PLATELET # BLD AUTO: 193 K/UL (ref 138–453)
PMV BLD AUTO: 10.6 FL (ref 8.1–13.5)
POC HCO3: 32.2 MMOL/L (ref 21–28)
POC O2 SATURATION: 96.6 % (ref 94–98)
POC PCO2: 50.2 MM HG (ref 35–48)
POC PH: 7.42 (ref 7.35–7.45)
POC PO2: 87 MM HG (ref 83–108)
POSITIVE BASE EXCESS, ART: 6 MMOL/L (ref 0–3)
POTASSIUM SERPL-SCNC: 4.9 MMOL/L (ref 3.7–5.3)
RBC # BLD AUTO: 6.25 M/UL (ref 4.21–5.77)
SAMPLE SITE: ABNORMAL
SERVICE CMNT-IMP: NORMAL
SODIUM SERPL-SCNC: 132 MMOL/L (ref 135–144)
SPECIMEN DESCRIPTION: NORMAL
WBC OTHER # BLD: 12.6 K/UL (ref 3.5–11.3)

## 2023-12-22 PROCEDURE — 2500000003 HC RX 250 WO HCPCS: Performed by: STUDENT IN AN ORGANIZED HEALTH CARE EDUCATION/TRAINING PROGRAM

## 2023-12-22 PROCEDURE — 85025 COMPLETE CBC W/AUTO DIFF WBC: CPT

## 2023-12-22 PROCEDURE — A4216 STERILE WATER/SALINE, 10 ML: HCPCS | Performed by: STUDENT IN AN ORGANIZED HEALTH CARE EDUCATION/TRAINING PROGRAM

## 2023-12-22 PROCEDURE — 6360000002 HC RX W HCPCS: Performed by: STUDENT IN AN ORGANIZED HEALTH CARE EDUCATION/TRAINING PROGRAM

## 2023-12-22 PROCEDURE — 36600 WITHDRAWAL OF ARTERIAL BLOOD: CPT

## 2023-12-22 PROCEDURE — 36415 COLL VENOUS BLD VENIPUNCTURE: CPT

## 2023-12-22 PROCEDURE — 2580000003 HC RX 258: Performed by: STUDENT IN AN ORGANIZED HEALTH CARE EDUCATION/TRAINING PROGRAM

## 2023-12-22 PROCEDURE — 99291 CRITICAL CARE FIRST HOUR: CPT | Performed by: INTERNAL MEDICINE

## 2023-12-22 PROCEDURE — C9113 INJ PANTOPRAZOLE SODIUM, VIA: HCPCS | Performed by: STUDENT IN AN ORGANIZED HEALTH CARE EDUCATION/TRAINING PROGRAM

## 2023-12-22 PROCEDURE — 71045 X-RAY EXAM CHEST 1 VIEW: CPT

## 2023-12-22 PROCEDURE — 94003 VENT MGMT INPAT SUBQ DAY: CPT

## 2023-12-22 PROCEDURE — 51798 US URINE CAPACITY MEASURE: CPT

## 2023-12-22 PROCEDURE — 82803 BLOOD GASES ANY COMBINATION: CPT

## 2023-12-22 PROCEDURE — 94640 AIRWAY INHALATION TREATMENT: CPT

## 2023-12-22 PROCEDURE — 51702 INSERT TEMP BLADDER CATH: CPT

## 2023-12-22 PROCEDURE — 2700000000 HC OXYGEN THERAPY PER DAY

## 2023-12-22 PROCEDURE — 2000000000 HC ICU R&B

## 2023-12-22 PROCEDURE — 94761 N-INVAS EAR/PLS OXIMETRY MLT: CPT

## 2023-12-22 PROCEDURE — 6360000002 HC RX W HCPCS

## 2023-12-22 PROCEDURE — 82947 ASSAY GLUCOSE BLOOD QUANT: CPT

## 2023-12-22 PROCEDURE — 80048 BASIC METABOLIC PNL TOTAL CA: CPT

## 2023-12-22 PROCEDURE — 51701 INSERT BLADDER CATHETER: CPT

## 2023-12-22 PROCEDURE — 6370000000 HC RX 637 (ALT 250 FOR IP)

## 2023-12-22 PROCEDURE — 6370000000 HC RX 637 (ALT 250 FOR IP): Performed by: STUDENT IN AN ORGANIZED HEALTH CARE EDUCATION/TRAINING PROGRAM

## 2023-12-22 RX ORDER — FUROSEMIDE 10 MG/ML
40 INJECTION INTRAMUSCULAR; INTRAVENOUS ONCE
Status: DISCONTINUED | OUTPATIENT
Start: 2023-12-22 | End: 2023-12-22

## 2023-12-22 RX ORDER — FUROSEMIDE 10 MG/ML
20 INJECTION INTRAMUSCULAR; INTRAVENOUS ONCE
Status: COMPLETED | OUTPATIENT
Start: 2023-12-22 | End: 2023-12-22

## 2023-12-22 RX ADMIN — IPRATROPIUM BROMIDE AND ALBUTEROL SULFATE 1 DOSE: 2.5; .5 SOLUTION RESPIRATORY (INHALATION) at 08:13

## 2023-12-22 RX ADMIN — POLYETHYLENE GLYCOL 3350 17 G: 17 POWDER, FOR SOLUTION ORAL at 07:30

## 2023-12-22 RX ADMIN — Medication 5 MG/HR: at 17:51

## 2023-12-22 RX ADMIN — DEXMEDETOMIDINE HYDROCHLORIDE 0.6 MCG/KG/HR: 400 INJECTION, SOLUTION INTRAVENOUS at 19:11

## 2023-12-22 RX ADMIN — HEPARIN SODIUM 5000 UNITS: 5000 INJECTION INTRAVENOUS; SUBCUTANEOUS at 06:33

## 2023-12-22 RX ADMIN — IPRATROPIUM BROMIDE AND ALBUTEROL SULFATE 1 DOSE: 2.5; .5 SOLUTION RESPIRATORY (INHALATION) at 11:36

## 2023-12-22 RX ADMIN — SODIUM CHLORIDE, PRESERVATIVE FREE 40 MG: 5 INJECTION INTRAVENOUS at 20:41

## 2023-12-22 RX ADMIN — IPRATROPIUM BROMIDE AND ALBUTEROL SULFATE 1 DOSE: 2.5; .5 SOLUTION RESPIRATORY (INHALATION) at 16:23

## 2023-12-22 RX ADMIN — Medication 125 MCG/HR: at 06:31

## 2023-12-22 RX ADMIN — DEXMEDETOMIDINE HYDROCHLORIDE 0.4 MCG/KG/HR: 400 INJECTION, SOLUTION INTRAVENOUS at 04:46

## 2023-12-22 RX ADMIN — TRAZODONE HYDROCHLORIDE 300 MG: 100 TABLET ORAL at 20:40

## 2023-12-22 RX ADMIN — OXYCODONE 10 MG: 5 TABLET ORAL at 03:47

## 2023-12-22 RX ADMIN — GABAPENTIN 800 MG: 800 TABLET ORAL at 20:40

## 2023-12-22 RX ADMIN — DEXMEDETOMIDINE HYDROCHLORIDE 0.4 MCG/KG/HR: 400 INJECTION, SOLUTION INTRAVENOUS at 09:52

## 2023-12-22 RX ADMIN — GABAPENTIN 800 MG: 800 TABLET ORAL at 13:41

## 2023-12-22 RX ADMIN — Medication 1000 MG: at 01:39

## 2023-12-22 RX ADMIN — SODIUM CHLORIDE, PRESERVATIVE FREE 40 MG: 5 INJECTION INTRAVENOUS at 07:30

## 2023-12-22 RX ADMIN — FENTANYL CITRATE 50 MCG: 50 INJECTION, SOLUTION INTRAMUSCULAR; INTRAVENOUS at 11:14

## 2023-12-22 RX ADMIN — SODIUM CHLORIDE, PRESERVATIVE FREE 10 ML: 5 INJECTION INTRAVENOUS at 07:31

## 2023-12-22 RX ADMIN — OXYCODONE 10 MG: 5 TABLET ORAL at 09:44

## 2023-12-22 RX ADMIN — Medication 175 MCG/HR: at 21:05

## 2023-12-22 RX ADMIN — HEPARIN SODIUM 5000 UNITS: 5000 INJECTION INTRAVENOUS; SUBCUTANEOUS at 13:40

## 2023-12-22 RX ADMIN — DEXMEDETOMIDINE HYDROCHLORIDE 0.8 MCG/KG/HR: 400 INJECTION, SOLUTION INTRAVENOUS at 22:44

## 2023-12-22 RX ADMIN — OXYCODONE 10 MG: 5 TABLET ORAL at 15:53

## 2023-12-22 RX ADMIN — VANCOMYCIN HYDROCHLORIDE 1500 MG: 1.5 INJECTION, POWDER, LYOPHILIZED, FOR SOLUTION INTRAVENOUS at 03:53

## 2023-12-22 RX ADMIN — ALBUTEROL SULFATE 2.5 MG: 2.5 SOLUTION RESPIRATORY (INHALATION) at 23:58

## 2023-12-22 RX ADMIN — GABAPENTIN 800 MG: 800 TABLET ORAL at 09:44

## 2023-12-22 RX ADMIN — ARIPIPRAZOLE 7.5 MG: 15 TABLET ORAL at 07:36

## 2023-12-22 RX ADMIN — VANCOMYCIN HYDROCHLORIDE 1500 MG: 1.5 INJECTION, POWDER, LYOPHILIZED, FOR SOLUTION INTRAVENOUS at 14:59

## 2023-12-22 RX ADMIN — FUROSEMIDE 20 MG: 10 INJECTION, SOLUTION INTRAMUSCULAR; INTRAVENOUS at 12:20

## 2023-12-22 RX ADMIN — SODIUM CHLORIDE, PRESERVATIVE FREE 10 ML: 5 INJECTION INTRAVENOUS at 20:40

## 2023-12-22 RX ADMIN — HEPARIN SODIUM 5000 UNITS: 5000 INJECTION INTRAVENOUS; SUBCUTANEOUS at 20:40

## 2023-12-22 RX ADMIN — IPRATROPIUM BROMIDE AND ALBUTEROL SULFATE 1 DOSE: 2.5; .5 SOLUTION RESPIRATORY (INHALATION) at 20:30

## 2023-12-22 RX ADMIN — DEXMEDETOMIDINE HYDROCHLORIDE 0.6 MCG/KG/HR: 400 INJECTION, SOLUTION INTRAVENOUS at 14:51

## 2023-12-22 RX ADMIN — OXYCODONE 10 MG: 5 TABLET ORAL at 20:40

## 2023-12-22 ASSESSMENT — PULMONARY FUNCTION TESTS
PIF_VALUE: 21
PIF_VALUE: 24
PIF_VALUE: 30
PIF_VALUE: 28
PIF_VALUE: 31
PIF_VALUE: 28

## 2023-12-22 NOTE — PLAN OF CARE
Problem: Chronic Conditions and Co-morbidities  Goal: Patient's chronic conditions and co-morbidity symptoms are monitored and maintained or improved  12/21/2023 2155 by Tania Closs, RN  Outcome: Progressing  12/21/2023 0944 by Lillie Alfaro RN  Outcome: Progressing     Problem: Discharge Planning  Goal: Discharge to home or other facility with appropriate resources  12/21/2023 2155 by Tania Closs, RN  Outcome: Progressing  12/21/2023 0944 by Lillie Alfaro RN  Outcome: Progressing     Problem: Safety - Medical Restraint  Goal: Remains free of injury from restraints (Restraint for Interference with Medical Device)  Description: INTERVENTIONS:  1. Determine that other, less restrictive measures have been tried or would not be effective before applying the restraint  2. Evaluate the patient's condition at the time of restraint application  3. Inform patient/family regarding the reason for restraint  4.  Q2H: Monitor safety, psychosocial status, comfort, nutrition and hydration  12/21/2023 2155 by Tania Closs, RN  Outcome: Progressing  Flowsheets  Taken 12/21/2023 2000 by Anila Pryor RN  Remains free of injury from restraints (restraint for interference with medical device): Every 2 hours: Monitor safety, psychosocial status, comfort, nutrition and hydration  Taken 12/21/2023 1800 by Lillie Alfaro RN  Remains free of injury from restraints (restraint for interference with medical device): Every 2 hours: Monitor safety, psychosocial status, comfort, nutrition and hydration  Taken 12/21/2023 1600 by Lillie Alfaro RN  Remains free of injury from restraints (restraint for interference with medical device): Every 2 hours: Monitor safety, psychosocial status, comfort, nutrition and hydration  Taken 12/21/2023 1400 by Lillie Alfaro RN  Remains free of injury from restraints (restraint for interference with medical device): Every 2 hours: Monitor safety, psychosocial status, comfort, nutrition

## 2023-12-22 NOTE — PLAN OF CARE
Problem: Chronic Conditions and Co-morbidities  Goal: Patient's chronic conditions and co-morbidity symptoms are monitored and maintained or improved  12/22/2023 0915 by Ani Lee RN  Outcome: Progressing     Problem: Discharge Planning  Goal: Discharge to home or other facility with appropriate resources  12/22/2023 0915 by Ani Lee RN  Outcome: Progressing     Problem: Safety - Medical Restraint  Goal: Remains free of injury from restraints (Restraint for Interference with Medical Device)  Description: INTERVENTIONS:  1. Determine that other, less restrictive measures have been tried or would not be effective before applying the restraint  2. Evaluate the patient's condition at the time of restraint application  3. Inform patient/family regarding the reason for restraint  4.  Q2H: Monitor safety, psychosocial status, comfort, nutrition and hydration  12/22/2023 0915 by Ani Lee RN  Outcome: Progressing  Flowsheets  Taken 12/22/2023 0800 by Ani Lee RN  Remains free of injury from restraints (restraint for interference with medical device): Every 2 hours: Monitor safety, psychosocial status, comfort, nutrition and hydration  Taken 12/22/2023 0600 by Renuka Verma RN  Remains free of injury from restraints (restraint for interference with medical device): Every 2 hours: Monitor safety, psychosocial status, comfort, nutrition and hydration  Taken 12/22/2023 0400 by Renuka Verma RN  Remains free of injury from restraints (restraint for interference with medical device): Every 2 hours: Monitor safety, psychosocial status, comfort, nutrition and hydration  Taken 12/22/2023 0200 by Nabeel Denise RN  Remains free of injury from restraints (restraint for interference with medical device): Every 2 hours: Monitor safety, psychosocial status, comfort, nutrition and hydration  Taken 12/22/2023 0000 by Nabeel Denise RN  Remains free of injury from restraints (restraint for

## 2023-12-22 NOTE — PROGRESS NOTES
INTENSIVE CARE UNIT  Resident Physician Progress Note    Patient - Rosa Griffin  Date of Admission -  12/17/2023  9:52 AM  Date of Evaluation -  12/22/2023  Room and Bed Number -  7658/9823-71   Hospital Day - 5    SUBJECTIVE:     HISTORY OF PRESENT ILLNESS:    Rosa Griffin is a 46 y.o. with PMH of COPD, previous trach, morbid obesity, joão, previous hx of DVT in 2017 not on AC?, bipolar 1. Apparently his son called EMS due to persistent confusion. Does have a hx of drug use, labs at OSH show cocaine and cannabinoid use. Patient apparently was satting in 76s and thus the decision was made to intubate. CT imaging shows bilateral multifocal pna. Patient was started on abx. Cultures sent. Labs remarkable for elevated creatnine of 2. Patient's baseline is normal. Currently is making urine. Covid and flu are neg. Troponins are elevated at 33 down trending to 25. Patient's EKG shows polymorphologic p waves which may be due to patient's pulmonary hx. Currently HDS afebrile has art line     CODE STATUS: Full Code    OVERNIGHT EVENTS:      Patient remains intubated and sedated on fentanyl, Versed, and Precedex. Nursing staff were able to come down on the fentanyl drip but did have to increase the Precedex drip due to agitation. Patient did have an episode of desaturation last night. FiO2 was increased to 45%. Tube feeds remain at goal.  [facility] did call yesterday. Excepted at [facility], awaiting bed placement.      AWAKE & FOLLOWING COMMANDS: []   No  [x]   Yes                           SECRETIONS                 Amount:  [x]   Small []   Moderate []   Large []   None        Color: [x]   White []   Colored []   Bloody                         SEDATION:                 RAAS Score: [x]   +1 to -1 []   -2 []   -3 []   -4        Sedation Agent: [x]   Propofol, fent  gtt [x]   Versed gtt  []   Ativan gtt  []   No Sedation        Paralytic Agent: [x]   Precedex []   Norcuron []   Nimbex []                         VASOPRESSORS:  [x]   No []   Yes            Vasopressor Agent []   Levophed []   Dopamine []   Vasopressin []   Dobutamine  []   Phenylephrine  []   Epinephrine     OBJECTIVE:     VITAL SIGNS:  Patient Vitals for the past 8 hrs:   BP Temp Temp src Pulse Resp SpO2   23 0823 -- -- -- 51 (!) 0 97 %   23 0811 -- -- -- (!) 48 18 96 %   23 0730 -- 98.4 °F (36.9 °C) Axillary -- -- --   23 0600 135/83 -- -- (!) 49 18 98 %   23 0500 132/73 -- -- 56 18 95 %   23 0417 -- -- -- -- 12 --   23 0400 139/79 98.2 °F (36.8 °C) Axillary (!) 48 11 97 %   23 0354 -- -- -- (!) 45 18 97 %   23 0300 130/77 -- -- (!) 47 18 97 %   23 0200 133/79 -- -- 50 13 97 %   23 0100 127/78 -- -- (!) 49 (!) 9 96 %       Last Body weight:   Wt Readings from Last 3 Encounters:   23 (!) 157.9 kg (348 lb 1.6 oz)   23 (!) 149.7 kg (330 lb)   10/28/23 (!) 154.2 kg (340 lb)       Body Mass Index : Body mass index is 49.95 kg/m².      Tmax over 24 hours: Temp (24hrs), Av.3 °F (36.8 °C), Min:97.3 °F (36.3 °C), Max:98.8 °F (37.1 °C)      Ins/Outs:   In: 3274.5 [I.V.:1205.3; NG/GT:1570]  Out: 3150 [Urine:3150]    PHYSICAL EXAM:  Constitutional: Intubated and sedated  Neck: Supple, ET tube in place, chronic trach scar noted just above the sternum  Respiratory: Equal chest rise and fall, bilateral breath sounds clear to auscultation no wheezes rales or rhonchi  Cardiovascular: regular rate and rhythm, normal S1, S2, no murmur noted and 2+ pulses throughout  Abdomen: soft, nontender, nondistended  Extremities: Upper and lower extremities with equal pulses, warm well-perfused    MEDICATIONS:  Scheduled Meds:   oxyCODONE  10 mg Orogastric Q6H    ipratropium 0.5 mg-albuterol 2.5 mg  1 Dose Inhalation 4x Daily RT    albuterol  2.5 mg Nebulization BID RT    ARIPiprazole  7.5 mg Per G Tube Daily    pantoprazole (PROTONIX) 40 mg in sodium chloride (PF) 0.9 % 10 mL injection  40 mg IntraVENous Q12H    polyethylene

## 2023-12-22 NOTE — PLAN OF CARE
Problem: Respiratory - Adult  Goal: Achieves optimal ventilation and oxygenation  12/22/2023 0830 by Placido Mujica RCP  Outcome: Progressing

## 2023-12-22 NOTE — PROGRESS NOTES
Comprehensive Nutrition Assessment    Type and Reason for Visit:  Reassess    Nutrition Recommendations/Plan:   Modify TF formula to Standard with Fiber goal 60 mL/hr = 2160 kcals, 92 gm protein per day. Monitor TF tolerance/adequacy of intakes - modify as needed. Will monitor labs, weights, and plan of care. Malnutrition Assessment:  Malnutrition Status:  Insufficient data (12/19/23 1213)    Context:  Acute Illness       Nutrition Assessment:    Pt remains intubated and sedated. Propofol off. Pt currently tolerating TF of Peptide Based High Protein formula at goal 50 mL/hr - discussed with RN about modifying formula to Standard with Fiber goal rate 60 mL/hr. Weights reviewed. Labs reviewed: Na 132 mmol/L. Meds include: Glycolax. Nutrition Related Findings:    labs/meds reviewed. Wound Type: None       Current Nutrition Intake & Therapies:    Average Meal Intake: NPO  Average Supplements Intake: NPO  Diet NPO  ADULT TUBE FEEDING; Nasogastric; Standard with Fiber; Continuous; 10; Yes; 10; Q 6 hours; 60; 30; Q 4 hours  Current Tube Feeding (TF) Orders:  Feeding Route: Nasogastric  Formula: Standard with Fiber  Schedule: Continuous  Feeding Regimen: 50 mL/hr  Additives/Modulars: None  Water Flushes: 30 mL q 4 hrs  Current TF & Flush Orders Provides: Peptide Based High Protein at 50 mL/hr = 1200 kcals, 105 gm protein  Goal TF & Flush Orders Provides: Standard with Fiber at 60 mL/hr = 2160 kcals, 92 gm protein per day    Additional Calorie Sources:  Propofol off. Anthropometric Measures:  Height: 177.8 cm (5' 10\")  Ideal Body Weight (IBW): 166 lbs (75 kg)    Admission Body Weight: 157.9 kg (348 lb 1.7 oz)  Current Body Weight: 157.9 kg (348 lb 1.7 oz), 209.7 % IBW.  Weight Source: Bed Scale  Current BMI (kg/m2): 49.9        Weight Adjustment For: No Adjustment                 BMI Categories: Obese Class 3 (BMI 40.0 or greater)    Estimated Daily Nutrient Needs:  Energy Requirements Based On:

## 2023-12-23 ENCOUNTER — APPOINTMENT (OUTPATIENT)
Dept: GENERAL RADIOLOGY | Age: 48
DRG: 917 | End: 2023-12-23
Attending: INTERNAL MEDICINE
Payer: MEDICARE

## 2023-12-23 LAB
ANION GAP SERPL CALCULATED.3IONS-SCNC: 8 MMOL/L (ref 9–17)
BASOPHILS # BLD: 0.12 K/UL (ref 0–0.2)
BASOPHILS NFR BLD: 1 % (ref 0–2)
BUN SERPL-MCNC: 18 MG/DL (ref 6–20)
CALCIUM SERPL-MCNC: 8.8 MG/DL (ref 8.6–10.4)
CHLORIDE SERPL-SCNC: 103 MMOL/L (ref 98–107)
CO2 SERPL-SCNC: 28 MMOL/L (ref 20–31)
CREAT SERPL-MCNC: 0.7 MG/DL (ref 0.7–1.2)
EOSINOPHIL # BLD: 0.36 K/UL (ref 0–0.44)
EOSINOPHILS RELATIVE PERCENT: 3 % (ref 1–4)
ERYTHROCYTE [DISTWIDTH] IN BLOOD BY AUTOMATED COUNT: 20.5 % (ref 11.8–14.4)
FIO2: 40
GFR SERPL CREATININE-BSD FRML MDRD: >60 ML/MIN/1.73M2
GLUCOSE BLD-MCNC: 141 MG/DL (ref 74–100)
GLUCOSE SERPL-MCNC: 132 MG/DL (ref 70–99)
HCT VFR BLD AUTO: 50.1 % (ref 40.7–50.3)
HGB BLD-MCNC: 14.2 G/DL (ref 13–17)
IMM GRANULOCYTES # BLD AUTO: 0.12 K/UL (ref 0–0.3)
IMM GRANULOCYTES NFR BLD: 1 %
LYMPHOCYTES NFR BLD: 1.56 K/UL (ref 1.1–3.7)
LYMPHOCYTES RELATIVE PERCENT: 13 % (ref 24–43)
MCH RBC QN AUTO: 22 PG (ref 25.2–33.5)
MCHC RBC AUTO-ENTMCNC: 28.3 G/DL (ref 28.4–34.8)
MCV RBC AUTO: 77.7 FL (ref 82.6–102.9)
MONOCYTES NFR BLD: 1.32 K/UL (ref 0.1–1.2)
MONOCYTES NFR BLD: 11 % (ref 3–12)
MORPHOLOGY: ABNORMAL
MORPHOLOGY: ABNORMAL
NEUTROPHILS NFR BLD: 71 % (ref 36–65)
NEUTS SEG NFR BLD: 8.52 K/UL (ref 1.5–8.1)
NRBC BLD-RTO: 0 PER 100 WBC
PATIENT TEMP: 37.8
PLATELET # BLD AUTO: ABNORMAL K/UL (ref 138–453)
PLATELET, FLUORESCENCE: 208 K/UL (ref 138–453)
PLATELETS.RETICULATED NFR BLD AUTO: 6.3 % (ref 1.1–10.3)
POC HCO3: 31.9 MMOL/L (ref 21–28)
POC O2 SATURATION: 95.7 % (ref 94–98)
POC PCO2 TEMP: 47.7 MM HG
POC PCO2: 46 MM HG (ref 35–48)
POC PH TEMP: 7.44
POC PH: 7.45 (ref 7.35–7.45)
POC PO2 TEMP: 81.2 MM HG
POC PO2: 77 MM HG (ref 83–108)
POSITIVE BASE EXCESS, ART: 6.6 MMOL/L (ref 0–3)
POTASSIUM SERPL-SCNC: 4.2 MMOL/L (ref 3.7–5.3)
RBC # BLD AUTO: 6.45 M/UL (ref 4.21–5.77)
SODIUM SERPL-SCNC: 139 MMOL/L (ref 135–144)
WBC OTHER # BLD: 12 K/UL (ref 3.5–11.3)

## 2023-12-23 PROCEDURE — 82803 BLOOD GASES ANY COMBINATION: CPT

## 2023-12-23 PROCEDURE — 85025 COMPLETE CBC W/AUTO DIFF WBC: CPT

## 2023-12-23 PROCEDURE — 6370000000 HC RX 637 (ALT 250 FOR IP)

## 2023-12-23 PROCEDURE — 2700000000 HC OXYGEN THERAPY PER DAY

## 2023-12-23 PROCEDURE — 2500000003 HC RX 250 WO HCPCS

## 2023-12-23 PROCEDURE — 94003 VENT MGMT INPAT SUBQ DAY: CPT

## 2023-12-23 PROCEDURE — C9113 INJ PANTOPRAZOLE SODIUM, VIA: HCPCS | Performed by: STUDENT IN AN ORGANIZED HEALTH CARE EDUCATION/TRAINING PROGRAM

## 2023-12-23 PROCEDURE — 36600 WITHDRAWAL OF ARTERIAL BLOOD: CPT

## 2023-12-23 PROCEDURE — 94640 AIRWAY INHALATION TREATMENT: CPT

## 2023-12-23 PROCEDURE — 6360000002 HC RX W HCPCS

## 2023-12-23 PROCEDURE — 82947 ASSAY GLUCOSE BLOOD QUANT: CPT

## 2023-12-23 PROCEDURE — 36415 COLL VENOUS BLD VENIPUNCTURE: CPT

## 2023-12-23 PROCEDURE — 2500000003 HC RX 250 WO HCPCS: Performed by: STUDENT IN AN ORGANIZED HEALTH CARE EDUCATION/TRAINING PROGRAM

## 2023-12-23 PROCEDURE — 74018 RADEX ABDOMEN 1 VIEW: CPT

## 2023-12-23 PROCEDURE — 2580000003 HC RX 258: Performed by: STUDENT IN AN ORGANIZED HEALTH CARE EDUCATION/TRAINING PROGRAM

## 2023-12-23 PROCEDURE — 85055 RETICULATED PLATELET ASSAY: CPT

## 2023-12-23 PROCEDURE — 6360000002 HC RX W HCPCS: Performed by: INTERNAL MEDICINE

## 2023-12-23 PROCEDURE — 71045 X-RAY EXAM CHEST 1 VIEW: CPT

## 2023-12-23 PROCEDURE — 6360000002 HC RX W HCPCS: Performed by: STUDENT IN AN ORGANIZED HEALTH CARE EDUCATION/TRAINING PROGRAM

## 2023-12-23 PROCEDURE — 80048 BASIC METABOLIC PNL TOTAL CA: CPT

## 2023-12-23 PROCEDURE — 6370000000 HC RX 637 (ALT 250 FOR IP): Performed by: STUDENT IN AN ORGANIZED HEALTH CARE EDUCATION/TRAINING PROGRAM

## 2023-12-23 PROCEDURE — 2000000000 HC ICU R&B

## 2023-12-23 PROCEDURE — 99291 CRITICAL CARE FIRST HOUR: CPT | Performed by: INTERNAL MEDICINE

## 2023-12-23 PROCEDURE — 94761 N-INVAS EAR/PLS OXIMETRY MLT: CPT

## 2023-12-23 PROCEDURE — A4216 STERILE WATER/SALINE, 10 ML: HCPCS | Performed by: STUDENT IN AN ORGANIZED HEALTH CARE EDUCATION/TRAINING PROGRAM

## 2023-12-23 RX ORDER — FUROSEMIDE 10 MG/ML
20 INJECTION INTRAMUSCULAR; INTRAVENOUS ONCE
Status: COMPLETED | OUTPATIENT
Start: 2023-12-23 | End: 2023-12-23

## 2023-12-23 RX ORDER — MIDAZOLAM HYDROCHLORIDE 1 MG/ML
INJECTION INTRAMUSCULAR; INTRAVENOUS
Status: DISPENSED
Start: 2023-12-23 | End: 2023-12-24

## 2023-12-23 RX ORDER — MIDAZOLAM HYDROCHLORIDE 1 MG/ML
INJECTION INTRAMUSCULAR; INTRAVENOUS
Status: COMPLETED
Start: 2023-12-23 | End: 2023-12-23

## 2023-12-23 RX ORDER — MIDAZOLAM HYDROCHLORIDE 2 MG/2ML
2 INJECTION, SOLUTION INTRAMUSCULAR; INTRAVENOUS ONCE
Status: COMPLETED | OUTPATIENT
Start: 2023-12-23 | End: 2023-12-23

## 2023-12-23 RX ORDER — SENNOSIDES A AND B 8.6 MG/1
1 TABLET, FILM COATED ORAL ONCE
Status: COMPLETED | OUTPATIENT
Start: 2023-12-23 | End: 2023-12-23

## 2023-12-23 RX ORDER — MIDAZOLAM HYDROCHLORIDE 2 MG/2ML
5 INJECTION, SOLUTION INTRAMUSCULAR; INTRAVENOUS ONCE
Status: COMPLETED | OUTPATIENT
Start: 2023-12-23 | End: 2023-12-23

## 2023-12-23 RX ADMIN — Medication 8 MG/HR: at 14:41

## 2023-12-23 RX ADMIN — MIDAZOLAM HYDROCHLORIDE 5 MG: 1 INJECTION, SOLUTION INTRAMUSCULAR; INTRAVENOUS at 14:08

## 2023-12-23 RX ADMIN — IPRATROPIUM BROMIDE AND ALBUTEROL SULFATE 1 DOSE: 2.5; .5 SOLUTION RESPIRATORY (INHALATION) at 15:51

## 2023-12-23 RX ADMIN — DEXMEDETOMIDINE HYDROCHLORIDE 0.8 MCG/KG/HR: 400 INJECTION, SOLUTION INTRAVENOUS at 21:33

## 2023-12-23 RX ADMIN — ANTI-FUNGAL POWDER MICONAZOLE NITRATE TALC FREE: 1.42 POWDER TOPICAL at 08:49

## 2023-12-23 RX ADMIN — DEXMEDETOMIDINE HYDROCHLORIDE 0.8 MCG/KG/HR: 400 INJECTION, SOLUTION INTRAVENOUS at 14:48

## 2023-12-23 RX ADMIN — OXYCODONE 10 MG: 5 TABLET ORAL at 15:42

## 2023-12-23 RX ADMIN — SENNOSIDES 8.6 MG: 8.6 TABLET, FILM COATED ORAL at 14:56

## 2023-12-23 RX ADMIN — FUROSEMIDE 20 MG: 10 INJECTION, SOLUTION INTRAMUSCULAR; INTRAVENOUS at 20:41

## 2023-12-23 RX ADMIN — FENTANYL CITRATE 50 MCG: 50 INJECTION, SOLUTION INTRAMUSCULAR; INTRAVENOUS at 12:06

## 2023-12-23 RX ADMIN — Medication 1000 MG: at 23:38

## 2023-12-23 RX ADMIN — DEXMEDETOMIDINE HYDROCHLORIDE 0.6 MCG/KG/HR: 400 INJECTION, SOLUTION INTRAVENOUS at 06:18

## 2023-12-23 RX ADMIN — MIDAZOLAM HYDROCHLORIDE 5 MG: 2 INJECTION, SOLUTION INTRAMUSCULAR; INTRAVENOUS at 14:08

## 2023-12-23 RX ADMIN — ALBUTEROL SULFATE 2.5 MG: 2.5 SOLUTION RESPIRATORY (INHALATION) at 03:55

## 2023-12-23 RX ADMIN — OXYCODONE 10 MG: 5 TABLET ORAL at 20:42

## 2023-12-23 RX ADMIN — MIDAZOLAM HYDROCHLORIDE 2 MG: 1 INJECTION, SOLUTION INTRAMUSCULAR; INTRAVENOUS at 13:37

## 2023-12-23 RX ADMIN — IPRATROPIUM BROMIDE AND ALBUTEROL SULFATE 1 DOSE: 2.5; .5 SOLUTION RESPIRATORY (INHALATION) at 20:34

## 2023-12-23 RX ADMIN — SODIUM CHLORIDE, PRESERVATIVE FREE 10 ML: 5 INJECTION INTRAVENOUS at 07:46

## 2023-12-23 RX ADMIN — GABAPENTIN 800 MG: 800 TABLET ORAL at 14:49

## 2023-12-23 RX ADMIN — SODIUM CHLORIDE, PRESERVATIVE FREE 10 ML: 5 INJECTION INTRAVENOUS at 20:41

## 2023-12-23 RX ADMIN — POLYETHYLENE GLYCOL 3350 17 G: 17 POWDER, FOR SOLUTION ORAL at 07:52

## 2023-12-23 RX ADMIN — DEXMEDETOMIDINE HYDROCHLORIDE 0.8 MCG/KG/HR: 400 INJECTION, SOLUTION INTRAVENOUS at 18:20

## 2023-12-23 RX ADMIN — SODIUM CHLORIDE, PRESERVATIVE FREE 40 MG: 5 INJECTION INTRAVENOUS at 07:52

## 2023-12-23 RX ADMIN — VANCOMYCIN HYDROCHLORIDE 1500 MG: 1.5 INJECTION, POWDER, LYOPHILIZED, FOR SOLUTION INTRAVENOUS at 15:46

## 2023-12-23 RX ADMIN — GABAPENTIN 800 MG: 800 TABLET ORAL at 08:49

## 2023-12-23 RX ADMIN — HEPARIN SODIUM 5000 UNITS: 5000 INJECTION INTRAVENOUS; SUBCUTANEOUS at 20:42

## 2023-12-23 RX ADMIN — FENTANYL CITRATE 50 MCG: 50 INJECTION, SOLUTION INTRAMUSCULAR; INTRAVENOUS at 13:14

## 2023-12-23 RX ADMIN — IPRATROPIUM BROMIDE AND ALBUTEROL SULFATE 1 DOSE: 2.5; .5 SOLUTION RESPIRATORY (INHALATION) at 07:48

## 2023-12-23 RX ADMIN — Medication 1000 MG: at 00:10

## 2023-12-23 RX ADMIN — SODIUM CHLORIDE, PRESERVATIVE FREE 40 MG: 5 INJECTION INTRAVENOUS at 20:42

## 2023-12-23 RX ADMIN — OXYCODONE 10 MG: 5 TABLET ORAL at 09:30

## 2023-12-23 RX ADMIN — MIDAZOLAM HYDROCHLORIDE 2 MG: 2 INJECTION, SOLUTION INTRAMUSCULAR; INTRAVENOUS at 13:37

## 2023-12-23 RX ADMIN — DEXMEDETOMIDINE HYDROCHLORIDE 0.6 MCG/KG/HR: 400 INJECTION, SOLUTION INTRAVENOUS at 11:00

## 2023-12-23 RX ADMIN — TRAZODONE HYDROCHLORIDE 300 MG: 100 TABLET ORAL at 20:43

## 2023-12-23 RX ADMIN — ARIPIPRAZOLE 7.5 MG: 15 TABLET ORAL at 07:52

## 2023-12-23 RX ADMIN — VANCOMYCIN HYDROCHLORIDE 1500 MG: 1.5 INJECTION, POWDER, LYOPHILIZED, FOR SOLUTION INTRAVENOUS at 03:37

## 2023-12-23 RX ADMIN — Medication 175 MCG/HR: at 09:58

## 2023-12-23 RX ADMIN — FENTANYL CITRATE 50 MCG: 50 INJECTION, SOLUTION INTRAMUSCULAR; INTRAVENOUS at 10:12

## 2023-12-23 RX ADMIN — ANTI-FUNGAL POWDER MICONAZOLE NITRATE TALC FREE: 1.42 POWDER TOPICAL at 20:43

## 2023-12-23 RX ADMIN — GABAPENTIN 800 MG: 800 TABLET ORAL at 20:43

## 2023-12-23 RX ADMIN — OXYCODONE 10 MG: 5 TABLET ORAL at 03:30

## 2023-12-23 RX ADMIN — HEPARIN SODIUM 5000 UNITS: 5000 INJECTION INTRAVENOUS; SUBCUTANEOUS at 06:18

## 2023-12-23 RX ADMIN — DEXMEDETOMIDINE HYDROCHLORIDE 0.8 MCG/KG/HR: 400 INJECTION, SOLUTION INTRAVENOUS at 02:15

## 2023-12-23 RX ADMIN — HEPARIN SODIUM 5000 UNITS: 5000 INJECTION INTRAVENOUS; SUBCUTANEOUS at 14:49

## 2023-12-23 RX ADMIN — IPRATROPIUM BROMIDE AND ALBUTEROL SULFATE 1 DOSE: 2.5; .5 SOLUTION RESPIRATORY (INHALATION) at 11:42

## 2023-12-23 ASSESSMENT — PULMONARY FUNCTION TESTS
PIF_VALUE: 26
PIF_VALUE: 28
PIF_VALUE: 26
PIF_VALUE: 27
PIF_VALUE: 27
PIF_VALUE: 16
PIF_VALUE: 41

## 2023-12-23 ASSESSMENT — PAIN SCALES - GENERAL: PAINLEVEL_OUTOF10: 0

## 2023-12-23 NOTE — PLAN OF CARE
Problem: Chronic Conditions and Co-morbidities  Goal: Patient's chronic conditions and co-morbidity symptoms are monitored and maintained or improved  12/23/2023 1626 by Deanna Price RN  Outcome: Progressing     Problem: Discharge Planning  Goal: Discharge to home or other facility with appropriate resources  12/23/2023 1626 by Deanna Price RN  Outcome: Progressing     Problem: Safety - Medical Restraint  Goal: Remains free of injury from restraints (Restraint for Interference with Medical Device)  Description: INTERVENTIONS:  1. Determine that other, less restrictive measures have been tried or would not be effective before applying the restraint  2. Evaluate the patient's condition at the time of restraint application  3. Inform patient/family regarding the reason for restraint  4. Q2H: Monitor safety, psychosocial status, comfort, nutrition and hydration  12/23/2023 1626 by Deanna Price RN  Outcome: Progressing  Flowsheets  Taken 12/23/2023 0800 by Deanna Price RN  Remains free of injury from restraints (restraint for interference with medical device): Every 2 hours: Monitor safety, psychosocial status, comfort, nutrition and hydration    Problem: Skin/Tissue Integrity  Goal: Absence of new skin breakdown  Description: 1. Monitor for areas of redness and/or skin breakdown  2. Assess vascular access sites hourly  3. Every 4-6 hours minimum:  Change oxygen saturation probe site  4. Every 4-6 hours:  If on nasal continuous positive airway pressure, respiratory therapy assess nares and determine need for appliance change or resting period.   12/23/2023 1626 by Deanna Price RN  Outcome: Progressing     Problem: Safety - Adult  Goal: Free from fall injury  12/23/2023 1626 by Deanna Price RN  Outcome: Progressing     Problem: Nutrition Deficit:  Goal: Optimize nutritional status  12/23/2023 1626 by Deanna Price RN  Outcome: Progressing     Problem: Respiratory - Adult  Goal: Achieves optimal ventilation and oxygenation  12/23/2023 1626 by Guerline Tran, RN  Outcome: Progressing     Problem: ABCDS Injury Assessment  Goal: Absence of physical injury  12/23/2023 1626 by Guerline Tran RN  Outcome: Progressing

## 2023-12-23 NOTE — PROGRESS NOTES
INTENSIVE CARE UNIT  Resident Physician Progress Note    Patient - Юлия Gorman  Date of Admission -  12/17/2023  9:52 AM  Date of Evaluation -  12/23/2023  Room and Bed Number -  8865/4861-89   Hospital Day - 6    SUBJECTIVE:     HISTORY OF PRESENT ILLNESS:    Юлия Gorman is a 46 y.o. with PMH of COPD, previous trach, morbid obesity, joão, previous hx of DVT in 2017 not on AC?, bipolar 1. Apparently his son called EMS due to persistent confusion. Does have a hx of drug use, labs at OSH show cocaine and cannabinoid use. Patient apparently was satting in 76s and thus the decision was made to intubate. CT imaging shows bilateral multifocal pna. Patient was started on abx. Cultures sent. Labs remarkable for elevated creatnine of 2. Patient's baseline is normal. Currently is making urine. Covid and flu are neg. Troponins are elevated at 33 down trending to 25. Patient's EKG shows polymorphologic p waves which may be due to patient's pulmonary hx. Currently HDS afebrile has art line     CODE STATUS: Full Code    OVERNIGHT EVENTS:      Patient not tolerating spontaneous breathing trials. Still agitated when trying to wean down on sedation. Hemodynamically stable patient did not have any bowel movements for the last 4 days. At [facility], awaiting bed placement.      AWAKE & FOLLOWING COMMANDS: []   No  [x]   Yes                           SECRETIONS                 Amount:  [x]   Small []   Moderate []   Large []   None        Color: [x]   White []   Colored []   Bloody                         SEDATION:                 RAAS Score: [x]   +1 to -1 []   -2 []   -3 []   -4        Sedation Agent: [x]   Propofol, fent  gtt [x]   Versed gtt  []   Ativan gtt  []   No Sedation        Paralytic Agent: [x]   Precedex []   Norcuron []   Nimbex []                         VASOPRESSORS:  [x]   No  []   Yes            Vasopressor Agent []   Levophed []   Dopamine []   Vasopressin []   Dobutamine  []   Phenylephrine  []   Epinephrine

## 2023-12-23 NOTE — PLAN OF CARE
Problem: Chronic Conditions and Co-morbidities  Goal: Patient's chronic conditions and co-morbidity symptoms are monitored and maintained or improved  Outcome: Progressing     Problem: Discharge Planning  Goal: Discharge to home or other facility with appropriate resources  Outcome: Progressing     Problem: Safety - Medical Restraint  Goal: Remains free of injury from restraints (Restraint for Interference with Medical Device)  Description: INTERVENTIONS:  1. Determine that other, less restrictive measures have been tried or would not be effective before applying the restraint  2. Evaluate the patient's condition at the time of restraint application  3. Inform patient/family regarding the reason for restraint  4. Q2H: Monitor safety, psychosocial status, comfort, nutrition and hydration  Outcome: Progressing  Flowsheets  Taken 12/23/2023 0200 by Dianna nIman RN  Remains free of injury from restraints (restraint for interference with medical device): Every 2 hours: Monitor safety, psychosocial status, comfort, nutrition and hydration  Taken 12/23/2023 0000 by Dianna Inman RN  Remains free of injury from restraints (restraint for interference with medical device): Every 2 hours: Monitor safety, psychosocial status, comfort, nutrition and hydration  Taken 12/22/2023 2200 by Dianna Inman RN  Remains free of injury from restraints (restraint for interference with medical device): Every 2 hours: Monitor safety, psychosocial status, comfort, nutrition and hydration  Taken 12/22/2023 2000 by Dianna Inman RN  Remains free of injury from restraints (restraint for interference with medical device): Every 2 hours: Monitor safety, psychosocial status, comfort, nutrition and hydration     Problem: Skin/Tissue Integrity  Goal: Absence of new skin breakdown  Description: 1. Monitor for areas of redness and/or skin breakdown  2. Assess vascular access sites hourly  3.   Every 4-6 hours minimum:  Change oxygen saturation

## 2023-12-23 NOTE — PLAN OF CARE
Problem: Skin/Tissue Integrity  Goal: Absence of new skin breakdown  Description: 1. Monitor for areas of redness and/or skin breakdown  2. Assess vascular access sites hourly  3. Every 4-6 hours minimum:  Change oxygen saturation probe site  4. Every 4-6 hours:  If on nasal continuous positive airway pressure, respiratory therapy assess nares and determine need for appliance change or resting period.   12/23/2023 0802 by Sade Minor RCP  Outcome: Progressing  12/23/2023 0312 by Des Silva RN  Outcome: Progressing     Problem: Respiratory - Adult  Goal: Achieves optimal ventilation and oxygenation  12/23/2023 0802 by Sade Minor RCP  Outcome: Progressing  Flowsheets (Taken 12/23/2023 0802)  Achieves optimal ventilation and oxygenation:   Assess for changes in respiratory status   Assess for changes in mentation and behavior   Position to facilitate oxygenation and minimize respiratory effort   Oxygen supplementation based on oxygen saturation or arterial blood gases   Encourage broncho-pulmonary hygiene including cough, deep breathe, incentive spirometry   Assess the need for suctioning and aspirate as needed   Assess and instruct to report shortness of breath or any respiratory difficulty   Respiratory therapy support as indicated  12/23/2023 0312 by Des Silva RN  Outcome: Progressing  12/22/2023 2113 by Jermain Garcia RCP  Outcome: Progressing

## 2023-12-24 ENCOUNTER — APPOINTMENT (OUTPATIENT)
Dept: GENERAL RADIOLOGY | Age: 48
DRG: 917 | End: 2023-12-24
Attending: INTERNAL MEDICINE
Payer: MEDICARE

## 2023-12-24 PROBLEM — M79.89 LEFT UPPER EXTREMITY SWELLING: Status: ACTIVE | Noted: 2023-12-24

## 2023-12-24 PROBLEM — M79.89 SWELLING OF RIGHT UPPER EXTREMITY: Status: ACTIVE | Noted: 2023-12-24

## 2023-12-24 LAB
ANION GAP SERPL CALCULATED.3IONS-SCNC: 10 MMOL/L (ref 9–17)
BASOPHILS # BLD: 0.12 K/UL (ref 0–0.2)
BASOPHILS NFR BLD: 1 % (ref 0–2)
BUN SERPL-MCNC: 17 MG/DL (ref 6–20)
CALCIUM SERPL-MCNC: 8.5 MG/DL (ref 8.6–10.4)
CHLORIDE SERPL-SCNC: 102 MMOL/L (ref 98–107)
CO2 SERPL-SCNC: 24 MMOL/L (ref 20–31)
CREAT SERPL-MCNC: 0.7 MG/DL (ref 0.7–1.2)
EOSINOPHIL # BLD: 0.37 K/UL (ref 0–0.44)
EOSINOPHILS RELATIVE PERCENT: 3 % (ref 1–4)
ERYTHROCYTE [DISTWIDTH] IN BLOOD BY AUTOMATED COUNT: 20.6 % (ref 11.8–14.4)
FIO2: 40
GFR SERPL CREATININE-BSD FRML MDRD: >60 ML/MIN/1.73M2
GLUCOSE BLD-MCNC: 148 MG/DL (ref 74–100)
GLUCOSE SERPL-MCNC: 114 MG/DL (ref 70–99)
HCT VFR BLD AUTO: 48.2 % (ref 40.7–50.3)
HGB BLD-MCNC: 13.9 G/DL (ref 13–17)
IMM GRANULOCYTES # BLD AUTO: 0.12 K/UL (ref 0–0.3)
IMM GRANULOCYTES NFR BLD: 1 %
LYMPHOCYTES NFR BLD: 1.11 K/UL (ref 1.1–3.7)
LYMPHOCYTES RELATIVE PERCENT: 9 % (ref 24–43)
MCH RBC QN AUTO: 22.9 PG (ref 25.2–33.5)
MCHC RBC AUTO-ENTMCNC: 28.8 G/DL (ref 28.4–34.8)
MCV RBC AUTO: 79.5 FL (ref 82.6–102.9)
MONOCYTES NFR BLD: 1.11 K/UL (ref 0.1–1.2)
MONOCYTES NFR BLD: 9 % (ref 3–12)
MORPHOLOGY: ABNORMAL
MORPHOLOGY: ABNORMAL
NEUTROPHILS NFR BLD: 77 % (ref 36–65)
NEUTS SEG NFR BLD: 9.47 K/UL (ref 1.5–8.1)
NRBC BLD-RTO: 0 PER 100 WBC
O2 DELIVERY DEVICE: ABNORMAL
PATIENT TEMP: 37.4
PLATELET # BLD AUTO: 188 K/UL (ref 138–453)
PMV BLD AUTO: 10.4 FL (ref 8.1–13.5)
POC HCO3: 30 MMOL/L (ref 21–28)
POC O2 SATURATION: 96.3 % (ref 94–98)
POC PCO2 TEMP: 43 MM HG
POC PCO2: 42.2 MM HG (ref 35–48)
POC PH TEMP: 7.45
POC PH: 7.46 (ref 7.35–7.45)
POC PO2 TEMP: 81.8 MM HG
POC PO2: 79.6 MM HG (ref 83–108)
POSITIVE BASE EXCESS, ART: 5.5 MMOL/L (ref 0–3)
POTASSIUM SERPL-SCNC: 4.4 MMOL/L (ref 3.7–5.3)
RBC # BLD AUTO: 6.06 M/UL (ref 4.21–5.77)
SAMPLE SITE: ABNORMAL
SODIUM SERPL-SCNC: 136 MMOL/L (ref 135–144)
WBC OTHER # BLD: 12.3 K/UL (ref 3.5–11.3)

## 2023-12-24 PROCEDURE — 94640 AIRWAY INHALATION TREATMENT: CPT

## 2023-12-24 PROCEDURE — 2580000003 HC RX 258: Performed by: STUDENT IN AN ORGANIZED HEALTH CARE EDUCATION/TRAINING PROGRAM

## 2023-12-24 PROCEDURE — 2580000003 HC RX 258

## 2023-12-24 PROCEDURE — 94761 N-INVAS EAR/PLS OXIMETRY MLT: CPT

## 2023-12-24 PROCEDURE — 80048 BASIC METABOLIC PNL TOTAL CA: CPT

## 2023-12-24 PROCEDURE — 36415 COLL VENOUS BLD VENIPUNCTURE: CPT

## 2023-12-24 PROCEDURE — 82803 BLOOD GASES ANY COMBINATION: CPT

## 2023-12-24 PROCEDURE — 36556 INSERT NON-TUNNEL CV CATH: CPT

## 2023-12-24 PROCEDURE — 2500000003 HC RX 250 WO HCPCS: Performed by: STUDENT IN AN ORGANIZED HEALTH CARE EDUCATION/TRAINING PROGRAM

## 2023-12-24 PROCEDURE — 2700000000 HC OXYGEN THERAPY PER DAY

## 2023-12-24 PROCEDURE — 6370000000 HC RX 637 (ALT 250 FOR IP): Performed by: STUDENT IN AN ORGANIZED HEALTH CARE EDUCATION/TRAINING PROGRAM

## 2023-12-24 PROCEDURE — 71045 X-RAY EXAM CHEST 1 VIEW: CPT

## 2023-12-24 PROCEDURE — 85025 COMPLETE CBC W/AUTO DIFF WBC: CPT

## 2023-12-24 PROCEDURE — 82947 ASSAY GLUCOSE BLOOD QUANT: CPT

## 2023-12-24 PROCEDURE — 36600 WITHDRAWAL OF ARTERIAL BLOOD: CPT

## 2023-12-24 PROCEDURE — 2000000000 HC ICU R&B

## 2023-12-24 PROCEDURE — 6360000002 HC RX W HCPCS: Performed by: STUDENT IN AN ORGANIZED HEALTH CARE EDUCATION/TRAINING PROGRAM

## 2023-12-24 PROCEDURE — 94003 VENT MGMT INPAT SUBQ DAY: CPT

## 2023-12-24 PROCEDURE — 74018 RADEX ABDOMEN 1 VIEW: CPT

## 2023-12-24 PROCEDURE — 6360000002 HC RX W HCPCS

## 2023-12-24 PROCEDURE — 02HV33Z INSERTION OF INFUSION DEVICE INTO SUPERIOR VENA CAVA, PERCUTANEOUS APPROACH: ICD-10-PCS | Performed by: INTERNAL MEDICINE

## 2023-12-24 PROCEDURE — A4216 STERILE WATER/SALINE, 10 ML: HCPCS | Performed by: STUDENT IN AN ORGANIZED HEALTH CARE EDUCATION/TRAINING PROGRAM

## 2023-12-24 PROCEDURE — 6370000000 HC RX 637 (ALT 250 FOR IP)

## 2023-12-24 PROCEDURE — C9113 INJ PANTOPRAZOLE SODIUM, VIA: HCPCS | Performed by: STUDENT IN AN ORGANIZED HEALTH CARE EDUCATION/TRAINING PROGRAM

## 2023-12-24 PROCEDURE — 99291 CRITICAL CARE FIRST HOUR: CPT | Performed by: INTERNAL MEDICINE

## 2023-12-24 RX ORDER — PROPOFOL 10 MG/ML
5-50 INJECTION, EMULSION INTRAVENOUS CONTINUOUS
Status: DISCONTINUED | OUTPATIENT
Start: 2023-12-24 | End: 2023-12-26 | Stop reason: HOSPADM

## 2023-12-24 RX ORDER — ARIPIPRAZOLE 15 MG/1
15 TABLET ORAL DAILY
Status: DISCONTINUED | OUTPATIENT
Start: 2023-12-25 | End: 2023-12-26 | Stop reason: HOSPADM

## 2023-12-24 RX ORDER — HYDRALAZINE HYDROCHLORIDE 20 MG/ML
10 INJECTION INTRAMUSCULAR; INTRAVENOUS EVERY 6 HOURS PRN
Status: DISCONTINUED | OUTPATIENT
Start: 2023-12-24 | End: 2023-12-26 | Stop reason: HOSPADM

## 2023-12-24 RX ORDER — SENNOSIDES A AND B 8.6 MG/1
1 TABLET, FILM COATED ORAL NIGHTLY
Status: DISCONTINUED | OUTPATIENT
Start: 2023-12-24 | End: 2023-12-26 | Stop reason: HOSPADM

## 2023-12-24 RX ORDER — MIDAZOLAM HYDROCHLORIDE 2 MG/2ML
5 INJECTION, SOLUTION INTRAMUSCULAR; INTRAVENOUS ONCE
Status: COMPLETED | OUTPATIENT
Start: 2023-12-24 | End: 2023-12-24

## 2023-12-24 RX ORDER — LACTULOSE 10 G/15ML
30 SOLUTION ORAL ONCE
Status: COMPLETED | OUTPATIENT
Start: 2023-12-24 | End: 2023-12-24

## 2023-12-24 RX ORDER — FUROSEMIDE 10 MG/ML
20 INJECTION INTRAMUSCULAR; INTRAVENOUS DAILY
Status: DISCONTINUED | OUTPATIENT
Start: 2023-12-24 | End: 2023-12-26 | Stop reason: HOSPADM

## 2023-12-24 RX ORDER — OXYCODONE HYDROCHLORIDE 5 MG/1
20 TABLET ORAL EVERY 6 HOURS
Status: DISCONTINUED | OUTPATIENT
Start: 2023-12-24 | End: 2023-12-26 | Stop reason: HOSPADM

## 2023-12-24 RX ORDER — BISACODYL 10 MG
10 SUPPOSITORY, RECTAL RECTAL DAILY PRN
Status: DISCONTINUED | OUTPATIENT
Start: 2023-12-24 | End: 2023-12-26 | Stop reason: HOSPADM

## 2023-12-24 RX ADMIN — DEXMEDETOMIDINE HYDROCHLORIDE 0.6 MCG/KG/HR: 400 INJECTION, SOLUTION INTRAVENOUS at 09:12

## 2023-12-24 RX ADMIN — ANTI-FUNGAL POWDER MICONAZOLE NITRATE TALC FREE: 1.42 POWDER TOPICAL at 07:48

## 2023-12-24 RX ADMIN — ARIPIPRAZOLE 7.5 MG: 15 TABLET ORAL at 07:48

## 2023-12-24 RX ADMIN — VANCOMYCIN HYDROCHLORIDE 1500 MG: 1.5 INJECTION, POWDER, LYOPHILIZED, FOR SOLUTION INTRAVENOUS at 02:41

## 2023-12-24 RX ADMIN — GABAPENTIN 800 MG: 800 TABLET ORAL at 10:00

## 2023-12-24 RX ADMIN — MIDAZOLAM HYDROCHLORIDE 5 MG: 1 INJECTION, SOLUTION INTRAMUSCULAR; INTRAVENOUS at 11:58

## 2023-12-24 RX ADMIN — FENTANYL CITRATE 50 MCG: 50 INJECTION, SOLUTION INTRAMUSCULAR; INTRAVENOUS at 09:56

## 2023-12-24 RX ADMIN — IPRATROPIUM BROMIDE AND ALBUTEROL SULFATE 1 DOSE: 2.5; .5 SOLUTION RESPIRATORY (INHALATION) at 12:26

## 2023-12-24 RX ADMIN — OXYCODONE 10 MG: 5 TABLET ORAL at 10:00

## 2023-12-24 RX ADMIN — ALBUTEROL SULFATE 2.5 MG: 2.5 SOLUTION RESPIRATORY (INHALATION) at 23:16

## 2023-12-24 RX ADMIN — VANCOMYCIN HYDROCHLORIDE 1500 MG: 1.5 INJECTION, POWDER, LYOPHILIZED, FOR SOLUTION INTRAVENOUS at 15:17

## 2023-12-24 RX ADMIN — DEXMEDETOMIDINE HYDROCHLORIDE 1 MCG/KG/HR: 400 INJECTION, SOLUTION INTRAVENOUS at 13:16

## 2023-12-24 RX ADMIN — Medication 200 MCG/HR: at 00:38

## 2023-12-24 RX ADMIN — HYDRALAZINE HYDROCHLORIDE 10 MG: 20 INJECTION INTRAMUSCULAR; INTRAVENOUS at 18:15

## 2023-12-24 RX ADMIN — GABAPENTIN 800 MG: 800 TABLET ORAL at 13:52

## 2023-12-24 RX ADMIN — Medication 10 MG/HR: at 15:20

## 2023-12-24 RX ADMIN — DEXMEDETOMIDINE HYDROCHLORIDE 0.6 MCG/KG/HR: 400 INJECTION, SOLUTION INTRAVENOUS at 09:53

## 2023-12-24 RX ADMIN — OXYCODONE 10 MG: 5 TABLET ORAL at 03:55

## 2023-12-24 RX ADMIN — Medication 200 MCG/HR: at 12:14

## 2023-12-24 RX ADMIN — HEPARIN SODIUM 5000 UNITS: 5000 INJECTION INTRAVENOUS; SUBCUTANEOUS at 06:30

## 2023-12-24 RX ADMIN — ALBUTEROL SULFATE 2.5 MG: 2.5 SOLUTION RESPIRATORY (INHALATION) at 03:38

## 2023-12-24 RX ADMIN — LACTULOSE 30 G: 20 SOLUTION ORAL at 10:00

## 2023-12-24 RX ADMIN — Medication 200 MCG/HR: at 21:01

## 2023-12-24 RX ADMIN — ANTI-FUNGAL POWDER MICONAZOLE NITRATE TALC FREE: 1.42 POWDER TOPICAL at 22:36

## 2023-12-24 RX ADMIN — IPRATROPIUM BROMIDE AND ALBUTEROL SULFATE 1 DOSE: 2.5; .5 SOLUTION RESPIRATORY (INHALATION) at 08:32

## 2023-12-24 RX ADMIN — HEPARIN SODIUM 5000 UNITS: 5000 INJECTION INTRAVENOUS; SUBCUTANEOUS at 22:30

## 2023-12-24 RX ADMIN — SODIUM CHLORIDE, PRESERVATIVE FREE 10 ML: 5 INJECTION INTRAVENOUS at 22:38

## 2023-12-24 RX ADMIN — HEPARIN SODIUM 5000 UNITS: 5000 INJECTION INTRAVENOUS; SUBCUTANEOUS at 13:43

## 2023-12-24 RX ADMIN — Medication 10 MG/HR: at 03:29

## 2023-12-24 RX ADMIN — PROPOFOL 20 MCG/KG/MIN: 10 INJECTION, EMULSION INTRAVENOUS at 12:09

## 2023-12-24 RX ADMIN — DEXMEDETOMIDINE HYDROCHLORIDE 1 MCG/KG/HR: 400 INJECTION, SOLUTION INTRAVENOUS at 18:26

## 2023-12-24 RX ADMIN — SODIUM CHLORIDE, PRESERVATIVE FREE 10 ML: 5 INJECTION INTRAVENOUS at 07:49

## 2023-12-24 RX ADMIN — ALBUTEROL SULFATE 2.5 MG: 2.5 SOLUTION RESPIRATORY (INHALATION) at 00:15

## 2023-12-24 RX ADMIN — IPRATROPIUM BROMIDE AND ALBUTEROL SULFATE 1 DOSE: 2.5; .5 SOLUTION RESPIRATORY (INHALATION) at 20:02

## 2023-12-24 RX ADMIN — OXYCODONE 20 MG: 5 TABLET ORAL at 15:56

## 2023-12-24 RX ADMIN — FUROSEMIDE 20 MG: 10 INJECTION, SOLUTION INTRAMUSCULAR; INTRAVENOUS at 12:16

## 2023-12-24 RX ADMIN — SODIUM CHLORIDE, PRESERVATIVE FREE 40 MG: 5 INJECTION INTRAVENOUS at 22:30

## 2023-12-24 RX ADMIN — SODIUM CHLORIDE: 9 INJECTION, SOLUTION INTRAVENOUS at 22:43

## 2023-12-24 RX ADMIN — SODIUM CHLORIDE, PRESERVATIVE FREE 40 MG: 5 INJECTION INTRAVENOUS at 07:49

## 2023-12-24 RX ADMIN — PROPOFOL 20 MCG/KG/MIN: 10 INJECTION, EMULSION INTRAVENOUS at 20:52

## 2023-12-24 RX ADMIN — Medication 10 MG/HR: at 21:01

## 2023-12-24 RX ADMIN — ZIPRASIDONE MESYLATE 20 MG: 20 INJECTION, POWDER, LYOPHILIZED, FOR SOLUTION INTRAMUSCULAR at 13:42

## 2023-12-24 RX ADMIN — DEXMEDETOMIDINE HYDROCHLORIDE 1 MCG/KG/HR: 400 INJECTION, SOLUTION INTRAVENOUS at 15:52

## 2023-12-24 RX ADMIN — POLYETHYLENE GLYCOL 3350 17 G: 17 POWDER, FOR SOLUTION ORAL at 07:49

## 2023-12-24 RX ADMIN — FENTANYL CITRATE 50 MCG: 50 INJECTION, SOLUTION INTRAMUSCULAR; INTRAVENOUS at 00:55

## 2023-12-24 RX ADMIN — DEXMEDETOMIDINE HYDROCHLORIDE 0.4 MCG/KG/HR: 400 INJECTION, SOLUTION INTRAVENOUS at 03:32

## 2023-12-24 ASSESSMENT — PULMONARY FUNCTION TESTS
PIF_VALUE: 24
PIF_VALUE: 26
PIF_VALUE: 27
PIF_VALUE: 27
PIF_VALUE: 25
PIF_VALUE: 25
PIF_VALUE: 27

## 2023-12-24 NOTE — PLAN OF CARE
Problem: Respiratory - Adult  Goal: Achieves optimal ventilation and oxygenation  12/23/2023 2042 by Flor Anthony, ASHLEIGH  Flowsheets (Taken 12/23/2023 2042)  Achieves optimal ventilation and oxygenation:   Respiratory therapy support as indicated   Assess the need for suctioning and aspirate as needed   Assess for changes in respiratory status   Assess for changes in mentation and behavior   Oxygen supplementation based on oxygen saturation or arterial blood gases

## 2023-12-24 NOTE — PLAN OF CARE
Problem: Chronic Conditions and Co-morbidities  Goal: Patient's chronic conditions and co-morbidity symptoms are monitored and maintained or improved  12/24/2023 1317 by Navi Booker RN  Outcome: Progressing     Problem: Discharge Planning  Goal: Discharge to home or other facility with appropriate resources  12/24/2023 1317 by Navi Booker RN  Outcome: Progressing     Problem: Safety - Medical Restraint  Goal: Remains free of injury from restraints (Restraint for Interference with Medical Device)  Description: INTERVENTIONS:  1. Determine that other, less restrictive measures have been tried or would not be effective before applying the restraint  2. Evaluate the patient's condition at the time of restraint application  3. Inform patient/family regarding the reason for restraint  4. Q2H: Monitor safety, psychosocial status, comfort, nutrition and hydration  12/24/2023 1317 by Navi Booker RN  Outcome: Progressing  Flowsheets  Taken 12/24/2023 0800 by Navi Booker RN  Remains free of injury from restraints (restraint for interference with medical device): Every 2 hours: Monitor safety, psychosocial status, comfort, nutrition and hydration    Problem: Safety - Adult  Goal: Free from fall injury  12/24/2023 1317 by Navi Booker RN  Outcome: Progressing     Problem: Nutrition Deficit:  Goal: Optimize nutritional status  12/24/2023 1317 by Navi Booker RN  Outcome: Progressing     Problem: Respiratory - Adult  Goal: Achieves optimal ventilation and oxygenation  12/24/2023 1317 by Navi Booker RN  Outcome: Progressing     Problem: Neurosensory - Adult  Goal: Achieves stable or improved neurological status  12/24/2023 1317 by Navi Booker RN  Outcome: Progressing     Problem: Neurosensory - Adult  Goal: Achieves maximal functionality and self care  12/24/2023 1317 by Navi Booker RN  Outcome: Progressing     Problem: Skin/Tissue Integrity - Adult  Goal: Skin integrity remains intact  12/24/2023 1317 by Jhony  Navi RN  Outcome: Progressing  Flowsheets (Taken 12/24/2023 0800)  Skin Integrity Remains Intact: Monitor for areas of redness and/or skin breakdown     Problem: Metabolic/Fluid and Electrolytes - Adult  Goal: Electrolytes maintained within normal limits  12/24/2023 1317 by Mynor Duran RN  Outcome: Progressing

## 2023-12-24 NOTE — PROCEDURES
PROCEDURE NOTE - CENTRAL VENOUS LINE PLACEMENT    PATIENT NAME: Nirmal Baeza [place] RECORD NO. [id number]  DATE: 12/24/2023  ATTENDING PHYSICIAN:     PREOPERATIVE DIAGNOSIS:  Need for IV access  POSTOPERATIVE DIAGNOSIS:  Same  PROCEDURE PERFORMED:  Right Internal Jugular Vein Central Line Insertion  PERFORMING PHYSICIAN: Brittney Ruiz MD  ANESTHESIA:  Local utilizing 1% lidocaine  ESTIMATED BLOOD LOSS:  Less than 25 ml  COMPLICATIONS:  None immediately appreciated. DISCUSSION:  Elder Osborn is a 46y.o.-year-old male who requires central IV access. The history and physical examination were reviewed and confirmed. The diagnoses, proposed procedure, risks, possible complications, benefits and alternatives were discussed with the patient or family. He was given the opportunity to ask questions, and once answered, informed consent was obtained. The patient was then prepared for the procedure. PROCEDURE:  A timeout was initiated by the bedside nurse and was confirmed by those present. The patient was placed in a supine position. The skin overlying the Right Internal Jugular Vein was prepped with chlorhexadine and draped in sterile fashion. The skin was infiltrated with local anesthetic. The vessel and surrounding anatomy was visualized using ultrasound. Through the anesthetized region, the introducer needle was inserted into the internal jugular vein returning dark red non pulsatile blood. A guidewire was placed through the center of the needle with no resistance. Ultrasound confirmed presence of wire in the vein. A small incision made in the skin with a #11 scalpel blade. The dilator was inserted into the skin and vein over guidewire using Seldinger technique. The dilator was then removed and the catheter was placed in the vein over the guidewire using Seldinger technique. The guidewire was then removed and all ports aspirated and flushed appropriately.  The catheter then secured using silk suture and a temporary sterile dressing was applied.  No immediate complication was evident.  All sponge, instrument and needle counts were correct at the completion of the procedure.    Postprocedural chest x-ray is pending. The patient tolerated the procedure well with no immediate complication evident.        Rosemary Vazquez MD  5:27 PM, 12/24/23   MD PGY3 Internal medicine resident  [facility], [place], [place]

## 2023-12-24 NOTE — PLAN OF CARE
Problem: Chronic Conditions and Co-morbidities  Goal: Patient's chronic conditions and co-morbidity symptoms are monitored and maintained or improved  12/24/2023 0151 by Chris Rojas RN  Outcome: Progressing  12/23/2023 1626 by Art Turner RN  Outcome: Progressing     Problem: Discharge Planning  Goal: Discharge to home or other facility with appropriate resources  12/24/2023 0151 by Chris Rojas RN  Outcome: Progressing  12/23/2023 1626 by Art Turner RN  Outcome: Progressing     Problem: Safety - Medical Restraint  Goal: Remains free of injury from restraints (Restraint for Interference with Medical Device)  Description: INTERVENTIONS:  1. Determine that other, less restrictive measures have been tried or would not be effective before applying the restraint  2. Evaluate the patient's condition at the time of restraint application  3. Inform patient/family regarding the reason for restraint  4.  Q2H: Monitor safety, psychosocial status, comfort, nutrition and hydration  12/24/2023 0151 by Chris Rojas RN  Outcome: Progressing  Flowsheets  Taken 12/24/2023 0100  Remains free of injury from restraints (restraint for interference with medical device): Every 2 hours: Monitor safety, psychosocial status, comfort, nutrition and hydration  Taken 12/24/2023 0000  Remains free of injury from restraints (restraint for interference with medical device): Every 2 hours: Monitor safety, psychosocial status, comfort, nutrition and hydration  Taken 12/23/2023 2200  Remains free of injury from restraints (restraint for interference with medical device): Every 2 hours: Monitor safety, psychosocial status, comfort, nutrition and hydration  Taken 12/23/2023 2000  Remains free of injury from restraints (restraint for interference with medical device): Every 2 hours: Monitor safety, psychosocial status, comfort, nutrition and hydration  12/23/2023 1626 by Art Turner RN  Outcome:

## 2023-12-24 NOTE — PLAN OF CARE
Problem: Respiratory - Adult  Goal: Achieves optimal ventilation and oxygenation  12/24/2023 0935 by Zoe Vale RCP  Outcome: Progressing  12/24/2023 0151 by Carlos Manuel Davis RN  Outcome: Progressing  12/23/2023 2042 by Flor Anthony RCP  Flowsheets (Taken 12/23/2023 2042)  Achieves optimal ventilation and oxygenation:   Respiratory therapy support as indicated   Assess the need for suctioning and aspirate as needed   Assess for changes in respiratory status   Assess for changes in mentation and behavior   Oxygen supplementation based on oxygen saturation or arterial blood gases

## 2023-12-24 NOTE — CONSULTS
VAT consulted for PIV. Patient has 2 patent PIVs in LUE at this time. Primary RN informed writer there is concern for thrombosis in RUE and that venous dopplers were ordered. Patient has had multiple PIVs infiltrate in BUE. Patient very swollen in BUE. US assessment showed irritated vasculature in BUE with non compressible LUE cephalic vein. VAT recommends BUE venous dopplers and to use the 2 patent PIVs at this time until patient requires further IV access. Primary RN notified.

## 2023-12-25 LAB
ALLEN TEST: POSITIVE
ANION GAP SERPL CALCULATED.3IONS-SCNC: 7 MMOL/L (ref 9–17)
BASOPHILS # BLD: 0.09 K/UL (ref 0–0.2)
BASOPHILS NFR BLD: 1 % (ref 0–2)
BUN SERPL-MCNC: 15 MG/DL (ref 6–20)
CALCIUM SERPL-MCNC: 8.8 MG/DL (ref 8.6–10.4)
CHLORIDE SERPL-SCNC: 101 MMOL/L (ref 98–107)
CO2 SERPL-SCNC: 26 MMOL/L (ref 20–31)
CREAT SERPL-MCNC: 0.7 MG/DL (ref 0.7–1.2)
EOSINOPHIL # BLD: 0.18 K/UL (ref 0–0.44)
EOSINOPHILS RELATIVE PERCENT: 2 % (ref 1–4)
ERYTHROCYTE [DISTWIDTH] IN BLOOD BY AUTOMATED COUNT: 20.9 % (ref 11.8–14.4)
FIO2: 40
GFR SERPL CREATININE-BSD FRML MDRD: >60 ML/MIN/1.73M2
GLUCOSE BLD-MCNC: 109 MG/DL (ref 74–100)
GLUCOSE SERPL-MCNC: 101 MG/DL (ref 70–99)
HCT VFR BLD AUTO: 51.4 % (ref 40.7–50.3)
HGB BLD-MCNC: 14.7 G/DL (ref 13–17)
IMM GRANULOCYTES # BLD AUTO: 0.18 K/UL (ref 0–0.3)
IMM GRANULOCYTES NFR BLD: 2 %
LYMPHOCYTES NFR BLD: 1.41 K/UL (ref 1.1–3.7)
LYMPHOCYTES RELATIVE PERCENT: 16 % (ref 24–43)
MCH RBC QN AUTO: 22.1 PG (ref 25.2–33.5)
MCHC RBC AUTO-ENTMCNC: 28.6 G/DL (ref 28.4–34.8)
MCV RBC AUTO: 77.4 FL (ref 82.6–102.9)
MODE: ABNORMAL
MONOCYTES NFR BLD: 0.88 K/UL (ref 0.1–1.2)
MONOCYTES NFR BLD: 10 % (ref 3–12)
MORPHOLOGY: ABNORMAL
MORPHOLOGY: ABNORMAL
NEUTROPHILS NFR BLD: 69 % (ref 36–65)
NEUTS SEG NFR BLD: 6.06 K/UL (ref 1.5–8.1)
NRBC BLD-RTO: 0 PER 100 WBC
O2 DELIVERY DEVICE: ABNORMAL
PLATELET # BLD AUTO: 194 K/UL (ref 138–453)
PMV BLD AUTO: 10.6 FL (ref 8.1–13.5)
POC HCO3: 30.6 MMOL/L (ref 21–28)
POC O2 SATURATION: 95.3 % (ref 94–98)
POC PCO2: 44.5 MM HG (ref 35–48)
POC PH: 7.45 (ref 7.35–7.45)
POC PO2: 74.8 MM HG (ref 83–108)
POSITIVE BASE EXCESS, ART: 5.6 MMOL/L (ref 0–3)
POTASSIUM SERPL-SCNC: 4.7 MMOL/L (ref 3.7–5.3)
RBC # BLD AUTO: 6.64 M/UL (ref 4.21–5.77)
SAMPLE SITE: ABNORMAL
SODIUM SERPL-SCNC: 134 MMOL/L (ref 135–144)
WBC OTHER # BLD: 8.8 K/UL (ref 3.5–11.3)

## 2023-12-25 PROCEDURE — 6370000000 HC RX 637 (ALT 250 FOR IP): Performed by: STUDENT IN AN ORGANIZED HEALTH CARE EDUCATION/TRAINING PROGRAM

## 2023-12-25 PROCEDURE — 82947 ASSAY GLUCOSE BLOOD QUANT: CPT

## 2023-12-25 PROCEDURE — 94761 N-INVAS EAR/PLS OXIMETRY MLT: CPT

## 2023-12-25 PROCEDURE — 6360000002 HC RX W HCPCS

## 2023-12-25 PROCEDURE — 82803 BLOOD GASES ANY COMBINATION: CPT

## 2023-12-25 PROCEDURE — 85025 COMPLETE CBC W/AUTO DIFF WBC: CPT

## 2023-12-25 PROCEDURE — 2580000003 HC RX 258

## 2023-12-25 PROCEDURE — 6360000002 HC RX W HCPCS: Performed by: STUDENT IN AN ORGANIZED HEALTH CARE EDUCATION/TRAINING PROGRAM

## 2023-12-25 PROCEDURE — 80048 BASIC METABOLIC PNL TOTAL CA: CPT

## 2023-12-25 PROCEDURE — 94003 VENT MGMT INPAT SUBQ DAY: CPT

## 2023-12-25 PROCEDURE — 6370000000 HC RX 637 (ALT 250 FOR IP)

## 2023-12-25 PROCEDURE — C9113 INJ PANTOPRAZOLE SODIUM, VIA: HCPCS | Performed by: STUDENT IN AN ORGANIZED HEALTH CARE EDUCATION/TRAINING PROGRAM

## 2023-12-25 PROCEDURE — 2700000000 HC OXYGEN THERAPY PER DAY

## 2023-12-25 PROCEDURE — 2000000000 HC ICU R&B

## 2023-12-25 PROCEDURE — 36600 WITHDRAWAL OF ARTERIAL BLOOD: CPT

## 2023-12-25 PROCEDURE — 94640 AIRWAY INHALATION TREATMENT: CPT

## 2023-12-25 PROCEDURE — 99291 CRITICAL CARE FIRST HOUR: CPT | Performed by: INTERNAL MEDICINE

## 2023-12-25 PROCEDURE — 36415 COLL VENOUS BLD VENIPUNCTURE: CPT

## 2023-12-25 PROCEDURE — 2580000003 HC RX 258: Performed by: STUDENT IN AN ORGANIZED HEALTH CARE EDUCATION/TRAINING PROGRAM

## 2023-12-25 PROCEDURE — A4216 STERILE WATER/SALINE, 10 ML: HCPCS | Performed by: STUDENT IN AN ORGANIZED HEALTH CARE EDUCATION/TRAINING PROGRAM

## 2023-12-25 PROCEDURE — 2500000003 HC RX 250 WO HCPCS: Performed by: STUDENT IN AN ORGANIZED HEALTH CARE EDUCATION/TRAINING PROGRAM

## 2023-12-25 RX ORDER — ATROPINE SULFATE 0.1 MG/ML
INJECTION INTRAVENOUS
Status: DISPENSED
Start: 2023-12-25 | End: 2023-12-25

## 2023-12-25 RX ORDER — NOREPINEPHRINE BITARTRATE 0.06 MG/ML
INJECTION, SOLUTION INTRAVENOUS
Status: DISPENSED
Start: 2023-12-25 | End: 2023-12-25

## 2023-12-25 RX ADMIN — TRAZODONE HYDROCHLORIDE 300 MG: 100 TABLET ORAL at 20:40

## 2023-12-25 RX ADMIN — GABAPENTIN 800 MG: 800 TABLET ORAL at 20:40

## 2023-12-25 RX ADMIN — ANTI-FUNGAL POWDER MICONAZOLE NITRATE TALC FREE: 1.42 POWDER TOPICAL at 09:00

## 2023-12-25 RX ADMIN — IPRATROPIUM BROMIDE AND ALBUTEROL SULFATE 1 DOSE: 2.5; .5 SOLUTION RESPIRATORY (INHALATION) at 20:15

## 2023-12-25 RX ADMIN — Medication 200 MCG/HR: at 05:43

## 2023-12-25 RX ADMIN — OXYCODONE 20 MG: 5 TABLET ORAL at 22:01

## 2023-12-25 RX ADMIN — PROPOFOL 40 MCG/KG/MIN: 10 INJECTION, EMULSION INTRAVENOUS at 12:39

## 2023-12-25 RX ADMIN — HEPARIN SODIUM 5000 UNITS: 5000 INJECTION INTRAVENOUS; SUBCUTANEOUS at 06:30

## 2023-12-25 RX ADMIN — PROPOFOL 35 MCG/KG/MIN: 10 INJECTION, EMULSION INTRAVENOUS at 23:03

## 2023-12-25 RX ADMIN — FUROSEMIDE 20 MG: 10 INJECTION, SOLUTION INTRAMUSCULAR; INTRAVENOUS at 08:59

## 2023-12-25 RX ADMIN — HEPARIN SODIUM 5000 UNITS: 5000 INJECTION INTRAVENOUS; SUBCUTANEOUS at 22:01

## 2023-12-25 RX ADMIN — PROPOFOL 35 MCG/KG/MIN: 10 INJECTION, EMULSION INTRAVENOUS at 19:16

## 2023-12-25 RX ADMIN — IPRATROPIUM BROMIDE AND ALBUTEROL SULFATE 1 DOSE: 2.5; .5 SOLUTION RESPIRATORY (INHALATION) at 15:14

## 2023-12-25 RX ADMIN — GABAPENTIN 800 MG: 800 TABLET ORAL at 08:58

## 2023-12-25 RX ADMIN — POLYETHYLENE GLYCOL 3350 17 G: 17 POWDER, FOR SOLUTION ORAL at 08:59

## 2023-12-25 RX ADMIN — OXYCODONE 20 MG: 5 TABLET ORAL at 08:58

## 2023-12-25 RX ADMIN — PROPOFOL 35 MCG/KG/MIN: 10 INJECTION, EMULSION INTRAVENOUS at 15:46

## 2023-12-25 RX ADMIN — HEPARIN SODIUM 5000 UNITS: 5000 INJECTION INTRAVENOUS; SUBCUTANEOUS at 14:48

## 2023-12-25 RX ADMIN — VANCOMYCIN HYDROCHLORIDE 1500 MG: 1.5 INJECTION, POWDER, LYOPHILIZED, FOR SOLUTION INTRAVENOUS at 04:08

## 2023-12-25 RX ADMIN — SENNOSIDES 8.6 MG: 8.6 TABLET, FILM COATED ORAL at 20:39

## 2023-12-25 RX ADMIN — SODIUM CHLORIDE, PRESERVATIVE FREE 10 ML: 5 INJECTION INTRAVENOUS at 20:39

## 2023-12-25 RX ADMIN — ANTI-FUNGAL POWDER MICONAZOLE NITRATE TALC FREE: 1.42 POWDER TOPICAL at 20:44

## 2023-12-25 RX ADMIN — GABAPENTIN 800 MG: 800 TABLET ORAL at 14:48

## 2023-12-25 RX ADMIN — Medication 8 MG/HR: at 06:30

## 2023-12-25 RX ADMIN — OXYCODONE 20 MG: 5 TABLET ORAL at 15:47

## 2023-12-25 RX ADMIN — SODIUM CHLORIDE, PRESERVATIVE FREE 10 ML: 5 INJECTION INTRAVENOUS at 08:59

## 2023-12-25 RX ADMIN — PROPOFOL 25 MCG/KG/MIN: 10 INJECTION, EMULSION INTRAVENOUS at 06:06

## 2023-12-25 RX ADMIN — ALBUTEROL SULFATE 2.5 MG: 2.5 SOLUTION RESPIRATORY (INHALATION) at 23:15

## 2023-12-25 RX ADMIN — PROPOFOL 35 MCG/KG/MIN: 10 INJECTION, EMULSION INTRAVENOUS at 10:14

## 2023-12-25 RX ADMIN — HYDRALAZINE HYDROCHLORIDE 10 MG: 20 INJECTION INTRAMUSCULAR; INTRAVENOUS at 09:43

## 2023-12-25 RX ADMIN — ARIPIPRAZOLE 15 MG: 15 TABLET ORAL at 08:58

## 2023-12-25 RX ADMIN — ONDANSETRON 4 MG: 2 INJECTION INTRAMUSCULAR; INTRAVENOUS at 09:24

## 2023-12-25 RX ADMIN — Medication 200 MCG/HR: at 15:57

## 2023-12-25 RX ADMIN — SODIUM CHLORIDE, PRESERVATIVE FREE 40 MG: 5 INJECTION INTRAVENOUS at 20:39

## 2023-12-25 RX ADMIN — IPRATROPIUM BROMIDE AND ALBUTEROL SULFATE 1 DOSE: 2.5; .5 SOLUTION RESPIRATORY (INHALATION) at 11:42

## 2023-12-25 RX ADMIN — IPRATROPIUM BROMIDE AND ALBUTEROL SULFATE 1 DOSE: 2.5; .5 SOLUTION RESPIRATORY (INHALATION) at 07:41

## 2023-12-25 ASSESSMENT — PULMONARY FUNCTION TESTS
PIF_VALUE: 26
PIF_VALUE: 26
PIF_VALUE: 28
PIF_VALUE: 26
PIF_VALUE: 34
PIF_VALUE: 26

## 2023-12-25 NOTE — PLAN OF CARE
Problem: Respiratory - Adult  Goal: Achieves optimal ventilation and oxygenation  12/24/2023 2009 by Rui Vincent RCP  Outcome: Progressing  BRONCHOSPASM/BRONCHOCONSTRICTION     [x]         IMPROVE AERATION/BREATH SOUNDS  [x]   ADMINISTER BRONCHODILATOR THERAPY AS APPROPRIATE  [x]   ASSESS BREATH SOUNDS  []   IMPLEMENT AEROSOL/MDI PROTOCOL  [x]   PATIENT EDUCATION AS NEEDED

## 2023-12-25 NOTE — PLAN OF CARE
Problem: Respiratory - Adult  Goal: Achieves optimal ventilation and oxygenation  12/25/2023 0743 by Perez Joyce RCP  Outcome: Progressing  12/25/2023 0004 by Maliha Arredondo RN  Outcome: Progressing  12/24/2023 2009 by Aurea Gallego RCP  Outcome: Progressing   BRONCHOSPASM/BRONCHOCONSTRICTION     [x]         IMPROVE AERATION/BREATH SOUNDS  [x]   ADMINISTER BRONCHODILATOR THERAPY AS APPROPRIATE  [x]   ASSESS BREATH SOUNDS  []   IMPLEMENT AEROSOL/MDI PROTOCOL  [x]   PATIENT EDUCATION AS NEEDED

## 2023-12-25 NOTE — PLAN OF CARE
Problem: Chronic Conditions and Co-morbidities  Goal: Patient's chronic conditions and co-morbidity symptoms are monitored and maintained or improved  Outcome: Progressing     Problem: Discharge Planning  Goal: Discharge to home or other facility with appropriate resources  Outcome: Progressing     Problem: Safety - Medical Restraint  Goal: Remains free of injury from restraints (Restraint for Interference with Medical Device)  Description: INTERVENTIONS:  1. Determine that other, less restrictive measures have been tried or would not be effective before applying the restraint  2. Evaluate the patient's condition at the time of restraint application  3. Inform patient/family regarding the reason for restraint  4. Q2H: Monitor safety, psychosocial status, comfort, nutrition and hydration  Outcome: Progressing  Flowsheets  Taken 12/25/2023 1600 by Vipin Reed RN  Remains free of injury from restraints (restraint for interference with medical device):   Determine that other, less restrictive measures have been tried or would not be effective before applying the restraint   Evaluate the patient's condition at the time of restraint application   Inform patient/family regarding the reason for restraint   Every 2 hours: Monitor safety, psychosocial status, comfort, nutrition and hydration  Taken 12/25/2023 1400 by Vipin Reed RN  Remains free of injury from restraints (restraint for interference with medical device):   Determine that other, less restrictive measures have been tried or would not be effective before applying the restraint   Evaluate the patient's condition at the time of restraint application   Inform patient/family regarding the reason for restraint   Every 2 hours: Monitor safety, psychosocial status, comfort, nutrition and hydration  Taken 12/25/2023 1200 by Vipin Reed RN  Remains free of injury from restraints (restraint for interference with medical device):   Determine that other, less  Free from fall injury  Outcome: Progressing     Problem: Nutrition Deficit:  Goal: Optimize nutritional status  Outcome: Progressing     Problem: Respiratory - Adult  Goal: Achieves optimal ventilation and oxygenation  12/25/2023 1721 by Luciano Renteria RN  Outcome: Progressing  12/25/2023 0743 by Naveed Aleman RCP  Outcome: Progressing     Problem: ABCDS Injury Assessment  Goal: Absence of physical injury  Outcome: Progressing     Problem: Neurosensory - Adult  Goal: Achieves stable or improved neurological status  Outcome: Progressing  Goal: Achieves maximal functionality and self care  Outcome: Progressing     Problem: Cardiovascular - Adult  Goal: Maintains optimal cardiac output and hemodynamic stability  Outcome: Progressing  Goal: Absence of cardiac dysrhythmias or at baseline  Outcome: Progressing     Problem: Skin/Tissue Integrity - Adult  Goal: Skin integrity remains intact  Outcome: Progressing  Goal: Oral mucous membranes remain intact  Outcome: Progressing     Problem: Musculoskeletal - Adult  Goal: Return mobility to safest level of function  Outcome: Progressing  Goal: Maintain proper alignment of affected body part  Outcome: Progressing  Goal: Return ADL status to a safe level of function  Outcome: Progressing     Problem: Gastrointestinal - Adult  Goal: Minimal or absence of nausea and vomiting  Outcome: Progressing  Goal: Maintains or returns to baseline bowel function  Outcome: Progressing  Goal: Maintains adequate nutritional intake  Outcome: Progressing     Problem: Genitourinary - Adult  Goal: Absence of urinary retention  Outcome: Progressing  Goal: Urinary catheter remains patent  Outcome: Progressing     Problem: Infection - Adult  Goal: Absence of infection at discharge  Outcome: Progressing  Goal: Absence of infection during hospitalization  Outcome: Progressing     Problem: Metabolic/Fluid and Electrolytes - Adult  Goal: Electrolytes maintained within normal limits  Outcome:

## 2023-12-25 NOTE — PROGRESS NOTES
INTENSIVE CARE UNIT  Resident Physician Progress Note    Patient - Ela Serrato  Date of Admission -  12/17/2023  9:52 AM  Date of Evaluation -  12/25/2023  Room and Bed Number -  2936/1867-98   Hospital Day - 8    SUBJECTIVE:     HISTORY OF PRESENT ILLNESS:    Ela Serrato is a 46 y.o. with PMH of COPD, previous trach, morbid obesity, joão, previous hx of DVT in 2017 not on AC?, bipolar 1. Apparently his son called EMS due to persistent confusion. Does have a hx of drug use, labs at OSH show cocaine and cannabinoid use. Patient apparently was satting in 76s and thus the decision was made to intubate. CT imaging shows bilateral multifocal pna. Patient was started on abx. Cultures sent. Labs remarkable for elevated creatnine of 2. Patient's baseline is normal. Currently is making urine. Covid and flu are neg. Troponins are elevated at 33 down trending to 25. Patient's EKG shows polymorphologic p waves which may be due to patient's pulmonary hx. Currently HDS afebrile has art line     CODE STATUS: Full Code    OVERNIGHT EVENTS:      Patient on propofol fentanyl and Versed. No agitation overnight. Patient had an episode of vomiting. X-ray KUB confirme position of the OG tube inside stomach. At [facility], awaiting bed placement.      AWAKE & FOLLOWING COMMANDS: []   No  [x]   Yes                           SECRETIONS                 Amount:  [x]   Small []   Moderate []   Large []   None        Color: [x]   White []   Colored []   Bloody                         SEDATION:                 RAAS Score: [x]   +1 to -1 []   -2 []   -3 []   -4        Sedation Agent: [x]   Propofol, fent  gtt [x]   Versed gtt  []   Ativan gtt  []   No Sedation        Paralytic Agent: [x]   Precedex []   Norcuron []   Nimbex []                         VASOPRESSORS:  [x]   No  []   Yes            Vasopressor Agent []   Levophed []   Dopamine []   Vasopressin []   Dobutamine  []   Phenylephrine  []   Epinephrine     OBJECTIVE:     VITAL SIGNS:  Patient including pertinent history and exam findings with the resident. I have reviewed the key elements of all parts of the encounter with the resident. I have seen and examined the patient with the resident.  I agree with the assessment and plan and status of the problem list as documented.    I saw the patient during the rounds today, chart reviewed, labs and medications reviewed overnight events noted.  Patient remains on ventilator he is on PEEP of 8 and FiO2 is 40% ventilator setting PRVC/18/580/8/40%.  He is less agitated today his Precedex is off and he is on propofol drip.  His Abilify was increased yesterday.  He is also on oxycodone and he is on fentanyl 200 mcg.  He was on 8 mg of Versed and now he is on 1 to 2 mg and weaned off on Versed.  Urine output is good 4 L in last 24 hours he is on Lasix 20 mg daily.  Chest x-ray shows bilateral effusion with bilateral atelectasis he finished Rocephin and vancomycin for 1 week.  Endotracheal secretions were moderate to large yesterday but today it is less.  Waiting for transfer to [facility] for ENT evaluation for tracheal surgery for tracheal stenosis.      Discussed with nursing staff, treatment and plan discussed.  Discussed with respiratory therapist.    Total critical care time caring for this patient with life threatening, unstable organ failure, including direct patient contact, management of life support systems, review of data including imaging and labs, discussions with other team members and physicians at least 30  Min so far today, excluding procedures.       Please note that this chart was generated using voice recognition Dragon dictation software. Although every effort was made to ensure the accuracy of this automated transcription, some errors in transcription may have occurred.     Mahin Faith MD  12/25/2023 4:00 PM

## 2023-12-25 NOTE — PLAN OF CARE
Problem: Chronic Conditions and Co-morbidities  Goal: Patient's chronic conditions and co-morbidity symptoms are monitored and maintained or improved  12/25/2023 0004 by Nia Thrasher RN  Outcome: Progressing  12/24/2023 1317 by Jett Moura RN  Outcome: Progressing     Problem: Discharge Planning  Goal: Discharge to home or other facility with appropriate resources  12/25/2023 0004 by Nia Thrasher RN  Outcome: Progressing  12/24/2023 1317 by Jett Moura RN  Outcome: Progressing     Problem: Safety - Medical Restraint  Goal: Remains free of injury from restraints (Restraint for Interference with Medical Device)  Description: INTERVENTIONS:  1. Determine that other, less restrictive measures have been tried or would not be effective before applying the restraint  2. Evaluate the patient's condition at the time of restraint application  3. Inform patient/family regarding the reason for restraint  4.  Q2H: Monitor safety, psychosocial status, comfort, nutrition and hydration  12/25/2023 0004 by Nia Thrasher RN  Outcome: Progressing  Flowsheets  Taken 12/24/2023 2200  Remains free of injury from restraints (restraint for interference with medical device): Every 2 hours: Monitor safety, psychosocial status, comfort, nutrition and hydration  Taken 12/24/2023 2000  Remains free of injury from restraints (restraint for interference with medical device): Every 2 hours: Monitor safety, psychosocial status, comfort, nutrition and hydration  12/24/2023 1317 by Jett Moura RN  Outcome: Progressing  Flowsheets  Taken 12/24/2023 0800 by Jett Moura RN  Remains free of injury from restraints (restraint for interference with medical device): Every 2 hours: Monitor safety, psychosocial status, comfort, nutrition and hydration  Taken 12/24/2023 0600 by Nia Thrasher RN  Remains free of injury from restraints (restraint for interference with medical device): Every 2 hours: Monitor safety, psychosocial

## 2023-12-26 ENCOUNTER — APPOINTMENT (OUTPATIENT)
Dept: VASCULAR LAB | Age: 48
DRG: 917 | End: 2023-12-26
Attending: INTERNAL MEDICINE
Payer: MEDICARE

## 2023-12-26 VITALS
HEART RATE: 68 BPM | BODY MASS INDEX: 45.1 KG/M2 | SYSTOLIC BLOOD PRESSURE: 154 MMHG | OXYGEN SATURATION: 98 % | DIASTOLIC BLOOD PRESSURE: 60 MMHG | HEIGHT: 70 IN | WEIGHT: 315 LBS | RESPIRATION RATE: 18 BRPM | TEMPERATURE: 98.2 F

## 2023-12-26 PROBLEM — R45.1 AGITATION REQUIRING SEDATION PROTOCOL: Status: ACTIVE | Noted: 2023-12-26

## 2023-12-26 LAB
ANION GAP SERPL CALCULATED.3IONS-SCNC: 8 MMOL/L (ref 9–17)
BASOPHILS # BLD: 0.07 K/UL (ref 0–0.2)
BASOPHILS NFR BLD: 1 % (ref 0–2)
BUN SERPL-MCNC: 13 MG/DL (ref 6–20)
CALCIUM SERPL-MCNC: 8.7 MG/DL (ref 8.6–10.4)
CHLORIDE SERPL-SCNC: 100 MMOL/L (ref 98–107)
CO2 SERPL-SCNC: 27 MMOL/L (ref 20–31)
CREAT SERPL-MCNC: 0.8 MG/DL (ref 0.7–1.2)
EOSINOPHIL # BLD: 0.21 K/UL (ref 0–0.44)
EOSINOPHILS RELATIVE PERCENT: 3 % (ref 1–4)
ERYTHROCYTE [DISTWIDTH] IN BLOOD BY AUTOMATED COUNT: 20.9 % (ref 11.8–14.4)
GFR SERPL CREATININE-BSD FRML MDRD: >60 ML/MIN/1.73M2
GLUCOSE SERPL-MCNC: 110 MG/DL (ref 70–99)
HCT VFR BLD AUTO: 48.2 % (ref 40.7–50.3)
HGB BLD-MCNC: 14.5 G/DL (ref 13–17)
IMM GRANULOCYTES # BLD AUTO: 0.14 K/UL (ref 0–0.3)
IMM GRANULOCYTES NFR BLD: 2 %
LYMPHOCYTES NFR BLD: 1.07 K/UL (ref 1.1–3.7)
LYMPHOCYTES RELATIVE PERCENT: 15 % (ref 24–43)
MCH RBC QN AUTO: 22.5 PG (ref 25.2–33.5)
MCHC RBC AUTO-ENTMCNC: 30.1 G/DL (ref 28.4–34.8)
MCV RBC AUTO: 74.8 FL (ref 82.6–102.9)
MONOCYTES NFR BLD: 0.71 K/UL (ref 0.1–1.2)
MONOCYTES NFR BLD: 10 % (ref 3–12)
MORPHOLOGY: ABNORMAL
MORPHOLOGY: ABNORMAL
NEUTROPHILS NFR BLD: 69 % (ref 36–65)
NEUTS SEG NFR BLD: 4.9 K/UL (ref 1.5–8.1)
NRBC BLD-RTO: 0 PER 100 WBC
PLATELET # BLD AUTO: ABNORMAL K/UL (ref 138–453)
PLATELET, FLUORESCENCE: 175 K/UL (ref 138–453)
PLATELETS.RETICULATED NFR BLD AUTO: 7.4 % (ref 1.1–10.3)
POTASSIUM SERPL-SCNC: 4.4 MMOL/L (ref 3.7–5.3)
RBC # BLD AUTO: 6.44 M/UL (ref 4.21–5.77)
SODIUM SERPL-SCNC: 135 MMOL/L (ref 135–144)
WBC OTHER # BLD: 7.1 K/UL (ref 3.5–11.3)

## 2023-12-26 PROCEDURE — 85025 COMPLETE CBC W/AUTO DIFF WBC: CPT

## 2023-12-26 PROCEDURE — 80048 BASIC METABOLIC PNL TOTAL CA: CPT

## 2023-12-26 PROCEDURE — 6370000000 HC RX 637 (ALT 250 FOR IP): Performed by: STUDENT IN AN ORGANIZED HEALTH CARE EDUCATION/TRAINING PROGRAM

## 2023-12-26 PROCEDURE — C9113 INJ PANTOPRAZOLE SODIUM, VIA: HCPCS | Performed by: STUDENT IN AN ORGANIZED HEALTH CARE EDUCATION/TRAINING PROGRAM

## 2023-12-26 PROCEDURE — 99291 CRITICAL CARE FIRST HOUR: CPT | Performed by: INTERNAL MEDICINE

## 2023-12-26 PROCEDURE — 94761 N-INVAS EAR/PLS OXIMETRY MLT: CPT

## 2023-12-26 PROCEDURE — 36415 COLL VENOUS BLD VENIPUNCTURE: CPT

## 2023-12-26 PROCEDURE — 2580000003 HC RX 258: Performed by: STUDENT IN AN ORGANIZED HEALTH CARE EDUCATION/TRAINING PROGRAM

## 2023-12-26 PROCEDURE — A4216 STERILE WATER/SALINE, 10 ML: HCPCS | Performed by: STUDENT IN AN ORGANIZED HEALTH CARE EDUCATION/TRAINING PROGRAM

## 2023-12-26 PROCEDURE — 2580000003 HC RX 258

## 2023-12-26 PROCEDURE — 94640 AIRWAY INHALATION TREATMENT: CPT

## 2023-12-26 PROCEDURE — 6370000000 HC RX 637 (ALT 250 FOR IP)

## 2023-12-26 PROCEDURE — 6360000002 HC RX W HCPCS

## 2023-12-26 PROCEDURE — 6360000002 HC RX W HCPCS: Performed by: STUDENT IN AN ORGANIZED HEALTH CARE EDUCATION/TRAINING PROGRAM

## 2023-12-26 PROCEDURE — 85055 RETICULATED PLATELET ASSAY: CPT

## 2023-12-26 PROCEDURE — 2700000000 HC OXYGEN THERAPY PER DAY

## 2023-12-26 PROCEDURE — 2500000003 HC RX 250 WO HCPCS: Performed by: STUDENT IN AN ORGANIZED HEALTH CARE EDUCATION/TRAINING PROGRAM

## 2023-12-26 PROCEDURE — 94003 VENT MGMT INPAT SUBQ DAY: CPT

## 2023-12-26 RX ORDER — IPRATROPIUM BROMIDE AND ALBUTEROL SULFATE 2.5; .5 MG/3ML; MG/3ML
3 SOLUTION RESPIRATORY (INHALATION)
Qty: 360 ML | DISCHARGE
Start: 2023-12-26

## 2023-12-26 RX ORDER — HEPARIN SODIUM 5000 [USP'U]/ML
5000 INJECTION, SOLUTION INTRAVENOUS; SUBCUTANEOUS EVERY 8 HOURS SCHEDULED
DISCHARGE
Start: 2023-12-26

## 2023-12-26 RX ORDER — GABAPENTIN 800 MG/1
800 TABLET ORAL 3 TIMES DAILY
Qty: 10 TABLET | Refills: 0 | DISCHARGE
Start: 2023-12-26 | End: 2023-12-28

## 2023-12-26 RX ORDER — PROPOFOL 10 MG/ML
5-50 INJECTION, EMULSION INTRAVENOUS CONTINUOUS
DISCHARGE
Start: 2023-12-26

## 2023-12-26 RX ORDER — ARIPIPRAZOLE 15 MG/1
15 TABLET ORAL DAILY
Qty: 30 TABLET | Refills: 3 | DISCHARGE
Start: 2023-12-27

## 2023-12-26 RX ORDER — CARVEDILOL 12.5 MG/1
12.5 TABLET ORAL 2 TIMES DAILY
Qty: 60 TABLET | Refills: 0 | DISCHARGE
Start: 2023-12-26

## 2023-12-26 RX ORDER — MIDAZOLAM HYDROCHLORIDE 1 MG/ML
1-10 INJECTION, SOLUTION INTRAVENOUS CONTINUOUS
Status: SHIPPED | OUTPATIENT
Start: 2023-12-26 | End: 2023-12-26 | Stop reason: HOSPADM

## 2023-12-26 RX ORDER — ALBUTEROL SULFATE 2.5 MG/3ML
2.5 SOLUTION RESPIRATORY (INHALATION)
Qty: 120 EACH | Refills: 3 | DISCHARGE
Start: 2023-12-26

## 2023-12-26 RX ORDER — ONDANSETRON 2 MG/ML
4 INJECTION INTRAMUSCULAR; INTRAVENOUS EVERY 6 HOURS PRN
Qty: 84 ML | DISCHARGE
Start: 2023-12-26

## 2023-12-26 RX ORDER — FUROSEMIDE 10 MG/ML
20 INJECTION INTRAMUSCULAR; INTRAVENOUS DAILY
DISCHARGE
Start: 2023-12-27

## 2023-12-26 RX ORDER — ALBUTEROL SULFATE 2.5 MG/3ML
2.5 SOLUTION RESPIRATORY (INHALATION) EVERY 4 HOURS PRN
Qty: 120 EACH | Refills: 3 | DISCHARGE
Start: 2023-12-26

## 2023-12-26 RX ORDER — HYDRALAZINE HYDROCHLORIDE 20 MG/ML
10 INJECTION INTRAMUSCULAR; INTRAVENOUS EVERY 6 HOURS PRN
DISCHARGE
Start: 2023-12-26

## 2023-12-26 RX ORDER — ONDANSETRON 4 MG/1
4 TABLET, ORALLY DISINTEGRATING ORAL EVERY 8 HOURS PRN
DISCHARGE
Start: 2023-12-26

## 2023-12-26 RX ORDER — TRAZODONE HYDROCHLORIDE 300 MG/1
300 TABLET ORAL NIGHTLY
DISCHARGE
Start: 2023-12-26

## 2023-12-26 RX ADMIN — FUROSEMIDE 20 MG: 10 INJECTION, SOLUTION INTRAMUSCULAR; INTRAVENOUS at 08:22

## 2023-12-26 RX ADMIN — POLYETHYLENE GLYCOL 3350 17 G: 17 POWDER, FOR SOLUTION ORAL at 08:22

## 2023-12-26 RX ADMIN — PROPOFOL 35 MCG/KG/MIN: 10 INJECTION, EMULSION INTRAVENOUS at 01:39

## 2023-12-26 RX ADMIN — GABAPENTIN 800 MG: 800 TABLET ORAL at 08:21

## 2023-12-26 RX ADMIN — SODIUM CHLORIDE, PRESERVATIVE FREE 10 ML: 5 INJECTION INTRAVENOUS at 08:22

## 2023-12-26 RX ADMIN — SODIUM CHLORIDE, PRESERVATIVE FREE 40 MG: 5 INJECTION INTRAVENOUS at 08:22

## 2023-12-26 RX ADMIN — ALBUTEROL SULFATE 2.5 MG: 2.5 SOLUTION RESPIRATORY (INHALATION) at 03:33

## 2023-12-26 RX ADMIN — PROPOFOL 35 MCG/KG/MIN: 10 INJECTION, EMULSION INTRAVENOUS at 08:14

## 2023-12-26 RX ADMIN — IPRATROPIUM BROMIDE AND ALBUTEROL SULFATE 1 DOSE: 2.5; .5 SOLUTION RESPIRATORY (INHALATION) at 08:16

## 2023-12-26 RX ADMIN — ARIPIPRAZOLE 15 MG: 15 TABLET ORAL at 08:21

## 2023-12-26 RX ADMIN — PROPOFOL 35 MCG/KG/MIN: 10 INJECTION, EMULSION INTRAVENOUS at 04:42

## 2023-12-26 RX ADMIN — ANTI-FUNGAL POWDER MICONAZOLE NITRATE TALC FREE: 1.42 POWDER TOPICAL at 08:22

## 2023-12-26 RX ADMIN — OXYCODONE 20 MG: 5 TABLET ORAL at 04:25

## 2023-12-26 RX ADMIN — Medication 200 MCG/HR: at 05:26

## 2023-12-26 RX ADMIN — IPRATROPIUM BROMIDE AND ALBUTEROL SULFATE 1 DOSE: 2.5; .5 SOLUTION RESPIRATORY (INHALATION) at 11:59

## 2023-12-26 RX ADMIN — HEPARIN SODIUM 5000 UNITS: 5000 INJECTION INTRAVENOUS; SUBCUTANEOUS at 05:36

## 2023-12-26 RX ADMIN — SODIUM CHLORIDE: 9 INJECTION, SOLUTION INTRAVENOUS at 05:23

## 2023-12-26 RX ADMIN — PROPOFOL 35 MCG/KG/MIN: 10 INJECTION, EMULSION INTRAVENOUS at 10:51

## 2023-12-26 RX ADMIN — OXYCODONE 20 MG: 5 TABLET ORAL at 08:21

## 2023-12-26 ASSESSMENT — PULMONARY FUNCTION TESTS
PIF_VALUE: 27
PIF_VALUE: 32
PIF_VALUE: 26

## 2023-12-26 NOTE — PROGRESS NOTES
INTENSIVE CARE UNIT  Resident Physician Progress Note    Patient - Yadira Childers  Date of Admission -  12/17/2023  9:52 AM  Date of Evaluation -  12/26/2023  Room and Bed Number -  7701/7818-80   Hospital Day - 9    SUBJECTIVE:     HISTORY OF PRESENT ILLNESS:    Yadira Childers is a 46 y.o. with PMH of COPD, previous trach, morbid obesity, joão, previous hx of DVT in 2017 not on AC?, bipolar 1. Apparently his son called EMS due to persistent confusion. Does have a hx of drug use, labs at OSH show cocaine and cannabinoid use. Patient apparently was satting in 76s and thus the decision was made to intubate. CT imaging shows bilateral multifocal pna. Patient was started on abx. Cultures sent. Labs remarkable for elevated creatnine of 2. Patient's baseline is normal. Currently is making urine. Covid and flu are neg. Troponins are elevated at 33 down trending to 25. Patient's EKG shows polymorphologic p waves which may be due to patient's pulmonary hx. Currently HDS afebrile has art line     CODE STATUS: Full Code    OVERNIGHT EVENTS:      Patient on propofol fentanyl and Versed. No agitation overnight. Patient had an episode of vomiting. X-ray KUB confirme position of the OG tube inside stomach. At [facility], awaiting bed placement. F[employer] [facility] [facility] B.I.D. Feeding Diet: Diet NPO  ADULT TUBE FEEDING; Nasogastric; Standard with Fiber; Continuous; 10; Yes; 10; Q 6 hours; 60; 30; Q 4 hours  Fluids: [facility]  Family: To be updated. Analgesic: Tylenol PRN  Sedation: Propofol 35 and  Fent 200  Thrombo-prophylaxis: [] Enoxaparin, [] Unfract. Heparin Subcutaneously, [] EPC Cuffs  Mobility: Poor  Heads up: 30 deg head end  Ulcer prophylaxis: [x] PPI Agent, [] S1Dzpsm, [] Sucralfate, [] Other:  Glycemic control: Adequate  Spontaneous breathing trial: Not tolerating    Bowel regimen/urine output: I/O: 5207/4002  Indwelling catheter/lines: Small since 12/22/23. CVC 12/24/23  De-escalation: SBT as tolerated.         AWAKE & FOLLOWING

## 2023-12-26 NOTE — PLAN OF CARE
Problem: Respiratory - Adult  Goal: Achieves optimal ventilation and oxygenation  12/26/2023 0821 by Felicitas Mccord RCP  Outcome: Progressing  12/26/2023 0730 by Tasha Wilcox RN  Outcome: Progressing  12/26/2023 0548 by Alejandra Blanco RN  Outcome: Progressing   BRONCHOSPASM/BRONCHOCONSTRICTION     [x]         IMPROVE AERATION/BREATH SOUNDS  [x]   ADMINISTER BRONCHODILATOR THERAPY AS APPROPRIATE  [x]   ASSESS BREATH SOUNDS  []   IMPLEMENT AEROSOL/MDI PROTOCOL  [x]   PATIENT EDUCATION AS NEEDED

## 2023-12-26 NOTE — PLAN OF CARE
Problem: Chronic Conditions and Co-morbidities  Goal: Patient's chronic conditions and co-morbidity symptoms are monitored and maintained or improved  12/26/2023 0730 by Pierre Orellana RN  Outcome: Progressing  12/26/2023 0548 by Candace Mcarthur RN  Outcome: Progressing     Problem: Discharge Planning  Goal: Discharge to home or other facility with appropriate resources  12/26/2023 0730 by Pierre Orellana RN  Outcome: Progressing  12/26/2023 0548 by Candace Mcarthur RN  Outcome: Progressing     Problem: Safety - Medical Restraint  Goal: Remains free of injury from restraints (Restraint for Interference with Medical Device)  Description: INTERVENTIONS:  1. Determine that other, less restrictive measures have been tried or would not be effective before applying the restraint  2. Evaluate the patient's condition at the time of restraint application  3. Inform patient/family regarding the reason for restraint  4. Q2H: Monitor safety, psychosocial status, comfort, nutrition and hydration  12/26/2023 0730 by Pierre Orellana RN  Outcome: Progressing  12/26/2023 0548 by Candace Mcarthur RN  Outcome: Progressing  Flowsheets  Taken 12/25/2023 2000 by Candace Mcarthur RN  Remains free of injury from restraints (restraint for interference with medical device): Every 2 hours: Monitor safety, psychosocial status, comfort, nutrition and hydration  Taken 12/25/2023 1800 by Pierre Orellana RN  Remains free of injury from restraints (restraint for interference with medical device):   Determine that other, less restrictive measures have been tried or would not be effective before applying the restraint   Evaluate the patient's condition at the time of restraint application   Inform patient/family regarding the reason for restraint   Every 2 hours: Monitor safety, psychosocial status, comfort, nutrition and hydration     Problem: Skin/Tissue Integrity  Goal: Absence of new skin breakdown  Description: 1. Monitor for areas of redness and/or skin breakdown  2.

## 2023-12-26 NOTE — DISCHARGE INSTR - COC
Continuity of Care Form    Patient Name: Joni Guillory   :  1975  MRN:  [id number]    Admit date:  2023  Discharge date:  2023    Code Status Order: Full Code   Advance Directives:     Admitting Physician:  Rudolph Davila MD  PCP: Edwige Blair DO    Discharging Nurse:   [facility] Unit/Room#: 3705/8011-20  Discharging Unit Phone Number:     Emergency Contact:   Extended Emergency Contact Information  Primary Emergency Contact: Narciso Petersen  Address: [address] [place] [address] [place] [address] [facility] Phone: [phone]  Relation: Parent  Hearing or visual needs: None  Other needs: None  Preferred language: English   needed? No  Secondary Emergency Contact: Loretta Harrison  Home Phone: [phone]  Relation: Parent   needed?  No    Past Surgical History:  Past Surgical History:   Procedure Laterality Date    ABDOMEN SURGERY      PEG tube     COLONOSCOPY N/A 2019    COLONOSCOPY POLYPECTOMY HOT BIOPSY performed by Alton Teresa MD at [address] N/A 2020    COLONOSCOPY POLYPECTOMY SNARE/COLD BIOPSY performed by Alton Teresa MD at [address] N/A 2022    COLONOSCOPY POLYPECTOMY SNARE/COLD BIOPSY performed by Alton Teresa MD at [address] [place] [address] Endoscopy    COLONOSCOPY  2022    COLONOSCOPY POLYPECTOMY HOT BIOPSY performed by Alton Teresa MD at [address] [place] [address] Endoscopy    COLONOSCOPY  2022    COLONOSCOPY WITH BIOPSY performed by Alton Teresa MD at [address] [place] [address]         Immunization History:   Immunization History   Administered Date(s) Administered    COVID-19, MODERNA BLUE border, Primary or Immunocompromised, (age 12y+), IM, 100 mcg/0.5mL 06/10/2022    COVID-19, Darelln Grandchild, ( formula), (age 12y+), IM, 50mcg/0.5mL 2023    COVID-19, PFIZER Bivalent, DO NOT Dilute, (age 12y+), IM, 30 mcg/0.3 mL 10/01/2022, 2023    Influenza Vaccine, unspecified formulation 2018    Influenza Virus Vaccine 10/17/2017,

## 2023-12-26 NOTE — PLAN OF CARE
Problem: Chronic Conditions and Co-morbidities  Goal: Patient's chronic conditions and co-morbidity symptoms are monitored and maintained or improved  12/26/2023 0548 by Florina Wilson RN  Outcome: Progressing     Problem: Discharge Planning  Goal: Discharge to home or other facility with appropriate resources  12/26/2023 0548 by Florina Wilson RN  Outcome: Progressing     Problem: Safety - Medical Restraint  Goal: Remains free of injury from restraints (Restraint for Interference with Medical Device)  Description: INTERVENTIONS:  1. Determine that other, less restrictive measures have been tried or would not be effective before applying the restraint  2. Evaluate the patient's condition at the time of restraint application  3. Inform patient/family regarding the reason for restraint  4. Q2H: Monitor safety, psychosocial status, comfort, nutrition and hydration  12/26/2023 0548 by Florina Wilson RN  Outcome: Progressing  Flowsheets  Taken 12/25/2023 2000 by Florina Wilson RN  Remains free of injury from restraints (restraint for interference with medical device): Every 2 hours: Monitor safety, psychosocial status, comfort, nutrition and hydration  Taken 12/25/2023 1800 by Carole Jay RN  Remains free of injury from restraints (restraint for interference with medical device):   Determine that other, less restrictive measures have been tried or would not be effective before applying the restraint   Evaluate the patient's condition at the time of restraint application   Inform patient/family regarding the reason for restraint   Every 2 hours: Monitor safety, psychosocial status, comfort, nutrition and hydration     Problem: Skin/Tissue Integrity  Goal: Absence of new skin breakdown  Description: 1. Monitor for areas of redness and/or skin breakdown  2. Assess vascular access sites hourly  3. Every 4-6 hours minimum:  Change oxygen saturation probe site  4.   Every 4-6 hours:  If on nasal continuous positive airway pressure, respiratory therapy assess nares and determine need for appliance change or resting period.  12/26/2023 0548 by Sandra Verma, RN  Outcome: Progressing     Problem: Safety - Adult  Goal: Free from fall injury  12/26/2023 0548 by Sandra Verma, RN  Outcome: Progressing  Flowsheets (Taken 12/25/2023 2127)  Free From Fall Injury: Instruct family/caregiver on patient safety

## 2023-12-26 NOTE — CARE COORDINATION
Transitional planning    Received phone call from charge RN that patient has a bed at [facility]. Bed #1041, report # [phone], accepting dr Dr Shannen Maravilla. 1811 CM spoke to resident and related that patient has a bed at [facility] and discharge order needed. 0896 called [address] and spoke to [facility] and CM gave her information over the phone. She does not have resources to transport patient. [address] called [email or web address] [phone] and spoke to [place]. CM told her need for transportation from [facility] to [facility]. CM gave her information over the phone. Patient is orally intubated, Vent settings FIO2 40%, peep 8, , rr18, on fentanyl and propofol, has OG with TF that can be capped. Needs cardiac monitor. Has barton and restraints. 7075 spoke to patient's RN and he notified patient's Mom of plan to transfer to [facility] today. 1000 received phone call from Elham Vasquez [phone] with [employer]. [facility] will be providing transportation by ambulance. Crew #[id number].  time is 12:30pm.    1009 Called patient's Mom, Oz Gupta, and told her transportation is set for  at 12:30 pm today by ambulance to go to [facility]. Room # 1041. She will update patient's father. [facility] and spoke to [place] in MICU and updated her that patient is getting picked up at 1230.  She confirms that patient is going to [facility] [address] [address] room 1041    1154 AVS/SHAINA , H&P, transfer report all put in blue envelope for [facility] crew here to  patient for transfer to [facility]

## 2023-12-31 NOTE — DISCHARGE SUMMARY
[facility]     Department of Internal Medicine - Critical Care Service    INPATIENT DISCHARGE SUMMARY      PATIENT IDENTIFICATION:  NAME:  Nirmal Petersen   :   1975  MRN:    [id number]     Acct:    [id number]   Admit Date:  2023  Discharge date:  2023  1:54 PM   Attending Provider: No att. providers found                                     Principal Problem:    AMS (altered mental status)  Active Problems:    Acute respiratory failure with hypoxemia (HCC)    History of tracheostomy    Morbid obesity with BMI of 45.0-49.9, adult (HCC)    COPD, severity to be determined (HCC)    Respiratory distress    Obstructive sleep apnea    History of tracheal stenosis    Drug overdose of undetermined intent    Tracheal stenosis    Left upper extremity swelling    Swelling of right upper extremity    Agitation requiring sedation protocol  Resolved Problems:    * No resolved hospital problems. *       REASON FOR HOSPITALIZATION:   Confusion       Hospital Course  Nirmal Petersen is a 48 y.o. with PMH of COPD, previous trach, morbid obesity, joão, previous hx of DVT in 2017 not on AC?, bipolar 1. Apparently his son called EMS due to persistent confusion. Does have a hx of drug use, labs at OSH show cocaine and cannabinoid use. Patient apparently was satting in 70s and thus the decision was made to intubate.  CT imaging shows bilateral multifocal pna. Patient was started on abx. Cultures sent. Labs remarkable for elevated creatnine of 2. Patient's baseline is normal. Covid and flu are neg. Troponins are elevated at 33 down trending to 25. Patient's EKG shows polymorphologic p waves which may be due to patient's pulmonary hx.      Patient was intubated and mechanically ventilated.  Patient was treated for pneumonia with vancomycin and ceftriaxone.  Blood culture was positive for gram-positive cocci staph epi coagulase negative.  Endotracheal culture was positive for MRSA.  Patient also developed

## 2024-01-10 ENCOUNTER — TELEPHONE (OUTPATIENT)
Dept: FAMILY MEDICINE CLINIC | Age: 49
End: 2024-01-10

## 2024-01-14 ASSESSMENT — PATIENT HEALTH QUESTIONNAIRE - PHQ9
SUM OF ALL RESPONSES TO PHQ QUESTIONS 1-9: 11
7. TROUBLE CONCENTRATING ON THINGS, SUCH AS READING THE NEWSPAPER OR WATCHING TELEVISION: SEVERAL DAYS
3. TROUBLE FALLING OR STAYING ASLEEP: MORE THAN HALF THE DAYS
9. THOUGHTS THAT YOU WOULD BE BETTER OFF DEAD, OR OF HURTING YOURSELF: NOT AT ALL
8. MOVING OR SPEAKING SO SLOWLY THAT OTHER PEOPLE COULD HAVE NOTICED. OR THE OPPOSITE - BEING SO FIDGETY OR RESTLESS THAT YOU HAVE BEEN MOVING AROUND A LOT MORE THAN USUAL: MORE THAN HALF THE DAYS
3. TROUBLE FALLING OR STAYING ASLEEP: 2
9. THOUGHTS THAT YOU WOULD BE BETTER OFF DEAD, OR OF HURTING YOURSELF: 0
1. LITTLE INTEREST OR PLEASURE IN DOING THINGS: NEARLY EVERY DAY
7. TROUBLE CONCENTRATING ON THINGS, SUCH AS READING THE NEWSPAPER OR WATCHING TELEVISION: 1
2. FEELING DOWN, DEPRESSED OR HOPELESS: SEVERAL DAYS
SUM OF ALL RESPONSES TO PHQ QUESTIONS 1-9: 11
5. POOR APPETITE OR OVEREATING: 0
10. IF YOU CHECKED OFF ANY PROBLEMS, HOW DIFFICULT HAVE THESE PROBLEMS MADE IT FOR YOU TO DO YOUR WORK, TAKE CARE OF THINGS AT HOME, OR GET ALONG WITH OTHER PEOPLE: VERY DIFFICULT
10. IF YOU CHECKED OFF ANY PROBLEMS, HOW DIFFICULT HAVE THESE PROBLEMS MADE IT FOR YOU TO DO YOUR WORK, TAKE CARE OF THINGS AT HOME, OR GET ALONG WITH OTHER PEOPLE: 2
6. FEELING BAD ABOUT YOURSELF - OR THAT YOU ARE A FAILURE OR HAVE LET YOURSELF OR YOUR FAMILY DOWN: SEVERAL DAYS
6. FEELING BAD ABOUT YOURSELF - OR THAT YOU ARE A FAILURE OR HAVE LET YOURSELF OR YOUR FAMILY DOWN: 1
SUM OF ALL RESPONSES TO PHQ QUESTIONS 1-9: 11
SUM OF ALL RESPONSES TO PHQ QUESTIONS 1-9: 11
2. FEELING DOWN, DEPRESSED OR HOPELESS: 1
SUM OF ALL RESPONSES TO PHQ9 QUESTIONS 1 & 2: 4
8. MOVING OR SPEAKING SO SLOWLY THAT OTHER PEOPLE COULD HAVE NOTICED. OR THE OPPOSITE, BEING SO FIGETY OR RESTLESS THAT YOU HAVE BEEN MOVING AROUND A LOT MORE THAN USUAL: 2
4. FEELING TIRED OR HAVING LITTLE ENERGY: 1
1. LITTLE INTEREST OR PLEASURE IN DOING THINGS: 3
4. FEELING TIRED OR HAVING LITTLE ENERGY: SEVERAL DAYS
SUM OF ALL RESPONSES TO PHQ QUESTIONS 1-9: 11
5. POOR APPETITE OR OVEREATING: NOT AT ALL

## 2024-01-15 ENCOUNTER — TELEPHONE (OUTPATIENT)
Dept: FAMILY MEDICINE CLINIC | Age: 49
End: 2024-01-15

## 2024-01-17 ENCOUNTER — OFFICE VISIT (OUTPATIENT)
Dept: FAMILY MEDICINE CLINIC | Age: 49
End: 2024-01-17
Payer: MEDICARE

## 2024-01-17 VITALS
DIASTOLIC BLOOD PRESSURE: 76 MMHG | SYSTOLIC BLOOD PRESSURE: 106 MMHG | OXYGEN SATURATION: 95 % | BODY MASS INDEX: 45.1 KG/M2 | HEART RATE: 83 BPM | HEIGHT: 70 IN | WEIGHT: 315 LBS

## 2024-01-17 DIAGNOSIS — F19.20: ICD-10-CM

## 2024-01-17 DIAGNOSIS — F33.0 MAJOR DEPRESSIVE DISORDER, RECURRENT, MILD (HCC): ICD-10-CM

## 2024-01-17 DIAGNOSIS — J42 CHRONIC BRONCHITIS, UNSPECIFIED CHRONIC BRONCHITIS TYPE (HCC): ICD-10-CM

## 2024-01-17 DIAGNOSIS — I82.629 DEEP VEIN THROMBOSIS (DVT) OF UPPER EXTREMITY, UNSPECIFIED CHRONICITY, UNSPECIFIED LATERALITY, UNSPECIFIED VEIN (HCC): ICD-10-CM

## 2024-01-17 DIAGNOSIS — I42.6 ALCOHOLIC CARDIOMYOPATHY (HCC): ICD-10-CM

## 2024-01-17 DIAGNOSIS — F33.1 MAJOR DEPRESSIVE DISORDER, RECURRENT, MODERATE (HCC): ICD-10-CM

## 2024-01-17 DIAGNOSIS — Z93.0 TRACHEOSTOMY STATUS (HCC): ICD-10-CM

## 2024-01-17 DIAGNOSIS — J39.8 TRACHEAL STENOSIS: Primary | ICD-10-CM

## 2024-01-17 PROCEDURE — 3074F SYST BP LT 130 MM HG: CPT | Performed by: FAMILY MEDICINE

## 2024-01-17 PROCEDURE — 3078F DIAST BP <80 MM HG: CPT | Performed by: FAMILY MEDICINE

## 2024-01-17 PROCEDURE — 99214 OFFICE O/P EST MOD 30 MIN: CPT | Performed by: FAMILY MEDICINE

## 2024-01-17 RX ORDER — ASPIRIN 81 MG/1
81 TABLET, CHEWABLE ORAL DAILY
COMMUNITY
Start: 2024-01-09

## 2024-01-17 RX ORDER — OMEPRAZOLE 40 MG/1
40 CAPSULE, DELAYED RELEASE ORAL
Qty: 90 CAPSULE | Refills: 3 | Status: SHIPPED | OUTPATIENT
Start: 2024-01-17

## 2024-01-17 RX ORDER — FAMOTIDINE 40 MG/1
40 TABLET, FILM COATED ORAL NIGHTLY
COMMUNITY

## 2024-01-17 RX ORDER — CARVEDILOL 12.5 MG/1
12.5 TABLET ORAL 2 TIMES DAILY
Qty: 60 TABLET | Refills: 5 | Status: SHIPPED | OUTPATIENT
Start: 2024-01-17

## 2024-01-17 RX ORDER — FLUTICASONE FUROATE, UMECLIDINIUM BROMIDE AND VILANTEROL TRIFENATATE 200; 62.5; 25 UG/1; UG/1; UG/1
1 POWDER RESPIRATORY (INHALATION) DAILY
Qty: 3 EACH | Refills: 3 | Status: SHIPPED | OUTPATIENT
Start: 2024-01-17

## 2024-01-17 RX ORDER — POTASSIUM CHLORIDE 20 MEQ/1
20 TABLET, EXTENDED RELEASE ORAL DAILY
COMMUNITY
Start: 2024-01-09

## 2024-01-17 RX ORDER — OMEPRAZOLE 40 MG/1
40 CAPSULE, DELAYED RELEASE ORAL
COMMUNITY

## 2024-01-17 RX ORDER — DESVENLAFAXINE SUCCINATE 50 MG/1
50 TABLET, EXTENDED RELEASE ORAL DAILY
COMMUNITY

## 2024-01-17 RX ORDER — TESTOSTERONE CYPIONATE 200 MG/ML
INJECTION, SOLUTION INTRAMUSCULAR
COMMUNITY
Start: 2023-12-02

## 2024-01-17 RX ORDER — LORAZEPAM 2 MG/1
2 TABLET ORAL 2 TIMES DAILY
COMMUNITY

## 2024-01-17 RX ORDER — ESZOPICLONE 2 MG/1
1 TABLET, FILM COATED ORAL NIGHTLY PRN
COMMUNITY

## 2024-01-17 RX ORDER — SYRINGE WITH NEEDLE, 1 ML 25GX5/8"
SYRINGE, EMPTY DISPOSABLE MISCELLANEOUS
COMMUNITY
Start: 2023-12-05

## 2024-01-17 RX ORDER — LUMATEPERONE 42 MG/1
42 CAPSULE ORAL DAILY
COMMUNITY

## 2024-01-17 RX ORDER — GABAPENTIN 800 MG/1
800 TABLET ORAL 3 TIMES DAILY
Qty: 90 TABLET | Refills: 3 | Status: SHIPPED | OUTPATIENT
Start: 2024-01-17 | End: 2024-01-19

## 2024-01-17 RX ORDER — PALIPERIDONE 3 MG/1
3 TABLET, EXTENDED RELEASE ORAL DAILY
COMMUNITY

## 2024-01-31 NOTE — PROGRESS NOTES
[facility]  [facility]  [address]  [address]  [facility] [address]  Dept: [phone]      Nirmal Petersen is a 48 y.o. male who presents today for follow up on his  medical conditions as noted below.      Chief Complaint   Patient presents with    Respiratory Distress       Patient Active Problem List:     Bilateral low back pain with bilateral sciatica     Morbid obesity due to excess calories (HCC)     Benign essential HTN     Acute respiratory failure with hypoxemia (HCC)     Acute systolic congestive heart failure (HCC)     Secondary cardiomyopathy (HCC)     DVT (deep venous thrombosis) ([facility])     Hyperglycemia     Rhabdomyolysis     Tubular adenoma of colon     Rectal polyp     Hyperplastic colon polyp     Serrated adenoma of colon     Polyp of colon     Tracheostomy status (HCC)     Combined opioid with non-opioid drug dependence, with abuse ([facility])     Morbid obesity with BMI of 45.0-49.9, adult (HCC)     History of psychiatric disorder     Hx of drug abuse (HCC)     Major depressive disorder, recurrent, mild     Major depressive disorder, recurrent, moderate     Major depressive disorder, recurrent, unspecified     Polycythemia     History of colon polyps     COPD, severity to be determined ([facility])     AMS (altered mental status)     Respiratory distress     Obstructive sleep apnea     History of tracheal stenosis     Drug overdose of undetermined intent     Tracheal stenosis     Left upper extremity swelling     Swelling of right upper extremity     Agitation requiring sedation protocol     Past Medical History:   Diagnosis Date    Anxiety     Arthritis     back    Back pain     Bipolar 1 disorder (HCC)     COPD (chronic obstructive pulmonary disease) (HCC)     Dvt femoral (deep venous thrombosis) ([facility])     Hx of blood clots     2017    Hypertension     Morbid obesity due to excess calories (HCC)     CHRIS (obstructive sleep apnea)     Pneumonia 2017    DUE TO OTHER

## 2024-02-01 RX ORDER — GEMFIBROZIL 600 MG/1
TABLET, FILM COATED ORAL
Qty: 180 TABLET | Refills: 3 | Status: SHIPPED | OUTPATIENT
Start: 2024-02-01

## 2024-02-21 ENCOUNTER — TELEPHONE (OUTPATIENT)
Dept: ADMINISTRATIVE | Age: 49
End: 2024-02-21

## 2024-02-21 NOTE — TELEPHONE ENCOUNTER
After speaking with Dr. Aguiar - this writer left VM for patient explaining that per Dr. Aguiar he will still need to f/u with [facility] or [facility] and a scope here would not change anything as we cannot do any surgeries/interventions here. Information sent to [facility] and patient has been seen at [facility] in past. Paperwork and Navinet completed for patient to be seen at [facility] completed multiple times. Advised patient it is not appropriate to f/u in our clinic as we have referred patient to external facility as we cannot do surgery here per surgeon. No additional needs.

## 2024-02-21 NOTE — TELEPHONE ENCOUNTER
Pat calling regarding booking a follow up with DR greenberg. He is being told by his other doctors that he needs to have a scope done with Dr Greenberg to see if he needs surgery asap. No openings currently showing until Late march (soonest booked). Would need to be see in the next 2 weeks. Please call to discuss.

## 2024-03-12 ENCOUNTER — TELEPHONE (OUTPATIENT)
Dept: FAMILY MEDICINE CLINIC | Age: 49
End: 2024-03-12

## 2024-03-12 DIAGNOSIS — J39.8 TRACHEAL STENOSIS: Primary | ICD-10-CM

## 2024-03-12 NOTE — TELEPHONE ENCOUNTER
Patient called in stating he is needing  referral to the provider below     Referral is pending     Please advise

## 2024-05-03 RX ORDER — GABAPENTIN 800 MG/1
800 TABLET ORAL 3 TIMES DAILY
Qty: 90 TABLET | Refills: 3 | Status: SHIPPED | OUTPATIENT
Start: 2024-05-03 | End: 2024-05-05

## 2024-06-10 ENCOUNTER — TELEPHONE (OUTPATIENT)
Dept: FAMILY MEDICINE CLINIC | Age: 49
End: 2024-06-10

## 2024-06-10 NOTE — TELEPHONE ENCOUNTER
Nirmal Petersen was contacted as a part of Medicare Annual Wellness Visit outreach.    Patient will call PCP office another time to schedule.

## 2024-07-11 ENCOUNTER — TELEPHONE (OUTPATIENT)
Dept: GASTROENTEROLOGY | Age: 49
End: 2024-07-11

## 2024-07-11 NOTE — TELEPHONE ENCOUNTER
Patient called in requesting to schedule colonoscopy. Stated he is to have one every 2 yrs. Last seen by NIDHI Abreu 2022      Please call to discuss    Thank you

## 2024-07-12 RX ORDER — POLYETHYLENE GLYCOL 3350, SODIUM SULFATE ANHYDROUS, SODIUM BICARBONATE, SODIUM CHLORIDE, POTASSIUM CHLORIDE 236; 22.74; 6.74; 5.86; 2.97 G/4L; G/4L; G/4L; G/4L; G/4L
POWDER, FOR SOLUTION ORAL
Qty: 1 EACH | Refills: 0 | Status: SHIPPED | OUTPATIENT
Start: 2024-07-12

## 2024-07-12 NOTE — TELEPHONE ENCOUNTER
Patient called requesting status of  call to schedule .    Please be advised that the best time to call Anytime.    Thank you.

## 2024-07-12 NOTE — TELEPHONE ENCOUNTER
Previous Dr. Slime Abreu pt.  Procedure scheduled/Joce  Procedure: colonoscopy  Dx: hx of colonic polyps  Ordering:  Date: 8/30/24  Time: 12:00pm/arrive 10:30am  Hospital: [facility]  Bowel prep instructions sent to patient: golytely  Patient advised by phone/mail: phone/bowel prep instructions mailed

## 2024-07-14 SDOH — ECONOMIC STABILITY: TRANSPORTATION INSECURITY
IN THE PAST 12 MONTHS, HAS LACK OF TRANSPORTATION KEPT YOU FROM MEETINGS, WORK, OR FROM GETTING THINGS NEEDED FOR DAILY LIVING?: YES

## 2024-07-14 SDOH — ECONOMIC STABILITY: INCOME INSECURITY: HOW HARD IS IT FOR YOU TO PAY FOR THE VERY BASICS LIKE FOOD, HOUSING, MEDICAL CARE, AND HEATING?: SOMEWHAT HARD

## 2024-07-14 SDOH — ECONOMIC STABILITY: FOOD INSECURITY: WITHIN THE PAST 12 MONTHS, YOU WORRIED THAT YOUR FOOD WOULD RUN OUT BEFORE YOU GOT MONEY TO BUY MORE.: SOMETIMES TRUE

## 2024-07-14 SDOH — ECONOMIC STABILITY: FOOD INSECURITY: WITHIN THE PAST 12 MONTHS, THE FOOD YOU BOUGHT JUST DIDN'T LAST AND YOU DIDN'T HAVE MONEY TO GET MORE.: SOMETIMES TRUE

## 2024-07-17 ENCOUNTER — TELEPHONE (OUTPATIENT)
Dept: FAMILY MEDICINE CLINIC | Age: 49
End: 2024-07-17

## 2024-07-17 ENCOUNTER — OFFICE VISIT (OUTPATIENT)
Dept: FAMILY MEDICINE CLINIC | Age: 49
End: 2024-07-17
Payer: MEDICARE

## 2024-07-17 ENCOUNTER — HOSPITAL ENCOUNTER (OUTPATIENT)
Age: 49
Setting detail: SPECIMEN
Discharge: HOME | End: 2024-07-17

## 2024-07-17 VITALS
HEART RATE: 77 BPM | BODY MASS INDEX: 45.1 KG/M2 | HEIGHT: 70 IN | DIASTOLIC BLOOD PRESSURE: 76 MMHG | OXYGEN SATURATION: 96 % | WEIGHT: 315 LBS | SYSTOLIC BLOOD PRESSURE: 106 MMHG

## 2024-07-17 DIAGNOSIS — R73.9 HYPERGLYCEMIA: ICD-10-CM

## 2024-07-17 DIAGNOSIS — N42.9 PROSTATE DISORDER: ICD-10-CM

## 2024-07-17 DIAGNOSIS — E66.01 MORBID OBESITY WITH BMI OF 50.0-59.9, ADULT (HCC): ICD-10-CM

## 2024-07-17 DIAGNOSIS — Z13.1 SCREENING FOR DIABETES MELLITUS: ICD-10-CM

## 2024-07-17 DIAGNOSIS — I50.21 ACUTE SYSTOLIC CONGESTIVE HEART FAILURE (HCC): ICD-10-CM

## 2024-07-17 DIAGNOSIS — I42.9 SECONDARY CARDIOMYOPATHY (HCC): ICD-10-CM

## 2024-07-17 DIAGNOSIS — K64.4 EXTERNAL HEMORRHOID: Primary | ICD-10-CM

## 2024-07-17 DIAGNOSIS — K64.4 EXTERNAL HEMORRHOID: ICD-10-CM

## 2024-07-17 DIAGNOSIS — E66.01 MORBID OBESITY DUE TO EXCESS CALORIES (HCC): ICD-10-CM

## 2024-07-17 DIAGNOSIS — J44.9 CHRONIC OBSTRUCTIVE PULMONARY DISEASE, UNSPECIFIED COPD TYPE (HCC): ICD-10-CM

## 2024-07-17 LAB
ALBUMIN SERPL-MCNC: 4.2 G/DL (ref 3.5–5.2)
ALBUMIN/GLOB SERPL: 2 {RATIO} (ref 1–2.5)
ALP SERPL-CCNC: 86 U/L (ref 40–129)
ALT SERPL-CCNC: 38 U/L (ref 10–50)
ANION GAP SERPL CALCULATED.3IONS-SCNC: 10 MMOL/L (ref 9–16)
AST SERPL-CCNC: 41 U/L (ref 10–50)
BILIRUB SERPL-MCNC: 0.2 MG/DL (ref 0–1.2)
BUN SERPL-MCNC: 12 MG/DL (ref 6–20)
CALCIUM SERPL-MCNC: 9.3 MG/DL (ref 8.6–10.4)
CHLORIDE SERPL-SCNC: 103 MMOL/L (ref 98–107)
CHOLEST SERPL-MCNC: 187 MG/DL (ref 0–199)
CHOLESTEROL/HDL RATIO: 3
CO2 SERPL-SCNC: 27 MMOL/L (ref 20–31)
CREAT SERPL-MCNC: 1 MG/DL (ref 0.7–1.2)
GFR, ESTIMATED: >90 ML/MIN/1.73M2
GLUCOSE SERPL-MCNC: 96 MG/DL (ref 74–99)
HBA1C MFR BLD: 5.5 %
HDLC SERPL-MCNC: 59 MG/DL
LDLC SERPL CALC-MCNC: 108 MG/DL (ref 0–100)
POTASSIUM SERPL-SCNC: 4.6 MMOL/L (ref 3.7–5.3)
PROT SERPL-MCNC: 6.4 G/DL (ref 6.6–8.7)
PSA SERPL-MCNC: 0.1 NG/ML (ref 0–4)
SODIUM SERPL-SCNC: 140 MMOL/L (ref 136–145)
TRIGL SERPL-MCNC: 97 MG/DL
VLDLC SERPL CALC-MCNC: 19 MG/DL

## 2024-07-17 PROCEDURE — 3078F DIAST BP <80 MM HG: CPT | Performed by: FAMILY MEDICINE

## 2024-07-17 PROCEDURE — 99214 OFFICE O/P EST MOD 30 MIN: CPT | Performed by: FAMILY MEDICINE

## 2024-07-17 PROCEDURE — 83036 HEMOGLOBIN GLYCOSYLATED A1C: CPT | Performed by: FAMILY MEDICINE

## 2024-07-17 PROCEDURE — 3074F SYST BP LT 130 MM HG: CPT | Performed by: FAMILY MEDICINE

## 2024-07-17 RX ORDER — ESCITALOPRAM OXALATE 20 MG/1
20 TABLET ORAL NIGHTLY
COMMUNITY
Start: 2024-07-15

## 2024-07-17 RX ORDER — HYDROXYZINE 50 MG/1
TABLET, FILM COATED ORAL
COMMUNITY
Start: 2024-07-16

## 2024-07-17 RX ORDER — PHENTERMINE HYDROCHLORIDE 37.5 MG/1
37.5 TABLET ORAL
Qty: 30 TABLET | Refills: 0 | Status: SHIPPED | OUTPATIENT
Start: 2024-07-17 | End: 2024-07-17 | Stop reason: SDUPTHER

## 2024-07-17 RX ORDER — PHENTERMINE HYDROCHLORIDE 37.5 MG/1
37.5 TABLET ORAL
Qty: 30 TABLET | Refills: 0 | Status: SHIPPED | OUTPATIENT
Start: 2024-07-17 | End: 2024-08-16

## 2024-07-17 RX ORDER — QUETIAPINE FUMARATE 200 MG/1
200 TABLET, FILM COATED ORAL NIGHTLY
COMMUNITY
Start: 2024-07-15

## 2024-07-17 RX ORDER — DIAZEPAM 5 MG/1
5 TABLET ORAL 2 TIMES DAILY PRN
COMMUNITY
Start: 2024-07-15

## 2024-07-17 RX ORDER — TIRZEPATIDE 2.5 MG/.5ML
2.5 INJECTION, SOLUTION SUBCUTANEOUS WEEKLY
Qty: 2 ML | Refills: 0 | Status: SHIPPED | OUTPATIENT
Start: 2024-07-17

## 2024-07-17 NOTE — PROGRESS NOTES
[facility]  [facility]  [address]  [address]  [facility] [address]  Dept: [phone]      Nirmal Petersen is a 48 y.o. male who presents today for follow up on his  medical conditions as noted below.      Chief Complaint   Patient presents with    Weight Loss    Hemorrhoids       Patient Active Problem List:     Bilateral low back pain with bilateral sciatica     Morbid obesity due to excess calories (HCC)     Benign essential HTN     Acute respiratory failure with hypoxemia (HCC)     Acute systolic congestive heart failure (HCC)     Secondary cardiomyopathy (HCC)     DVT (deep venous thrombosis) ([facility])     Hyperglycemia     Rhabdomyolysis     Tubular adenoma of colon     Rectal polyp     Hyperplastic colon polyp     Serrated adenoma of colon     Polyp of colon     Tracheostomy status (HCC)     Combined opioid with non-opioid drug dependence, with abuse (HCC)     Morbid obesity with BMI of 45.0-49.9, adult (HCC)     History of psychiatric disorder     Hx of drug abuse (HCC)     Major depressive disorder, recurrent, mild     Major depressive disorder, recurrent, moderate     Major depressive disorder, recurrent, unspecified     Polycythemia     History of colon polyps     COPD, severity to be determined ([facility])     AMS (altered mental status)     Respiratory distress     Obstructive sleep apnea     History of tracheal stenosis     Drug overdose of undetermined intent     Tracheal stenosis     Left upper extremity swelling     Swelling of right upper extremity     Agitation requiring sedation protocol     Past Medical History:   Diagnosis Date    Anxiety     Arthritis     back    Back pain     Bipolar 1 disorder (HCC)     COPD (chronic obstructive pulmonary disease) (HCC)     Dvt femoral (deep venous thrombosis) ([facility])     Hx of blood clots     2017    Hypertension     Morbid obesity due to excess calories (HCC)     CHRIS (obstructive sleep apnea)     Pneumonia 2017    DUE TO

## 2024-07-17 NOTE — TELEPHONE ENCOUNTER
Patient called was seen today and stated that the adipex and pain medication has not been sent in. Please advise.

## 2024-07-17 NOTE — TELEPHONE ENCOUNTER
Patient was informed and verbalized understanding.   Patient is wanting the medication [facility] not [facility]. Please advise.

## 2024-07-22 RX ORDER — SEMAGLUTIDE 0.25 MG/.5ML
0.25 INJECTION, SOLUTION SUBCUTANEOUS
Qty: 3 ML | Refills: 0 | Status: SHIPPED | OUTPATIENT
Start: 2024-07-22

## 2024-07-31 ENCOUNTER — OFFICE VISIT (OUTPATIENT)
Dept: SURGERY | Age: 49
End: 2024-07-31
Payer: MEDICARE

## 2024-07-31 VITALS
SYSTOLIC BLOOD PRESSURE: 96 MMHG | BODY MASS INDEX: 44.1 KG/M2 | HEART RATE: 72 BPM | DIASTOLIC BLOOD PRESSURE: 68 MMHG | HEIGHT: 71 IN | WEIGHT: 315 LBS

## 2024-07-31 DIAGNOSIS — K64.1 SECOND DEGREE HEMORRHOIDS: ICD-10-CM

## 2024-07-31 DIAGNOSIS — K64.2 THIRD DEGREE HEMORRHOIDS: Primary | ICD-10-CM

## 2024-07-31 PROCEDURE — 99203 OFFICE O/P NEW LOW 30 MIN: CPT | Performed by: COLON & RECTAL SURGERY

## 2024-07-31 PROCEDURE — 46600 DIAGNOSTIC ANOSCOPY SPX: CPT | Performed by: COLON & RECTAL SURGERY

## 2024-07-31 PROCEDURE — 3074F SYST BP LT 130 MM HG: CPT | Performed by: COLON & RECTAL SURGERY

## 2024-07-31 PROCEDURE — 3078F DIAST BP <80 MM HG: CPT | Performed by: COLON & RECTAL SURGERY

## 2024-07-31 RX ORDER — HYDROCORTISONE ACETATE PRAMOXINE HCL 1; 1 G/100G; G/100G
CREAM TOPICAL
Qty: 1 EACH | Refills: 2 | Status: CANCELLED | OUTPATIENT
Start: 2024-07-31

## 2024-07-31 RX ORDER — HYDROCORTISONE 25 MG/G
CREAM TOPICAL
Qty: 1 EACH | Refills: 2 | Status: SHIPPED | OUTPATIENT
Start: 2024-07-31

## 2024-07-31 ASSESSMENT — ENCOUNTER SYMPTOMS
CONSTIPATION: 1
WHEEZING: 0
BLOOD IN STOOL: 1
NAUSEA: 0
STRIDOR: 0
DIARRHEA: 1
COLOR CHANGE: 0
APNEA: 0
RECTAL PAIN: 1
ABDOMINAL PAIN: 0
ANAL BLEEDING: 1
BACK PAIN: 0
VOMITING: 0
CHEST TIGHTNESS: 0
SHORTNESS OF BREATH: 0
COUGH: 0
ABDOMINAL DISTENTION: 0

## 2024-07-31 NOTE — PROGRESS NOTES
[facility]  [address] [id number]  Dept: [phone]    Patient:  Nirmal Petersen  YOB: 1975  Date: 7/31/2024     The patient is a 48 y.o. male who presents today for consult of the following problems:     Chief Complaint: New Patient (Mr. Petersen is here as a new patient for internal and external hemorrhoids. He states that he does see blood in his stool, which he noticed around 06/01/24-06/11/24. He does complain of rectal pain, which began before the blood in stool. His bowels are inconsistent, as he notes that he has a bowel movement every couple days. He hasn't had a BM in about 3 days. He describes his bowels to be a mushy stool.)       HPI:   This 48-year-old  patient presents with rectal pain and bleeding intermittently going on for the last 2 years.  He denies any significant constipation although sometimes he has to strain.  He reports occasional rectal bleeding mostly on the toilet paper.  He also reports rectal pain which is also intermittent.    Last colonoscopy: 06/06/2022 Dr. Slime Abreu      History:     Past Medical History:   Diagnosis Date    Anxiety     Arthritis     back    Back pain     Bipolar 1 disorder (HCC)     COPD (chronic obstructive pulmonary disease) (HCC)     Dvt femoral (deep venous thrombosis) ([facility])     Hx of blood clots     2017    Hypertension     Morbid obesity due to excess calories (HCC)     CHRIS (obstructive sleep apnea)     Pneumonia 2017    DUE TO OTHER AEROBIC GRAM-NEGATIVE BACTERIA, UNSPECIFIED LATERALITY, UNSPECIFIED PART OF LUNG     Respiratory failure (HCC) 2017    Smoking      Past Surgical History:   Procedure Laterality Date    ABDOMEN SURGERY      PEG tube 2017    COLONOSCOPY N/A 12/12/2019    COLONOSCOPY POLYPECTOMY HOT BIOPSY performed by Slime Abreu MD at [facility] OR    COLONOSCOPY N/A 6/2/2020    COLONOSCOPY POLYPECTOMY SNARE/COLD

## 2024-07-31 NOTE — PATIENT INSTRUCTIONS
Use Metamucil (the canned variety) each night. Mix 1 Tbsp with a glass (8-10 oz) of water and drink quickly.       If no bowel movement with the Metamucil, take Miralax in the morning in addition to the Metamucil at night.

## 2024-08-01 ENCOUNTER — TELEPHONE (OUTPATIENT)
Dept: SURGERY | Age: 49
End: 2024-08-01

## 2024-08-01 NOTE — TELEPHONE ENCOUNTER
BREANNI: [facility] pharmacy called and stated that pramoxine cream is unavailable to be filled.There is a combination hydrocortisone and pramoxine cream, but it is not covered by insurance. Patient was okay with filling and picking up the hydrocortisone cream, but the pharmacist wanted to inform Dr. Verduzco. Patient to follow up with Dr. Verduzco 08/22/2024.

## 2024-08-09 ENCOUNTER — PATIENT MESSAGE (OUTPATIENT)
Dept: FAMILY MEDICINE CLINIC | Age: 49
End: 2024-08-09

## 2024-08-09 RX ORDER — FLUTICASONE FUROATE, UMECLIDINIUM BROMIDE AND VILANTEROL TRIFENATATE 200; 62.5; 25 UG/1; UG/1; UG/1
1 POWDER RESPIRATORY (INHALATION) DAILY
Qty: 1 EACH | Refills: 11 | Status: SHIPPED | OUTPATIENT
Start: 2024-08-09

## 2024-08-09 NOTE — TELEPHONE ENCOUNTER
From: Nirmal Petersen  To: Dr. Jesenia Miller  Sent: 8/9/2024 6:32 AM EDT  Subject: Constipation    Is it possible to get a script for colace? If so [facility]

## 2024-08-11 RX ORDER — DOCUSATE SODIUM 100 MG/1
100 CAPSULE, LIQUID FILLED ORAL DAILY PRN
Qty: 30 CAPSULE | Refills: 2 | Status: SHIPPED | OUTPATIENT
Start: 2024-08-11

## 2024-08-15 DIAGNOSIS — E66.01 MORBID OBESITY WITH BMI OF 50.0-59.9, ADULT: ICD-10-CM

## 2024-08-15 RX ORDER — PHENTERMINE HYDROCHLORIDE 37.5 MG/1
37.5 TABLET ORAL
Qty: 30 TABLET | Refills: 0 | OUTPATIENT
Start: 2024-08-15 | End: 2024-09-14

## 2024-08-29 ENCOUNTER — ANESTHESIA EVENT (OUTPATIENT)
Dept: OPERATING ROOM | Age: 49
End: 2024-08-29
Payer: MEDICARE

## 2024-08-30 ENCOUNTER — ANESTHESIA (OUTPATIENT)
Dept: OPERATING ROOM | Age: 49
End: 2024-08-30
Payer: MEDICARE

## 2024-08-30 ENCOUNTER — HOSPITAL ENCOUNTER (OUTPATIENT)
Age: 49
Setting detail: OUTPATIENT SURGERY
Discharge: HOME | End: 2024-08-30
Attending: INTERNAL MEDICINE | Admitting: INTERNAL MEDICINE
Payer: MEDICARE

## 2024-08-30 VITALS
DIASTOLIC BLOOD PRESSURE: 82 MMHG | RESPIRATION RATE: 14 BRPM | TEMPERATURE: 98.1 F | BODY MASS INDEX: 44.1 KG/M2 | OXYGEN SATURATION: 93 % | SYSTOLIC BLOOD PRESSURE: 119 MMHG | HEIGHT: 71 IN | WEIGHT: 315 LBS | HEART RATE: 78 BPM

## 2024-08-30 DIAGNOSIS — Z86.010 HX OF COLONIC POLYPS: ICD-10-CM

## 2024-08-30 PROCEDURE — 88305 TISSUE EXAM BY PATHOLOGIST: CPT

## 2024-08-30 PROCEDURE — 3700000001 HC ADD 15 MINUTES (ANESTHESIA): Performed by: INTERNAL MEDICINE

## 2024-08-30 PROCEDURE — 2580000003 HC RX 258

## 2024-08-30 PROCEDURE — 3700000000 HC ANESTHESIA ATTENDED CARE: Performed by: INTERNAL MEDICINE

## 2024-08-30 PROCEDURE — 2709999900 HC NON-CHARGEABLE SUPPLY: Performed by: INTERNAL MEDICINE

## 2024-08-30 PROCEDURE — 7100000011 HC PHASE II RECOVERY - ADDTL 15 MIN: Performed by: INTERNAL MEDICINE

## 2024-08-30 PROCEDURE — 7100000010 HC PHASE II RECOVERY - FIRST 15 MIN: Performed by: INTERNAL MEDICINE

## 2024-08-30 PROCEDURE — 6360000002 HC RX W HCPCS: Performed by: NURSE ANESTHETIST, CERTIFIED REGISTERED

## 2024-08-30 PROCEDURE — 45385 COLONOSCOPY W/LESION REMOVAL: CPT | Performed by: INTERNAL MEDICINE

## 2024-08-30 PROCEDURE — 2500000003 HC RX 250 WO HCPCS: Performed by: NURSE ANESTHETIST, CERTIFIED REGISTERED

## 2024-08-30 PROCEDURE — 3609010400 HC COLONOSCOPY POLYPECTOMY HOT BIOPSY: Performed by: INTERNAL MEDICINE

## 2024-08-30 RX ORDER — SODIUM CHLORIDE 0.9 % (FLUSH) 0.9 %
5-40 SYRINGE (ML) INJECTION PRN
Status: DISCONTINUED | OUTPATIENT
Start: 2024-08-30 | End: 2024-08-30 | Stop reason: HOSPADM

## 2024-08-30 RX ORDER — LIDOCAINE HYDROCHLORIDE 10 MG/ML
1 INJECTION, SOLUTION EPIDURAL; INFILTRATION; INTRACAUDAL; PERINEURAL
Status: DISCONTINUED | OUTPATIENT
Start: 2024-08-31 | End: 2024-08-30 | Stop reason: HOSPADM

## 2024-08-30 RX ORDER — LIDOCAINE HYDROCHLORIDE 20 MG/ML
INJECTION, SOLUTION EPIDURAL; INFILTRATION; INTRACAUDAL; PERINEURAL PRN
Status: DISCONTINUED | OUTPATIENT
Start: 2024-08-30 | End: 2024-08-30 | Stop reason: SDUPTHER

## 2024-08-30 RX ORDER — METOCLOPRAMIDE HYDROCHLORIDE 5 MG/ML
10 INJECTION INTRAMUSCULAR; INTRAVENOUS
Status: CANCELLED | OUTPATIENT
Start: 2024-08-30 | End: 2024-08-31

## 2024-08-30 RX ORDER — NALOXONE HYDROCHLORIDE 0.4 MG/ML
INJECTION, SOLUTION INTRAMUSCULAR; INTRAVENOUS; SUBCUTANEOUS PRN
Status: CANCELLED | OUTPATIENT
Start: 2024-08-30

## 2024-08-30 RX ORDER — SODIUM CHLORIDE 9 MG/ML
INJECTION, SOLUTION INTRAVENOUS PRN
Status: DISCONTINUED | OUTPATIENT
Start: 2024-08-30 | End: 2024-08-30 | Stop reason: HOSPADM

## 2024-08-30 RX ORDER — SODIUM CHLORIDE 0.9 % (FLUSH) 0.9 %
5-40 SYRINGE (ML) INJECTION PRN
Status: CANCELLED | OUTPATIENT
Start: 2024-08-30

## 2024-08-30 RX ORDER — SODIUM CHLORIDE, SODIUM LACTATE, POTASSIUM CHLORIDE, CALCIUM CHLORIDE 600; 310; 30; 20 MG/100ML; MG/100ML; MG/100ML; MG/100ML
INJECTION, SOLUTION INTRAVENOUS CONTINUOUS
Status: DISCONTINUED | OUTPATIENT
Start: 2024-08-30 | End: 2024-08-30 | Stop reason: HOSPADM

## 2024-08-30 RX ORDER — HALOPERIDOL 5 MG/ML
1 INJECTION INTRAMUSCULAR
Status: CANCELLED | OUTPATIENT
Start: 2024-08-30 | End: 2024-08-31

## 2024-08-30 RX ORDER — SODIUM CHLORIDE 9 MG/ML
INJECTION, SOLUTION INTRAVENOUS PRN
Status: CANCELLED | OUTPATIENT
Start: 2024-08-30

## 2024-08-30 RX ORDER — SODIUM CHLORIDE 0.9 % (FLUSH) 0.9 %
5-40 SYRINGE (ML) INJECTION EVERY 12 HOURS SCHEDULED
Status: CANCELLED | OUTPATIENT
Start: 2024-08-30

## 2024-08-30 RX ORDER — SODIUM CHLORIDE 9 MG/ML
INJECTION, SOLUTION INTRAVENOUS CONTINUOUS
Status: DISCONTINUED | OUTPATIENT
Start: 2024-08-30 | End: 2024-08-30 | Stop reason: HOSPADM

## 2024-08-30 RX ORDER — PROPOFOL 10 MG/ML
INJECTION, EMULSION INTRAVENOUS PRN
Status: DISCONTINUED | OUTPATIENT
Start: 2024-08-30 | End: 2024-08-30 | Stop reason: SDUPTHER

## 2024-08-30 RX ORDER — SODIUM CHLORIDE 0.9 % (FLUSH) 0.9 %
5-40 SYRINGE (ML) INJECTION EVERY 12 HOURS SCHEDULED
Status: DISCONTINUED | OUTPATIENT
Start: 2024-08-30 | End: 2024-08-30 | Stop reason: HOSPADM

## 2024-08-30 RX ADMIN — LIDOCAINE HYDROCHLORIDE 100 MG: 20 INJECTION, SOLUTION EPIDURAL; INFILTRATION; INTRACAUDAL; PERINEURAL at 12:20

## 2024-08-30 RX ADMIN — PROPOFOL 80 MG: 10 INJECTION, EMULSION INTRAVENOUS at 12:19

## 2024-08-30 RX ADMIN — PROPOFOL 125 MCG/KG/MIN: 10 INJECTION, EMULSION INTRAVENOUS at 12:20

## 2024-08-30 RX ADMIN — SODIUM CHLORIDE, POTASSIUM CHLORIDE, SODIUM LACTATE AND CALCIUM CHLORIDE: 600; 310; 30; 20 INJECTION, SOLUTION INTRAVENOUS at 11:22

## 2024-08-30 ASSESSMENT — LIFESTYLE VARIABLES: SMOKING_STATUS: 1

## 2024-08-30 ASSESSMENT — PAIN - FUNCTIONAL ASSESSMENT: PAIN_FUNCTIONAL_ASSESSMENT: 0-10

## 2024-08-30 NOTE — H&P
Interval H&P Note    Pt Name: Nirmal Petersen  MRN: [id number]  YOB: 1975  Date of evaluation: 8/30/2024      [x] I have reviewed in [facility] the GI Note by Dr Verduzco dated 7/31/24 by attached below for the Interval History and Physical note.     [x] I have examined  Nirmal Petersen, a 48 y.o. male with known comorbidities managed by PCP and specialists who arrived for the scheduled COLONOSCOPY DIAGNOSTIC by Nemesio Hobson MD for Hx of colonic polyps. Patient followed bowel prep until watery clear. Previous colonoscopies with  last 6/6/22 (OP Note below), 6/2/20 (polyps) and 12/12/19.  No FH colon cancer or polyps.  Patient denies bowel changes, bloody tarry stools, diarrhea alternating with constipation, abdominal pain or unintentional weight loss. The patient denies new health changes, fever, chills, wheezing, cough, increased SOB, chest pain, open sores or wounds.    Date of Procedure: 6/6/2022     Pre-Op Diagnosis: SCREENING COLON, HISTORY OF POLYPS  Post-Op Diagnosis: FAIR PREP, colon polyps, hemorrhoids  IMPRESSION:   FAIR PREP  1) Proximal transverse colon polyp, 8mm s/p hot snare polypectomy and removal  2) Distal transverse colon polyp 7mm s/p hot snare polypectomy and removal  3) Sigmoid polyp, 6mm s/p cold snare polypectomy and removal  4) Rectal polyp, 5mm s/p cold biopsy and removal  5) Few small mouthed diverticula in the left colon  6) Moderate external hemorrhoids     RECOMMENDATIONS:   1) Follow up with referring provider, as previously scheduled.   2) Repeat Colonoscopy in 5 yrs  3) Follow up path in GI clinic.   Electronically signed by Slime Abreu MD on 6/6/2022     Surgical Pathology Report  SURGICAL PATHOLOGY REPORT  Collected: 06/06/22 120   Lab status: Final   Resulting lab: [facility]   Value: -- Diagnosis --    A.  Transverse colon, polyp, biopsy: Sessile serrated adenoma.    B.  Distal transverse colon, polyp, biopsy: Sessile serrated adenoma.    C.  Sigmoid colon,  perianal area revealed no observed abnormalities.    Digital rectal exam: no observed abnormalities and normal anal sphincter tone.   The anoscope was gently inserted and the anorectum was inspected.    Visualization: good.      Anoscopy revealed 2 internal hemorrhoids, second-degree hemorrhoid in right anterior quadrant and a third degree hemorrhoid in right posterior quadrant.  The greater assessed based on clinical estimation.  Mucosa: Irritated..      The patient tolerated the procedure with difficulty.    Complication: None     Studies Review:     LABS:  CBC:   Lab Results   Component Value Date    WBC 7.1 12/26/2023    HGB 14.5 12/26/2023    HCT 48.2 12/26/2023    MCV 74.8 (L) 12/26/2023    PLT See Reflexed IPF Result 12/26/2023        BMP:   Lab Results   Component Value Date/Time     07/17/2024 09:10 AM    K 4.6 07/17/2024 09:10 AM     07/17/2024 09:10 AM    CO2 27 07/17/2024 09:10 AM    BUN 12 07/17/2024 09:10 AM    CREATININE 1.0 07/17/2024 09:10 AM    GLUCOSE 96 07/17/2024 09:10 AM    CALCIUM 9.3 07/17/2024 09:10 AM    LABGLOM >90 07/17/2024 09:10 AM    LABGLOM >60 12/26/2023 04:27 AM         Assessment and Plan:       Nirmal was seen today for new patient.    Diagnoses and all orders for this visit:    Third degree hemorrhoids  -     hydrocortisone (PROCTOSOL HC) 2.5 % CREA rectal cream; Apply intrarectally once daily.    Second degree hemorrhoids    Other orders  -     Pramoxine HCl 1 % CREA; Apply 1 each topically daily Apply intrarectally once daily.         1. Third degree hemorrhoids    2. Second degree hemorrhoids    Assessment:  Internal hemorrhoids combination of second and third degrees.  No indication for immediate surgery.  Patient also has tracheal stenosis which may make intubation difficult for surgery if needed.  Patient is awaiting laser treatment at [facility] which has not been set up yet.  Patient is morbidly obese which will make hemorrhoidectomy difficult without general

## 2024-08-30 NOTE — ANESTHESIA PRE PROCEDURE
Department of Anesthesiology  Preprocedure Note       Name:  Nirmal Petersen   Age:  48 y.o.  :  1975                                          MRN:  [id number]         Date:  2024      Surgeon: Surgeon(s):  Nemesio Hobson MD    Procedure: Procedure(s):  COLONOSCOPY DIAGNOSTIC    Medications prior to admission:   Prior to Admission medications    Medication Sig Start Date End Date Taking? Authorizing Provider   docusate sodium (COLACE) 100 MG capsule Take 1 capsule by mouth daily as needed for Constipation 24  Yes Jesenia Miller DO   fluticasone-umeclidin-vilant (TRELEGY ELLIPTA) 200-62.5-25 MCG/ACT AEPB inhaler Inhale 1 puff into the lungs daily 24  Yes Jesenia Miller DO   hydrocortisone (PROCTOSOL HC) 2.5 % CREA rectal cream Apply intrarectally once daily. 24  Yes Brett Verduzco MD   escitalopram (LEXAPRO) 20 MG tablet Take 1 tablet by mouth nightly at bedtime. 7/15/24  Yes ProviderTawanda MD   hydrOXYzine HCl (ATARAX) 50 MG tablet  24  Yes ProviderTawanda MD   QUEtiapine (SEROQUEL) 200 MG tablet Take 1 tablet by mouth nightly at bedtime. 7/15/24  Yes ProviderTawanda MD   eszopiclone (LUNESTA) 2 MG TABS Take 1 tablet by mouth nightly as needed.   Yes ProviderTawanda MD   CAPLYTA 42 MG capsule Take 1 capsule by mouth daily   Yes ProviderTawanda MD   omeprazole (PRILOSEC) 40 MG delayed release capsule Take 1 capsule by mouth Daily with supper   Yes ProviderTawanda MD   carvedilol (COREG) 12.5 MG tablet Take 1 tablet by mouth 2 times daily 24  Yes Jesenia Miller DO   albuterol (PROVENTIL) (2.5 MG/3ML) 0.083% nebulizer solution Take 3 mLs by nebulization in the morning and 3 mLs in the evening. 23  Yes Howie Ely MD   ipratropium 0.5 mg-albuterol 2.5 mg (DUONEB) 0.5-2.5 (3) MG/3ML SOLN nebulizer solution Inhale 3 mLs into the lungs 4 times daily 23  Yes Howie Ely MD  wall motion. Normal diastolic function.  ·  No significant valvular stenosis or regurgitation seen  ·  No pericardial effusion.       Neuro/Psych:   (+) neuromuscular disease:, psychiatric history:depression/anxiety             GI/Hepatic/Renal:   (+) bowel prep, morbid obesity          Endo/Other: Negative Endo/Other ROS                    Abdominal:   (+) obese    Abdomen: soft.      Vascular:   + DVT.       Other Findings: had        Anesthesia Plan      MAC     ASA 3       Induction: intravenous.    MIPS: prophylactic pharmacologic antiemetic agents not administered perioperatively for documented reasons.  Anesthetic plan and risks discussed with patient.      Plan discussed with CRNA.                  DONALDO AMADO MD   8/30/2024

## 2024-08-30 NOTE — DISCHARGE INSTRUCTIONS
[facility]    POST-ENDOSCOPY INSTRUCTIONS    1. ACTIVITY   No driving, operating machinery, or making important decisions for 24 hours.    Resume normal activity after 24 hours.  You may return to work after 24 hours.    2. DIET        Resume your usual diet unless specified below.   ***    3. MEDICATIONS    Resume your usual medications.     Do not consume alcohol, tranquilizers, or sleeping medications for 24 hour unless advised by your physician.                 4. PHYSICIAN FOLLOW-UP / INSTRUCTIONS    Please call the office/clinic in 10 days for biopsy results:      [  ] GI office:  [phone]          Follow up with your family physician as planned.    6. NORMAL CHANGES YOU MAY EXPERIENCE AFTER ENDOSCOPY:         COLONOSCOPY    Passing of gas for several hours.      Some mild abdominal cramping.                  You may feel fatigued for the next 24-48   hours due to the prep and sedation    7. CALL YOUR PHYSICIAN IF YOU EXPERIENCE ANY OF THE FOLLOWING      A.  Passing blood rectally or vomiting blood (color may be red or black)      B.  Severe abdominal pain or tenderness (that is not relieved by passing air)      C.  Fever, chills, or excessive sweating      D.  Persistent nausea or vomiting      E.  Redness or swelling at the IV site    If you have additional questions, PLEASE call your doctor or the [facility] GI Unit ([phone])       Recommendations: Follow-up with biopsy results.                                     Repeat colonoscopy in 3 years.                                     Follow-up with PCP

## 2024-08-30 NOTE — ANESTHESIA POSTPROCEDURE EVALUATION
Department of Anesthesiology  Postprocedure Note    Patient: Nirmal Petersen  MRN: [id number]  YOB: 1975  Date of evaluation: 8/30/2024    Procedure Summary       Date: 08/30/24 Room / Location: [address]    Anesthesia Start: 1217 Anesthesia Stop: 1259    Procedure: COLONOSCOPY POLYPECTOMY Diagnosis:       Hx of colonic polyps      (Hx of colonic polyps [Z86.010])    Surgeons: Nemesio Hobson MD Responsible Provider: Gracia Starks MD    Anesthesia Type: MAC ASA Status: 3            Anesthesia Type: No value filed.    Che Phase I:      Che Phase II: Che Score: 10    Anesthesia Post Evaluation    Patient location during evaluation: PACU  Patient participation: complete - patient participated  Level of consciousness: awake  Airway patency: patent  Nausea & Vomiting: no nausea  Cardiovascular status: blood pressure returned to baseline  Respiratory status: acceptable  Hydration status: euvolemic  Comments: Multimodal analgesia pain management as indicated by procedure  Multimodal analgesia pain management approach  Pain management: adequate    No notable events documented.

## 2024-08-30 NOTE — OP NOTE
Operative Note      Patient: Nirmal Petersen  YOB: 1975  MRN: [id number]    Date of Procedure: 8/30/2024    Pre-Op Diagnosis Codes:      * Hx of colonic polyps [Z86.010]    Post-Op Diagnosis:  Rectal polyp and internal hemorrhoids.       Procedure(s):  COLONOSCOPY POLYPECTOMY    Surgeon(s):  Nemesio Hobson MD    Assistant:   * No surgical staff found *    Anesthesia: Monitor Anesthesia Care    Estimated Blood Loss (mL): Minimal    Complications: None    Specimens:   ID Type Source Tests Collected by Time Destination   A : RECTAL POLYP Tissue Rectum SURGICAL PATHOLOGY Nemesio Hobson MD 8/30/2024 1248        Implants:  * No implants in log *      Drains: * No LDAs found *    Findings:  Infection Present At Time Of Surgery (PATOS) (choose all levels that have infection present):  No infection present        Scope withdrawal time: 16 min     Description of Procedure:  Informed consent was obtained from the patient after explanation of the procedure including indications, description of the procedure,  benefits and possible risks and complications of the procedure, and alternatives. Questions were answered.  The patient's history was reviewed and a directed physical examination was performed prior to the procedure.    Patient was monitored throughout the procedure with pulse oximetry and periodic assessment of vital signs. Patient was sedated as noted above. With the patient initially in the left lateral decubitus position, a digital rectal examination was performed and revealed negative without mass, lesions or tenderness.  The Olympus video colonoscope was placed in the patient's rectum and advanced without difficulty  to the cecum, which was identified by the ileocecal valve and appendiceal orifice.  The prep was adequate.  Examination of the mucosa was performed during both introduction and withdrawal of the colonoscope. Retroflexed view of the rectum was performed.  The patient  was taken to

## 2024-09-04 LAB — SURGICAL PATHOLOGY REPORT: NORMAL

## 2024-09-04 RX ORDER — GABAPENTIN 800 MG/1
800 TABLET ORAL 3 TIMES DAILY
Qty: 90 TABLET | Refills: 3 | Status: SHIPPED | OUTPATIENT
Start: 2024-09-04 | End: 2024-09-06

## 2024-09-04 NOTE — TELEPHONE ENCOUNTER
Nirmal Petersen is calling to request a refill on the following medication(s):    Last Visit Date (If Applicable):  7/17/2024    Next Visit Date:    Visit date not found    Medication Request:  Requested Prescriptions     Pending Prescriptions Disp Refills    gabapentin (NEURONTIN) 800 MG tablet 90 tablet 3     Sig: Take 1 tablet by mouth 3 times daily for 2 days.

## 2024-11-04 RX ORDER — CARVEDILOL 12.5 MG/1
12.5 TABLET ORAL 2 TIMES DAILY
Qty: 60 TABLET | Refills: 5 | Status: SHIPPED | OUTPATIENT
Start: 2024-11-04

## 2024-11-04 NOTE — TELEPHONE ENCOUNTER
Nirmal Petersen is calling to request a refill on the following medication(s):    Last Visit Date (If Applicable):  7/17/2024    Next Visit Date:    Visit date not found    Medication Request:  Requested Prescriptions     Pending Prescriptions Disp Refills    carvedilol (COREG) 12.5 MG tablet 60 tablet 5     Sig: Take 1 tablet by mouth 2 times daily

## 2024-12-04 ENCOUNTER — PATIENT MESSAGE (OUTPATIENT)
Dept: FAMILY MEDICINE CLINIC | Age: 49
End: 2024-12-04

## 2024-12-04 NOTE — TELEPHONE ENCOUNTER
Nirmal Petersen is calling to request a refill on the following medication(s):    Last Visit Date (If Applicable):  7/17/2024    Next Visit Date:    Visit date not found    Medication Request:  Requested Prescriptions     Pending Prescriptions Disp Refills    omeprazole (PRILOSEC) 40 MG delayed release capsule 30 capsule 1     Sig: Take 1 capsule by mouth Daily with supper

## 2024-12-05 RX ORDER — OMEPRAZOLE 40 MG/1
40 CAPSULE, DELAYED RELEASE ORAL
Qty: 30 CAPSULE | Refills: 1 | Status: SHIPPED | OUTPATIENT
Start: 2024-12-05

## 2025-01-03 RX ORDER — GABAPENTIN 800 MG/1
800 TABLET ORAL 3 TIMES DAILY
Qty: 90 TABLET | Refills: 0 | Status: SHIPPED | OUTPATIENT
Start: 2025-01-03 | End: 2025-01-05

## 2025-01-22 ENCOUNTER — OFFICE VISIT (OUTPATIENT)
Dept: FAMILY MEDICINE CLINIC | Age: 50
End: 2025-01-22
Payer: MEDICARE

## 2025-01-22 VITALS
HEART RATE: 77 BPM | BODY MASS INDEX: 44.1 KG/M2 | DIASTOLIC BLOOD PRESSURE: 73 MMHG | OXYGEN SATURATION: 99 % | WEIGHT: 315 LBS | HEIGHT: 71 IN | SYSTOLIC BLOOD PRESSURE: 111 MMHG

## 2025-01-22 DIAGNOSIS — M54.50 LOW BACK PAIN WITHOUT SCIATICA, UNSPECIFIED BACK PAIN LATERALITY, UNSPECIFIED CHRONICITY: ICD-10-CM

## 2025-01-22 DIAGNOSIS — J44.9 CHRONIC OBSTRUCTIVE PULMONARY DISEASE, UNSPECIFIED COPD TYPE (HCC): ICD-10-CM

## 2025-01-22 DIAGNOSIS — F19.20: ICD-10-CM

## 2025-01-22 DIAGNOSIS — I42.9 SECONDARY CARDIOMYOPATHY (HCC): ICD-10-CM

## 2025-01-22 DIAGNOSIS — J20.9 ACUTE BRONCHITIS, UNSPECIFIED ORGANISM: Primary | ICD-10-CM

## 2025-01-22 DIAGNOSIS — F33.1 MAJOR DEPRESSIVE DISORDER, RECURRENT, MODERATE (HCC): ICD-10-CM

## 2025-01-22 DIAGNOSIS — Z93.0 TRACHEOSTOMY STATUS (HCC): ICD-10-CM

## 2025-01-22 PROCEDURE — 99213 OFFICE O/P EST LOW 20 MIN: CPT | Performed by: FAMILY MEDICINE

## 2025-01-22 PROCEDURE — 3078F DIAST BP <80 MM HG: CPT | Performed by: FAMILY MEDICINE

## 2025-01-22 PROCEDURE — 3074F SYST BP LT 130 MM HG: CPT | Performed by: FAMILY MEDICINE

## 2025-01-22 RX ORDER — PREDNISONE 20 MG/1
TABLET ORAL
Qty: 20 TABLET | Refills: 0 | Status: SHIPPED | OUTPATIENT
Start: 2025-01-22

## 2025-01-22 RX ORDER — CODEINE PHOSPHATE AND GUAIFENESIN 10; 100 MG/5ML; MG/5ML
10 SOLUTION ORAL 3 TIMES DAILY PRN
Qty: 210 ML | Refills: 0 | Status: SHIPPED | OUTPATIENT
Start: 2025-01-22 | End: 2025-01-29

## 2025-01-22 RX ORDER — MOXIFLOXACIN HYDROCHLORIDE 400 MG/1
400 TABLET ORAL DAILY
Qty: 10 TABLET | Refills: 0 | Status: SHIPPED | OUTPATIENT
Start: 2025-01-22 | End: 2025-02-01

## 2025-01-22 RX ORDER — SODIUM CHLORIDE FOR INHALATION 3 %
4 VIAL, NEBULIZER (ML) INHALATION 2 TIMES DAILY
COMMUNITY
Start: 2025-01-14 | End: 2025-04-14

## 2025-01-22 RX ORDER — GUAIFENESIN 600 MG/1
1200 TABLET, EXTENDED RELEASE ORAL 2 TIMES DAILY
COMMUNITY
Start: 2025-01-14 | End: 2026-01-14

## 2025-01-22 RX ORDER — GABAPENTIN 800 MG/1
800 TABLET ORAL 3 TIMES DAILY
Qty: 90 TABLET | Refills: 2 | Status: SHIPPED | OUTPATIENT
Start: 2025-01-22 | End: 2025-01-24

## 2025-01-22 SDOH — ECONOMIC STABILITY: FOOD INSECURITY: WITHIN THE PAST 12 MONTHS, YOU WORRIED THAT YOUR FOOD WOULD RUN OUT BEFORE YOU GOT MONEY TO BUY MORE.: NEVER TRUE

## 2025-01-22 SDOH — ECONOMIC STABILITY: FOOD INSECURITY: WITHIN THE PAST 12 MONTHS, THE FOOD YOU BOUGHT JUST DIDN'T LAST AND YOU DIDN'T HAVE MONEY TO GET MORE.: NEVER TRUE

## 2025-01-22 ASSESSMENT — PATIENT HEALTH QUESTIONNAIRE - PHQ9
2. FEELING DOWN, DEPRESSED OR HOPELESS: NEARLY EVERY DAY
SUM OF ALL RESPONSES TO PHQ QUESTIONS 1-9: 23
1. LITTLE INTEREST OR PLEASURE IN DOING THINGS: NEARLY EVERY DAY
6. FEELING BAD ABOUT YOURSELF - OR THAT YOU ARE A FAILURE OR HAVE LET YOURSELF OR YOUR FAMILY DOWN: NEARLY EVERY DAY
8. MOVING OR SPEAKING SO SLOWLY THAT OTHER PEOPLE COULD HAVE NOTICED. OR THE OPPOSITE, BEING SO FIGETY OR RESTLESS THAT YOU HAVE BEEN MOVING AROUND A LOT MORE THAN USUAL: MORE THAN HALF THE DAYS
5. POOR APPETITE OR OVEREATING: NEARLY EVERY DAY
SUM OF ALL RESPONSES TO PHQ9 QUESTIONS 1 & 2: 6
SUM OF ALL RESPONSES TO PHQ QUESTIONS 1-9: 23
SUM OF ALL RESPONSES TO PHQ QUESTIONS 1-9: 23
7. TROUBLE CONCENTRATING ON THINGS, SUCH AS READING THE NEWSPAPER OR WATCHING TELEVISION: NEARLY EVERY DAY
SUM OF ALL RESPONSES TO PHQ QUESTIONS 1-9: 23
4. FEELING TIRED OR HAVING LITTLE ENERGY: NEARLY EVERY DAY
10. IF YOU CHECKED OFF ANY PROBLEMS, HOW DIFFICULT HAVE THESE PROBLEMS MADE IT FOR YOU TO DO YOUR WORK, TAKE CARE OF THINGS AT HOME, OR GET ALONG WITH OTHER PEOPLE: SOMEWHAT DIFFICULT
9. THOUGHTS THAT YOU WOULD BE BETTER OFF DEAD, OR OF HURTING YOURSELF: NOT AT ALL
3. TROUBLE FALLING OR STAYING ASLEEP: NEARLY EVERY DAY

## 2025-01-22 ASSESSMENT — COLUMBIA-SUICIDE SEVERITY RATING SCALE - C-SSRS
1. WITHIN THE PAST MONTH, HAVE YOU WISHED YOU WERE DEAD OR WISHED YOU COULD GO TO SLEEP AND NOT WAKE UP?: NO
6. HAVE YOU EVER DONE ANYTHING, STARTED TO DO ANYTHING, OR PREPARED TO DO ANYTHING TO END YOUR LIFE?: NO
2. HAVE YOU ACTUALLY HAD ANY THOUGHTS OF KILLING YOURSELF?: NO

## 2025-01-22 NOTE — PROGRESS NOTES
[facility]  [facility]  [address]  [address]  [facility] [address]  Dept: [phone]      Nirmal Petersen is a 49 y.o. male who presents today for follow up on his  medical conditions as noted below.      Chief Complaint   Patient presents with    Cough    Fever    Chills       Patient Active Problem List:     Bilateral low back pain with bilateral sciatica     Morbid obesity due to excess calories     Benign essential HTN     Acute respiratory failure with hypoxemia     Acute systolic congestive heart failure (HCC)     Secondary cardiomyopathy (HCC)     DVT (deep venous thrombosis) ([facility])     Hyperglycemia     Rhabdomyolysis     Tubular adenoma of colon     Rectal polyp     Hyperplastic colon polyp     Serrated adenoma of colon     Polyp of colon     Tracheostomy status (HCC)     Combined opioid with non-opioid drug dependence, with abuse ([facility])     Morbid obesity with BMI of 45.0-49.9, adult     History of psychiatric disorder     Hx of drug abuse ([facility])     Major depressive disorder, recurrent, mild     Major depressive disorder, recurrent, moderate     Major depressive disorder, recurrent, unspecified     Polycythemia     History of colon polyps     COPD, severity to be determined ([facility])     AMS (altered mental status)     Respiratory distress     Obstructive sleep apnea     History of tracheal stenosis     Drug overdose of undetermined intent     Tracheal stenosis     Left upper extremity swelling     Swelling of right upper extremity     Agitation requiring sedation protocol     Past Medical History:   Diagnosis Date    Anxiety     Arthritis     back    Back pain     Bipolar 1 disorder ([facility])     CHF (congestive heart failure) ([facility])     COPD (chronic obstructive pulmonary disease) ([facility])     Dvt femoral (deep venous thrombosis) ([facility])     R. leg    Hx of blood clots     2017    Hyperlipidemia     Hypertension     Morbid obesity due to excess calories     CHRIS

## 2025-01-30 ENCOUNTER — TELEPHONE (OUTPATIENT)
Dept: FAMILY MEDICINE CLINIC | Age: 50
End: 2025-01-30

## 2025-01-30 DIAGNOSIS — M54.50 LOW BACK PAIN WITHOUT SCIATICA, UNSPECIFIED BACK PAIN LATERALITY, UNSPECIFIED CHRONICITY: ICD-10-CM

## 2025-01-30 RX ORDER — GABAPENTIN 800 MG/1
800 TABLET ORAL 3 TIMES DAILY
Qty: 90 TABLET | Refills: 2 | Status: SHIPPED | OUTPATIENT
Start: 2025-01-30 | End: 2025-03-01

## 2025-01-30 NOTE — TELEPHONE ENCOUNTER
Received call from:    [facility] [id number]  [place], [place] - [address]  [place] [phone] - F [phone]    Regarding directions for:    Gabapentin 800 mg.

## 2025-02-11 RX ORDER — OMEPRAZOLE 40 MG/1
40 CAPSULE, DELAYED RELEASE ORAL
Qty: 30 CAPSULE | Refills: 1 | Status: SHIPPED | OUTPATIENT
Start: 2025-02-11

## 2025-03-18 RX ORDER — OMEPRAZOLE 40 MG/1
40 CAPSULE, DELAYED RELEASE ORAL
Qty: 90 CAPSULE | Refills: 3 | Status: SHIPPED | OUTPATIENT
Start: 2025-03-18

## 2025-03-18 NOTE — TELEPHONE ENCOUNTER
Nirmal Petersen is calling to request a refill on the following medication(s):    Last Visit Date (If Applicable):  1/22/2025    Next Visit Date:    Visit date not found    Medication Request:  Requested Prescriptions     Pending Prescriptions Disp Refills    omeprazole (PRILOSEC) 40 MG delayed release capsule [Pharmacy Med Name: OMEPRAZOLE DR 40 MG CAPSULE] 30 capsule 1     Sig: TAKE 1 CAPSULE BY MOUTH DAILY WITH SUPPER

## 2025-04-22 ENCOUNTER — TELEPHONE (OUTPATIENT)
Dept: FAMILY MEDICINE CLINIC | Age: 50
End: 2025-04-22

## 2025-04-22 NOTE — TELEPHONE ENCOUNTER
Date patient is due:01/01/25    Outcome of phone call? appointment scheduled    Did patient deny appointment? no    Has a packet mailed or is patient completing on mychart? mychart    Virtual visit offered? no

## 2025-04-28 SDOH — HEALTH STABILITY: PHYSICAL HEALTH: ON AVERAGE, HOW MANY MINUTES DO YOU ENGAGE IN EXERCISE AT THIS LEVEL?: 0 MIN

## 2025-04-28 SDOH — HEALTH STABILITY: PHYSICAL HEALTH: ON AVERAGE, HOW MANY DAYS PER WEEK DO YOU ENGAGE IN MODERATE TO STRENUOUS EXERCISE (LIKE A BRISK WALK)?: 0 DAYS

## 2025-04-28 ASSESSMENT — PATIENT HEALTH QUESTIONNAIRE - PHQ9
SUM OF ALL RESPONSES TO PHQ QUESTIONS 1-9: 17
1. LITTLE INTEREST OR PLEASURE IN DOING THINGS: NEARLY EVERY DAY
SUM OF ALL RESPONSES TO PHQ QUESTIONS 1-9: 17
7. TROUBLE CONCENTRATING ON THINGS, SUCH AS READING THE NEWSPAPER OR WATCHING TELEVISION: NEARLY EVERY DAY
SUM OF ALL RESPONSES TO PHQ QUESTIONS 1-9: 17
10. IF YOU CHECKED OFF ANY PROBLEMS, HOW DIFFICULT HAVE THESE PROBLEMS MADE IT FOR YOU TO DO YOUR WORK, TAKE CARE OF THINGS AT HOME, OR GET ALONG WITH OTHER PEOPLE: EXTREMELY DIFFICULT
5. POOR APPETITE OR OVEREATING: NOT AT ALL
3. TROUBLE FALLING OR STAYING ASLEEP: MORE THAN HALF THE DAYS
9. THOUGHTS THAT YOU WOULD BE BETTER OFF DEAD, OR OF HURTING YOURSELF: NOT AT ALL
6. FEELING BAD ABOUT YOURSELF - OR THAT YOU ARE A FAILURE OR HAVE LET YOURSELF OR YOUR FAMILY DOWN: NEARLY EVERY DAY
8. MOVING OR SPEAKING SO SLOWLY THAT OTHER PEOPLE COULD HAVE NOTICED. OR THE OPPOSITE, BEING SO FIGETY OR RESTLESS THAT YOU HAVE BEEN MOVING AROUND A LOT MORE THAN USUAL: NOT AT ALL
2. FEELING DOWN, DEPRESSED OR HOPELESS: NEARLY EVERY DAY
4. FEELING TIRED OR HAVING LITTLE ENERGY: NEARLY EVERY DAY
SUM OF ALL RESPONSES TO PHQ QUESTIONS 1-9: 17

## 2025-04-28 ASSESSMENT — LIFESTYLE VARIABLES
HOW MANY STANDARD DRINKS CONTAINING ALCOHOL DO YOU HAVE ON A TYPICAL DAY: PATIENT DOES NOT DRINK
HOW OFTEN DO YOU HAVE A DRINK CONTAINING ALCOHOL: 1
HOW OFTEN DO YOU HAVE A DRINK CONTAINING ALCOHOL: NEVER
HOW OFTEN DO YOU HAVE SIX OR MORE DRINKS ON ONE OCCASION: 1
HOW MANY STANDARD DRINKS CONTAINING ALCOHOL DO YOU HAVE ON A TYPICAL DAY: 0

## 2025-04-29 ENCOUNTER — OFFICE VISIT (OUTPATIENT)
Dept: FAMILY MEDICINE CLINIC | Age: 50
End: 2025-04-29
Payer: MEDICARE

## 2025-04-29 VITALS
BODY MASS INDEX: 44.1 KG/M2 | OXYGEN SATURATION: 96 % | WEIGHT: 315 LBS | SYSTOLIC BLOOD PRESSURE: 111 MMHG | DIASTOLIC BLOOD PRESSURE: 81 MMHG | HEIGHT: 71 IN | HEART RATE: 70 BPM

## 2025-04-29 DIAGNOSIS — Z00.00 MEDICARE ANNUAL WELLNESS VISIT, SUBSEQUENT: Primary | ICD-10-CM

## 2025-04-29 DIAGNOSIS — J44.9 CHRONIC OBSTRUCTIVE PULMONARY DISEASE, UNSPECIFIED COPD TYPE (HCC): ICD-10-CM

## 2025-04-29 DIAGNOSIS — M54.50 LOW BACK PAIN WITHOUT SCIATICA, UNSPECIFIED BACK PAIN LATERALITY, UNSPECIFIED CHRONICITY: ICD-10-CM

## 2025-04-29 DIAGNOSIS — I42.6 ALCOHOLIC CARDIOMYOPATHY (HCC): ICD-10-CM

## 2025-04-29 DIAGNOSIS — N42.9 PROSTATE DISORDER: ICD-10-CM

## 2025-04-29 DIAGNOSIS — R73.9 HYPERGLYCEMIA: ICD-10-CM

## 2025-04-29 DIAGNOSIS — E66.01 MORBID OBESITY WITH BMI OF 50.0-59.9, ADULT (HCC): ICD-10-CM

## 2025-04-29 DIAGNOSIS — I42.9 SECONDARY CARDIOMYOPATHY (HCC): ICD-10-CM

## 2025-04-29 DIAGNOSIS — D75.1 POLYCYTHEMIA: ICD-10-CM

## 2025-04-29 DIAGNOSIS — E03.9 HYPOTHYROIDISM, UNSPECIFIED TYPE: ICD-10-CM

## 2025-04-29 PROCEDURE — 3074F SYST BP LT 130 MM HG: CPT | Performed by: FAMILY MEDICINE

## 2025-04-29 PROCEDURE — 3079F DIAST BP 80-89 MM HG: CPT | Performed by: FAMILY MEDICINE

## 2025-04-29 PROCEDURE — G0439 PPPS, SUBSEQ VISIT: HCPCS | Performed by: FAMILY MEDICINE

## 2025-04-29 RX ORDER — CARVEDILOL 12.5 MG/1
12.5 TABLET ORAL 2 TIMES DAILY
Qty: 180 TABLET | Refills: 3 | Status: SHIPPED | OUTPATIENT
Start: 2025-04-29

## 2025-04-29 RX ORDER — MIRTAZAPINE 15 MG/1
TABLET, FILM COATED ORAL
COMMUNITY
Start: 2025-04-22

## 2025-04-29 RX ORDER — GABAPENTIN 800 MG/1
800 TABLET ORAL 3 TIMES DAILY
Qty: 270 TABLET | Refills: 0 | Status: SHIPPED | OUTPATIENT
Start: 2025-04-29 | End: 2025-05-29

## 2025-04-29 NOTE — PATIENT INSTRUCTIONS
use of this information.         A Healthy Heart: Care Instructions  Overview     Coronary artery disease, also called heart disease, occurs when a substance called plaque builds up in the vessels that supply oxygen-rich blood to your heart muscle. This can narrow the blood vessels and reduce blood flow. A heart attack happens when blood flow is completely blocked. A high-fat diet, smoking, and other factors increase the risk of heart disease.  Your doctor has found that you have a chance of having heart disease. A heart-healthy lifestyle can help keep your heart healthy and prevent heart disease. This lifestyle includes eating healthy, being active, staying at a weight that's healthy for you, and not smoking or using tobacco. It also includes taking medicines as directed, managing other health conditions, and trying to get a healthy amount of sleep.  Follow-up care is a key part of your treatment and safety. Be sure to make and go to all appointments, and call your doctor if you are having problems. It's also a good idea to know your test results and keep a list of the medicines you take.  How can you care for yourself at home?  Diet    Use less salt when you cook and eat. This helps lower your blood pressure. Taste food before salting. Add only a little salt when you think you need it. With time, your taste buds will adjust to less salt.     Eat fewer snack items, fast foods, canned soups, and other high-salt, high-fat, processed foods.     Read food labels and try to avoid saturated and trans fats. They increase your risk of heart disease by raising cholesterol levels.     Limit the amount of solid fat--butter, margarine, and shortening--you eat. Use olive, peanut, or canola oil when you cook. Bake, broil, and steam foods instead of frying them.     Eat a variety of fruit and vegetables every day. Dark green, deep orange, red, or yellow fruits and vegetables are especially good for you. Examples include spinach,

## 2025-04-29 NOTE — PROGRESS NOTES
Medicare Annual Wellness Visit    Nirmal Petersen is here for Medicare AWV    Assessment & Plan   Medicare annual wellness visit, subsequent  Hyperglycemia  -     CBC with Auto Differential; Future  -     Comprehensive Metabolic Panel; Future  -     Lipid Panel; Future  -     PSA, Prostatic Specific Antigen; Future  -     T4, Free; Future  -     TSH; Future  -     Hemoglobin A1C; Future  -     Testosterone, free, total; Future  Secondary cardiomyopathy (HCC)  -     CBC with Auto Differential; Future  -     Comprehensive Metabolic Panel; Future  -     Lipid Panel; Future  -     PSA, Prostatic Specific Antigen; Future  -     T4, Free; Future  -     TSH; Future  -     Hemoglobin A1C; Future  -     Testosterone, free, total; Future  Prostate disorder  -     CBC with Auto Differential; Future  -     Comprehensive Metabolic Panel; Future  -     Lipid Panel; Future  -     PSA, Prostatic Specific Antigen; Future  -     T4, Free; Future  -     TSH; Future  -     Hemoglobin A1C; Future  -     Testosterone, free, total; Future  Alcoholic cardiomyopathy (HCC)  -     CBC with Auto Differential; Future  -     Comprehensive Metabolic Panel; Future  -     Lipid Panel; Future  -     PSA, Prostatic Specific Antigen; Future  -     T4, Free; Future  -     TSH; Future  -     Hemoglobin A1C; Future  -     Testosterone, free, total; Future  Polycythemia  -     CBC with Auto Differential; Future  -     Comprehensive Metabolic Panel; Future  -     Lipid Panel; Future  -     PSA, Prostatic Specific Antigen; Future  -     T4, Free; Future  -     TSH; Future  -     Hemoglobin A1C; Future  -     Testosterone, free, total; Future  Morbid obesity with BMI of 50.0-59.9, adult (HCC)  -     CBC with Auto Differential; Future  -     Comprehensive Metabolic Panel; Future  -     Lipid Panel; Future  -     PSA, Prostatic Specific Antigen; Future  -     T4, Free; Future  -     TSH; Future  -     Hemoglobin A1C; Future  -     Testosterone, free, total;

## 2025-05-02 ENCOUNTER — HOSPITAL ENCOUNTER (OUTPATIENT)
Age: 50
Discharge: HOME | End: 2025-05-02
Payer: MEDICARE

## 2025-05-02 DIAGNOSIS — E03.9 HYPOTHYROIDISM, UNSPECIFIED TYPE: ICD-10-CM

## 2025-05-02 DIAGNOSIS — N42.9 PROSTATE DISORDER: ICD-10-CM

## 2025-05-02 DIAGNOSIS — E66.01 MORBID OBESITY WITH BMI OF 50.0-59.9, ADULT (HCC): ICD-10-CM

## 2025-05-02 DIAGNOSIS — I42.6 ALCOHOLIC CARDIOMYOPATHY (HCC): ICD-10-CM

## 2025-05-02 DIAGNOSIS — J44.9 CHRONIC OBSTRUCTIVE PULMONARY DISEASE, UNSPECIFIED COPD TYPE (HCC): ICD-10-CM

## 2025-05-02 DIAGNOSIS — D75.1 POLYCYTHEMIA: ICD-10-CM

## 2025-05-02 DIAGNOSIS — R73.9 HYPERGLYCEMIA: ICD-10-CM

## 2025-05-02 DIAGNOSIS — I42.9 SECONDARY CARDIOMYOPATHY (HCC): ICD-10-CM

## 2025-05-02 LAB
ALBUMIN SERPL-MCNC: 4.3 G/DL (ref 3.5–5.2)
ALBUMIN/GLOB SERPL: 1.5 {RATIO} (ref 1–2.5)
ALP SERPL-CCNC: 102 U/L (ref 40–129)
ALT SERPL-CCNC: 15 U/L (ref 10–50)
ANION GAP SERPL CALCULATED.3IONS-SCNC: 10 MMOL/L (ref 9–16)
AST SERPL-CCNC: 17 U/L (ref 10–50)
BASOPHILS # BLD: 0.09 K/UL (ref 0–0.2)
BASOPHILS NFR BLD: 1 % (ref 0–2)
BILIRUB SERPL-MCNC: 0.4 MG/DL (ref 0–1.2)
BUN SERPL-MCNC: 15 MG/DL (ref 6–20)
CALCIUM SERPL-MCNC: 9.8 MG/DL (ref 8.6–10.4)
CHLORIDE SERPL-SCNC: 102 MMOL/L (ref 98–107)
CHOLEST SERPL-MCNC: 248 MG/DL (ref 0–199)
CHOLESTEROL/HDL RATIO: 4.5
CO2 SERPL-SCNC: 27 MMOL/L (ref 20–31)
CREAT SERPL-MCNC: 1 MG/DL (ref 0.7–1.2)
EOSINOPHIL # BLD: 0.23 K/UL (ref 0–0.44)
EOSINOPHILS RELATIVE PERCENT: 3 % (ref 1–4)
ERYTHROCYTE [DISTWIDTH] IN BLOOD BY AUTOMATED COUNT: 14.5 % (ref 11.8–14.4)
EST. AVERAGE GLUCOSE BLD GHB EST-MCNC: 103 MG/DL
GFR, ESTIMATED: >90 ML/MIN/1.73M2
GLUCOSE SERPL-MCNC: 109 MG/DL (ref 74–99)
HBA1C MFR BLD: 5.2 % (ref 4–6)
HCT VFR BLD AUTO: 49.9 % (ref 40.7–50.3)
HDLC SERPL-MCNC: 55 MG/DL
HGB BLD-MCNC: 16.1 G/DL (ref 13–17)
IMM GRANULOCYTES # BLD AUTO: 0.05 K/UL (ref 0–0.3)
IMM GRANULOCYTES NFR BLD: 1 %
LDLC SERPL CALC-MCNC: 156 MG/DL (ref 0–100)
LYMPHOCYTES NFR BLD: 1.84 K/UL (ref 1.1–3.7)
LYMPHOCYTES RELATIVE PERCENT: 20 % (ref 24–43)
MCH RBC QN AUTO: 26.9 PG (ref 25.2–33.5)
MCHC RBC AUTO-ENTMCNC: 32.3 G/DL (ref 28.4–34.8)
MCV RBC AUTO: 83.3 FL (ref 82.6–102.9)
MONOCYTES NFR BLD: 0.56 K/UL (ref 0.1–1.2)
MONOCYTES NFR BLD: 6 % (ref 3–12)
NEUTROPHILS NFR BLD: 69 % (ref 36–65)
NEUTS SEG NFR BLD: 6.28 K/UL (ref 1.5–8.1)
NRBC BLD-RTO: 0 PER 100 WBC
PLATELET # BLD AUTO: 201 K/UL (ref 138–453)
PMV BLD AUTO: 11.2 FL (ref 8.1–13.5)
POTASSIUM SERPL-SCNC: 4.3 MMOL/L (ref 3.7–5.3)
PROT SERPL-MCNC: 7.1 G/DL (ref 6.6–8.7)
PSA SERPL-MCNC: 0.09 NG/ML (ref 0–4)
RBC # BLD AUTO: 5.99 M/UL (ref 4.21–5.77)
RBC # BLD: ABNORMAL 10*6/UL
SHBG SERPL-SCNC: 33 NMOL/L (ref 19–76)
SODIUM SERPL-SCNC: 139 MMOL/L (ref 136–145)
T4 FREE SERPL-MCNC: 1.1 NG/DL (ref 0.92–1.68)
TESTOST FREE MFR SERPL: 30.6 PG/ML (ref 47–244)
TESTOST SERPL-MCNC: 163 NG/DL (ref 193–740)
TRIGL SERPL-MCNC: 183 MG/DL
TSH SERPL DL<=0.05 MIU/L-ACNC: 2.37 UIU/ML (ref 0.27–4.2)
VLDLC SERPL CALC-MCNC: 37 MG/DL (ref 1–30)
WBC OTHER # BLD: 9.1 K/UL (ref 3.5–11.3)

## 2025-05-02 PROCEDURE — 80053 COMPREHEN METABOLIC PANEL: CPT

## 2025-05-02 PROCEDURE — 84270 ASSAY OF SEX HORMONE GLOBUL: CPT

## 2025-05-02 PROCEDURE — 85025 COMPLETE CBC W/AUTO DIFF WBC: CPT

## 2025-05-02 PROCEDURE — 84153 ASSAY OF PSA TOTAL: CPT

## 2025-05-02 PROCEDURE — 80061 LIPID PANEL: CPT

## 2025-05-02 PROCEDURE — 84403 ASSAY OF TOTAL TESTOSTERONE: CPT

## 2025-05-02 PROCEDURE — 83036 HEMOGLOBIN GLYCOSYLATED A1C: CPT

## 2025-05-02 PROCEDURE — 84443 ASSAY THYROID STIM HORMONE: CPT

## 2025-05-02 PROCEDURE — 84439 ASSAY OF FREE THYROXINE: CPT

## 2025-05-02 PROCEDURE — 36415 COLL VENOUS BLD VENIPUNCTURE: CPT

## 2025-05-08 ENCOUNTER — TELEPHONE (OUTPATIENT)
Dept: FAMILY MEDICINE CLINIC | Age: 50
End: 2025-05-08

## 2025-05-12 ENCOUNTER — RESULTS FOLLOW-UP (OUTPATIENT)
Dept: FAMILY MEDICINE CLINIC | Age: 50
End: 2025-05-12

## 2025-05-12 DIAGNOSIS — E78.2 MIXED HYPERLIPIDEMIA: Primary | ICD-10-CM

## 2025-05-12 RX ORDER — ATORVASTATIN CALCIUM 40 MG/1
40 TABLET, FILM COATED ORAL DAILY
Qty: 90 TABLET | Refills: 0 | Status: SHIPPED | OUTPATIENT
Start: 2025-05-12

## 2025-06-16 ENCOUNTER — OFFICE VISIT (OUTPATIENT)
Dept: FAMILY MEDICINE CLINIC | Age: 50
End: 2025-06-16
Payer: MEDICARE

## 2025-06-16 VITALS
HEIGHT: 71 IN | DIASTOLIC BLOOD PRESSURE: 73 MMHG | BODY MASS INDEX: 44.1 KG/M2 | OXYGEN SATURATION: 95 % | SYSTOLIC BLOOD PRESSURE: 116 MMHG | WEIGHT: 315 LBS | HEART RATE: 68 BPM

## 2025-06-16 DIAGNOSIS — L72.9 INFECTED CYST OF SKIN: Primary | ICD-10-CM

## 2025-06-16 DIAGNOSIS — L08.9 INFECTED CYST OF SKIN: Primary | ICD-10-CM

## 2025-06-16 PROCEDURE — 3074F SYST BP LT 130 MM HG: CPT | Performed by: FAMILY MEDICINE

## 2025-06-16 PROCEDURE — 3078F DIAST BP <80 MM HG: CPT | Performed by: FAMILY MEDICINE

## 2025-06-16 PROCEDURE — 96372 THER/PROPH/DIAG INJ SC/IM: CPT | Performed by: FAMILY MEDICINE

## 2025-06-16 PROCEDURE — 99213 OFFICE O/P EST LOW 20 MIN: CPT | Performed by: FAMILY MEDICINE

## 2025-06-16 RX ORDER — TESTOSTERONE CYPIONATE 200 MG/ML
INJECTION, SOLUTION INTRAMUSCULAR
COMMUNITY
Start: 2025-05-22

## 2025-06-16 RX ORDER — LIDOCAINE HYDROCHLORIDE 10 MG/ML
2 INJECTION, SOLUTION INFILTRATION; PERINEURAL ONCE
Status: COMPLETED | OUTPATIENT
Start: 2025-06-16 | End: 2025-06-16

## 2025-06-16 RX ORDER — CEFTRIAXONE 1 G/1
1000 INJECTION, POWDER, FOR SOLUTION INTRAMUSCULAR; INTRAVENOUS ONCE
Status: COMPLETED | OUTPATIENT
Start: 2025-06-16 | End: 2025-06-16

## 2025-06-16 RX ORDER — DOXYCYCLINE HYCLATE 100 MG
100 TABLET ORAL 2 TIMES DAILY
Qty: 20 TABLET | Refills: 0 | Status: SHIPPED | OUTPATIENT
Start: 2025-06-16 | End: 2025-06-26

## 2025-06-16 RX ADMIN — CEFTRIAXONE 1000 MG: 1 INJECTION, POWDER, FOR SOLUTION INTRAMUSCULAR; INTRAVENOUS at 09:23

## 2025-06-16 RX ADMIN — LIDOCAINE HYDROCHLORIDE 2 ML: 10 INJECTION, SOLUTION INFILTRATION; PERINEURAL at 09:24

## 2025-06-16 ASSESSMENT — PATIENT HEALTH QUESTIONNAIRE - PHQ9
SUM OF ALL RESPONSES TO PHQ QUESTIONS 1-9: 0
1. LITTLE INTEREST OR PLEASURE IN DOING THINGS: NOT AT ALL
2. FEELING DOWN, DEPRESSED OR HOPELESS: NOT AT ALL
SUM OF ALL RESPONSES TO PHQ QUESTIONS 1-9: 0

## 2025-06-16 NOTE — PROGRESS NOTES
Current packs/day: 0.00     Types: Cigarettes     Quit date: 9/3/2017     Years since quittin.7    Smokeless tobacco: Never    Tobacco comments:     Pt. Declined smoking sensation referral   Substance Use Topics    Alcohol use: Not Currently     Comment: Qit 7 months and 5 days ago        HDL (mg/dL)   Date Value   2025 55     BUN (mg/dL)   Date Value   2025 15     Creatinine (mg/dL)   Date Value   2025 1.0     Glucose (mg/dL)   Date Value   2025 109 (H)     Hemoglobin A1C (%)   Date Value   2025 5.2              Subjective:      HPI  History of Present Illness  The patient presents for evaluation of an abscess.    He reports the presence of an abscess, which he first noticed several months ago. The abscess has since increased in size, prompting his concern. He also mentions that a portion of the abscess was expelled during a recent shower.        Review of Systems:     Constitutional: Negative for fever, appetite change and fatigue.        Family social and medical history reviewed and unchanged     HENT: Negative.  Negative for nosebleeds, trouble swallowing and neck pain.    Eyes: Negative for photophobia and visual disturbance.   Respiratory: Negative.  Negative for chest tightness and shortness of breath.    Cardiovascular: Negative.  Negative for chest pain and leg swelling.   Gastrointestinal: Negative.  Negative for abdominal pain and blood in stool.   Endocrine: Negative for cold intolerance and polyuria.   Genitourinary: Negative for dysuria and hematuria.   Musculoskeletal: Negative.    Skin: Negative for rash.   Allergic/Immunologic: Negative.    Neurological: Negative.  Negative for dizziness, weakness and numbness.   Hematological: Negative.  Negative for adenopathy. Does not bruise/bleed easily.   Psychiatric/Behavioral: Negative for sleep disturbance, dysphoric mood and  decreased concentration. The patient is not nervous/anxious.        Objective:     Physical

## 2025-06-25 ENCOUNTER — OFFICE VISIT (OUTPATIENT)
Age: 50
End: 2025-06-25
Payer: MEDICARE

## 2025-06-25 VITALS
BODY MASS INDEX: 44.1 KG/M2 | WEIGHT: 315 LBS | SYSTOLIC BLOOD PRESSURE: 106 MMHG | HEART RATE: 72 BPM | DIASTOLIC BLOOD PRESSURE: 74 MMHG | HEIGHT: 71 IN | OXYGEN SATURATION: 98 %

## 2025-06-25 DIAGNOSIS — L72.3 SEBACEOUS CYST: Primary | ICD-10-CM

## 2025-06-25 PROCEDURE — 99202 OFFICE O/P NEW SF 15 MIN: CPT

## 2025-06-25 PROCEDURE — 3074F SYST BP LT 130 MM HG: CPT

## 2025-06-25 PROCEDURE — 3078F DIAST BP <80 MM HG: CPT

## 2025-06-25 ASSESSMENT — PATIENT HEALTH QUESTIONNAIRE - PHQ9
SUM OF ALL RESPONSES TO PHQ QUESTIONS 1-9: 0
2. FEELING DOWN, DEPRESSED OR HOPELESS: NOT AT ALL
5. POOR APPETITE OR OVEREATING: NOT AT ALL
9. THOUGHTS THAT YOU WOULD BE BETTER OFF DEAD, OR OF HURTING YOURSELF: NOT AT ALL
SUM OF ALL RESPONSES TO PHQ QUESTIONS 1-9: 0
7. TROUBLE CONCENTRATING ON THINGS, SUCH AS READING THE NEWSPAPER OR WATCHING TELEVISION: NOT AT ALL
10. IF YOU CHECKED OFF ANY PROBLEMS, HOW DIFFICULT HAVE THESE PROBLEMS MADE IT FOR YOU TO DO YOUR WORK, TAKE CARE OF THINGS AT HOME, OR GET ALONG WITH OTHER PEOPLE: NOT DIFFICULT AT ALL
3. TROUBLE FALLING OR STAYING ASLEEP: NOT AT ALL
6. FEELING BAD ABOUT YOURSELF - OR THAT YOU ARE A FAILURE OR HAVE LET YOURSELF OR YOUR FAMILY DOWN: NOT AT ALL
8. MOVING OR SPEAKING SO SLOWLY THAT OTHER PEOPLE COULD HAVE NOTICED. OR THE OPPOSITE, BEING SO FIGETY OR RESTLESS THAT YOU HAVE BEEN MOVING AROUND A LOT MORE THAN USUAL: NOT AT ALL
4. FEELING TIRED OR HAVING LITTLE ENERGY: NOT AT ALL
SUM OF ALL RESPONSES TO PHQ QUESTIONS 1-9: 0
SUM OF ALL RESPONSES TO PHQ QUESTIONS 1-9: 0
1. LITTLE INTEREST OR PLEASURE IN DOING THINGS: NOT AT ALL

## 2025-06-25 NOTE — PATIENT INSTRUCTIONS
Thank you letting us take care of you today. We hope that all your questions were addressed. If a question was overlooked or something else comes to mind after you return home, please call our office at [phone].    If you need to cancel or change an appointment, surgery or procedure, please contact the office at [phone].

## 2025-06-25 NOTE — PROGRESS NOTES
Visit Information    Have you changed or started any medications since your last visit including any over-the-counter medicines, vitamins, or herbal medicines? no   Have you stopped taking any of your medications? Is so, why? -  no  Are you having any side effects from any of your medications? - no    Have you seen any other physician or provider since your last visit?  no   Have you had any other diagnostic tests since your last visit?  no   Have you been seen in the emergency room and/or had an admission in a hospital since we last saw you?  no   Have you had your routine dental cleaning in the past 6 months?  no     Do you have an active MyChart account? If no, what is the barrier?  Yes    Patient Care Team:  Jesenia Miller DO as PCP - General (Family Medicine)  Jesenia Miller DO as PCP - Empaneled Provider  Raheem Perez MD as Referring Physician (Internal Medicine)  Dangelo Mancilla MD as Consulting Physician (Pulmonology)  Kt Tomlinson MD as Consulting Physician (Internal Medicine)  Slime Abreu MD as Consulting Physician (Gastroenterology)    Medical History Review  Past Medical, Family, and Social History reviewed and does not contribute to the patient presenting condition    Health Maintenance   Topic Date Due    Hepatitis B vaccine (1 of 3 - 19+ 3-dose series) Never done    Lipids  05/02/2026    Depression Monitoring  06/16/2026    Colorectal Cancer Screen  08/30/2026    Diabetes screen  05/02/2028    DTaP/Tdap/Td vaccine (3 - Td or Tdap) 03/24/2032    Annual Wellness Visit (Medicare Advantage)  Completed    Flu vaccine  Completed    Shingles vaccine  Completed    Pneumococcal 50+ years Vaccine  Completed    COVID-19 Vaccine  Completed    Hepatitis C screen  Completed    HIV screen  Completed    Hepatitis A vaccine  Aged Out    Hib vaccine  Aged Out    Polio vaccine  Aged Out    Meningococcal (ACWY) vaccine  Aged Out    Meningococcal B vaccine  Aged Out    GFR test (Diabetes, CKD 3-4, OR

## 2025-06-25 NOTE — PROGRESS NOTES
History and Physical  [facility]    Patient's Name/Date of Birth: Nirmal Petersen / 1975 (49 y.o.)    Date: June 25, 2025     HPI: Pt is a 49 y.o. male with medical history of CHF, COPD, hx DVT, previous tracheostomy and PEG tube, post tracheostomy tracheal stenosis who presents to [facility] for posterior neck cyst. Patient reports that this started about 3 months ago. He noticed purulent drainage intermittently. He is currently completing a doxycycline regimen. Patient reports that the cyst has grown in size and is more painful at this time. He does follow with Dr. Li who had placed the tracheal stent and removed it as well. Patient has a 17.5 pack year history.    Past Medical History:   Diagnosis Date    Anxiety     Arthritis     back    Back pain     Bipolar 1 disorder (HCC)     CHF (congestive heart failure) (HCC)     COPD (chronic obstructive pulmonary disease) (HCC)     Dvt femoral (deep venous thrombosis) ([facility])     R. leg    Hx of blood clots     2017    Hyperlipidemia     Hypertension     Morbid obesity due to excess calories (HCC)     CHRIS (obstructive sleep apnea)     CPAP    Pneumonia 2017    DUE TO OTHER AEROBIC GRAM-NEGATIVE BACTERIA, UNSPECIFIED LATERALITY, UNSPECIFIED PART OF LUNG     Respiratory failure ([facility]) 2017    Smoking     TIA (transient ischemic attack)     Tracheal stenosis 2023       Past Surgical History:   Procedure Laterality Date    ABDOMEN SURGERY      PEG tube 2017    COLONOSCOPY N/A 12/12/2019    COLONOSCOPY POLYPECTOMY HOT BIOPSY performed by Slime Abreu MD at [facility] OR    COLONOSCOPY N/A 06/02/2020    COLONOSCOPY POLYPECTOMY SNARE/COLD BIOPSY performed by Slime Abreu MD at [facility] OR    COLONOSCOPY N/A 06/06/2022    COLONOSCOPY POLYPECTOMY SNARE/COLD BIOPSY performed by Slime Abreu MD at [facility]    COLONOSCOPY  06/06/2022    COLONOSCOPY POLYPECTOMY HOT BIOPSY performed by Slime Abreu MD at [facility]    COLONOSCOPY  06/06/2022    COLONOSCOPY

## 2025-07-23 DIAGNOSIS — M54.50 LOW BACK PAIN WITHOUT SCIATICA, UNSPECIFIED BACK PAIN LATERALITY, UNSPECIFIED CHRONICITY: ICD-10-CM

## 2025-07-23 RX ORDER — GABAPENTIN 800 MG/1
800 TABLET ORAL 3 TIMES DAILY
Qty: 270 TABLET | Refills: 0 | Status: SHIPPED | OUTPATIENT
Start: 2025-07-23 | End: 2025-08-22

## 2025-07-23 RX ORDER — FLUTICASONE FUROATE, UMECLIDINIUM BROMIDE AND VILANTEROL TRIFENATATE 200; 62.5; 25 UG/1; UG/1; UG/1
1 POWDER RESPIRATORY (INHALATION) DAILY
Qty: 1 EACH | Refills: 11 | Status: SHIPPED | OUTPATIENT
Start: 2025-07-23

## 2025-07-23 NOTE — TELEPHONE ENCOUNTER
Nirmal Petersen is calling to request a refill on the following medication(s):    Last Visit Date (If Applicable):  6/16/2025    Next Visit Date:    Visit date not found    Medication Request:  Requested Prescriptions     Pending Prescriptions Disp Refills    fluticasone-umeclidin-vilant (TRELEGY ELLIPTA) 200-62.5-25 MCG/ACT AEPB inhaler 1 each 11     Sig: Inhale 1 puff into the lungs daily    gabapentin (NEURONTIN) 800 MG tablet 270 tablet 0     Sig: Take 1 tablet by mouth 3 times daily for 30 days.

## 2025-08-26 DIAGNOSIS — E78.2 MIXED HYPERLIPIDEMIA: ICD-10-CM

## 2025-08-26 RX ORDER — ATORVASTATIN CALCIUM 40 MG/1
40 TABLET, FILM COATED ORAL DAILY
Qty: 90 TABLET | Refills: 0 | Status: SHIPPED | OUTPATIENT
Start: 2025-08-26

## (undated) DEVICE — ELECTRODE PT RET AD L9FT HI MOIST COND ADH HYDRGEL CORDED

## (undated) DEVICE — STAZ ENDO KIT: Brand: MEDLINE INDUSTRIES, INC.

## (undated) DEVICE — TRAP SURG QUAD PARABOLA SLOT DSGN SFTY SCRN TRAPEASE

## (undated) DEVICE — TRAP SPEC RETRV CLR PLAS POLYP IN LN SUCT QUIK CTCH

## (undated) DEVICE — CUP MED 1OZ CLR POLYPR FEED GRAD W/O LID

## (undated) DEVICE — GAUZE,SPONGE,4"X4",16PLY,STRL,LF,10/TRAY: Brand: MEDLINE

## (undated) DEVICE — SNARE ENDOSCP M W27MMXL240CM SHTH DIA2.4MM OVL FLX DISP

## (undated) DEVICE — PROTECTOR ULN NRV PUR FOAM HK LOOP STRP ANATOMICALLY

## (undated) DEVICE — FORCEPS BX L240CM WRK CHN 2.8MM STD CAP W/ NDL MIC MESH

## (undated) DEVICE — GLOVE ORTHO 8   MSG9480

## (undated) DEVICE — SNARE ENDOSCP L240CM W15MM SHTH DIA2.4MM CHN 2.8MM STIFF

## (undated) DEVICE — BASIN EMSIS 700ML GRAPHITE PLAS KID SHP GRAD

## (undated) DEVICE — TRAP POLYP ETRAP

## (undated) DEVICE — SNARE ENDOSCP M L240CM LOOP W27MM SHTH DIA2.4MM OVL FLX